# Patient Record
Sex: MALE | Race: BLACK OR AFRICAN AMERICAN | NOT HISPANIC OR LATINO | Employment: OTHER | URBAN - METROPOLITAN AREA
[De-identification: names, ages, dates, MRNs, and addresses within clinical notes are randomized per-mention and may not be internally consistent; named-entity substitution may affect disease eponyms.]

---

## 2017-01-02 DIAGNOSIS — I10 ESSENTIAL HYPERTENSION: ICD-10-CM

## 2017-01-02 RX ORDER — HYDRALAZINE HYDROCHLORIDE 50 MG/1
TABLET, FILM COATED ORAL
Qty: 180 TABLET | Refills: 0 | Status: SHIPPED | OUTPATIENT
Start: 2017-01-02 | End: 2017-02-16 | Stop reason: SDUPTHER

## 2017-01-16 RX ORDER — CALCITRIOL 0.5 UG/1
CAPSULE ORAL
Qty: 90 CAPSULE | Refills: 4 | Status: ON HOLD | OUTPATIENT
Start: 2017-01-16 | End: 2017-04-05 | Stop reason: HOSPADM

## 2017-01-22 ENCOUNTER — HOSPITAL ENCOUNTER (OUTPATIENT)
Facility: HOSPITAL | Age: 82
Discharge: HOME | End: 2017-01-23
Attending: EMERGENCY MEDICINE | Admitting: INTERNAL MEDICINE
Payer: MEDICARE

## 2017-01-22 DIAGNOSIS — R10.30 LOWER ABDOMINAL PAIN: ICD-10-CM

## 2017-01-22 DIAGNOSIS — R53.1 WEAKNESS FOLLOWING CEREBROVASCULAR ACCIDENT (CVA): Chronic | ICD-10-CM

## 2017-01-22 DIAGNOSIS — N18.4 ANEMIA OF CHRONIC RENAL FAILURE, STAGE 4 (SEVERE): Chronic | ICD-10-CM

## 2017-01-22 DIAGNOSIS — E10.65 TYPE 1 DIABETES MELLITUS WITH HYPERGLYCEMIA: Primary | ICD-10-CM

## 2017-01-22 DIAGNOSIS — N18.4 CKD (CHRONIC KIDNEY DISEASE) STAGE 4, GFR 15-29 ML/MIN: Chronic | ICD-10-CM

## 2017-01-22 DIAGNOSIS — I15.0 RENOVASCULAR HYPERTENSION: Chronic | ICD-10-CM

## 2017-01-22 DIAGNOSIS — G40.909 SECONDARY SEIZURE DISORDER: Chronic | ICD-10-CM

## 2017-01-22 DIAGNOSIS — G31.84 MCI (MILD COGNITIVE IMPAIRMENT): Chronic | ICD-10-CM

## 2017-01-22 DIAGNOSIS — E10.65 HYPERGLYCEMIA DUE TO TYPE 1 DIABETES MELLITUS: ICD-10-CM

## 2017-01-22 DIAGNOSIS — D63.1 ANEMIA OF CHRONIC RENAL FAILURE, STAGE 4 (SEVERE): Chronic | ICD-10-CM

## 2017-01-22 DIAGNOSIS — I69.398 WEAKNESS FOLLOWING CEREBROVASCULAR ACCIDENT (CVA): Chronic | ICD-10-CM

## 2017-01-22 LAB
B-OH-BUTYR BLD STRIP-SCNC: 1 MMOL/L
BACTERIA #/AREA URNS AUTO: NORMAL /HPF
BASOPHILS # BLD AUTO: 0.02 K/UL
BASOPHILS NFR BLD: 0.4 %
BILIRUB UR QL STRIP: NEGATIVE
CLARITY UR REFRACT.AUTO: CLEAR
COLOR UR AUTO: ABNORMAL
DIFFERENTIAL METHOD: ABNORMAL
EOSINOPHIL # BLD AUTO: 0.2 K/UL
EOSINOPHIL NFR BLD: 2.8 %
ERYTHROCYTE [DISTWIDTH] IN BLOOD BY AUTOMATED COUNT: 12.4 %
GLUCOSE UR QL STRIP: ABNORMAL
HCT VFR BLD AUTO: 34.9 %
HGB BLD-MCNC: 11.8 G/DL
HGB UR QL STRIP: ABNORMAL
KETONES UR QL STRIP: NEGATIVE
LEUKOCYTE ESTERASE UR QL STRIP: NEGATIVE
LYMPHOCYTES # BLD AUTO: 1.3 K/UL
LYMPHOCYTES NFR BLD: 23.8 %
MCH RBC QN AUTO: 29.3 PG
MCHC RBC AUTO-ENTMCNC: 33.8 %
MCV RBC AUTO: 87 FL
MICROSCOPIC COMMENT: NORMAL
MONOCYTES # BLD AUTO: 0.6 K/UL
MONOCYTES NFR BLD: 11.7 %
NEUTROPHILS # BLD AUTO: 3.2 K/UL
NEUTROPHILS NFR BLD: 61.3 %
NITRITE UR QL STRIP: NEGATIVE
PH UR STRIP: 7 [PH] (ref 5–8)
PLATELET # BLD AUTO: 241 K/UL
PMV BLD AUTO: 11.9 FL
PROT UR QL STRIP: ABNORMAL
RBC # BLD AUTO: 4.03 M/UL
RBC #/AREA URNS AUTO: 1 /HPF (ref 0–4)
SP GR UR STRIP: 1.01 (ref 1–1.03)
URN SPEC COLLECT METH UR: ABNORMAL
UROBILINOGEN UR STRIP-ACNC: NEGATIVE EU/DL
WBC # BLD AUTO: 5.29 K/UL
WBC #/AREA URNS AUTO: 1 /HPF (ref 0–5)
YEAST UR QL AUTO: NORMAL

## 2017-01-22 PROCEDURE — 85025 COMPLETE CBC W/AUTO DIFF WBC: CPT

## 2017-01-22 PROCEDURE — 99285 EMERGENCY DEPT VISIT HI MDM: CPT

## 2017-01-22 PROCEDURE — 82010 KETONE BODYS QUAN: CPT

## 2017-01-22 PROCEDURE — 81001 URINALYSIS AUTO W/SCOPE: CPT

## 2017-01-22 PROCEDURE — 93010 ELECTROCARDIOGRAM REPORT: CPT | Mod: ,,, | Performed by: INTERNAL MEDICINE

## 2017-01-22 PROCEDURE — 93005 ELECTROCARDIOGRAM TRACING: CPT

## 2017-01-22 PROCEDURE — 83036 HEMOGLOBIN GLYCOSYLATED A1C: CPT

## 2017-01-22 PROCEDURE — 99285 EMERGENCY DEPT VISIT HI MDM: CPT | Mod: ,,, | Performed by: EMERGENCY MEDICINE

## 2017-01-22 PROCEDURE — 96374 THER/PROPH/DIAG INJ IV PUSH: CPT

## 2017-01-22 PROCEDURE — 80053 COMPREHEN METABOLIC PANEL: CPT

## 2017-01-22 PROCEDURE — 25000003 PHARM REV CODE 250: Performed by: ANESTHESIOLOGY

## 2017-01-22 PROCEDURE — 96361 HYDRATE IV INFUSION ADD-ON: CPT

## 2017-01-22 PROCEDURE — 82962 GLUCOSE BLOOD TEST: CPT

## 2017-01-22 RX ADMIN — SODIUM CHLORIDE 1000 ML: 0.9 INJECTION, SOLUTION INTRAVENOUS at 09:01

## 2017-01-22 NOTE — IP AVS SNAPSHOT
[facility]  [address]  [facility] [address]  Phone: [phone]           Patient Discharge Instructions     Our goal is to set you up for success. This packet includes information on your condition, medications, and your home care. It will help you to care for yourself so you don't get sicker and need to go back to the hospital.     Please ask your nurse if you have any questions.        There are many details to remember when preparing to leave the hospital. Here is what you will need to do:    1. Take your medicine. If you are prescribed medications, review your Medication List in the following pages. You may have new medications to  at the pharmacy and others that you'll need to stop taking. Review the instructions for how and when to take your medications. Talk with your doctor or nurses if you are unsure of what to do.     2. Go to your follow-up appointments. Specific follow-up information is listed in the following pages. Your may be contacted by a transition nurse or clinical provider about future appointments. Be sure we have all of the phone numbers to reach you, if needed. Please contact your provider's office if you are unable to make an appointment.     3. Watch for warning signs. Your doctor or nurse will give you detailed warning signs to watch for and when to call for assistance. These instructions may also include educational information about your condition. If you experience any of warning signs to your health, call your doctor.               [facility]  Unless otherwise directed by your provider, please contact [facility], our nurse care line that is available for 24/7 assistance.     [phone] (toll-free)    Registered nurses in the [facility] provide clinical advisement, health education, appointment booking, and other advisory services.                    ** Verify the list of medication(s) below is accurate and up  "to date. Carry this with you in case of emergency. If your medications have changed, please notify your healthcare provider.             Medication List      CHANGE how you take these medications        Additional Info                      amlodipine 10 MG tablet   Commonly known as:  NORVASC   Quantity:  90 tablet   Refills:  4   What changed:  Another medication with the same name was removed. Continue taking this medication, and follow the directions you see here.    Last time this was given:  10 mg on 1/23/2017  8:40 AM   Instructions:  TAKE 1 TABLET BY MOUTH EVERY DAY     Begin Date    AM    Noon    PM    Bedtime       pen needle, diabetic 31 gauge x 5/16" Ndle   Commonly known as:  BD INSULIN PEN NEEDLE UF SHORT   Quantity:  450 each   Refills:  3   What changed:  additional instructions    Instructions:  Use to inject insulin 5 times daily     Begin Date    AM    Noon    PM    Bedtime         CONTINUE taking these medications        Additional Info                      aspirin 81 MG EC tablet   Commonly known as:  ECOTRIN   Refills:  0   Dose:  81 mg    Last time this was given:  81 mg on 1/23/2017  8:40 AM   Instructions:  Take 81 mg by mouth once daily.     Begin Date    AM    Noon    PM    Bedtime       blood-glucose meter kit   Commonly known as:  CONTOUR METER   Quantity:  1 each   Refills:  0    Instructions:  Use as instructed.  Please substitute appropriate meter to match his strips.     Begin Date    AM    Noon    PM    Bedtime       calcitRIOL 0.5 MCG Cap   Commonly known as:  ROCALTROL   Quantity:  90 capsule   Refills:  4    Last time this was given:  0.5 mcg on 1/23/2017  8:40 AM   Instructions:  TAKE 1 CAPSULE BY MOUTH ONCE DAILY     Begin Date    AM    Noon    PM    Bedtime       carvedilol 3.125 MG tablet   Commonly known as:  COREG   Quantity:  180 tablet   Refills:  3   Dose:  3.125 mg    Last time this was given:  3.125 mg on 1/23/2017  8:40 AM   Instructions:  Take 1 tablet (3.125 mg " total) by mouth 2 (two) times daily.     Begin Date    AM    Noon    PM    Bedtime       CONTOUR TEST STRIPS Strp   Quantity:  300 strip   Refills:  12   Comments:  **Patient requests 90 days supply**   Generic drug:  blood sugar diagnostic    Instructions:  USE AS DIRECTED THREE TIMES DAILY     Begin Date    AM    Noon    PM    Bedtime       furosemide 20 MG tablet   Commonly known as:  LASIX   Quantity:  90 tablet   Refills:  3    Last time this was given:  20 mg on 1/23/2017  8:40 AM   Instructions:  TAKE 1 TABLET BY MOUTH EVERY DAY     Begin Date    AM    Noon    PM    Bedtime       * hydrALAZINE 50 MG tablet   Commonly known as:  APRESOLINE   Quantity:  180 tablet   Refills:  4   Dose:  50 mg    Last time this was given:  50 mg on 1/23/2017  8:40 AM   Instructions:  Take 1 tablet (50 mg total) by mouth every 12 (twelve) hours.     Begin Date    AM    Noon    PM    Bedtime       * hydrALAZINE 50 MG tablet   Commonly known as:  APRESOLINE   Quantity:  180 tablet   Refills:  0    Last time this was given:  50 mg on 1/23/2017  8:40 AM   Instructions:  TAKE 1 TABLET BY MOUTH EVERY 12 HOURS     Begin Date    AM    Noon    PM    Bedtime       insulin aspart 100 unit/mL Inpn pen   Commonly known as:  NOVOLOG FLEXPEN   Quantity:  1 Box   Refills:  6   Dose:  5 Units    Last time this was given:  7 Units on 1/23/2017  8:38 AM   Instructions:  Inject 5 Units into the skin 3 (three) times daily with meals. Plus correction scale.     Begin Date    AM    Noon    PM    Bedtime       insulin detemir 100 unit/mL (3 mL) Inpn pen   Commonly known as:  LEVEMIR FLEXPEN   Quantity:  1 Box   Refills:  6   Dose:  15 Units    Last time this was given:  12 Units on 1/23/2017  8:39 AM   Instructions:  Inject 15 Units into the skin once daily.     Begin Date    AM    Noon    PM    Bedtime       lancets Misc   Quantity:  100 each   Refills:  3    Instructions:  Use three times daily for finger stick glucose monitoring.     Begin Date    AM     Noon    PM    Bedtime       levetiracetam 1000 MG tablet   Commonly known as:  KEPPRA   Quantity:  180 tablet   Refills:  6    Last time this was given:  1,000 mg on 1/23/2017  8:40 AM   Instructions:  TAKE 1 TABLET BY MOUTH TWICE DAILY     Begin Date    AM    Noon    PM    Bedtime       nystatin cream   Commonly known as:  MYCOSTATIN   Quantity:  60 g   Refills:  3    Instructions:  Apply topically 2 (two) times daily.     Begin Date    AM    Noon    PM    Bedtime       paroxetine 10 MG tablet   Commonly known as:  PAXIL   Quantity:  90 tablet   Refills:  1   Dose:  10 mg    Last time this was given:  10 mg on 1/23/2017  6:53 AM   Instructions:  Take 1 tablet (10 mg total) by mouth every morning.     Begin Date    AM    Noon    PM    Bedtime       * Notice:  This list has 2 medication(s) that are the same as other medications prescribed for you. Read the directions carefully, and ask your doctor or other care provider to review them with you.      ASK your doctor about these medications        Additional Info                      lisinopril 10 MG tablet   Quantity:  90 tablet   Refills:  4   You were prescribed two or more home medications with a similar name. You should completely stop taking this medication.    Instructions:  TAKE 1 TABLET BY MOUTH EVERY DAY     Begin Date    AM    Noon    PM    Bedtime                  Please bring to all follow up appointments:    1. A copy of your discharge instructions.  2. All medicines you are currently taking in their original bottles.  3. Identification and insurance card.    Please arrive 15 minutes ahead of scheduled appointment time.    Please call 24 hours in advance if you must reschedule your appointment and/or time.        Your Scheduled Appointments     Jan 26, 2017 12:00 PM CST   Established Patient Visit with MD Mathieu Hollis - Internal Medicine ([facility])    [address]  [facility] [address]    [phone]            Feb 09, 2017 11:00 AM CST   Non-Fasting Lab with LAB, APPOINTMENT [facility] ([facility])    [address]  [facility] [address]   [phone]            Feb 16, 2017  3:00 PM CST   Established Patient with Clara Coreas DNP, NP   [facility] - Endocrinology ([facility] )    [address] [id number] [phone]              Follow-up Information     Follow up with Mady Carson MD On 1/26/2017.    Specialty:  Internal Medicine    Why:  appointment 12:00pm    Contact information:    [address]  [place] [id number] [phone]          Schedule an appointment as soon as possible for a visit with ISABEL Guevara,ANP-C.    Specialty:  Endocrinology    Contact information:    [address] [id number] [phone]        Referrals     Future Orders    Ambulatory consult to Diabetic Education     Questions:    Diagnosis:      Reason For Referral (Please Check Appropriate Boxes):  Suboptimal blood glucose levels    Diabetes Self-Management Education Program:  Individual DSME    Reason for Indiviual DSME :      Ambulatory Referral to Endocrinology         Discharge Instructions     Future Orders    Activity as tolerated     Diet general     Questions:    Total calories:      Fat restriction, if any:      Protein restriction, if any:      Na restriction, if any:      Fluid restriction:      Additional restrictions:  Diabetic 1800        Primary Diagnosis     Your primary diagnosis was:  Type 1 Diabetes      Admission Information     Date & Time Provider Department CSN    1/22/2017  8:00 PM Jose Henderson MD [facility] [id number]      Care Providers     Provider Role Specialty Primary office phone    Jose Henderson MD Attending Provider Hospitalist [phone]    Jose Hendersno MD Team Attending  Hospitalist [phone]      Your Vitals Were  "    BP Pulse Temp Resp Height Weight    168/88 69 98.4 °F (36.9 °C) (Oral) 18 5' 7" (1.702 m) 79.8 kg (176 lb)    SpO2 BMI             100% 27.57 kg/m2         Recent Lab Values        4/14/2015 10/22/2015 1/19/2016 4/19/2016 6/27/2016 10/20/2016 12/8/2016 1/22/2017      5:37 PM 12:44 PM  2:07 PM  2:30 PM  9:47 PM 10:04 AM 12:23 PM  9:11 PM    A1C 11.2 (H) 8.9 (H) 8.3 (H) 8.5 (H) 8.9 (H) 7.9 (H) 9.1 (H) 9.5 (H)    Comment for A1C at 10:04 AM on 10/20/2016:  According to ADA guidelines, hemoglobin A1C <7.0% represents  optimal control in non-pregnant diabetic patients.  Different  metrics may apply to specific populations.   Standards of Medical Care in Diabetes - 2016.  For the purpose of screening for the presence of diabetes:  <5.7%     Consistent with the absence of diabetes  5.7-6.4%  Consistent with increasing risk for diabetes   (prediabetes)  >or=6.5%  Consistent with diabetes  Currently no consensus exists for use of hemoglobin A1C  for diagnosis of diabetes for children.      Comment for A1C at 12:23 PM on 12/8/2016:  According to ADA guidelines, hemoglobin A1C <7.0% represents  optimal control in non-pregnant diabetic patients.  Different  metrics may apply to specific populations.   Standards of Medical Care in Diabetes - 2016.  For the purpose of screening for the presence of diabetes:  <5.7%     Consistent with the absence of diabetes  5.7-6.4%  Consistent with increasing risk for diabetes   (prediabetes)  >or=6.5%  Consistent with diabetes  Currently no consensus exists for use of hemoglobin A1C  for diagnosis of diabetes for children.      Comment for A1C at  9:11 PM on 1/22/2017:  According to ADA guidelines, hemoglobin A1C <7.0% represents  optimal control in non-pregnant diabetic patients.  Different  metrics may apply to specific populations.   Standards of Medical Care in Diabetes - 2016.  For the purpose of screening for the presence of diabetes:  <5.7%     Consistent with the absence of " diabetes  5.7-6.4%  Consistent with increasing risk for diabetes   (prediabetes)  >or=6.5%  Consistent with diabetes  Currently no consensus exists for use of hemoglobin A1C  for diagnosis of diabetes for children.        Allergies as of 1/23/2017     No Known Allergies      Advance Directives     An advance directive is a document which, in the event you are no longer able to make decisions for yourself, tells your healthcare team what kind of treatment you do or do not want to receive, or who you would like to make those decisions for you.  If you do not currently have an advance directive, [facility] encourages you to create one.  For more information call:  [phone]), [phone] ([phone]),  or log on to [email or web address].        Smoking Cessation     If you would like to quit smoking:   You may be eligible for free services if you are a [place] resident and started smoking cigarettes before September 1, 1988.  Call the Smoking Cessation Trust ([facility]) toll free at (189) [phone] or [phone].   Call 3-730-QUIT-NOW if you do not meet the above criteria.            Language Assistance Services     ATTENTION: Language assistance services are available, free of charge. Please call [phone].      ATENCIÓN: Si habla español, tiene a miller disposición servicios gratuitos de asistencia lingüística. Llame al [phone].     CHÚ Ý: N?u b?n nói Ti?ng Vi?t, có các d?ch v? h? tr? ngôn ng? mi?n phí dành cho b?n. G?i s? [phone].        Chronic Kindey Disease Education             Diabetes Discharge Instructions                                    [facility] complies with applicable Federal civil rights laws and does not discriminate on the basis of race, color, national origin, age, disability, or sex.

## 2017-01-23 VITALS
RESPIRATION RATE: 18 BRPM | HEIGHT: 67 IN | HEART RATE: 69 BPM | BODY MASS INDEX: 27.62 KG/M2 | WEIGHT: 176 LBS | SYSTOLIC BLOOD PRESSURE: 168 MMHG | TEMPERATURE: 98 F | DIASTOLIC BLOOD PRESSURE: 88 MMHG | OXYGEN SATURATION: 100 %

## 2017-01-23 PROBLEM — R10.30 LOWER ABDOMINAL PAIN: Status: ACTIVE | Noted: 2017-01-23

## 2017-01-23 PROBLEM — E10.65 HYPERGLYCEMIA DUE TO TYPE 1 DIABETES MELLITUS: Status: ACTIVE | Noted: 2017-01-23

## 2017-01-23 LAB
ALBUMIN SERPL BCP-MCNC: 3 G/DL
ALP SERPL-CCNC: 100 U/L
ALT SERPL W/O P-5'-P-CCNC: 30 U/L
ANION GAP SERPL CALC-SCNC: 8 MMOL/L
ANION GAP SERPL CALC-SCNC: 9 MMOL/L
AST SERPL-CCNC: 22 U/L
BASOPHILS # BLD AUTO: 0.02 K/UL
BASOPHILS NFR BLD: 0.4 %
BILIRUB SERPL-MCNC: 0.4 MG/DL
BUN SERPL-MCNC: 21 MG/DL
BUN SERPL-MCNC: 23 MG/DL (ref 6–30)
BUN SERPL-MCNC: 25 MG/DL
CALCIUM SERPL-MCNC: 8.7 MG/DL
CALCIUM SERPL-MCNC: 9.9 MG/DL
CHLORIDE SERPL-SCNC: 103 MMOL/L (ref 95–110)
CHLORIDE SERPL-SCNC: 104 MMOL/L
CHLORIDE SERPL-SCNC: 106 MMOL/L
CO2 SERPL-SCNC: 25 MMOL/L
CO2 SERPL-SCNC: 28 MMOL/L
CREAT SERPL-MCNC: 1.9 MG/DL (ref 0.5–1.4)
CREAT SERPL-MCNC: 2 MG/DL
CREAT SERPL-MCNC: 2.2 MG/DL
DIFFERENTIAL METHOD: ABNORMAL
EOSINOPHIL # BLD AUTO: 0.2 K/UL
EOSINOPHIL NFR BLD: 3.3 %
ERYTHROCYTE [DISTWIDTH] IN BLOOD BY AUTOMATED COUNT: 12.5 %
EST. GFR  (AFRICAN AMERICAN): 30.7 ML/MIN/1.73 M^2
EST. GFR  (AFRICAN AMERICAN): 34.4 ML/MIN/1.73 M^2
EST. GFR  (NON AFRICAN AMERICAN): 26.5 ML/MIN/1.73 M^2
EST. GFR  (NON AFRICAN AMERICAN): 29.8 ML/MIN/1.73 M^2
ESTIMATED AVG GLUCOSE: 226 MG/DL
GLUCOSE SERPL-MCNC: 227 MG/DL
GLUCOSE SERPL-MCNC: 547 MG/DL (ref 70–110)
GLUCOSE SERPL-MCNC: 592 MG/DL
HBA1C MFR BLD HPLC: 9.5 %
HCT VFR BLD AUTO: 34.5 %
HCT VFR BLD CALC: 32 %PCV (ref 36–54)
HGB BLD-MCNC: 11.8 G/DL
LYMPHOCYTES # BLD AUTO: 1.9 K/UL
LYMPHOCYTES NFR BLD: 32.9 %
MCH RBC QN AUTO: 29.1 PG
MCHC RBC AUTO-ENTMCNC: 34.2 %
MCV RBC AUTO: 85 FL
MONOCYTES # BLD AUTO: 0.5 K/UL
MONOCYTES NFR BLD: 8.9 %
NEUTROPHILS # BLD AUTO: 3.1 K/UL
NEUTROPHILS NFR BLD: 54.3 %
PLATELET # BLD AUTO: 230 K/UL
PMV BLD AUTO: 10.6 FL
POC IONIZED CALCIUM: 1.2 MMOL/L (ref 1.06–1.42)
POC TCO2 (MEASURED): 27 MMOL/L (ref 23–29)
POCT GLUCOSE: 213 MG/DL (ref 70–110)
POCT GLUCOSE: 218 MG/DL (ref 70–110)
POCT GLUCOSE: 222 MG/DL (ref 70–110)
POCT GLUCOSE: 279 MG/DL (ref 70–110)
POCT GLUCOSE: 461 MG/DL (ref 70–110)
POCT GLUCOSE: >500 MG/DL (ref 70–110)
POCT GLUCOSE: >500 MG/DL (ref 70–110)
POTASSIUM BLD-SCNC: 3.4 MMOL/L (ref 3.5–5.1)
POTASSIUM SERPL-SCNC: 3.3 MMOL/L
POTASSIUM SERPL-SCNC: 3.3 MMOL/L
PROT SERPL-MCNC: 6.4 G/DL
RBC # BLD AUTO: 4.06 M/UL
SAMPLE: ABNORMAL
SODIUM BLD-SCNC: 138 MMOL/L (ref 136–145)
SODIUM SERPL-SCNC: 139 MMOL/L
SODIUM SERPL-SCNC: 141 MMOL/L
WBC # BLD AUTO: 5.71 K/UL

## 2017-01-23 PROCEDURE — 25000003 PHARM REV CODE 250: Performed by: PHYSICIAN ASSISTANT

## 2017-01-23 PROCEDURE — G0378 HOSPITAL OBSERVATION PER HR: HCPCS

## 2017-01-23 PROCEDURE — 63600175 PHARM REV CODE 636 W HCPCS: Performed by: ANESTHESIOLOGY

## 2017-01-23 PROCEDURE — 85025 COMPLETE CBC W/AUTO DIFF WBC: CPT

## 2017-01-23 PROCEDURE — 36415 COLL VENOUS BLD VENIPUNCTURE: CPT

## 2017-01-23 PROCEDURE — 80048 BASIC METABOLIC PNL TOTAL CA: CPT

## 2017-01-23 PROCEDURE — 25000003 PHARM REV CODE 250: Performed by: ANESTHESIOLOGY

## 2017-01-23 PROCEDURE — 99217 PR OBSERVATION CARE DISCHARGE: CPT | Mod: ,,, | Performed by: PHYSICIAN ASSISTANT

## 2017-01-23 PROCEDURE — 99220 PR INITIAL OBSERVATION CARE,LEVL III: CPT | Mod: ,,, | Performed by: PHYSICIAN ASSISTANT

## 2017-01-23 PROCEDURE — 63600175 PHARM REV CODE 636 W HCPCS: Performed by: PHYSICIAN ASSISTANT

## 2017-01-23 RX ORDER — PROMETHAZINE HYDROCHLORIDE 25 MG/1
25 TABLET ORAL EVERY 6 HOURS PRN
Status: DISCONTINUED | OUTPATIENT
Start: 2017-01-23 | End: 2017-01-23 | Stop reason: HOSPADM

## 2017-01-23 RX ORDER — HYDRALAZINE HYDROCHLORIDE 25 MG/1
50 TABLET, FILM COATED ORAL EVERY 12 HOURS
Status: DISCONTINUED | OUTPATIENT
Start: 2017-01-23 | End: 2017-01-23

## 2017-01-23 RX ORDER — GLUCAGON 1 MG
1 KIT INJECTION
Status: DISCONTINUED | OUTPATIENT
Start: 2017-01-23 | End: 2017-01-23 | Stop reason: HOSPADM

## 2017-01-23 RX ORDER — AMLODIPINE BESYLATE 10 MG/1
10 TABLET ORAL DAILY
Status: DISCONTINUED | OUTPATIENT
Start: 2017-01-23 | End: 2017-01-23 | Stop reason: HOSPADM

## 2017-01-23 RX ORDER — IBUPROFEN 200 MG
24 TABLET ORAL
Status: DISCONTINUED | OUTPATIENT
Start: 2017-01-23 | End: 2017-01-23 | Stop reason: HOSPADM

## 2017-01-23 RX ORDER — POTASSIUM CHLORIDE 20 MEQ/1
40 TABLET, EXTENDED RELEASE ORAL ONCE
Status: COMPLETED | OUTPATIENT
Start: 2017-01-23 | End: 2017-01-23

## 2017-01-23 RX ORDER — CARVEDILOL 3.12 MG/1
3.12 TABLET ORAL 2 TIMES DAILY
Status: DISCONTINUED | OUTPATIENT
Start: 2017-01-23 | End: 2017-01-23 | Stop reason: HOSPADM

## 2017-01-23 RX ORDER — LEVETIRACETAM 500 MG/1
1000 TABLET ORAL 2 TIMES DAILY
Status: DISCONTINUED | OUTPATIENT
Start: 2017-01-23 | End: 2017-01-23 | Stop reason: HOSPADM

## 2017-01-23 RX ORDER — POLYETHYLENE GLYCOL 3350 17 G/17G
17 POWDER, FOR SOLUTION ORAL 3 TIMES DAILY PRN
Status: DISCONTINUED | OUTPATIENT
Start: 2017-01-23 | End: 2017-01-23 | Stop reason: HOSPADM

## 2017-01-23 RX ORDER — RAMELTEON 8 MG/1
8 TABLET ORAL NIGHTLY PRN
Status: DISCONTINUED | OUTPATIENT
Start: 2017-01-23 | End: 2017-01-23 | Stop reason: HOSPADM

## 2017-01-23 RX ORDER — CALCITRIOL 0.25 UG/1
0.5 CAPSULE ORAL DAILY
Status: DISCONTINUED | OUTPATIENT
Start: 2017-01-23 | End: 2017-01-23 | Stop reason: HOSPADM

## 2017-01-23 RX ORDER — IPRATROPIUM BROMIDE AND ALBUTEROL SULFATE 2.5; .5 MG/3ML; MG/3ML
3 SOLUTION RESPIRATORY (INHALATION) EVERY 4 HOURS PRN
Status: DISCONTINUED | OUTPATIENT
Start: 2017-01-23 | End: 2017-01-23 | Stop reason: HOSPADM

## 2017-01-23 RX ORDER — IBUPROFEN 200 MG
16 TABLET ORAL
Status: DISCONTINUED | OUTPATIENT
Start: 2017-01-23 | End: 2017-01-23 | Stop reason: HOSPADM

## 2017-01-23 RX ORDER — ASPIRIN 81 MG/1
81 TABLET ORAL DAILY
Status: DISCONTINUED | OUTPATIENT
Start: 2017-01-23 | End: 2017-01-23 | Stop reason: HOSPADM

## 2017-01-23 RX ORDER — ONDANSETRON 8 MG/1
8 TABLET, ORALLY DISINTEGRATING ORAL EVERY 8 HOURS PRN
Status: DISCONTINUED | OUTPATIENT
Start: 2017-01-23 | End: 2017-01-23 | Stop reason: HOSPADM

## 2017-01-23 RX ORDER — LOPERAMIDE HYDROCHLORIDE 2 MG/1
2 CAPSULE ORAL
Status: DISCONTINUED | OUTPATIENT
Start: 2017-01-23 | End: 2017-01-23 | Stop reason: HOSPADM

## 2017-01-23 RX ORDER — HYDRALAZINE HYDROCHLORIDE 50 MG/1
50 TABLET, FILM COATED ORAL EVERY 12 HOURS
Status: DISCONTINUED | OUTPATIENT
Start: 2017-01-23 | End: 2017-01-23 | Stop reason: HOSPADM

## 2017-01-23 RX ORDER — INSULIN ASPART 100 [IU]/ML
5 INJECTION, SOLUTION INTRAVENOUS; SUBCUTANEOUS
Status: DISCONTINUED | OUTPATIENT
Start: 2017-01-23 | End: 2017-01-23 | Stop reason: HOSPADM

## 2017-01-23 RX ORDER — ACETAMINOPHEN 325 MG/1
650 TABLET ORAL EVERY 4 HOURS PRN
Status: DISCONTINUED | OUTPATIENT
Start: 2017-01-23 | End: 2017-01-23 | Stop reason: HOSPADM

## 2017-01-23 RX ORDER — PAROXETINE 10 MG/1
10 TABLET, FILM COATED ORAL EVERY MORNING
Status: DISCONTINUED | OUTPATIENT
Start: 2017-01-23 | End: 2017-01-23 | Stop reason: HOSPADM

## 2017-01-23 RX ORDER — INSULIN ASPART 100 [IU]/ML
0-5 INJECTION, SOLUTION INTRAVENOUS; SUBCUTANEOUS
Status: DISCONTINUED | OUTPATIENT
Start: 2017-01-23 | End: 2017-01-23 | Stop reason: HOSPADM

## 2017-01-23 RX ORDER — FUROSEMIDE 20 MG/1
20 TABLET ORAL DAILY
Status: DISCONTINUED | OUTPATIENT
Start: 2017-01-23 | End: 2017-01-23 | Stop reason: HOSPADM

## 2017-01-23 RX ADMIN — AMLODIPINE BESYLATE 10 MG: 10 TABLET ORAL at 08:01

## 2017-01-23 RX ADMIN — INSULIN ASPART 5 UNITS: 100 INJECTION, SOLUTION INTRAVENOUS; SUBCUTANEOUS at 12:01

## 2017-01-23 RX ADMIN — INSULIN ASPART 2 UNITS: 100 INJECTION, SOLUTION INTRAVENOUS; SUBCUTANEOUS at 12:01

## 2017-01-23 RX ADMIN — CARVEDILOL 3.12 MG: 3.12 TABLET, FILM COATED ORAL at 08:01

## 2017-01-23 RX ADMIN — POTASSIUM CHLORIDE 40 MEQ: 1500 TABLET, EXTENDED RELEASE ORAL at 11:01

## 2017-01-23 RX ADMIN — FUROSEMIDE 20 MG: 20 TABLET ORAL at 08:01

## 2017-01-23 RX ADMIN — INSULIN DETEMIR 12 UNITS: 100 INJECTION, SOLUTION SUBCUTANEOUS at 08:01

## 2017-01-23 RX ADMIN — LEVETIRACETAM 1000 MG: 500 TABLET, FILM COATED ORAL at 08:01

## 2017-01-23 RX ADMIN — INSULIN ASPART 2 UNITS: 100 INJECTION, SOLUTION INTRAVENOUS; SUBCUTANEOUS at 08:01

## 2017-01-23 RX ADMIN — HYDRALAZINE HYDROCHLORIDE 50 MG: 50 TABLET ORAL at 08:01

## 2017-01-23 RX ADMIN — INSULIN ASPART 5 UNITS: 100 INJECTION, SOLUTION INTRAVENOUS; SUBCUTANEOUS at 08:01

## 2017-01-23 RX ADMIN — CALCITRIOL 0.5 MCG: 0.25 CAPSULE, LIQUID FILLED ORAL at 08:01

## 2017-01-23 RX ADMIN — SODIUM CHLORIDE 1000 ML: 0.9 INJECTION, SOLUTION INTRAVENOUS at 12:01

## 2017-01-23 RX ADMIN — ASPIRIN 81 MG: 81 TABLET, COATED ORAL at 08:01

## 2017-01-23 RX ADMIN — PAROXETINE HYDROCHLORIDE 10 MG: 10 TABLET, FILM COATED ORAL at 06:01

## 2017-01-23 RX ADMIN — INSULIN HUMAN 8 UNITS: 100 INJECTION, SOLUTION PARENTERAL at 12:01

## 2017-01-23 NOTE — DISCHARGE SUMMARY
[facility] Hospital Medicine  Discharge Summary      Patient Name: Julius Cardenas  MRN: [id number]  Admission Date: 1/22/2017  Hospital Length of Stay: 0 days  Discharge Date and Time: 1/23/2017 10:26 AM  Attending Physician: Jose Henderson MD   Discharging Provider: Yris Gamez PA-C  Primary Care Provider: Mady Carson MD  [facility] Medicine Team: [facility] [place] [facility] Yris Gamez PA-C    HPI:   Patient is an 84 year old gentleman with a h/o CKD 4, HTN, poorly controlled DM I, parasagittal meningioma with secondary seizure disorder, TIA, and mild cognitive impairment.  He presents with abdominal pain and hyperglycemia.  No family at bedside.  Abdominal pain began earlier today.  He has had two BMs today and is passing gas (one BM in ER).  He denies N/V/D, chest pain, SOB, dizziness, palpitations, fever/chills.  Glucose on arrival was 592.  He was given 1L NS bolus and 8 units regular insulin with improvement to 461.  He reports improvement with abdominal pain.    * No surgery found *      Indwelling Lines/Drains at time of discharge:   Lines/Drains/Airways          No matching active lines, drains, or airways        Hospital Course:   Pt admitted to observation for hyperglycemia (ED ).  Anion gap of 8, Beta-Hydroxybutyrate 1.0.  Pt not in DKA.  Glucose improved with IVF and administration of prescribed insulin (noncompliance an issue).  At time of discharge, glucose 227.  Pt to be discharged on Levemir 15U qhs and Novolog 5U tid with meals plus low dose correction scale.  Will refer to diabetes education outpatient.  Potassium of 3.3 replaced po prior to discharge.     Consults:   Consults         Status Ordering Provider     Case Management/  Once     Provider:  (Not yet assigned)    Acknowledged PEGGY HICKEY     Inpatient consult to Diabetes educator  Once     Provider:  (Not yet assigned)    Acknowledged PEGGY HICKEY.          Significant Diagnostic  Studies: Labs:   BMP:   Recent Labs  Lab 01/22/17  2349 01/23/17  0821   * 227*    141   K 3.3* 3.3*    104   CO2 25 28   BUN 25* 21   CREATININE 2.2* 2.0*   CALCIUM 8.7 9.9       Pending Diagnostic Studies:     None        Final Active Diagnoses:    Diagnosis Date Noted POA    PRINCIPAL PROBLEM:  Hyperglycemia due to type 1 diabetes mellitus [E10.65] 01/23/2017 Yes    Weakness following cerebrovascular accident (CVA) [I69.398, R53.1] 01/30/2016 Not Applicable     Chronic    Anemia of chronic renal failure, stage 4 (severe) [N18.4, D63.1] 04/08/2014 Yes     Chronic    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  Service: Hospital Medicine      Discharged Condition: good    Disposition: Home or Self Care    Follow Up:  Follow-up Information     Follow up with Mady Carson MD On 1/26/2017.    Specialty:  Internal Medicine    Why:  appointment 12:00pm    Contact information:    [address]  [place] [address]  [phone]          Schedule an appointment as soon as possible for a visit with ISABEL Guevara,ANP-C.    Specialty:  Endocrinology    Contact information:    [address] [facility] [id number] [phone]          Patient Instructions:     Ambulatory Referral to Endocrinology   Referral Priority: Routine Referral Type: Consultation   Requested Specialty: Endocrinology    Number of Visits Requested: 1      Ambulatory consult to Diabetic Education   Referral Priority: Routine Referral Type: Consultation   Referral  Reason: Specialty Services Required    Requested Specialty: Diabetes    Number of Visits Requested: 1      Diet general   Order Specific Question Answer Comments   Additional restrictions: Diabetic 1800      Activity as tolerated       Medications:  Reconciled Home Medications:   Current Discharge Medication List      CONTINUE these medications which have NOT CHANGED    Details   amlodipine (NORVASC) 10 MG tablet TAKE 1 TABLET BY MOUTH EVERY DAY  Qty: 90 tablet, Refills: 4      aspirin (ECOTRIN) 81 MG EC tablet Take 81 mg by mouth once daily.      blood-glucose meter (CONTOUR METER) kit Use as instructed.  Please substitute appropriate meter to match his strips.  Qty: 1 each, Refills: 0    Associated Diagnoses: DM2 (diabetes mellitus, type 2); Type II or unspecified type diabetes mellitus with renal manifestations, uncontrolled      calcitRIOL (ROCALTROL) 0.5 MCG Cap TAKE 1 CAPSULE BY MOUTH ONCE DAILY  Qty: 90 capsule, Refills: 4      carvedilol (COREG) 3.125 MG tablet Take 1 tablet (3.125 mg total) by mouth 2 (two) times daily.  Qty: 180 tablet, Refills: 3    Associated Diagnoses: Essential hypertension      CONTOUR TEST STRIPS Strp USE AS DIRECTED THREE TIMES DAILY  Qty: 300 strip, Refills: 12    Comments: **Patient requests 90 days supply**  Associated Diagnoses: Diabetes mellitus due to abnormal insulin      furosemide (LASIX) 20 MG tablet TAKE 1 TABLET BY MOUTH EVERY DAY  Qty: 90 tablet, Refills: 3      !! hydrALAZINE (APRESOLINE) 50 MG tablet Take 1 tablet (50 mg total) by mouth every 12 (twelve) hours.  Qty: 180 tablet, Refills: 4    Associated Diagnoses: Renovascular hypertension      !! hydrALAZINE (APRESOLINE) 50 MG tablet TAKE 1 TABLET BY MOUTH EVERY 12 HOURS  Qty: 180 tablet, Refills: 0    Associated Diagnoses: Essential hypertension      insulin aspart (NOVOLOG FLEXPEN) 100 unit/mL InPn pen Inject 5 Units into the skin 3 (three) times daily with meals. Plus correction scale.  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  Qty: 100 each, Refills: 3      levetiracetam (KEPPRA) 1000 MG tablet TAKE 1 TABLET BY MOUTH TWICE DAILY  Qty: 180 tablet, Refills: 6    Associated Diagnoses: Intractable epilepsy without status epilepticus      nystatin (MYCOSTATIN) cream Apply topically 2 (two) times daily.  Qty: 60 g, Refills: 3      paroxetine (PAXIL) 10 MG tablet Take 1 tablet (10 mg total) by mouth every morning.  Qty: 90 tablet, Refills: 1      pen needle, diabetic (BD INSULIN PEN NEEDLE UF SHORT) 31 gauge x 5/16" Ndle Use to inject insulin 5 times daily  Qty: 450 each, Refills: 3    Associated Diagnoses: 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       !! - Potential duplicate medications found. Please discuss with provider.      STOP taking these medications       lisinopril 10 MG tablet Comments:   Reason for Stopping:         lisinopril 10 MG tablet Comments:   Reason for Stopping:             Time spent on the discharge of patient: >30 minutes    Yris Gamez PA-C  Department of Hospital Medicine  [facility]  "

## 2017-01-23 NOTE — PROGRESS NOTES
Pt free of falls. Pain at LLQ but said manageable and no med needs for pain at this time. Cbg is within stable dtczt=206. Vs within pt baseline. No acute distress noted. Will continue to monitor pt.

## 2017-01-23 NOTE — SUBJECTIVE & OBJECTIVE
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     TIA (transient ischemic attack) 2016    Type 1 diabetes     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 3/25/2013    Type 1 diabetes mellitus with hyperglycemia 4/8/2014    Type 1 diabetes mellitus with hypoglycemia and without coma 4/8/2014    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      Poor control due to cognitive impairment, with history of hypoglycemia and DKA     Vitamin D deficiency disease 7/31/2013       Past Surgical History   Procedure Laterality Date    Cataract extraction w/  intraocular lens implant  8/26/2009, 10/14/2009    Craniotomy  1995    Craniotomy  12/21/2010    Eye surgery         Review of patient's allergies indicates:  No Known Allergies    No current facility-administered medications on file prior to encounter.      Current Outpatient Prescriptions on File Prior to Encounter   Medication Sig    amlodipine (NORVASC) 10 MG tablet TAKE 1 TABLET BY MOUTH EVERY DAY    aspirin (ECOTRIN) 81 MG EC tablet Take 81 mg by mouth once daily.    blood-glucose meter (CONTOUR METER) kit Use as instructed.  Please substitute appropriate meter to match his strips.    calcitRIOL (ROCALTROL) 0.5 MCG Cap TAKE 1 CAPSULE BY MOUTH ONCE DAILY    carvedilol (COREG) 3.125 MG tablet Take 1 tablet (3.125 mg total) by mouth 2 (two) times daily.     "CONTOUR TEST STRIPS Strp USE AS DIRECTED THREE TIMES DAILY    furosemide (LASIX) 20 MG tablet TAKE 1 TABLET BY MOUTH EVERY DAY    hydrALAZINE (APRESOLINE) 50 MG tablet Take 1 tablet (50 mg total) by mouth every 12 (twelve) hours.    hydrALAZINE (APRESOLINE) 50 MG tablet TAKE 1 TABLET BY MOUTH EVERY 12 HOURS    insulin aspart (NOVOLOG FLEXPEN) 100 unit/mL InPn pen Inject 5 Units into the skin 3 (three) times daily with meals. Plus correction scale.    insulin detemir (LEVEMIR FLEXPEN) 100 unit/mL (3 mL) SubQ InPn pen Inject 15 Units into the skin once daily.    lancets Misc Use three times daily for finger stick glucose monitoring.    levetiracetam (KEPPRA) 1000 MG tablet TAKE 1 TABLET BY MOUTH TWICE DAILY    nystatin (MYCOSTATIN) cream Apply topically 2 (two) times daily.    paroxetine (PAXIL) 10 MG tablet Take 1 tablet (10 mg total) by mouth every morning.    pen needle, diabetic (BD INSULIN PEN NEEDLE UF SHORT) 31 gauge x 5/16" Ndle Use to inject insulin 5 times daily (Patient taking differently: Use to inject insulin 4 times daily)    [DISCONTINUED] amlodipine (NORVASC) 10 MG tablet Take 1 tablet (10 mg total) by mouth once daily.    [DISCONTINUED] lisinopril 10 MG tablet Take 1 tablet (10 mg total) by mouth once daily.    [DISCONTINUED] lisinopril 10 MG tablet TAKE 1 TABLET BY MOUTH EVERY DAY     Family History     Problem Relation (Age of Onset)    Cataracts Mother    Diabetes Mother, Sister, Sister, Brother, Sister, Brother    No Known Problems Father, Son, Daughter, Maternal Grandmother, Maternal Grandfather, Paternal Grandmother, Paternal Grandfather, Maternal Aunt, Maternal Uncle, Paternal Aunt, Paternal Uncle        Social History Main Topics    Smoking status: Former Smoker     Packs/day: 1.00     Types: Cigarettes     Quit date: 12/31/1994    Smokeless tobacco: Former User     Quit date: 4/3/2010    Alcohol use No    Drug use: No    Sexual activity: Not on file     Review of Systems "   Constitutional: Negative for activity change, appetite change, chills, diaphoresis, fatigue, fever and unexpected weight change.   HENT: Negative for congestion, rhinorrhea, sore throat, trouble swallowing and voice change.    Eyes: Negative for visual disturbance.   Respiratory: Negative for cough, choking, chest tightness, shortness of breath and wheezing.    Cardiovascular: Negative for chest pain, palpitations and leg swelling.   Gastrointestinal: Positive for abdominal pain. Negative for abdominal distention, anal bleeding, blood in stool, constipation, diarrhea, nausea and vomiting.   Endocrine: Negative for cold intolerance, heat intolerance, polydipsia and polyuria.   Genitourinary: Negative for dysuria, flank pain, frequency, hematuria and urgency.   Musculoskeletal: Negative for arthralgias, back pain, joint swelling and myalgias.   Skin: Negative for color change and rash.   Neurological: Negative for dizziness, seizures, syncope, facial asymmetry, speech difficulty, weakness, light-headedness, numbness and headaches.   Hematological: Negative for adenopathy. Does not bruise/bleed easily.   Psychiatric/Behavioral: Negative for agitation, confusion, hallucinations and suicidal ideas.     Objective:     Vital Signs (Most Recent):  Temp: 97.7 °F (36.5 °C) (01/22/17 1620)  Pulse: 73 (01/23/17 0241)  Resp: 18 (01/23/17 0242)  BP: (!) 158/77 (01/23/17 0241)  SpO2: 100 % (01/23/17 0241) Vital Signs (24h Range):  Temp:  [97.7 °F (36.5 °C)] 97.7 °F (36.5 °C)  Pulse:  [72-79] 73  Resp:  [16-26] 18  SpO2:  [97 %-100 %] 100 %  BP: (158-195)/(72-90) 158/77     Weight: 77.1 kg (170 lb)  Body mass index is 26.63 kg/(m^2).    Physical Exam   Constitutional: He is oriented to person, place, and time. He appears well-developed and well-nourished. No distress.   HENT:   Head: Normocephalic and atraumatic.   Eyes: Pupils are equal, round, and reactive to light.   Neck: Neck supple. Carotid bruit is not present. No  thyromegaly present.   Cardiovascular: Normal rate and regular rhythm.  Exam reveals no gallop.    No murmur heard.  Pulmonary/Chest: Effort normal and breath sounds normal. No respiratory distress. He has no wheezes.   Abdominal: Bowel sounds are normal. He exhibits no distension. There is no splenomegaly or hepatomegaly. There is no tenderness.   Musculoskeletal: Normal range of motion. He exhibits no edema.   Neurological: He is alert and oriented to person, place, and time.   Skin: Skin is warm and dry. No rash noted.   Psychiatric: He has a normal mood and affect. His behavior is normal.        Significant Labs: All pertinent labs within the past 24 hours have been reviewed.    Significant Imaging: I have reviewed all pertinent imaging results/findings within the past 24 hours.

## 2017-01-23 NOTE — NURSING
Potassium replacement administered. Family contacted about need for transportation since pt d/c. Family stated they could not provide the ride and pt would need taxi like last time. Contacted DAVID Gonzales about ride. IV access removed. Catheter intact. Discharge instructions given to pt.     1145: Spoke with MJ Rizzo about ride. Ride set up for  at 1300.       1315: pt picked up. Personal belongings sent with pt. Pt stable upon leaving floor.

## 2017-01-23 NOTE — ED NOTES
Pt identifiers Julius Cardenas were checked and are correct  LOC: The patient is awake, alert, aware of environment with an appropriate affect. Oriented to person, place, and time, but answers questions inappropriately.  APPEARANCE: Pt resting comfortably, in no acute distress, pt is clean but does not appear well groomed. SKIN: Skin warm, dry and intact, loose skin turgor, dry mucous membranes.  RESPIRATORY: Airway is open and patent, respirations are spontaneous, even and unlabored, normal effort and rate. Lung sounds diminished but clear bilaterally  CARDIAC: Normal rate and rhythm, 3+ BLE edema noted, pedal pulses palpable.  ABDOMEN: Soft, tender to palpation, nondistended. Bowel sounds present x4 quads. Pt incontinent of urine and stool.    NEUROLOGIC: PERRL, facial expression is symmetrical, patient moving all extremities spontaneously.  Follows all commands appropriately  MUSCULOSKELETAL: No obvious deformities.

## 2017-01-23 NOTE — ED NOTES
Pt. Brought to bathroom in ED stretcher and was changed into hospital gown. Pt. Had many episodes of urine incontinence and home brief was saturated along with bed sheets. Perianal care provided. Pt. Changed into hospital gown and all clothing placed in labeled belonging bags (2x). Bed linens were changed and fresh sheets/blankets and hospital non-skid socks provided to patient.

## 2017-01-23 NOTE — ASSESSMENT & PLAN NOTE
- Continue amlodipine 10mg daily, Coreg 3.125mg daily, hydralazine 50mg BID, lisinopril 10mg daily.

## 2017-01-23 NOTE — ASSESSMENT & PLAN NOTE
- Glucose now improved to 279.  - Will start patient on 12 units Levemir daily and 5 units Novolog TID WM.  - Anion gap of 8, Beta-Hydroxybutyrate 1.0.  Not in DKA.  - Will consult diabetes educator.  Patient has multiple recent admission for issues pertaining to his diabetes.  I suspect his mild cognitive impairment is contributing.  12/8/2016 HgbA1c 9.1.

## 2017-01-23 NOTE — PLAN OF CARE
DAVID spoke to MJ Rizzo, and confirmed that pt needs a wc van to his house.  MJ was in pt's room and confirmed pt's address and p/u time.    DAVID called [facility] ([phone]) and spoke to Jeannine.  DAVID set up and  confirmed a 1pm  from 1160b, to his home.    DAVID informed pt's RN of p/u time.    Macrina Quevedo LCSW     [phone]  Critical Care ([facility])

## 2017-01-23 NOTE — UM SECONDARY REVIEW
Physician Advisor External - case referred to EHR via email from SAMI Lofton RN    Level of Care Issue

## 2017-01-23 NOTE — ED PROVIDER NOTES
Encounter Date: 1/22/2017    SCRIBE #1 NOTE: I, Fabienne Romo, am scribing for, and in the presence of,  Dr. Sauceda. I have scribed the following portions of the note - the Resident attestation.       History     Chief Complaint   Patient presents with    Abdominal Pain     +c/o generalized weakness; CBG >600 per EMS     Review of patient's allergies indicates:  No Known Allergies    HPI  The history is provided by the patient.   84 y M w PMHx of DM1 poorly controlled, CKD4, HTN, parasagittal meningioma with secondary seizure disorder, TIA and mild cognitive impairment presenting with abdominal pain and hyperglycemia. Patient reports abdominal pain that started earlier today. He could not give it a number. He say it does not radiate and is not associated with N/V, CP, SOB, fever, chills. He reports he had a BM today and is passing gas. He denies blood in his stools, and nursing confirmed as he had a large BM upon being roomed.       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     TIA (transient ischemic attack) 2016    Type 1 diabetes     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 3/25/2013    Type 1 diabetes mellitus with hyperglycemia 4/8/2014    Type 1 diabetes mellitus with hypoglycemia and without coma 4/8/2014    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      Poor control due to cognitive  impairment, with history of hypoglycemia and DKA     Vitamin D deficiency disease 7/31/2013     Past Medical History Pertinent Negatives   Diagnosis Date Noted    Amblyopia 2/1/2016    Arthritis 2/1/2016    Cataract 2/1/2016    Diabetic retinopathy 2/1/2016    Glaucoma 2/1/2016    Macular degeneration 2/1/2016    Retinal detachment 2/1/2016    Sickle cell anemia 2/1/2016    Sickle cell trait 2/1/2016    Strabismus 2/1/2016    Stroke 9/9/2016    Uveitis 2/1/2016     Past Surgical History   Procedure Laterality Date    Cataract extraction w/  intraocular lens implant  8/26/2009, 10/14/2009    Craniotomy  1995    Craniotomy  12/21/2010    Eye surgery       Family History   Problem Relation Age of Onset    Diabetes Mother     Cataracts Mother     No Known Problems Father     Diabetes Sister     No Known Problems Son     No Known Problems Daughter     No Known Problems Maternal Grandmother     No Known Problems Maternal Grandfather     No Known Problems Paternal Grandmother     No Known Problems Paternal Grandfather     Diabetes Sister     Diabetes Brother     No Known Problems Maternal Aunt     No Known Problems Maternal Uncle     No Known Problems Paternal Aunt     No Known Problems Paternal Uncle     Diabetes Sister     Diabetes Brother     Amblyopia Neg Hx     Blindness Neg Hx     Cancer Neg Hx     Glaucoma Neg Hx     Hypertension Neg Hx     Macular degeneration Neg Hx     Retinal detachment Neg Hx     Strabismus Neg Hx     Stroke Neg Hx     Thyroid disease Neg Hx      Social History   Substance Use Topics    Smoking status: Former Smoker     Packs/day: 1.00     Types: Cigarettes     Quit date: 12/31/1994    Smokeless tobacco: Former User     Quit date: 4/3/2010    Alcohol use No     Review of Systems   Constitutional: Negative for chills and fever.   HENT: Negative for congestion and sore throat.    Respiratory: Negative for chest tightness, shortness of breath and  wheezing.    Cardiovascular: Negative for chest pain and leg swelling.   Gastrointestinal: Positive for abdominal pain. Negative for blood in stool, diarrhea, nausea and vomiting.   Genitourinary: Negative for dysuria, frequency and urgency.   Musculoskeletal: Negative for back pain.   Skin: Negative for rash.   Neurological: Negative for dizziness, facial asymmetry, weakness and headaches.   Hematological: Does not bruise/bleed easily.   Psychiatric/Behavioral: Positive for decreased concentration. Negative for agitation and behavioral problems.       Physical Exam   Initial Vitals   BP Pulse Resp Temp SpO2   01/22/17 1620 01/22/17 1620 01/22/17 1620 01/22/17 1620 01/22/17 1620   182/89 73 16 97.7 °F (36.5 °C) 97 %     Physical Exam    Nursing note and vitals reviewed.  Constitutional: He appears well-developed and well-nourished. He is not diaphoretic. No distress.   HENT:   Head: Normocephalic and atraumatic.   Eyes: Pupils are equal, round, and reactive to light. No scleral icterus.   Neck: Normal range of motion. Neck supple.   Cardiovascular: Normal rate, regular rhythm and normal heart sounds.   Pulmonary/Chest: Breath sounds normal. No respiratory distress. He has no wheezes.   Abdominal: Bowel sounds are normal. He exhibits no distension. There is tenderness. There is guarding. There is no rebound.   BL lower quadrants exquisitely tender   Musculoskeletal: He exhibits no edema or tenderness.   Neurological: He is alert and oriented to person, place, and time. No cranial nerve deficit.   Psychiatric: He has a normal mood and affect.         ED Course   Procedures  Labs Reviewed   CBC W/ AUTO DIFFERENTIAL - Abnormal; Notable for the following:        Result Value    RBC 4.03 (*)     Hemoglobin 11.8 (*)     Hematocrit 34.9 (*)     All other components within normal limits   BETA - HYDROXYBUTYRATE, SERUM - Abnormal; Notable for the following:     Beta-Hydroxybutyrate 1.0 (*)     All other components within  normal limits   URINALYSIS - Abnormal; Notable for the following:     Protein, UA Trace (*)     Glucose, UA 4+ (*)     Occult Blood UA Trace (*)     All other components within normal limits   COMPREHENSIVE METABOLIC PANEL - Abnormal; Notable for the following:     Potassium 3.3 (*)     Glucose 592 (*)     BUN, Bld 25 (*)     Creatinine 2.2 (*)     Albumin 3.0 (*)     eGFR if  30.7 (*)     eGFR if non  26.5 (*)     All other components within normal limits   ISTAT PROCEDURE - Abnormal; Notable for the following:     POC Glucose 547 (*)     POC Creatinine 1.9 (*)     POC Potassium 3.4 (*)     POC Hematocrit 32 (*)     All other components within normal limits   POCT GLUCOSE - Abnormal; Notable for the following:     POCT Glucose 461 (*)     All other components within normal limits   POCT GLUCOSE - Abnormal; Notable for the following:     POCT Glucose 279 (*)     All other components within normal limits   URINALYSIS MICROSCOPIC   HEMOGLOBIN A1C     EKG Readings: (Independently Interpreted)   Rhythm: Normal Sinus Rhythm. Ectopy: No Ectopy. Conduction: Normal. ST Segments: Normal ST Segments. T Waves: Normal. Clinical Impression: Normal Sinus Rhythm       Differential diagnosis includes but not limited to DKA, constipation, obstruction. POCT Glucose >500. DKA protocol initiated. NS 1L bolus as he has normal EF. Beta-hydroxybutyrate, CBC, CMP, UA, CXR, EKG ordered.  Amaya Mcguire MD, PGY-3  02/01/2017  8:41 PM    Increased Beta-hydroxybutyrate. Ketones in urine. Chronic anemia. CXR wnl.  Amaya Mcguire MD, PGY-3  02/01/2017  10:43 PM    NS bolus 1L as well as 8 units of insulin given. No anion gap. Glucose 592. POCT glucose ordered.  Amaya Mcguire MD, PGY-3  02/01/2017  12:24 AM           Medical Decision Making:   History:   Old Medical Records: I decided to obtain old medical records.  Clinical Tests:   Lab Tests: Ordered and Reviewed  Radiological Study:  Ordered and Reviewed  Medical Tests: Ordered and Reviewed            Scribe Attestation:   Scribe #1: I performed the above scribed service and the documentation accurately describes the services I performed. I attest to the accuracy of the note.    Attending Attestation:   Physician Attestation Statement for Resident:  As the supervising MD   Physician Attestation Statement: I have personally seen and examined this patient.   I agree with the above history. -: This is a 84 year old male with history of DM type I with poorly controlled insulin presents with abdominal pain since earlier today. Pt denies CP, SOB, nausea, vomiting. Pt produced BM today. No blood in stool and passing gas.    As the supervising MD I agree with the above PE.   -: Tender in lower quadrant with guarding. No acute abdomen.    As the supervising MD I agree with the above treatment, course, plan, and disposition.   -: Will get labs, CXR.           Physician Attestation for Scribe:  Physician Attestation Statement for Scribe #1: I, Dr. Sauceda, reviewed documentation, as scribed by Fabienne Romo in my presence, and it is both accurate and complete.         Attending ED Notes:   84-year-old male not been taking his medications for his  Diabetes now presenting with significant hyperglycemia beta hydroxy it are is slightly elevated patient treated aggressively with hydration  Regular insulin with slight improvement but patient require further treatment in the hospital patient is placed in observation to internal medicine          ED Course     Clinical Impression:   The primary encounter diagnosis was Type 1 diabetes mellitus with hyperglycemia. Diagnoses of Lower abdominal pain, Hyperglycemia due to type 1 diabetes mellitus, CKD (chronic kidney disease) stage 4, GFR 15-29 ml/min, Renovascular hypertension, Anemia of chronic renal failure, stage 4 (severe), MCI (mild cognitive impairment), Secondary seizure disorder, and Weakness following  cerebrovascular accident (CVA) were also pertinent to this visit.    Disposition:   Disposition: Placed in Observation  Condition: Serious       Amaya Mcguire MD  Resident  01/23/17 0809       Cristofer Sauceda MD  02/01/17 0912

## 2017-01-23 NOTE — H&P
[facility] Medicine  History & Physical    Patient Name: Julius Cardenas  MRN: [id number]  Admission Date: 1/22/2017  Attending Physician: Jose Henderson MD   Primary Care Provider: Mady Carsno MD    [facility] Medicine Team: [facility]OSP MED F Jolly Woodard PA-C     Patient information was obtained from patient, past medical records and ER records.     Subjective:     Principal Problem:Hyperglycemia due to type 1 diabetes mellitus    Chief Complaint:  Abdominal pain     HPI: Patient is an 84 year old gentleman with a h/o CKD 4, HTN, poorly controlled DM I, parasagittal meningioma with secondary seizure disorder, TIA, and mild cognitive impairment.  He presents with abdominal pain and hyperglycemia.  No family at bedside.  Abdominal pain began earlier today.  He has had two BMs today and is passing gas (one BM in ER).  He denies N/V/D, chest pain, SOB, dizziness, palpitations, fever/chills.  Glucose on arrival was 592.  He was given 1L NS bolus and 8 units regular insulin with improvement to 461.  He reports improvement with abdominal pain.    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     TIA (transient ischemic attack) 2016    Type 1 diabetes     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 3/25/2013    Type 1 diabetes mellitus with hyperglycemia 4/8/2014    Type 1 diabetes mellitus with  hypoglycemia and without coma 4/8/2014    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      Poor control due to cognitive impairment, with history of hypoglycemia and DKA     Vitamin D deficiency disease 7/31/2013       Past Surgical History   Procedure Laterality Date    Cataract extraction w/  intraocular lens implant  8/26/2009, 10/14/2009    Craniotomy  1995    Craniotomy  12/21/2010    Eye surgery         Review of patient's allergies indicates:  No Known Allergies    No current facility-administered medications on file prior to encounter.      Current Outpatient Prescriptions on File Prior to Encounter   Medication Sig    amlodipine (NORVASC) 10 MG tablet TAKE 1 TABLET BY MOUTH EVERY DAY    aspirin (ECOTRIN) 81 MG EC tablet Take 81 mg by mouth once daily.    blood-glucose meter (CONTOUR METER) kit Use as instructed.  Please substitute appropriate meter to match his strips.    calcitRIOL (ROCALTROL) 0.5 MCG Cap TAKE 1 CAPSULE BY MOUTH ONCE DAILY    carvedilol (COREG) 3.125 MG tablet Take 1 tablet (3.125 mg total) by mouth 2 (two) times daily.    CONTOUR TEST STRIPS Strp USE AS DIRECTED THREE TIMES DAILY    furosemide (LASIX) 20 MG tablet TAKE 1 TABLET BY MOUTH EVERY DAY    hydrALAZINE (APRESOLINE) 50 MG tablet Take 1 tablet (50 mg total) by mouth every 12 (twelve) hours.    hydrALAZINE (APRESOLINE) 50 MG tablet TAKE 1 TABLET BY MOUTH EVERY 12 HOURS    insulin aspart (NOVOLOG FLEXPEN) 100 unit/mL InPn pen Inject 5 Units into the skin 3 (three) times daily with meals. Plus correction scale.    insulin detemir (LEVEMIR FLEXPEN) 100 unit/mL (3 mL) SubQ InPn pen Inject 15 Units into the skin once daily.    lancets Misc Use three times daily for finger stick glucose monitoring.    levetiracetam (KEPPRA) 1000 MG tablet TAKE 1 TABLET BY MOUTH TWICE DAILY    nystatin (MYCOSTATIN) cream Apply topically 2 (two) times daily.    paroxetine (PAXIL) 10 MG tablet Take 1 tablet (10 mg total)  "by mouth every morning.    pen needle, diabetic (BD INSULIN PEN NEEDLE UF SHORT) 31 gauge x 5/16" Ndle Use to inject insulin 5 times daily (Patient taking differently: Use to inject insulin 4 times daily)    [DISCONTINUED] amlodipine (NORVASC) 10 MG tablet Take 1 tablet (10 mg total) by mouth once daily.    [DISCONTINUED] lisinopril 10 MG tablet Take 1 tablet (10 mg total) by mouth once daily.    [DISCONTINUED] lisinopril 10 MG tablet TAKE 1 TABLET BY MOUTH EVERY DAY     Family History     Problem Relation (Age of Onset)    Cataracts Mother    Diabetes Mother, Sister, Sister, Brother, Sister, Brother    No Known Problems Father, Son, Daughter, Maternal Grandmother, Maternal Grandfather, Paternal Grandmother, Paternal Grandfather, Maternal Aunt, Maternal Uncle, Paternal Aunt, Paternal Uncle        Social History Main Topics    Smoking status: Former Smoker     Packs/day: 1.00     Types: Cigarettes     Quit date: 12/31/1994    Smokeless tobacco: Former User     Quit date: 4/3/2010    Alcohol use No    Drug use: No    Sexual activity: Not on file     Review of Systems   Constitutional: Negative for activity change, appetite change, chills, diaphoresis, fatigue, fever and unexpected weight change.   HENT: Negative for congestion, rhinorrhea, sore throat, trouble swallowing and voice change.    Eyes: Negative for visual disturbance.   Respiratory: Negative for cough, choking, chest tightness, shortness of breath and wheezing.    Cardiovascular: Negative for chest pain, palpitations and leg swelling.   Gastrointestinal: Positive for abdominal pain. Negative for abdominal distention, anal bleeding, blood in stool, constipation, diarrhea, nausea and vomiting.   Endocrine: Negative for cold intolerance, heat intolerance, polydipsia and polyuria.   Genitourinary: Negative for dysuria, flank pain, frequency, hematuria and urgency.   Musculoskeletal: Negative for arthralgias, back pain, joint swelling and myalgias. "   Skin: Negative for color change and rash.   Neurological: Negative for dizziness, seizures, syncope, facial asymmetry, speech difficulty, weakness, light-headedness, numbness and headaches.   Hematological: Negative for adenopathy. Does not bruise/bleed easily.   Psychiatric/Behavioral: Negative for agitation, confusion, hallucinations and suicidal ideas.     Objective:     Vital Signs (Most Recent):  Temp: 97.7 °F (36.5 °C) (01/22/17 1620)  Pulse: 73 (01/23/17 0241)  Resp: 18 (01/23/17 0242)  BP: (!) 158/77 (01/23/17 0241)  SpO2: 100 % (01/23/17 0241) Vital Signs (24h Range):  Temp:  [97.7 °F (36.5 °C)] 97.7 °F (36.5 °C)  Pulse:  [72-79] 73  Resp:  [16-26] 18  SpO2:  [97 %-100 %] 100 %  BP: (158-195)/(72-90) 158/77     Weight: 77.1 kg (170 lb)  Body mass index is 26.63 kg/(m^2).    Physical Exam   Constitutional: He is oriented to person, place, and time. He appears well-developed and well-nourished. No distress.   HENT:   Head: Normocephalic and atraumatic.   Eyes: Pupils are equal, round, and reactive to light.   Neck: Neck supple. Carotid bruit is not present. No thyromegaly present.   Cardiovascular: Normal rate and regular rhythm.  Exam reveals no gallop.    No murmur heard.  Pulmonary/Chest: Effort normal and breath sounds normal. No respiratory distress. He has no wheezes.   Abdominal: Bowel sounds are normal. He exhibits no distension. There is no splenomegaly or hepatomegaly. There is no tenderness.   Musculoskeletal: Normal range of motion. He exhibits no edema.   Neurological: He is alert and oriented to person, place, and time.   Skin: Skin is warm and dry. No rash noted.   Psychiatric: He has a normal mood and affect. His behavior is normal.        Significant Labs: All pertinent labs within the past 24 hours have been reviewed.    Significant Imaging: I have reviewed all pertinent imaging results/findings within the past 24 hours.    Assessment/Plan:     * Hyperglycemia due to type 1 diabetes  mellitus  - Glucose now improved to 279.  - Will start patient on 12 units Levemir daily and 5 units Novolog TID WM.  - Anion gap of 8, Beta-Hydroxybutyrate 1.0.  Not in DKA.  - Will consult diabetes educator.  Patient has multiple recent admission for issues pertaining to his diabetes.  I suspect his mild cognitive impairment is contributing.  12/8/2016 HgbA1c 9.1.      CKD (chronic kidney disease) stage 4, GFR 15-29 ml/min  - Creatinine, GFR stable at 2.2 and 30.7.  Continue to monitor.      Secondary seizure disorder  - Continue Keppra 1000mg BID.      MCI (mild cognitive impairment)  - Stable.  Patient lives with brother and sister-in-law per recent admission note.      Renovascular hypertension  - Continue amlodipine 10mg daily, Coreg 3.125mg daily, hydralazine 50mg BID, lisinopril 10mg daily.      Anemia of chronic renal failure, stage 4 (severe)  - Stable at 11.8/34.9.      Weakness following cerebrovascular accident (CVA)  - Stable.  Continue secondary prevention.      VTE Risk Mitigation         Ordered     Medium Risk of VTE  Once      01/23/17 0301     Place sequential compression device  Until discontinued      01/23/17 0301     Place SRINIVAS hose  Until discontinued      01/23/17 0301        Jolly Woodard PA-C  Department of Hospital Medicine   [facility]

## 2017-01-25 ENCOUNTER — TELEPHONE (OUTPATIENT)
Dept: DIABETES | Facility: CLINIC | Age: 82
End: 2017-01-25

## 2017-01-25 NOTE — TELEPHONE ENCOUNTER
Patient previously followed in Diabetes Empowerment, last seen 2017.     Pt determined to have T1DM and needs chronic endocrine f/u.    Patient needs 1:1 diabetes education with a family member to review insulin regimen, injection technique, timing of medications to assure patient is giving medications as prescribed. Suspect non adherence due to hospitalizations in past 3 months r/t hyperglycemia    Appt already scheduled for chronic endocrine f/u with KEITH Coreas in Feb, needs diabetes education appt rescheduled (has cancelled and no showed in the past) - also needs family member present at visit (family member that gives injections)    Thanks

## 2017-01-25 NOTE — TELEPHONE ENCOUNTER
Received order for Diabetes education.  Pt is participating in the Diabetes Empowerment Program with Caitlyn Pitts, JUDITH and see's a Diabetes educator in the program.

## 2017-02-11 DIAGNOSIS — I10 ESSENTIAL HYPERTENSION: ICD-10-CM

## 2017-02-11 RX ORDER — CARVEDILOL 3.12 MG/1
TABLET ORAL
Qty: 180 TABLET | Refills: 4 | Status: ON HOLD | OUTPATIENT
Start: 2017-02-11 | End: 2017-02-19 | Stop reason: HOSPADM

## 2017-02-16 ENCOUNTER — HOSPITAL ENCOUNTER (INPATIENT)
Facility: OTHER | Age: 82
LOS: 3 days | Discharge: HOME HEALTH | DRG: 638 | End: 2017-02-19
Attending: EMERGENCY MEDICINE | Admitting: HOSPITALIST
Payer: MEDICARE

## 2017-02-16 DIAGNOSIS — R53.1 WEAKNESS: ICD-10-CM

## 2017-02-16 DIAGNOSIS — E11.00 HYPEROSMOLAR NON-KETOTIC STATE IN PATIENT WITH TYPE 2 DIABETES MELLITUS: Primary | ICD-10-CM

## 2017-02-16 DIAGNOSIS — N17.9 ACUTE KIDNEY INJURY: ICD-10-CM

## 2017-02-16 DIAGNOSIS — R73.9 HYPERGLYCEMIA: ICD-10-CM

## 2017-02-16 DIAGNOSIS — E11.01 HYPERGLYCEMIC HYPEROSMOLAR NONKETOTIC COMA: ICD-10-CM

## 2017-02-16 DIAGNOSIS — I15.0 RENOVASCULAR HYPERTENSION: ICD-10-CM

## 2017-02-16 PROBLEM — E11.21 DIABETIC NEPHROPATHY ASSOCIATED WITH TYPE 2 DIABETES MELLITUS: Status: ACTIVE | Noted: 2017-02-16

## 2017-02-16 PROBLEM — E86.9 VOLUME DEPLETION: Status: ACTIVE | Noted: 2017-02-16

## 2017-02-16 PROBLEM — E10.21 DIABETIC NEPHROPATHY ASSOCIATED WITH TYPE 1 DIABETES MELLITUS: Status: ACTIVE | Noted: 2017-02-16

## 2017-02-16 PROBLEM — E11.42 DIABETIC POLYNEUROPATHY ASSOCIATED WITH TYPE 2 DIABETES MELLITUS: Status: ACTIVE | Noted: 2017-02-16

## 2017-02-16 PROBLEM — E44.0 MALNUTRITION OF MODERATE DEGREE: Status: ACTIVE | Noted: 2017-02-16

## 2017-02-16 LAB
25(OH)D3+25(OH)D2 SERPL-MCNC: 14 NG/ML
ANION GAP SERPL CALC-SCNC: 11 MMOL/L
ANION GAP SERPL CALC-SCNC: 13 MMOL/L
B-OH-BUTYR BLD STRIP-SCNC: 0.2 MMOL/L
BACTERIA #/AREA URNS HPF: NORMAL /HPF
BASOPHILS # BLD AUTO: 0.01 K/UL
BASOPHILS NFR BLD: 0.2 %
BILIRUB UR QL STRIP: NEGATIVE
BUN SERPL-MCNC: 22 MG/DL
BUN SERPL-MCNC: 27 MG/DL
CALCIUM SERPL-MCNC: 9.1 MG/DL
CALCIUM SERPL-MCNC: 9.5 MG/DL
CHLORIDE SERPL-SCNC: 114 MMOL/L
CHLORIDE SERPL-SCNC: 99 MMOL/L
CLARITY UR: CLEAR
CO2 SERPL-SCNC: 20 MMOL/L
CO2 SERPL-SCNC: 24 MMOL/L
COLOR UR: YELLOW
CREAT SERPL-MCNC: 1.9 MG/DL
CREAT SERPL-MCNC: 2.7 MG/DL
DIFFERENTIAL METHOD: ABNORMAL
EOSINOPHIL # BLD AUTO: 0.1 K/UL
EOSINOPHIL NFR BLD: 1.7 %
ERYTHROCYTE [DISTWIDTH] IN BLOOD BY AUTOMATED COUNT: 12.5 %
EST. GFR  (AFRICAN AMERICAN): 24 ML/MIN/1.73 M^2
EST. GFR  (AFRICAN AMERICAN): 37 ML/MIN/1.73 M^2
EST. GFR  (NON AFRICAN AMERICAN): 21 ML/MIN/1.73 M^2
EST. GFR  (NON AFRICAN AMERICAN): 32 ML/MIN/1.73 M^2
FERRITIN SERPL-MCNC: 112 NG/ML
GLUCOSE SERPL-MCNC: 55 MG/DL
GLUCOSE SERPL-MCNC: 770 MG/DL
GLUCOSE UR QL STRIP: ABNORMAL
HCT VFR BLD AUTO: 34.9 %
HGB BLD-MCNC: 11.6 G/DL
HGB UR QL STRIP: ABNORMAL
IRON SERPL-MCNC: 65 UG/DL
KETONES UR QL STRIP: NEGATIVE
LEUKOCYTE ESTERASE UR QL STRIP: NEGATIVE
LYMPHOCYTES # BLD AUTO: 1.7 K/UL
LYMPHOCYTES NFR BLD: 25 %
MAGNESIUM SERPL-MCNC: 1.7 MG/DL
MCH RBC QN AUTO: 28.4 PG
MCHC RBC AUTO-ENTMCNC: 33.2 %
MCV RBC AUTO: 86 FL
MICROSCOPIC COMMENT: NORMAL
MONOCYTES # BLD AUTO: 0.4 K/UL
MONOCYTES NFR BLD: 6.3 %
NEUTROPHILS # BLD AUTO: 4.4 K/UL
NEUTROPHILS NFR BLD: 66.6 %
NITRITE UR QL STRIP: NEGATIVE
PH UR STRIP: 6 [PH] (ref 5–8)
PHOSPHATE SERPL-MCNC: 2.1 MG/DL
PLATELET # BLD AUTO: 239 K/UL
PMV BLD AUTO: 10.7 FL
POCT GLUCOSE: 176 MG/DL (ref 70–110)
POCT GLUCOSE: 249 MG/DL (ref 70–110)
POCT GLUCOSE: 285 MG/DL (ref 70–110)
POCT GLUCOSE: 404 MG/DL (ref 70–110)
POCT GLUCOSE: 54 MG/DL (ref 70–110)
POCT GLUCOSE: 69 MG/DL (ref 70–110)
POCT GLUCOSE: 88 MG/DL (ref 70–110)
POTASSIUM SERPL-SCNC: 3.3 MMOL/L
POTASSIUM SERPL-SCNC: 3.4 MMOL/L
PROT UR QL STRIP: ABNORMAL
RBC # BLD AUTO: 4.08 M/UL
RBC #/AREA URNS HPF: 1 /HPF (ref 0–4)
SATURATED IRON: 20 %
SODIUM SERPL-SCNC: 136 MMOL/L
SODIUM SERPL-SCNC: 145 MMOL/L
SP GR UR STRIP: <=1.005 (ref 1–1.03)
TOTAL IRON BINDING CAPACITY: 333 UG/DL
TRANSFERRIN SERPL-MCNC: 225 MG/DL
TROPONIN I SERPL DL<=0.01 NG/ML-MCNC: 0.03 NG/ML
URN SPEC COLLECT METH UR: ABNORMAL
UROBILINOGEN UR STRIP-ACNC: NEGATIVE EU/DL
WBC # BLD AUTO: 6.63 K/UL
YEAST URNS QL MICRO: NORMAL

## 2017-02-16 PROCEDURE — 84100 ASSAY OF PHOSPHORUS: CPT

## 2017-02-16 PROCEDURE — 82306 VITAMIN D 25 HYDROXY: CPT

## 2017-02-16 PROCEDURE — 82728 ASSAY OF FERRITIN: CPT

## 2017-02-16 PROCEDURE — 96361 HYDRATE IV INFUSION ADD-ON: CPT

## 2017-02-16 PROCEDURE — 96365 THER/PROPH/DIAG IV INF INIT: CPT

## 2017-02-16 PROCEDURE — 93010 ELECTROCARDIOGRAM REPORT: CPT | Mod: ,,, | Performed by: INTERNAL MEDICINE

## 2017-02-16 PROCEDURE — 63600175 PHARM REV CODE 636 W HCPCS: Performed by: HOSPITALIST

## 2017-02-16 PROCEDURE — 83735 ASSAY OF MAGNESIUM: CPT

## 2017-02-16 PROCEDURE — 80048 BASIC METABOLIC PNL TOTAL CA: CPT | Mod: 91

## 2017-02-16 PROCEDURE — 80048 BASIC METABOLIC PNL TOTAL CA: CPT

## 2017-02-16 PROCEDURE — 81000 URINALYSIS NONAUTO W/SCOPE: CPT

## 2017-02-16 PROCEDURE — 20000000 HC ICU ROOM

## 2017-02-16 PROCEDURE — 85025 COMPLETE CBC W/AUTO DIFF WBC: CPT

## 2017-02-16 PROCEDURE — 83540 ASSAY OF IRON: CPT

## 2017-02-16 PROCEDURE — 93005 ELECTROCARDIOGRAM TRACING: CPT

## 2017-02-16 PROCEDURE — 25000003 PHARM REV CODE 250: Performed by: HOSPITALIST

## 2017-02-16 PROCEDURE — 36415 COLL VENOUS BLD VENIPUNCTURE: CPT

## 2017-02-16 PROCEDURE — 99223 1ST HOSP IP/OBS HIGH 75: CPT | Mod: AI,,, | Performed by: INTERNAL MEDICINE

## 2017-02-16 PROCEDURE — 99285 EMERGENCY DEPT VISIT HI MDM: CPT | Mod: 25

## 2017-02-16 PROCEDURE — 25000003 PHARM REV CODE 250: Performed by: INTERNAL MEDICINE

## 2017-02-16 PROCEDURE — 25000003 PHARM REV CODE 250: Performed by: EMERGENCY MEDICINE

## 2017-02-16 PROCEDURE — 82010 KETONE BODYS QUAN: CPT

## 2017-02-16 PROCEDURE — 84484 ASSAY OF TROPONIN QUANT: CPT

## 2017-02-16 RX ORDER — ASPIRIN 81 MG/1
81 TABLET ORAL DAILY
Status: DISCONTINUED | OUTPATIENT
Start: 2017-02-16 | End: 2017-02-19 | Stop reason: HOSPADM

## 2017-02-16 RX ORDER — CARVEDILOL 3.12 MG/1
3.12 TABLET ORAL 2 TIMES DAILY
Status: DISCONTINUED | OUTPATIENT
Start: 2017-02-16 | End: 2017-02-18

## 2017-02-16 RX ORDER — LEVETIRACETAM 500 MG/1
1000 TABLET ORAL 2 TIMES DAILY
Status: DISCONTINUED | OUTPATIENT
Start: 2017-02-16 | End: 2017-02-19 | Stop reason: HOSPADM

## 2017-02-16 RX ORDER — DEXTROSE MONOHYDRATE AND SODIUM CHLORIDE 5; .9 G/100ML; G/100ML
INJECTION, SOLUTION INTRAVENOUS CONTINUOUS
Status: DISCONTINUED | OUTPATIENT
Start: 2017-02-16 | End: 2017-02-17

## 2017-02-16 RX ORDER — ONDANSETRON 2 MG/ML
4 INJECTION INTRAMUSCULAR; INTRAVENOUS EVERY 12 HOURS PRN
Status: DISCONTINUED | OUTPATIENT
Start: 2017-02-16 | End: 2017-02-19 | Stop reason: HOSPADM

## 2017-02-16 RX ORDER — POTASSIUM CHLORIDE 20 MEQ/1
40 TABLET, EXTENDED RELEASE ORAL ONCE
Status: COMPLETED | OUTPATIENT
Start: 2017-02-16 | End: 2017-02-16

## 2017-02-16 RX ORDER — SODIUM CHLORIDE 9 MG/ML
INJECTION, SOLUTION INTRAVENOUS CONTINUOUS
Status: DISCONTINUED | OUTPATIENT
Start: 2017-02-16 | End: 2017-02-16

## 2017-02-16 RX ORDER — CALCITRIOL 0.25 UG/1
0.5 CAPSULE ORAL DAILY
Status: DISCONTINUED | OUTPATIENT
Start: 2017-02-16 | End: 2017-02-19 | Stop reason: HOSPADM

## 2017-02-16 RX ORDER — HEPARIN SODIUM 5000 [USP'U]/ML
5000 INJECTION, SOLUTION INTRAVENOUS; SUBCUTANEOUS EVERY 8 HOURS
Status: DISCONTINUED | OUTPATIENT
Start: 2017-02-16 | End: 2017-02-19 | Stop reason: HOSPADM

## 2017-02-16 RX ORDER — PAROXETINE 10 MG/1
10 TABLET, FILM COATED ORAL EVERY MORNING
Status: DISCONTINUED | OUTPATIENT
Start: 2017-02-16 | End: 2017-02-19 | Stop reason: HOSPADM

## 2017-02-16 RX ORDER — AMLODIPINE BESYLATE 5 MG/1
10 TABLET ORAL DAILY
Status: DISCONTINUED | OUTPATIENT
Start: 2017-02-16 | End: 2017-02-17

## 2017-02-16 RX ORDER — SODIUM CHLORIDE 9 MG/ML
1000 INJECTION, SOLUTION INTRAVENOUS
Status: COMPLETED | OUTPATIENT
Start: 2017-02-16 | End: 2017-02-16

## 2017-02-16 RX ORDER — HYDRALAZINE HYDROCHLORIDE 25 MG/1
50 TABLET, FILM COATED ORAL EVERY 8 HOURS
Status: DISCONTINUED | OUTPATIENT
Start: 2017-02-16 | End: 2017-02-17

## 2017-02-16 RX ADMIN — HYDRALAZINE HYDROCHLORIDE 50 MG: 25 TABLET, FILM COATED ORAL at 09:02

## 2017-02-16 RX ADMIN — SODIUM CHLORIDE 1000 ML: 0.9 INJECTION, SOLUTION INTRAVENOUS at 03:02

## 2017-02-16 RX ADMIN — SODIUM CHLORIDE 8 UNITS/HR: 9 INJECTION, SOLUTION INTRAVENOUS at 04:02

## 2017-02-16 RX ADMIN — SODIUM CHLORIDE: 0.9 INJECTION, SOLUTION INTRAVENOUS at 03:02

## 2017-02-16 RX ADMIN — CARVEDILOL 3.12 MG: 3.12 TABLET, FILM COATED ORAL at 09:02

## 2017-02-16 RX ADMIN — HEPARIN SODIUM 5000 UNITS: 5000 INJECTION, SOLUTION INTRAVENOUS; SUBCUTANEOUS at 09:02

## 2017-02-16 RX ADMIN — LEVETIRACETAM 1000 MG: 500 TABLET, FILM COATED ORAL at 09:02

## 2017-02-16 RX ADMIN — SODIUM CHLORIDE 1000 ML: 0.9 INJECTION, SOLUTION INTRAVENOUS at 01:02

## 2017-02-16 RX ADMIN — DEXTROSE AND SODIUM CHLORIDE: 5; .9 INJECTION, SOLUTION INTRAVENOUS at 10:02

## 2017-02-16 RX ADMIN — AMLODIPINE BESYLATE 10 MG: 5 TABLET ORAL at 03:02

## 2017-02-16 RX ADMIN — HYDRALAZINE HYDROCHLORIDE 50 MG: 25 TABLET, FILM COATED ORAL at 03:02

## 2017-02-16 RX ADMIN — DEXTROSE MONOHYDRATE 25 G: 25 INJECTION, SOLUTION INTRAVENOUS at 09:02

## 2017-02-16 RX ADMIN — CALCITRIOL 0.5 MCG: 0.25 CAPSULE, LIQUID FILLED ORAL at 03:02

## 2017-02-16 RX ADMIN — POTASSIUM CHLORIDE 40 MEQ: 1500 TABLET, EXTENDED RELEASE ORAL at 03:02

## 2017-02-16 RX ADMIN — CARVEDILOL 3.12 MG: 3.12 TABLET, FILM COATED ORAL at 03:02

## 2017-02-16 RX ADMIN — ASPIRIN 81 MG: 81 TABLET, COATED ORAL at 03:02

## 2017-02-16 NOTE — IP AVS SNAPSHOT
[facility] Location ([place])  [address] [place] [address]  Phone: [phone]           Patient Discharge Instructions     Our goal is to set you up for success. This packet includes information on your condition, medications, and your home care. It will help you to care for yourself so you don't get sicker and need to go back to the hospital.     Please ask your nurse if you have any questions.        There are many details to remember when preparing to leave the hospital. Here is what you will need to do:    1. Take your medicine. If you are prescribed medications, review your Medication List in the following pages. You may have new medications to  at the pharmacy and others that you'll need to stop taking. Review the instructions for how and when to take your medications. Talk with your doctor or nurses if you are unsure of what to do.     2. Go to your follow-up appointments. Specific follow-up information is listed in the following pages. Your may be contacted by a transition nurse or clinical provider about future appointments. Be sure we have all of the phone numbers to reach you, if needed. Please contact your provider's office if you are unable to make an appointment.     3. Watch for warning signs. Your doctor or nurse will give you detailed warning signs to watch for and when to call for assistance. These instructions may also include educational information about your condition. If you experience any of warning signs to your health, call your doctor.               ** Verify the list of medication(s) below is accurate and up to date. Carry this with you in case of emergency. If your medications have changed, please notify your healthcare provider.             Medication List      START taking these medications        Additional Info                      nifedipine 90 MG (OSM) Tr24   Commonly known as:  PROCARDIA-XL   Quantity:  30 tablet   Refills:  0   Dose:  90 mg    Last  "time this was given:  90 mg on 2/19/2017  8:23 AM   Instructions:  Take 1 tablet (90 mg total) by mouth once daily.     Begin Date    AM    Noon    PM    Bedtime         CHANGE how you take these medications        Additional Info                      carvedilol 6.25 MG tablet   Commonly known as:  COREG   Quantity:  60 tablet   Refills:  0   Dose:  6.25 mg   What changed:  See the new instructions.    Last time this was given:  6.25 mg on 2/19/2017  8:23 AM   Instructions:  Take 1 tablet (6.25 mg total) by mouth 2 (two) times daily.     Begin Date    AM    Noon    PM    Bedtime       hydrALAZINE 50 MG tablet   Commonly known as:  APRESOLINE   Quantity:  180 tablet   Refills:  0   Dose:  50 mg   What changed:  when to take this    Last time this was given:  50 mg on 2/19/2017  6:03 AM   Instructions:  Take 1 tablet (50 mg total) by mouth every 8 (eight) hours.     Begin Date    AM    Noon    PM    Bedtime       pen needle, diabetic 31 gauge x 5/16" Ndle   Commonly known as:  BD INSULIN PEN NEEDLE UF SHORT   Quantity:  450 each   Refills:  3   What changed:  additional instructions    Instructions:  Use to inject insulin 5 times daily     Begin Date    AM    Noon    PM    Bedtime         CONTINUE taking these medications        Additional Info                      aspirin 81 MG EC tablet   Commonly known as:  ECOTRIN   Refills:  0   Dose:  81 mg    Last time this was given:  81 mg on 2/19/2017  8:23 AM   Instructions:  Take 81 mg by mouth once daily.     Begin Date    AM    Noon    PM    Bedtime       blood-glucose meter kit   Commonly known as:  CONTOUR METER   Quantity:  1 each   Refills:  0    Instructions:  Use as instructed.  Please substitute appropriate meter to match his strips.     Begin Date    AM    Noon    PM    Bedtime       calcitRIOL 0.5 MCG Cap   Commonly known as:  ROCALTROL   Quantity:  90 capsule   Refills:  4    Last time this was given:  0.5 mcg on 2/19/2017  8:22 AM   Instructions:  TAKE 1 " CAPSULE BY MOUTH ONCE DAILY     Begin Date    AM    Noon    PM    Bedtime       CONTOUR TEST STRIPS Strp   Quantity:  300 strip   Refills:  12   Comments:  **Patient requests 90 days supply**   Generic drug:  blood sugar diagnostic    Instructions:  USE AS DIRECTED THREE TIMES DAILY     Begin Date    AM    Noon    PM    Bedtime       insulin aspart 100 unit/mL Inpn pen   Commonly known as:  NOVOLOG FLEXPEN   Quantity:  1 Box   Refills:  6   Dose:  5 Units    Last time this was given:  5 Units on 2/19/2017 12:46 PM   Instructions:  Inject 5 Units into the skin 3 (three) times daily with meals. Plus correction scale.     Begin Date    AM    Noon    PM    Bedtime       insulin detemir 100 unit/mL (3 mL) Inpn pen   Commonly known as:  LEVEMIR FLEXPEN   Quantity:  1 Box   Refills:  6   Dose:  15 Units    Last time this was given:  15 Units on 2/19/2017  8:23 AM   Instructions:  Inject 15 Units into the skin once daily.     Begin Date    AM    Noon    PM    Bedtime       lancets Misc   Quantity:  100 each   Refills:  3    Instructions:  Use three times daily for finger stick glucose monitoring.     Begin Date    AM    Noon    PM    Bedtime       levetiracetam 1000 MG tablet   Commonly known as:  KEPPRA   Quantity:  180 tablet   Refills:  6    Last time this was given:  1,000 mg on 2/19/2017  8:22 AM   Instructions:  TAKE 1 TABLET BY MOUTH TWICE DAILY     Begin Date    AM    Noon    PM    Bedtime       nystatin cream   Commonly known as:  MYCOSTATIN   Quantity:  60 g   Refills:  3    Instructions:  Apply topically 2 (two) times daily.     Begin Date    AM    Noon    PM    Bedtime       paroxetine 10 MG tablet   Commonly known as:  PAXIL   Quantity:  90 tablet   Refills:  1   Dose:  10 mg    Last time this was given:  10 mg on 2/19/2017  6:03 AM   Instructions:  Take 1 tablet (10 mg total) by mouth every morning.     Begin Date    AM    Noon    PM    Bedtime         STOP taking these medications     amlodipine 10 MG tablet    Commonly known as:  NORVASC       furosemide 20 MG tablet   Commonly known as:  LASIX            Where to Get Your Medications      These medications were sent to [facility] [address] [place] [address] AT [address] [address]    Hours:  24-hours Phone:  [phone]     carvedilol 6.25 MG tablet    hydrALAZINE 50 MG tablet    nifedipine 90 MG (OSM) Tr24                  Please bring to all follow up appointments:    1. A copy of your discharge instructions.  2. All medicines you are currently taking in their original bottles.  3. Identification and insurance card.    Please arrive 15 minutes ahead of scheduled appointment time.    Please call 24 hours in advance if you must reschedule your appointment and/or time.        Your Scheduled Appointments     Apr 20, 2017  1:00 PM CDT   Diabetes Education with CALI Delcid - Diabetes Management ([facility] )    [address]  [place] [address] [phone]            Apr 20, 2017  2:30 PM CDT   Non-Fasting Lab with LAB, APPOINTMENT [place] [facility] ([facility])    [address] [facility] [address] [phone]            Apr 20, 2017  3:30 PM CDT   Established Patient with JUDITH William - Endo/Diab/Metab ([facility] )    [address]  [place] [address] [phone]              Follow-up Information     Follow up with Mady Carson MD. Schedule an appointment as soon as possible for a visit in 1 week.    Specialty:  Internal Medicine    Contact information:    [address]  [place] [address] [phone]          Discharge Instructions     Future Orders    Activity as tolerated     Call MD for:  difficulty breathing or increased cough     Call MD for:  increased confusion or weakness     Call MD for:  persistent dizziness,  "light-headedness, or visual disturbances     Call MD for:  persistent nausea and vomiting or diarrhea     Call MD for:  severe persistent headache     Call MD for:  severe uncontrolled pain     Call MD for:  temperature >100.4     Call MD for:  worsening rash     Diet Diabetic 1800 Calories       Discharge References/Attachments     DIABETIC KETOACIDOSIS (ENGLISH)        Primary Diagnosis     Your primary diagnosis was:  Hyperosmolar Non-Ketotic State In Patient With Type 2 Diabetes Mellitus      Admission Information     Date & Time Provider Department CSN    2/16/2017 11:36 AM John Hill MD [facility] [id number]      Care Providers     Provider Role Specialty Primary office phone    John Hill MD Attending Provider Hospitalist [phone]    Diogo Colvin MD Consulting Physician  Nephrology  [phone]      Your Vitals Were     BP Pulse Temp Resp Height Weight    137/75 (BP Location: Left arm, Patient Position: Lying, BP Method: Automatic) 70 97.5 °F (36.4 °C) (Oral) 18 5' 8" (1.727 m) 66.5 kg (146 lb 9.7 oz)    SpO2 BMI             100% 22.29 kg/m2         Recent Lab Values        4/14/2015 10/22/2015 1/19/2016 4/19/2016 6/27/2016 10/20/2016 12/8/2016 1/22/2017      5:37 PM 12:44 PM  2:07 PM  2:30 PM  9:47 PM 10:04 AM 12:23 PM  9:11 PM    A1C 11.2 (H) 8.9 (H) 8.3 (H) 8.5 (H) 8.9 (H) 7.9 (H) 9.1 (H) 9.5 (H)    Comment for A1C at 10:04 AM on 10/20/2016:  According to ADA guidelines, hemoglobin A1C <7.0% represents  optimal control in non-pregnant diabetic patients.  Different  metrics may apply to specific populations.   Standards of Medical Care in Diabetes - 2016.  For the purpose of screening for the presence of diabetes:  <5.7%     Consistent with the absence of diabetes  5.7-6.4%  Consistent with increasing risk for diabetes   (prediabetes)  >or=6.5%  Consistent with diabetes  Currently no consensus exists for use of hemoglobin A1C  for diagnosis of diabetes for " children.      Comment for A1C at 12:23 PM on 12/8/2016:  According to ADA guidelines, hemoglobin A1C <7.0% represents  optimal control in non-pregnant diabetic patients.  Different  metrics may apply to specific populations.   Standards of Medical Care in Diabetes - 2016.  For the purpose of screening for the presence of diabetes:  <5.7%     Consistent with the absence of diabetes  5.7-6.4%  Consistent with increasing risk for diabetes   (prediabetes)  >or=6.5%  Consistent with diabetes  Currently no consensus exists for use of hemoglobin A1C  for diagnosis of diabetes for children.      Comment for A1C at  9:11 PM on 1/22/2017:  According to ADA guidelines, hemoglobin A1C <7.0% represents  optimal control in non-pregnant diabetic patients.  Different  metrics may apply to specific populations.   Standards of Medical Care in Diabetes - 2016.  For the purpose of screening for the presence of diabetes:  <5.7%     Consistent with the absence of diabetes  5.7-6.4%  Consistent with increasing risk for diabetes   (prediabetes)  >or=6.5%  Consistent with diabetes  Currently no consensus exists for use of hemoglobin A1C  for diagnosis of diabetes for children.        Allergies as of 2/19/2017     No Known Allergies      [facility] On Call     [facility] On Call Nurse Care Line - 24/7 Assistance  Unless otherwise directed by your provider, please contact [facility], our nurse care line that is available for 24/7 assistance.     Registered nurses in the [facility] provide clinical advisement, health education, appointment booking, and other advisory services.  Call for this free service at [phone].        Advance Directives     An advance directive is a document which, in the event you are no longer able to make decisions for yourself, tells your healthcare team what kind of treatment you do or do not want to receive, or who you would like to make those decisions for you.  If you do not currently have an  advance directive, [facility] encourages you to create one.  For more information call:  [phone]), [phone] ([phone]),  or log on to [email or web address].        Smoking Cessation     If you would like to quit smoking:   You may be eligible for free services if you are a [place] resident and started smoking cigarettes before September 1, 1988.  Call the Smoking Cessation Trust (SCT) toll free at (885) 109-4228 or (195) 586-7010.   Call 7-139-QUIT-NOW if you do not meet the above criteria.            Language Assistance Services     ATTENTION: Language assistance services are available, free of charge. Please call [phone].      ATENCIÓN: Si habla español, tiene a miller disposición servicios gratuitos de asistencia lingüística. Llame al [phone].     CHÚ Ý: N?u b?n nói Ti?ng Vi?t, có các d?ch v? h? tr? ngôn ng? mi?n phí dành cho b?n. G?i s? [phone].        Chronic Kindey Disease Education             Diabetes Discharge Instructions                                    [facility] complies with applicable Federal civil rights laws and does not discriminate on the basis of race, color, national origin, age, disability, or sex.

## 2017-02-16 NOTE — H&P
History & Physical  Hospital Medicine      SUBJECTIVE:     Chief Complaint/Reason for Admission: hyperglycemia    History of Present Illness:  Mr. Julius Cardenas is a 84 y.o. who has a history of prior CVA with residual left sided weakness. PT reports  He goes to adult  daily. Apparently they noted he had more weakness on the side of his prior stroke and had difficulty getting him out of the tub because of this. He normally ambulates with a cane. They called EMS who recorded a BG >500 so he was brought to the ED where BG was 770. He is a poor historian. Unclear if he took his insulin today but admits he is not consistently compliant and rarely checks his BG. Denies any CP/ SOB.      (Not in a hospital admission)     Review of patient's allergies indicates:  No Known Allergies    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     TIA (transient ischemic attack) 2016    Type 1 diabetes     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 3/25/2013    Type 1 diabetes mellitus with hyperglycemia 4/8/2014    Type 1 diabetes mellitus with hypoglycemia and without coma 4/8/2014    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      Poor control due to cognitive impairment, with history of hypoglycemia and DKA     Vitamin D deficiency disease 7/31/2013       Past Surgical History   Procedure Laterality Date     Cataract extraction w/  intraocular lens implant  8/26/2009, 10/14/2009    Craniotomy  1995    Craniotomy  12/21/2010    Eye surgery         Family History   Problem Relation Age of Onset    Diabetes Mother     Cataracts Mother     No Known Problems Father     Diabetes Sister     No Known Problems Son     No Known Problems Daughter     No Known Problems Maternal Grandmother     No Known Problems Maternal Grandfather     No Known Problems Paternal Grandmother     No Known Problems Paternal Grandfather     Diabetes Sister     Diabetes Brother     No Known Problems Maternal Aunt     No Known Problems Maternal Uncle     No Known Problems Paternal Aunt     No Known Problems Paternal Uncle     Diabetes Sister     Diabetes Brother     Amblyopia Neg Hx     Blindness Neg Hx     Cancer Neg Hx     Glaucoma Neg Hx     Hypertension Neg Hx     Macular degeneration Neg Hx     Retinal detachment Neg Hx     Strabismus Neg Hx     Stroke Neg Hx     Thyroid disease Neg Hx         Social History     Social History    Marital status: Single     Spouse name: N/A    Number of children: N/A    Years of education: N/A     Social History Main Topics    Smoking status: Former Smoker     Packs/day: 1.00     Types: Cigarettes     Quit date: 12/31/1994    Smokeless tobacco: Former User     Quit date: 4/3/2010    Alcohol use No    Drug use: No    Sexual activity: Not Asked     Other Topics Concern    None     Social History Narrative    Single with adult aged children. Lives with Brother and Sister-in-Law. Quit alcohol and tobacco intake ~2007. Retired, but previously worked as installing Insulation.       Review of Systems:  Constitutional: No fever or chills, no weight changes.  Eyes: No visual changes or photophobia  HEENT: No nasal congestion or sore throat  Respiratory: No cough or shorness of breath  Cardiovascular: No chest pain or palpitations  Gastrointestinal: No nausea or vomiting, no diarrhea or  change in bowel habits  Genitourinary: No hematuria or dysuria  Skin: No rash or pruritis  Hematologic/lymphatic: No easy bruising, bleeding or lymphadenopathy  Musculoskeletal: No arthralgias or myalgias  Neurological: No seizures or tremors  Endocrine: No heat/cold intolerance.  No polyuria/polydipsia  Psychiatric:  No depression or anxiety.      OBJECTIVE:     Temp:  [98.6 °F (37 °C)] 98.6 °F (37 °C)  Pulse:  [74-86] 82  Resp:  [18-19] 19  SpO2:  [94 %-98 %] 98 %  BP: (206-209)/(86-98) 209/86  Body mass index is 27.37 kg/(m^2).     Physical Exam:  General appearance: Well developed, well nourished  Head: Normocephalic, atraumatic  Eyes:  Conjunctivae nl. Sclera anicteric. PERRL.  HEENT: Lips, mucosa, and tongue normal; teeth and gums normal and oropharynx clear.  Neck: Supple, trachea midline, no thyromegaly.  Lungs: Clear to auscultation bilaterally and normal respiratory effort  Heart: Regular rate and rhythm, S1, S2 normal, no murmur, click, rub or gallop  Abdomen: Soft, non-tender, non-distended; bowel sounds normal; no masses, no organomegaly  Extremities: No cyanosis or clubbing. No edema  Pulses: 2+ and symmetric  Skin: Skin color, texture, turgor normal. No rashes or lesions  Lymph nodes: Cervical, supraclavicular, and axillary nodes normal.  Neurologic: left sided weakness  Psychiatric:  Alert and oriented times 3.  Affect appropriate.      Laboratory: Reviewed and noted  where applicable- Please see chart for full lab data.    BMP:   Recent Labs  Lab 02/16/17  1254   *      K 3.4*   CL 99   CO2 24   BUN 27*   CREATININE 2.7*   CALCIUM 9.5     CMP:   Recent Labs  Lab 02/16/17  1254   *   CALCIUM 9.5      K 3.4*   CO2 24   CL 99   BUN 27*   CREATININE 2.7*     Diagnostic Tests:  CT head:In this patient with history of previous resection of a parasagittal anterior meningioma, there are findings compatible with enlargement of this, likely partially resected mass.  If deemed  clinically indicated, further evaluation with contrast-enhanced MRI can be performed.  This mass is not causing mass effect on today's exam.  For the clinical history of weakness, this mass is of doubtful clinical correlation.    ASSESSMENT/PLAN:     Active Hospital Problems    Diagnosis  POA    *Hyperosmolar non-ketotic state in patient with type 2 diabetes mellitus [E11.01]  Yes    NIALL (acute kidney injury) [N17.9]  Yes    Volume depletion [E86.9]  Yes    Malnutrition of moderate degree [E44.0]  Yes    DM (diabetes mellitus), type 2, uncontrolled [E11.65]  Yes    Diabetic polyneuropathy associated with type 2 diabetes mellitus [E11.42]  Yes    Diabetic nephropathy associated with type 2 diabetes mellitus [E11.21]  Yes    Hypokalemia [E87.6]  Yes    Anemia of chronic renal failure, stage 4 (severe) [N18.4, D63.1]  Yes     Chronic    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     Hyperosmolar syndrome associated with type 2 DM  -suspect this is  from noncompliance, his cognitive  impairment is likley a significant factor in this  -no clear precipitating source such as infection or ischemia on EKG  -will hydrate, place on insulin gtt    Uncontrolled type 2 DM associated with diabetic nephropathy causing CKD 4 and diabetic neuropathy  -once hyperosmolar syndrome resolved will restart basal / prandial insulin  -check A1c    NIALL from volume depletion related to osmotic diuresis from hyperglycemia  -IVF  -monitor UOP  -daily Cr    H/o of CVA with residual left sided weakness with acute worsening of sx  -nothing acute on head ct  -suspect this worsening of prior neurologic deficits is related to his current severe metabolic disturbance.  -will consider MRI if does not resolve with correction of hyperglycemia,  dehydration    H/o of seizure d/o  -continue keppra    Hypokalemia  -replace, needs to be monitored closely since on insulin drip    Malnutrition of Moderate Degree  -monitor caloric intake  -supplements PRN    Anemia of CKD  -monitor H&H  VTE prophylaxis  -Heparin SQ

## 2017-02-16 NOTE — ED PROVIDER NOTES
"Encounter Date: 2/16/2017    SCRIBE #1 NOTE: I, Rocío Mayo, am scribing for, and in the presence of, Dr. Munguia.       History     Chief Complaint   Patient presents with    Hyperglycemia     EMS reports weakness and "HI" CBG.      Review of patient's allergies indicates:  No Known Allergies  HPI Comments: Time seen by provider: 12:34 PM    This is a 84 y.o. male, with dementia, who presents with complaint of gradually worsening left leg weakness that began last month. He states he did not speak to adult  staff about the weakness previously, but this morning he could not get out of his bath because he could not move his leg. The staff had to help him out of the tub, and they called EMS. The staff called EMS, who found his CBG to be "high." He denies headache and shortness of breath. The patient's medical record states he has residual left-sided weakness from a previous CVA.     The history is provided by the patient, the EMS personnel and medical records. The history is limited by the condition of the patient.     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     TIA (transient ischemic attack) 2016    Type 1 diabetes     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 3/25/2013    Type 1 diabetes mellitus with hyperglycemia 4/8/2014    Type 1 diabetes mellitus with hypoglycemia and without coma 4/8/2014    Type 1 " diabetes mellitus with stage 4 chronic kidney disease     Poor control due to cognitive impairment, with history of hypoglycemia and DKA     Vitamin D deficiency disease 7/31/2013     Past Medical History Pertinent Negatives:   Diagnosis Date Noted    Amblyopia 02/01/2016    Arthritis 02/01/2016    Cataract 02/01/2016    Diabetic retinopathy 02/01/2016    Glaucoma 02/01/2016    Macular degeneration 02/01/2016    Retinal detachment 02/01/2016    Sickle cell anemia 02/01/2016    Sickle cell trait 02/01/2016    Strabismus 02/01/2016    Stroke 09/09/2016    Uveitis 02/01/2016     Past Surgical History:   Procedure Laterality Date    CATARACT EXTRACTION W/  INTRAOCULAR LENS IMPLANT  8/26/2009, 10/14/2009    CRANIOTOMY  1995    CRANIOTOMY  12/21/2010    EYE SURGERY       Family History   Problem Relation Age of Onset    Diabetes Mother     Cataracts Mother     No Known Problems Father     Diabetes Sister     No Known Problems Son     No Known Problems Daughter     No Known Problems Maternal Grandmother     No Known Problems Maternal Grandfather     No Known Problems Paternal Grandmother     No Known Problems Paternal Grandfather     Diabetes Sister     Diabetes Brother     No Known Problems Maternal Aunt     No Known Problems Maternal Uncle     No Known Problems Paternal Aunt     No Known Problems Paternal Uncle     Diabetes Sister     Diabetes Brother     Amblyopia Neg Hx     Blindness Neg Hx     Cancer Neg Hx     Glaucoma Neg Hx     Hypertension Neg Hx     Macular degeneration Neg Hx     Retinal detachment Neg Hx     Strabismus Neg Hx     Stroke Neg Hx     Thyroid disease Neg Hx      Social History   Substance Use Topics    Smoking status: Former Smoker     Packs/day: 1.00     Types: Cigarettes     Quit date: 12/31/1994    Smokeless tobacco: Former User     Quit date: 4/3/2010    Alcohol use No     Review of Systems   Constitutional: Negative for chills and fever.   HENT:  Negative for congestion and facial swelling.    Respiratory: Negative for shortness of breath.    Cardiovascular: Negative for chest pain.   Gastrointestinal: Negative for abdominal pain, nausea and vomiting.   Endocrine: Negative for polyuria.   Genitourinary: Negative for dysuria.   Musculoskeletal: Negative for myalgias.   Skin: Negative for rash.   Neurological: Positive for weakness (left leg). Negative for headaches.       Physical Exam   Initial Vitals   BP Pulse Resp Temp SpO2   -- 02/16/17 1141 02/16/17 1141 02/16/17 1141 02/16/17 1141    74 18 98.6 °F (37 °C) 94 %     Physical Exam    Nursing note and vitals reviewed.  Constitutional: He appears well-developed and well-nourished. He is not diaphoretic. No distress.   HENT:   Head: Normocephalic and atraumatic.   Right Ear: External ear normal.   Left Ear: External ear normal.   Eyes: EOM are normal. Right eye exhibits no discharge. Left eye exhibits no discharge.   Neck: Normal range of motion.   Cardiovascular: Normal rate, regular rhythm and normal heart sounds. Exam reveals no gallop and no friction rub.    No murmur heard.  Pulmonary/Chest: Breath sounds normal. No respiratory distress. He has no wheezes. He has no rhonchi. He has no rales.   Abdominal: Soft. There is no tenderness. There is no rebound and no guarding.   Musculoskeletal: Normal range of motion. He exhibits no edema or tenderness.   Neurological: He is alert.   Oriented to person and place.   Left hemiparesis. Left facial droop.   Skin: Skin is warm and dry. No rash and no abscess noted. No erythema. No pallor.   Psychiatric: He has a normal mood and affect. His behavior is normal. Judgment and thought content normal.         ED Course   Critical Care  Date/Time: 3/2/2017 7:05 AM  Performed by: ABBY WATERMAN.  Authorized by: ABBY WATERMAN.   Direct patient critical care time: 15 minutes  Additional history critical care time: 5 minutes  Ordering / reviewing critical care  time: 5 minutes  Documentation critical care time: 5 minutes  Consulting other physicians critical care time: 5 minutes  Total critical care time (exclusive of procedural time) : 35 minutes        Labs Reviewed   CBC W/ AUTO DIFFERENTIAL - Abnormal; Notable for the following:        Result Value    RBC 4.08 (*)     Hemoglobin 11.6 (*)     Hematocrit 34.9 (*)     All other components within normal limits   BASIC METABOLIC PANEL - Abnormal; Notable for the following:     Potassium 3.4 (*)     Glucose 770 (*)     BUN, Bld 27 (*)     Creatinine 2.7 (*)     eGFR if  24 (*)     eGFR if non  21 (*)     All other components within normal limits    Narrative:     GLU critical result(s) called and verbal readback obtained from Yonatan Smith RN., 02/16/2017 13:26   URINALYSIS - Abnormal; Notable for the following:     Specific Gravity, UA <=1.005 (*)     Protein, UA Trace (*)     Glucose, UA 3+ (*)     Occult Blood UA Trace (*)     All other components within normal limits   BETA - HYDROXYBUTYRATE, SERUM   URINALYSIS MICROSCOPIC   IRON AND TIBC   FERRITIN   VITAMIN D   PTH, INTACT        Imaging Results         CT Head Without Contrast (Final result) Result time:  02/16/17 13:34:24    Final result by Adali Garay MD (02/16/17 13:34:24)    Impression:     In this patient with history of previous resection of a parasagittal anterior meningioma, there are findings compatible with enlargement of this, likely partially resected mass.  If deemed clinically indicated, further evaluation with contrast-enhanced MRI can be performed.  This mass is not causing mass effect on today's exam.  For the clinical history of weakness, this mass is of doubtful clinical correlation.      Electronically signed by: ADALI GARAY MD  Date:     02/16/17  Time:    13:34     Narrative:    Comparison: 10/30/16.    Technique: Contiguous 5 mm axial images were obtained from the vertex to the skull base without  demonstration of contrast.  Sagittal and coronal reformats were also submitted.    Findings: Post surgical changes associated with right frontoparietal craniotomy which extends into the left frontal space and associated subjacent encephalomalacia change in the bifrontal lobes is present.  On today's exam, there is a hyperdense, extra axial mass seen in all 3 projections would best in the coronal view which measures approximately 2.8 cm x 2.5 cm.  This mass abuts the anterior [place] and superior sagittal sinus.  It is not well-seen on prior exams.  No intra-axial or extra-axial hemorrhage.  No intra-axial mass.  Global cerebral volume loss is present.  A mild degree of periventricular white matter hyperintense foci are identified.  There is no evidence for acute ischemia or infarction.    The intraorbital contents are normal.  Numerous has aspects of the skull and skull base as well as the soft tissue structures are within normal limits.             EKG Readings: (Independently Interpreted)   Initial Reading: No STEMI.   Normal sinus rhythm at rate of 81. Normal intervals. Normal QRS. No acute ST or T wave abnormalities. Overall impression: Normal.  Compared to EKG on 01/22/2017 no change.           Medical Decision Making:   Clinical Tests:   Lab Tests: Ordered and Reviewed  Radiological Study: Ordered and Reviewed  Medical Tests: Ordered and Reviewed  ED Management:  84-year-old gentleman brought in for weakness and hyperglycemia.  The weakness is generalized.  He states he has no new deficits.  The patient does have a history of a previous stroke which is consistent with his left hemiparesis.  Patient's examination is otherwise unremarkable.  Blood work including an EKG CT the brain will be done and reevaluate.    The patient's glucose is 770.  Creatinine is 2.7 above his baseline of 2.1.  Based on this due to the patient's be admitted for further evaluation and management.  Patient otherwise has no acute  complaints.    1:52 PM: Discussed the case with Dr. Hill, hospitalist on call, who will admit the patient.     Additional MDM:   EKG: I have independently interpreted EKG(s) - see notes.          Scribe Attestation:   Scribe #1: I performed the above scribed service and the documentation accurately describes the services I performed. I attest to the accuracy of the note.    Attending Attestation:           Physician Attestation for Scribe:  Physician Attestation Statement for Scribe #1: I, Dr. Munguia, reviewed documentation, as scribed by Rocío Mayo in my presence, and it is both accurate and complete.                 ED Course     Clinical Impression:     1. Hyperosmolar non-ketotic state in patient with type 2 diabetes mellitus    2. Weakness    3. Hyperglycemia    4. Acute kidney injury    5. Hyperglycemic hyperosmolar nonketotic coma    6. Renovascular hypertension             Gianfranco Munguia, DO  02/16/17 1519       Gianfranco Munguia, DO  03/02/17 0705

## 2017-02-16 NOTE — ED NOTES
Patients sister, Christine Cardenas, wants to be called if patient is admitted or discharged at [phone]

## 2017-02-16 NOTE — CONSULTS
Consult Note  Nephrology    Consult Requested By: Gianfranco Munguia DO  Reason for Consult: NIALL on CKD Stage IV    SUBJECTIVE:     History of Present Illness:  Patient is a 84 y.o. male with CKD Stage IV, likely from DM, BPH, HTN, parasagittal meningioma with secondary seizure disorder, TIA, and mild cognitive impairment, CVA with resultant hemiparesis, admitted for HONK.  He reports adherence to insulin, both at home and at adult .  He states he only eats one meal a day--breakfast.  He has been seen by Dr. Salgado for nephrology evaluation in October 2016.  Baseline Cr ~ 2.3-2.4.  He has been hospitalized at [facility] [place] twice in the last two months for hyperglycemic-related issues.    In the ED, labs remarkable for blood sugar >700, Cr 2.7, K 3.4.  He is receiving IVFs.    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     TIA (transient ischemic attack) 2016    Type 1 diabetes     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 3/25/2013    Type 1 diabetes mellitus with hyperglycemia 4/8/2014    Type 1 diabetes mellitus with hypoglycemia and without coma 4/8/2014    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      Poor control due to cognitive impairment, with history of hypoglycemia and DKA     Vitamin D deficiency disease 7/31/2013     Past Surgical History   Procedure Laterality Date    Cataract extraction  w/  intraocular lens implant  8/26/2009, 10/14/2009    Craniotomy  1995    Craniotomy  12/21/2010    Eye surgery       Family History   Problem Relation Age of Onset    Diabetes Mother     Cataracts Mother     No Known Problems Father     Diabetes Sister     No Known Problems Son     No Known Problems Daughter     No Known Problems Maternal Grandmother     No Known Problems Maternal Grandfather     No Known Problems Paternal Grandmother     No Known Problems Paternal Grandfather     Diabetes Sister     Diabetes Brother     No Known Problems Maternal Aunt     No Known Problems Maternal Uncle     No Known Problems Paternal Aunt     No Known Problems Paternal Uncle     Diabetes Sister     Diabetes Brother     Amblyopia Neg Hx     Blindness Neg Hx     Cancer Neg Hx     Glaucoma Neg Hx     Hypertension Neg Hx     Macular degeneration Neg Hx     Retinal detachment Neg Hx     Strabismus Neg Hx     Stroke Neg Hx     Thyroid disease Neg Hx      Social History   Substance Use Topics    Smoking status: Former Smoker     Packs/day: 1.00     Types: Cigarettes     Quit date: 12/31/1994    Smokeless tobacco: Former User     Quit date: 4/3/2010    Alcohol use No       Review of patient's allergies indicates:  No Known Allergies     Review of Systems:  Constitutional: no fever or chills  Eyes: no visual changes  ENT: no nasal congestion or sore throat  Respiratory: no cough or shortness of breath  Cardiovascular: no chest pain or palpitations  Gastrointestinal: no nausea or vomiting, no abdominal pain or change in bowel habits  Musculoskeletal: no arthralgias or myalgias  Neurological: no seizures or tremors  Behavioral/Psych: no auditory or visual hallucinations  Endocrine: no heat or cold intolerance     OBJECTIVE:     Vital Signs (Most Recent)  Temp: 98.6 °F (37 °C) (02/16/17 1141)  Pulse: 84 (02/16/17 1557)  Resp: 20 (02/16/17 1557)  BP: (!) 207/97 (02/16/17 1557)  SpO2: 96 % (02/16/17  1557)    Vital Signs Range (Last 24H):  Temp:  [98.6 °F (37 °C)]   Pulse:  [74-86]   Resp:  [18-20]   BP: (206-218)/(86-98)   SpO2:  [94 %-98 %]     Physical Exam:  Gen: Awake, answers questions appropriately, NAD  HEENT: mmm, sclera anicteric, poor dentition  CV: RRR, no m/r  Resp: CTAB, rales at bases  GI: soft, distended, NTTP, +BS  Extr: no edema  Skin: thickened toe nails     amlodipine  10 mg Oral Daily    aspirin  81 mg Oral Daily    calcitRIOL  0.5 mcg Oral Daily    carvedilol  3.125 mg Oral BID    heparin (porcine)  5,000 Units Subcutaneous Q8H    hydrALAZINE  50 mg Oral Q8H    levetiracetam  1,000 mg Oral BID    paroxetine  10 mg Oral QAM       Laboratory:    Recent Labs  Lab 02/16/17  1254      K 3.4*   CL 99   CO2 24   BUN 27*   CREATININE 2.7*   CALCIUM 9.5       Recent Labs  Lab 02/16/17  1254   WBC 6.63   HGB 11.6*   HCT 34.9*             Diagnostic Results:  Labs: Reviewed  X-Ray: Reviewed    ASSESSMENT/PLAN:     NIALL on CKD CKD Stage IV due to HONK with T1DM (N17.9, N18.4, E11.01)  - Agree with IVFs.  - Check BMPs q4hr  - Baseline Cr ~ 2.3-2.4.  - Strict I/Os.  - Defer to Dr. Salgado to discuss RRT options at outpatient f/u appt.    Hypokalemia (E87.6)  - Expect potassium to move intracellularly with continued IVFs and insulin administration.  - Replace prn.    T1DM (E 10.22)  - IVFs and insulin per primary team.  - HgbA1C 9.5% last month.  - Needs strict glycemic control as an outpatient.    Secondary HPTH (N25.81)  - On Calctriol daily.  - Check iPTH and Vitamin D levels.    Anemia of CKD (D63.1)  - Check iron studies.  - Hgb>10.    HTN (I 12.9)  - Resume home BP meds.  - Can add Labetalol prn.    D/W Dr. Beck.  Thank you for the consult.  We will continue to follow along with you.    Diogo Colvin MD  Nephrology

## 2017-02-17 PROBLEM — R73.9 HYPERGLYCEMIA: Status: ACTIVE | Noted: 2017-02-17

## 2017-02-17 LAB
ANION GAP SERPL CALC-SCNC: 10 MMOL/L
ANION GAP SERPL CALC-SCNC: 9 MMOL/L
ANION GAP SERPL CALC-SCNC: 9 MMOL/L
BUN SERPL-MCNC: 17 MG/DL
BUN SERPL-MCNC: 18 MG/DL
BUN SERPL-MCNC: 20 MG/DL
CALCIUM SERPL-MCNC: 8.4 MG/DL
CALCIUM SERPL-MCNC: 8.5 MG/DL
CALCIUM SERPL-MCNC: 8.7 MG/DL
CHLORIDE SERPL-SCNC: 115 MMOL/L
CO2 SERPL-SCNC: 21 MMOL/L
CO2 SERPL-SCNC: 22 MMOL/L
CO2 SERPL-SCNC: 22 MMOL/L
CREAT SERPL-MCNC: 1.8 MG/DL
CREAT SERPL-MCNC: 1.9 MG/DL
CREAT SERPL-MCNC: 1.9 MG/DL
EST. GFR  (AFRICAN AMERICAN): 37 ML/MIN/1.73 M^2
EST. GFR  (AFRICAN AMERICAN): 37 ML/MIN/1.73 M^2
EST. GFR  (AFRICAN AMERICAN): 39 ML/MIN/1.73 M^2
EST. GFR  (NON AFRICAN AMERICAN): 32 ML/MIN/1.73 M^2
EST. GFR  (NON AFRICAN AMERICAN): 32 ML/MIN/1.73 M^2
EST. GFR  (NON AFRICAN AMERICAN): 34 ML/MIN/1.73 M^2
GLUCOSE SERPL-MCNC: 111 MG/DL
GLUCOSE SERPL-MCNC: 137 MG/DL
GLUCOSE SERPL-MCNC: 81 MG/DL
MAGNESIUM SERPL-MCNC: 1.6 MG/DL
MAGNESIUM SERPL-MCNC: 1.6 MG/DL
PHOSPHATE SERPL-MCNC: 2.3 MG/DL
PHOSPHATE SERPL-MCNC: 2.4 MG/DL
POCT GLUCOSE: 115 MG/DL (ref 70–110)
POCT GLUCOSE: 128 MG/DL (ref 70–110)
POCT GLUCOSE: 133 MG/DL (ref 70–110)
POCT GLUCOSE: 161 MG/DL (ref 70–110)
POCT GLUCOSE: 173 MG/DL (ref 70–110)
POCT GLUCOSE: 176 MG/DL (ref 70–110)
POCT GLUCOSE: 180 MG/DL (ref 70–110)
POCT GLUCOSE: 203 MG/DL (ref 70–110)
POCT GLUCOSE: 466 MG/DL (ref 70–110)
POCT GLUCOSE: 85 MG/DL (ref 70–110)
POCT GLUCOSE: 91 MG/DL (ref 70–110)
POTASSIUM SERPL-SCNC: 2.9 MMOL/L
POTASSIUM SERPL-SCNC: 2.9 MMOL/L
POTASSIUM SERPL-SCNC: 3.2 MMOL/L
SODIUM SERPL-SCNC: 145 MMOL/L
SODIUM SERPL-SCNC: 146 MMOL/L
SODIUM SERPL-SCNC: 147 MMOL/L
TROPONIN I SERPL DL<=0.01 NG/ML-MCNC: 0.02 NG/ML
TROPONIN I SERPL DL<=0.01 NG/ML-MCNC: 0.02 NG/ML

## 2017-02-17 PROCEDURE — 97162 PT EVAL MOD COMPLEX 30 MIN: CPT

## 2017-02-17 PROCEDURE — 25000003 PHARM REV CODE 250: Performed by: HOSPITALIST

## 2017-02-17 PROCEDURE — 97530 THERAPEUTIC ACTIVITIES: CPT

## 2017-02-17 PROCEDURE — 97116 GAIT TRAINING THERAPY: CPT

## 2017-02-17 PROCEDURE — 80048 BASIC METABOLIC PNL TOTAL CA: CPT

## 2017-02-17 PROCEDURE — 25000003 PHARM REV CODE 250: Performed by: NURSE PRACTITIONER

## 2017-02-17 PROCEDURE — 25000003 PHARM REV CODE 250: Performed by: INTERNAL MEDICINE

## 2017-02-17 PROCEDURE — 97535 SELF CARE MNGMENT TRAINING: CPT

## 2017-02-17 PROCEDURE — 36415 COLL VENOUS BLD VENIPUNCTURE: CPT

## 2017-02-17 PROCEDURE — 84484 ASSAY OF TROPONIN QUANT: CPT

## 2017-02-17 PROCEDURE — 83735 ASSAY OF MAGNESIUM: CPT | Mod: 91

## 2017-02-17 PROCEDURE — 63600175 PHARM REV CODE 636 W HCPCS: Performed by: INTERNAL MEDICINE

## 2017-02-17 PROCEDURE — 80048 BASIC METABOLIC PNL TOTAL CA: CPT | Mod: 91

## 2017-02-17 PROCEDURE — 20000000 HC ICU ROOM

## 2017-02-17 PROCEDURE — 99233 SBSQ HOSP IP/OBS HIGH 50: CPT | Mod: ,,, | Performed by: INTERNAL MEDICINE

## 2017-02-17 PROCEDURE — 97166 OT EVAL MOD COMPLEX 45 MIN: CPT

## 2017-02-17 PROCEDURE — 84100 ASSAY OF PHOSPHORUS: CPT | Mod: 91

## 2017-02-17 RX ORDER — NIFEDIPINE 30 MG/1
90 TABLET, EXTENDED RELEASE ORAL DAILY
Status: DISCONTINUED | OUTPATIENT
Start: 2017-02-17 | End: 2017-02-19 | Stop reason: HOSPADM

## 2017-02-17 RX ORDER — POTASSIUM CHLORIDE 20 MEQ/15ML
40 SOLUTION ORAL ONCE
Status: COMPLETED | OUTPATIENT
Start: 2017-02-17 | End: 2017-02-17

## 2017-02-17 RX ORDER — HYDRALAZINE HYDROCHLORIDE 25 MG/1
50 TABLET, FILM COATED ORAL EVERY 12 HOURS
Status: DISCONTINUED | OUTPATIENT
Start: 2017-02-17 | End: 2017-02-19 | Stop reason: HOSPADM

## 2017-02-17 RX ORDER — IBUPROFEN 200 MG
16 TABLET ORAL
Status: DISCONTINUED | OUTPATIENT
Start: 2017-02-17 | End: 2017-02-19 | Stop reason: HOSPADM

## 2017-02-17 RX ORDER — IBUPROFEN 200 MG
24 TABLET ORAL
Status: DISCONTINUED | OUTPATIENT
Start: 2017-02-17 | End: 2017-02-19 | Stop reason: HOSPADM

## 2017-02-17 RX ORDER — INSULIN ASPART 100 [IU]/ML
0-5 INJECTION, SOLUTION INTRAVENOUS; SUBCUTANEOUS
Status: DISCONTINUED | OUTPATIENT
Start: 2017-02-17 | End: 2017-02-19 | Stop reason: HOSPADM

## 2017-02-17 RX ORDER — CHOLECALCIFEROL (VITAMIN D3) 25 MCG
1000 TABLET ORAL DAILY
Status: DISCONTINUED | OUTPATIENT
Start: 2017-02-17 | End: 2017-02-19 | Stop reason: HOSPADM

## 2017-02-17 RX ORDER — GLUCAGON 1 MG
1 KIT INJECTION
Status: DISCONTINUED | OUTPATIENT
Start: 2017-02-17 | End: 2017-02-19 | Stop reason: HOSPADM

## 2017-02-17 RX ORDER — DEXTROSE MONOHYDRATE AND SODIUM CHLORIDE 5; .45 G/100ML; G/100ML
INJECTION, SOLUTION INTRAVENOUS CONTINUOUS
Status: DISCONTINUED | OUTPATIENT
Start: 2017-02-17 | End: 2017-02-17

## 2017-02-17 RX ORDER — INSULIN ASPART 100 [IU]/ML
5 INJECTION, SOLUTION INTRAVENOUS; SUBCUTANEOUS
Status: DISCONTINUED | OUTPATIENT
Start: 2017-02-18 | End: 2017-02-19 | Stop reason: HOSPADM

## 2017-02-17 RX ORDER — HYDRALAZINE HYDROCHLORIDE 25 MG/1
100 TABLET, FILM COATED ORAL EVERY 12 HOURS
Status: DISCONTINUED | OUTPATIENT
Start: 2017-02-17 | End: 2017-02-17

## 2017-02-17 RX ADMIN — DEXTROSE AND SODIUM CHLORIDE: 5; .45 INJECTION, SOLUTION INTRAVENOUS at 07:02

## 2017-02-17 RX ADMIN — DEXTROSE AND SODIUM CHLORIDE: 5; .9 INJECTION, SOLUTION INTRAVENOUS at 05:02

## 2017-02-17 RX ADMIN — CALCITRIOL 0.5 MCG: 0.25 CAPSULE, LIQUID FILLED ORAL at 08:02

## 2017-02-17 RX ADMIN — HYDRALAZINE HYDROCHLORIDE 50 MG: 25 TABLET, FILM COATED ORAL at 06:02

## 2017-02-17 RX ADMIN — INSULIN HUMAN 10 UNITS: 100 INJECTION, SOLUTION PARENTERAL at 09:02

## 2017-02-17 RX ADMIN — PAROXETINE HYDROCHLORIDE 10 MG: 10 TABLET, FILM COATED ORAL at 06:02

## 2017-02-17 RX ADMIN — CARVEDILOL 3.12 MG: 3.12 TABLET, FILM COATED ORAL at 09:02

## 2017-02-17 RX ADMIN — CARVEDILOL 3.12 MG: 3.12 TABLET, FILM COATED ORAL at 08:02

## 2017-02-17 RX ADMIN — HEPARIN SODIUM 5000 UNITS: 5000 INJECTION, SOLUTION INTRAVENOUS; SUBCUTANEOUS at 06:02

## 2017-02-17 RX ADMIN — NIFEDIPINE 90 MG: 30 TABLET, FILM COATED, EXTENDED RELEASE ORAL at 08:02

## 2017-02-17 RX ADMIN — HEPARIN SODIUM 5000 UNITS: 5000 INJECTION, SOLUTION INTRAVENOUS; SUBCUTANEOUS at 09:02

## 2017-02-17 RX ADMIN — LEVETIRACETAM 1000 MG: 500 TABLET, FILM COATED ORAL at 09:02

## 2017-02-17 RX ADMIN — INSULIN DETEMIR 15 UNITS: 100 INJECTION, SOLUTION SUBCUTANEOUS at 06:02

## 2017-02-17 RX ADMIN — HYDRALAZINE HYDROCHLORIDE 50 MG: 25 TABLET, FILM COATED ORAL at 09:02

## 2017-02-17 RX ADMIN — INSULIN ASPART 5 UNITS: 100 INJECTION, SOLUTION INTRAVENOUS; SUBCUTANEOUS at 06:02

## 2017-02-17 RX ADMIN — HEPARIN SODIUM 5000 UNITS: 5000 INJECTION, SOLUTION INTRAVENOUS; SUBCUTANEOUS at 02:02

## 2017-02-17 RX ADMIN — POTASSIUM CHLORIDE 40 MEQ: 40 SOLUTION ORAL at 08:02

## 2017-02-17 RX ADMIN — LEVETIRACETAM 1000 MG: 500 TABLET, FILM COATED ORAL at 08:02

## 2017-02-17 RX ADMIN — VITAMIN D, TAB 1000IU (100/BT) 1000 UNITS: 25 TAB at 08:02

## 2017-02-17 RX ADMIN — ASPIRIN 81 MG: 81 TABLET, COATED ORAL at 08:02

## 2017-02-17 NOTE — PROGRESS NOTES
Progress Note  Hospital Medicine    Admit Date: 2/16/2017   LOS: 1 day     SUBJECTIVE:     Follow-up For:  Hyperosmolar non-ketotic state in patient with type 2 diabetes mellitus    Interval History:      Off insulin drip since last night  No recurrence of hyperglycemia    Review of Systems:  Constitutional: No fever or chills  Respiratory: No cough or shorness of breath  Cardiovascular: No chest pain or palpitations  Gastrointestinal: No nausea or vomiting  Neurological: No confusion or weakness    OBJECTIVE:     Vital Signs Range (Last 24H):  Vitals:    02/17/17 0700   BP: (!) 186/84   Pulse: 60   Resp: 12   Temp: 97.9 °F (36.6 °C)       Temp:  [97.8 °F (36.6 °C)-98.2 °F (36.8 °C)]   Pulse:  [60-86]   Resp:  [12-33]   BP: (139-218)/()   SpO2:  [96 %-100 %]     I & O (Last 24H):  Intake/Output Summary (Last 24 hours) at 02/17/17 1354  Last data filed at 02/17/17 0600   Gross per 24 hour   Intake          1007.52 ml   Output              950 ml   Net            57.52 ml       Physical Exam:  General appearance: Well developed, well nourished  Eyes:  Conjunctivae/corneas clear. PERRL.  Lungs: Normal respiratory effort,   clear to auscultation bilaterally   Heart: Regular rate and rhythm, S1, S2 normal, no murmur, rub or sherron.  Abdomen: Soft, non-tender non-distended; bowel sounds normal; no masses,  no organomegaly  Extremities: No cyanosis or clubbing. No edema.    Skin: Skin color, texture, turgor normal. No rashes or lesions  Neurologic: left side weakness    Laboratory Data:  Reviewed and noted  where applicable- Please see chart for full lab data.    Medications:  Medication list was reviewed and changes noted under Assessment/Plan.    Diagnostic Results:        ASSESSMENT/PLAN:     Active Hospital Problems    Diagnosis  POA    *Hyperosmolar non-ketotic state in patient with type 2 diabetes mellitus [E11.01]  Yes    NIALL (acute kidney injury) [N17.9]  Yes    Volume depletion [E86.9]  Yes     Malnutrition of moderate degree [E44.0]  Yes    DM (diabetes mellitus), type 2, uncontrolled [E11.65]  Yes    Diabetic polyneuropathy associated with type 2 diabetes mellitus [E11.42]  Yes    Diabetic nephropathy associated with type 2 diabetes mellitus [E11.21]  Yes    Hypokalemia [E87.6]  Yes    Anemia of chronic renal failure, stage 4 (severe) [N18.4, D63.1]  Yes     Chronic    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       Hyperosmolar syndrome associated with type 2 DM  -no clear precipitating source such as infection or cardiac ischemia  -off insulin drip without recurrence of hyperglycemia     Uncontrolled type 2 DM associated with diabetic nephropathy causing CKD 4 and diabetic neuropathy  - observe on SSI for now    Recent Labs  Lab 02/17/17  0433 02/17/17  0538 02/17/17  0636 02/17/17  0743 02/17/17  0831 02/17/17  0932   POCTGLUCOSE 115* 85 91 128* 173* 203*        NIALL from volume depletion related to osmotic diuresis from hyperglycemia  -resolving  -monitor UOP  -daily Cr     H/o of CVA with residual left sided weakness with acute worsening of sx  -nothing acute on head ct  -suspect this worsening of prior neurologic deficits is related to his current severe metabolic disturbance.  -PT/OT  -eval need for MRI after PT eval    H/o of seizure d/o  -continue keppra     Hypokalemia  -replace     Malnutrition of Moderate Degree  -monitor caloric intake  -supplements PRN     Anemia of CKD  -monitor H&H    VTE prophylaxis  -Heparin SQ        Yonatan Beck MD  c- [phone]  p- [phone]

## 2017-02-17 NOTE — PT/OT/SLP EVAL
Occupational Therapy  Evaluation and Treatment    Julius Cardenas   MRN: [id number]   Admitting Diagnosis: Hyperosmolar non-ketotic state in patient with type 2 diabetes mellitus    OT Date of Treatment: 02/17/17   OT Start Time: 1050  OT Stop Time: 1113  OT Total Time (min): 23 min    Billable Minutes:  Evaluation 7  Self Care/Home Management 8  Therapeutic Activity 8    Diagnosis: Hyperosmolar non-ketotic state in patient with type 2 diabetes mellitus       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     TIA (transient ischemic attack) 2016    Type 1 diabetes     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 3/25/2013    Type 1 diabetes mellitus with hyperglycemia 4/8/2014    Type 1 diabetes mellitus with hypoglycemia and without coma 4/8/2014    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      Poor control due to cognitive impairment, with history of hypoglycemia and DKA     Vitamin D deficiency disease 7/31/2013      Past Surgical History   Procedure Laterality Date    Cataract extraction w/  intraocular lens implant  8/26/2009, 10/14/2009    Craniotomy  1995    Craniotomy  12/21/2010    Eye surgery         Referring physician: ELISABETH Hill  Date referred to OT: 2/17/17    General Precautions: Standard, aspiration, fall  Orthopedic Precautions: N/A  Braces: N/A    Do you have any cultural, spiritual,  "Hindu conflicts, given your current situation?: none specified     Patient History:  Living Environment  Living Environment Comment: Per chart and pt confirmed: Pt lives with his brother and sister in law in a 1 story house with 5 MONTY with bilateral hand rails. He ambulates with a quad cane. He has an aide that come daily to assist with bathing while seated on a shower chair in his tub/shower combe. Aide also assists with dressing and wears briefs due to incontenince.   Equipment Currently Used at Home: cane, quad    Prior level of function:            Dominant hand: right    Subjective:  Communicated with nursing prior to session.  "Sometimes you just need to drink some water!"  Chief Complaint: none at this time  Patient/Family stated goals: to go back home.     Pain Ratin/10              Pain Rating Post-Intervention: 0/10    Objective:       Cognitive Exam:  Oriented to: Person and Place  Follows Commands/attention: Easily distracted and Follows one-step commands  Communication: clear/fluent  Memory:  Poor immediate recall pt poor historian/inconsistent  Safety awareness/insight to disability: impaired  Coping skills/emotional control: Appropriate to situation    Visual/perceptual:  Pt inconsistent with testing appropriately.     Physical Exam:  Postural examination/scapula alignment: Head forward and Kyphosis  Skin integrity: Dry scar LE's  Edema: None noted BUE's    Sensation:   Intact    Upper Extremity Range of Motion:  Right Upper Extremity: WFL  Left Upper Extremity: WFL    Upper Extremity Strength:  Right Upper Extremity: WFL except 3+/5  Left Upper Extremity: WFL except 3+/5   Strength: 4-/5    Fine motor coordination:   NT    Functional Mobility:    Transfers:  Sit <> Stand Assistance: Stand By Assistance  Sit <> Stand Assistive Device: Rolling Walker  Bed <> Chair Technique:  (pt ambulated around bed with rolling walker and assistance to manage lines. Prior to sitting pt required verbal " "prompting for safe transfer. )  Bed <> Chair Transfer Assistance: Stand By Assistance    Functional Ambulation: with rolling walker SBA and management of lines and verbal cuing for safe transfer to bedside chair    Activities of Daily Living:    LE Dressing Level of Assistance: Total assistance (Pt initiated doffing L sock )      Balance:   Static Sit: GOOD-: Takes MODERATE challenges from all directions but inconsistently  Dynamic Sit: FAIR+: Maintains balance through MINIMAL excursions of active trunk motion  Static Stand: FAIR+: Takes MINIMAL challenges from all directions  Dynamic stand: FAIR+: Needs CLOSE SUPERVISION during gait and is able to right self with minor LOB    Therapeutic Activities and Exercises:  Functional transfers and mobility, role of OT and POC. Walker safety.     AM-PAC 6 CLICK ADL  How much help from another person does this patient currently need?  1 = Unable, Total/Dependent Assistance  2 = A lot, Maximum/Moderate Assistance  3 = A little, Minimum/Contact Guard/Supervision  4 = None, Modified [place]/Independent    Putting on and taking off regular lower body clothing? : 1  Bathing (including washing, rinsing, drying)?: 1  Toileting, which includes using toilet, bedpan, or urinal? : 2  Putting on and taking off regular upper body clothing?: 2  Taking care of personal grooming such as brushing teeth?: 2  Eating meals?: 3  Total Score: 11    AM-PAC Raw Score CMS "G-Code Modifier Level of Impairment Assistance   6 % Total / Unable   7 - 9 CM 80 - 100% Maximal Assist   10 - 14 CL 60 - 80% Moderate Assist   15 - 19 CK 40 - 60% Moderate Assist   20 - 22 CJ 20 - 40% Minimal Assist   23 CI 1-20% SBA / CGA   24 CH 0% Independent/ Mod I       Patient left up in chair with all lines intact, call button in reach and nursing notified    Assessment:  Julius Cardenas is a 84 y.o. male with a medical diagnosis of Hyperosmolar non-ketotic state in patient with type 2 diabetes mellitus OT " evaluation completed and treatment initiated. Pt would benefit from skilled occupational therapy intervention for increased activity tolerance and independence in ADL's.    Rehab identified problem list/impairments: Rehab identified problem list/impairments: weakness, impaired self care skills, decreased upper extremity function, decreased lower extremity function, decreased safety awareness, impaired cognition, impaired endurance    Rehab potential is good.    Activity tolerance: Good    Discharge recommendations: Discharge Facility/Level Of Care Needs: home health PT, home health OT     Barriers to discharge: Barriers to Discharge: None    Equipment recommendations: walker, rolling     GOALS:   Occupational Therapy Goals        Problem: Occupational Therapy Goal    Goal Priority Disciplines Outcome Interventions   Occupational Therapy Goal     OT, PT/OT Ongoing (interventions implemented as appropriate)    Description:  Goals to be met by: 3/10/2017     Patient will increase functional independence with ADLs by performing:    Supine <> sit with MOD I   Assess feeding.   Grooming while seated with Set-up Assistance.  Toilet transfer to toilet with Supervision.                  PLAN:  Patient to be seen 5 x/week to address the above listed problems via self-care/home management, therapeutic exercises, neuromuscular re-education  Plan of Care expires: 03/19/17  Plan of Care reviewed with: patient         Janetteyessenia WOOD Haritha, OT  02/17/2017

## 2017-02-17 NOTE — PT/OT/SLP EVAL
Physical Therapy  Evaluation and Treatment    Julius Cardenas   MRN: [id number]   Admitting Diagnosis: Hyperosmolar non-ketotic state in patient with type 2 diabetes mellitus    PT Received On: 02/17/17  PT Start Time: 1021     PT Stop Time: 1051    PT Total Time (min): 30 min       Billable Minutes:  Evaluation 12, Gait Training 10 and Therapeutic Activity 8    Diagnosis: Hyperosmolar non-ketotic state in patient with type 2 diabetes mellitus      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     TIA (transient ischemic attack) 2016    Type 1 diabetes     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 3/25/2013    Type 1 diabetes mellitus with hyperglycemia 4/8/2014    Type 1 diabetes mellitus with hypoglycemia and without coma 4/8/2014    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      Poor control due to cognitive impairment, with history of hypoglycemia and DKA     Vitamin D deficiency disease 7/31/2013      Past Surgical History   Procedure Laterality Date    Cataract extraction w/  intraocular lens implant  8/26/2009, 10/14/2009    Craniotomy  1995    Craniotomy  12/21/2010    Eye surgery         Referring physician: Kiran  Date referred to PT: 2/16/17    General Precautions: Standard, aspiration, fall  Orthopedic Precautions: N/A   Braces: N/A       Do you have any cultural, spiritual, Orthodoxy  "conflicts, given your current situation?: none specified    Patient History:  Living Environment Comment: Per chart and confirmed by patient: Pt lives with brother and sister in law in 1 story house with 5 steps to enter with bilateral hand rails. He ambualtes with a quad cane. He has an aide that comes daily to assist with bathing while seated on a shower chair in his tub./shower. Aide also assists with dressing. He reports he wears briefs due to incontinence.   Equipment Currently Used at Home: cane, quad, shower chair  DME owned (not currently used): nonw    Previous Level of Function:  Ambulation Skills: needs device   Self-care: needs assist and device    Subjective:  Communicated with nurse prior to session.  Pt stated "I'll be fine without the brief. I don't have to go right now." Then later with incontinence during gait pt stated "you were taking too long. That's why I hate all this talking."    Chief Complaint: urinary incontinence  Patient goals: none stated    Pain Ratin/10   Pain Rating Post-Intervention: 0/10    Objective:    Pt found supine in bed with HOB elevated, nurse present.     Cognitive Exam:  Oriented to: Person, Place, Time and Situation    Follows Commands/attention: Follows one-step commands  Communication: clear/fluent  Safety awareness/insight to disability: impaired    Physical Exam:  Postural examination/scapula alignment: Rounded shoulder    Skin integrity: Visible skin intact (redness at distal LEs)  Edema: mild bilateral feet    Sensation:   Diminished to light touch bilateral distal LEs    Lower Extremity Range of Motion:  Right Lower Extremity: WFL  Left Lower Extremity: WFL    Lower Extremity Strength:  Right Lower Extremity: WFL  Left Lower Extremity: Deficits: 3+/5 ankle dorsiflexion     No tone or coordination impairments identified.     Functional Mobility:  Bed Mobility:  Supine to Sit: Minimum Assistance (HOB elevated)    Transfers:  Sit <> Stand Assistance: Stand By " Assistance (x 3 trials)  Sit <> Stand Assistive Device: No Assistive Device    Gait:   Gait Distance: x 165 ft on level tile, urinary incontience during gait trial  Assistance 1: Contact Guard Assistance  Gait Assistive Device: Rolling walker  Gait Pattern: reciprocal  Verbal cues for safety throughout mobility, including pacing and navigation of obstacles.     Balance:   Static Sit: FAIR+: Able to take MINIMAL challenges from all directions  Dynamic Sit: FAIR+: Maintains balance through MINIMAL excursions of active trunk motion  Static Stand: FAIR: Maintains without assist but unable to take challenges x 2 trials while assisting with cleaning/hygiene following urinary incontinence.   Dynamic stand: FAIR: Needs CONTACT GUARD during gait with bilateral UE support; pt also attempted pericare following incontinence and required min A at trunk without UE support.     Therapeutic Activities and Exercises:  Max A for changing out soiled gown, total A for changing out soiled socks.     AM-PAC 6 CLICK MOBILITY  How much help from another person does this patient currently need?   1 = Unable, Total/Dependent Assistance  2 = A lot, Maximum/Moderate Assistance  3 = A little, Minimum/Contact Guard/Supervision  4 = None, Modified [place]/Independent    Turning over in bed (including adjusting bedclothes, sheets and blankets)?: 3  Sitting down on and standing up from a chair with arms (e.g., wheelchair, bedside commode, etc.): 3  Moving from lying on back to sitting on the side of the bed?: 3  Moving to and from a bed to a chair (including a wheelchair)?: 3  Need to walk in hospital room?: 3  Climbing 3-5 steps with a railing?: 3  Total Score: 18     AM-PAC Raw Score CMS G-Code Modifier Level of Impairment Assistance   6 % Total / Unable   7 - 9 CM 80 - 100% Maximal Assist   10 - 14 CL 60 - 80% Moderate Assist   15 - 19 CK 40 - 60% Moderate Assist   20 - 22 CJ 20 - 40% Minimal Assist   23 CI 1-20% SBA / CGA   24 CH  0% Independent/ Mod I     Patient left sitting up at edge of bed with all lines intact, call button in reach, nurse notified and OT present.    Assessment:   Julius Cardenas is a 84 y.o. male with a medical diagnosis of Hyperosmolar non-ketotic state in patient with type 2 diabetes mellitus   PT evaluation completed. Good activity tolerance including ambulation in halls with rolling walker > 150 ft although urinary incontinence occurred [facility] through gait trial. Will need briefs for OOB mobility. Pt has decreased independence with functional mobility presenting with below impairments. Pt would benefit from continued skilled PT to maximize independence and safety with functional mobility.      Rehab identified problem list/impairments: Rehab identified problem list/impairments: weakness, impaired self care skills, impaired functional mobilty, impaired balance    Rehab potential is good.    Activity tolerance: Good    Discharge recommendations: Discharge Facility/Level Of Care Needs: home health PT, home health OT     Barriers to discharge: Barriers to Discharge: None    Equipment recommendations: Equipment Needed After Discharge: walker, rolling     GOALS:   Physical Therapy Goals        Problem: Physical Therapy Goal    Goal Priority Disciplines Outcome Goal Variances Interventions   Physical Therapy Goal     PT/OT, PT Ongoing (interventions implemented as appropriate)     Description:  Goals to be met by: 17     Patient will increase functional independence with mobility by performin. Supine to sit with modified independence.   2. Sit to supine with modified independence.   3. Sit<>stand transfer with modified independence using quad cane or rolling walker.   4. Gait x 150 feet with modified independence using quad cane or rolling walker.   5. Ascend/descend 5 stairs with bilateral handrails with CGA with or without AD.                PLAN:    Patient to be seen 6 x/week to address the above listed  problems via gait training, therapeutic activities, therapeutic exercises, neuromuscular re-education  Plan of Care expires: 03/19/17  Plan of Care reviewed with: patient          Katemasha Jerry, PT  02/17/2017

## 2017-02-17 NOTE — PHYSICIAN QUERY
"PT Name: Julius Cardenas  MR #: [id number]     Physician Query Form - Documentation Clarification    Reviewer  Suni Hernandez RN, CCDS  ext [phone])     This form is a permanent document in the medical record.     Query Date: February 17, 2017  By submitting this query, we are merely seeking further clarification of documentation to reflect the severity of illness of your patient. Please utilize your independent clinical judgment when addressing the question(s) below.    (The Medical record reflects the following:)      Supporting Clinical Findings Location in Medical Record     "Hyperosmolar syndrome associated with type 2 DM"    "PMH Type 1 diabetes"    "T1DM (E 10.22)  - IVFs and insulin per primary team.  - HgbA1C 9.5% last month.  - Needs strict glycemic control as an outpatient."       H&P 2/16/17      H&P 2/16/17    Nephrology Consult 2/16/17                                                                                  Doctor, Please specify diagnosis or diagnoses associated with above clinical findings.  Please clarify type of Diabetes Mellitus:    Physician Use Only        ____Type 1 Diabetes mellitus     XX  ____Type 2  Diabetes mellitus      ____other,___________                                                                                                             [  ] Unable to determine            "

## 2017-02-17 NOTE — PLAN OF CARE
Problem: Occupational Therapy Goal  Goal: Occupational Therapy Goal  Goals to be met by: 3/10/2017     Patient will increase functional independence with ADLs by performing:    Supine <> sit with MOD I   Assess feeding.   Grooming while seated with Set-up Assistance.  Toilet transfer to toilet with Supervision.  Outcome: Ongoing (interventions implemented as appropriate)  OT evaluation completed and treatment initiated.  Pt would benefit from skilled occupational therapy intervention for increased activity tolerance and independence in ADL's.

## 2017-02-17 NOTE — PROGRESS NOTES
"Nephrology  Progress Note    Admit Date: 2/16/2017   LOS: 1 day     SUBJECTIVE:     Follow-up For:  Hyperosmolar non-ketotic state in patient with type 2 diabetes mellitus/NIALL    Interval History:    Off insulin drip.  Renal fxn improved and UOP adequate.  Denies CP/SOB.  Discussed with RN at bedside.      Review of Systems:  Constitutional: No fever or chills  Respiratory: No cough or shortness of breath  Cardiovascular: No chest pain or palpitations  Gastrointestinal: No nausea or vomiting  Neurological: No confusion or weakness    OBJECTIVE:     Vital Signs Range (Last 24H):  Visit Vitals    BP (!) 173/77    Pulse 60    Temp 98.2 °F (36.8 °C) (Oral)    Resp 12    Ht 5' 8" (1.727 m)    Wt 81.6 kg (180 lb)    SpO2 98%    BMI 27.37 kg/m2       Temp:  [97.8 °F (36.6 °C)-98.6 °F (37 °C)]   Pulse:  [60-86]   Resp:  [12-33]   BP: (139-218)/()   SpO2:  [94 %-100 %]     I & O (Last 24H):  Intake/Output Summary (Last 24 hours) at 02/17/17 0722  Last data filed at 02/17/17 0600   Gross per 24 hour   Intake          1007.52 ml   Output              950 ml   Net            57.52 ml       Physical Exam:  General appearance: Well developed, well nourished  Eyes:  Conjunctivae/corneas clear. PERRL.  Poor dentition  Lungs: Normal respiratory effort,   clear to auscultation bilaterally   Heart: Regular rate and rhythm, S1, S2 normal, no murmur, rub or sherron.  Abdomen: Soft, non-tender non-distended; bowel sounds normal; no masses,  no organomegaly  Extremities: No cyanosis or clubbing. No edema.    Skin: Skin color, texture, turgor normal. No rashes or lesions  Neurologic: Normal strength and tone. No focal numbness or weakness     Laboratory Data:    Recent Labs  Lab 02/16/17  1254   WBC 6.63   RBC 4.08*   HGB 11.6*   HCT 34.9*      MCV 86   MCH 28.4   MCHC 33.2       BMP:   Recent Labs  Lab 02/17/17  0616   GLU 81   *   K 3.2*   *   CO2 22*   BUN 18   CREATININE 1.9*   CALCIUM 8.7   MG 1.6 "     Lab Results   Component Value Date    CALCIUM 8.7 02/17/2017    PHOS 2.4 (L) 02/17/2017       POCT Glucose   Date Value Ref Range Status   02/17/2017 91 70 - 110 mg/dL Final   02/17/2017 85 70 - 110 mg/dL Final   02/17/2017 115 (H) 70 - 110 mg/dL Final   02/17/2017 133 (H) 70 - 110 mg/dL Final   02/17/2017 161 (H) 70 - 110 mg/dL Final   02/17/2017 180 (H) 70 - 110 mg/dL Final   02/16/2017 176 (H) 70 - 110 mg/dL Final   02/16/2017 176 (H) 70 - 110 mg/dL Final   02/16/2017 54 (L) 70 - 110 mg/dL Final   02/16/2017 69 (L) 70 - 110 mg/dL Final   02/16/2017 88 70 - 110 mg/dL Final   02/16/2017 249 (H) 70 - 110 mg/dL Final   02/16/2017 285 (H) 70 - 110 mg/dL Final   02/16/2017 404 (H) 70 - 110 mg/dL Final         Medications:  Medication list was reviewed and changes noted under Assessment/Plan.    Diagnostic Results:        ASSESSMENT/PLAN:       Resolved NIALL on CKD CKD Stage IV due to HONK with T1DM (N17.9, N18.4, E11.01)  - adjusted IVF's  - Baseline Cr ~ 2.3-2.4.  - Strict I/Os.  - Defer to Dr. Salgado to discuss RRT options at outpatient f/u appt.  -Renally dose meds, avoid nephrotoxins, and monitor I/O's closely.       Hypokalemia (E87.6)  -Replace orally this am.  -mag wnl    Hypernatremia/Hyperchloridemia secondary to fluid resuscitation/dehydration from hyperglycemia (E87.0):  -IVF's adjusted.    -encourage water intake today.        T1DM (E 10.22)  - IVFs and insulin per primary team.  - HgbA1C 9.5% last month.  - Needs strict glycemic control as an outpatient.       Secondary HPTH (N25.81, E55.9)  - On Calctriol daily.  - Add D3 1000/day       Anemia of CKD (D63.1)  - at goal for CKD       HTN (I 12.9)  - adjusted meds.  -change norvasc to procardia.  -defer ACE/ARB/[place] to Dr. Salgado as outpt.         Stable for transfer to floor.   See above orders/recs.

## 2017-02-17 NOTE — PLAN OF CARE
Concetta Saucedo [phone] ( NIECE IN LAW )    Ryan [phone] ( SISTER IN LAW)--- LIVES WITH HER AND HIS BROTHER PAST TEN YEARS   ----------------------------------------   ATTN : TEAM   DC PLANNING    PT LIVES WITH BROTHER AND SISTER ON LAW RYAN  X 10  YEARS     WENT TO LIVE WITH BROTHER'S  DAUGHTER  CONCETTA PAST WEEK    DME OWNS : CANE   '  HX: [facility] 5 X WK   ( CONTINUE CARE )     DC ADDRESS : BROTHER ADDRESS [id number] [id number]     RECOMMENDATIONS:  AWAIS , BSC ,  CHAIR     Magali Hicks RN  Case management 2/17/201710:57 AM  # [phone] (FAX) [phone]     02/17/17 1057   Discharge Assessment   Assessment Type Discharge Planning Assessment   Assessment information obtained from? Caregiver   Communicated expected length of stay with patient/caregiver yes   Prior to hospitilization cognitive status: Alert/Oriented   Prior to hospitalization functional status: Assistive Equipment   Current cognitive status: Alert/Oriented   Current Functional Status: Assistive Equipment   Arrived From admitted as an inpatient   Lives With sibling(s)   Able to Return to Prior Arrangements yes   Is patient able to care for self after discharge? Yes   Patient's perception of discharge disposition admitted as an inpatient   Patient currently being followed by outpatient case management? No   Patient currently receives home health services? No   Does the patient currently use HME? No   Patient currently receives private duty nursing? No   Patient currently receives any other outside agency services? ([facility])   Equipment Currently Used at Home cane, straight   Do you have any problems affording any of your prescribed medications? TBD   Is the patient taking medications as prescribed? yes   Do you have any financial concerns preventing you from receiving the healthcare you need? No   Does the patient have transportation to healthcare appointments? No   On Dialysis? No   Does  the patient receive services at the [facility]? No   Are there any open cases? No   Discharge Plan A Home with family   Discharge Plan B Home with family   Patient/Family In Agreement With Plan yes

## 2017-02-17 NOTE — PLAN OF CARE
Problem: Physical Therapy Goal  Goal: Physical Therapy Goal  Goals to be met by: 17     Patient will increase functional independence with mobility by performin. Supine to sit with modified independence.   2. Sit to supine with modified independence.   3. Sit<>stand transfer with modified independence using quad cane or rolling walker.   4. Gait x 150 feet with modified independence using quad cane or rolling walker.   5. Ascend/descend 5 stairs with bilateral handrails with CGA with or without AD.  Outcome: Ongoing (interventions implemented as appropriate)  PT evaluation completed. Good activity tolerance including ambulation in halls with rolling walker > 150 ft although urinary incontinence occurred assisted through gait trial. Will need briefs for OOB mobility. Will continue to follow and progress as tolerated. Please see progress note for detailed plan of care and recommendations.

## 2017-02-18 LAB
ANION GAP SERPL CALC-SCNC: 8 MMOL/L
BUN SERPL-MCNC: 23 MG/DL
CALCIUM SERPL-MCNC: 9 MG/DL
CHLORIDE SERPL-SCNC: 113 MMOL/L
CO2 SERPL-SCNC: 23 MMOL/L
CREAT SERPL-MCNC: 2.2 MG/DL
EST. GFR  (AFRICAN AMERICAN): 31 ML/MIN/1.73 M^2
EST. GFR  (NON AFRICAN AMERICAN): 27 ML/MIN/1.73 M^2
GLUCOSE SERPL-MCNC: 64 MG/DL
MAGNESIUM SERPL-MCNC: 1.6 MG/DL
PHOSPHATE SERPL-MCNC: 2.8 MG/DL
POCT GLUCOSE: 112 MG/DL (ref 70–110)
POCT GLUCOSE: 153 MG/DL (ref 70–110)
POCT GLUCOSE: 337 MG/DL (ref 70–110)
POCT GLUCOSE: 431 MG/DL (ref 70–110)
POCT GLUCOSE: 73 MG/DL (ref 70–110)
POCT GLUCOSE: 75 MG/DL (ref 70–110)
POTASSIUM SERPL-SCNC: 3.2 MMOL/L
SODIUM SERPL-SCNC: 144 MMOL/L

## 2017-02-18 PROCEDURE — 63600175 PHARM REV CODE 636 W HCPCS: Performed by: INTERNAL MEDICINE

## 2017-02-18 PROCEDURE — 99233 SBSQ HOSP IP/OBS HIGH 50: CPT | Mod: ,,, | Performed by: HOSPITALIST

## 2017-02-18 PROCEDURE — 25000003 PHARM REV CODE 250: Performed by: NURSE PRACTITIONER

## 2017-02-18 PROCEDURE — 84100 ASSAY OF PHOSPHORUS: CPT

## 2017-02-18 PROCEDURE — 25000003 PHARM REV CODE 250: Performed by: INTERNAL MEDICINE

## 2017-02-18 PROCEDURE — 83735 ASSAY OF MAGNESIUM: CPT

## 2017-02-18 PROCEDURE — 11000001 HC ACUTE MED/SURG PRIVATE ROOM

## 2017-02-18 PROCEDURE — 25000003 PHARM REV CODE 250: Performed by: HOSPITALIST

## 2017-02-18 PROCEDURE — 36415 COLL VENOUS BLD VENIPUNCTURE: CPT

## 2017-02-18 PROCEDURE — 80048 BASIC METABOLIC PNL TOTAL CA: CPT

## 2017-02-18 RX ORDER — POTASSIUM CHLORIDE 20 MEQ/1
40 TABLET, EXTENDED RELEASE ORAL ONCE
Status: DISCONTINUED | OUTPATIENT
Start: 2017-02-18 | End: 2017-02-18

## 2017-02-18 RX ORDER — POTASSIUM CHLORIDE 20 MEQ/1
40 TABLET, EXTENDED RELEASE ORAL 2 TIMES DAILY
Status: COMPLETED | OUTPATIENT
Start: 2017-02-18 | End: 2017-02-18

## 2017-02-18 RX ORDER — CARVEDILOL 6.25 MG/1
6.25 TABLET ORAL 2 TIMES DAILY
Status: DISCONTINUED | OUTPATIENT
Start: 2017-02-18 | End: 2017-02-19 | Stop reason: HOSPADM

## 2017-02-18 RX ORDER — CARVEDILOL 3.12 MG/1
3.12 TABLET ORAL ONCE
Status: COMPLETED | OUTPATIENT
Start: 2017-02-18 | End: 2017-02-18

## 2017-02-18 RX ADMIN — ASPIRIN 81 MG: 81 TABLET, COATED ORAL at 08:02

## 2017-02-18 RX ADMIN — INSULIN ASPART 5 UNITS: 100 INJECTION, SOLUTION INTRAVENOUS; SUBCUTANEOUS at 05:02

## 2017-02-18 RX ADMIN — HEPARIN SODIUM 5000 UNITS: 5000 INJECTION, SOLUTION INTRAVENOUS; SUBCUTANEOUS at 06:02

## 2017-02-18 RX ADMIN — LEVETIRACETAM 1000 MG: 500 TABLET, FILM COATED ORAL at 08:02

## 2017-02-18 RX ADMIN — INSULIN DETEMIR 15 UNITS: 100 INJECTION, SOLUTION SUBCUTANEOUS at 08:02

## 2017-02-18 RX ADMIN — VITAMIN D, TAB 1000IU (100/BT) 1000 UNITS: 25 TAB at 08:02

## 2017-02-18 RX ADMIN — CARVEDILOL 3.12 MG: 3.12 TABLET, FILM COATED ORAL at 08:02

## 2017-02-18 RX ADMIN — INSULIN ASPART 5 UNITS: 100 INJECTION, SOLUTION INTRAVENOUS; SUBCUTANEOUS at 12:02

## 2017-02-18 RX ADMIN — HYDRALAZINE HYDROCHLORIDE 50 MG: 25 TABLET, FILM COATED ORAL at 08:02

## 2017-02-18 RX ADMIN — CARVEDILOL 3.12 MG: 3.12 TABLET, FILM COATED ORAL at 01:02

## 2017-02-18 RX ADMIN — CALCITRIOL 0.5 MCG: 0.25 CAPSULE, LIQUID FILLED ORAL at 08:02

## 2017-02-18 RX ADMIN — INSULIN ASPART 2 UNITS: 100 INJECTION, SOLUTION INTRAVENOUS; SUBCUTANEOUS at 12:02

## 2017-02-18 RX ADMIN — POTASSIUM CHLORIDE 40 MEQ: 1500 TABLET, EXTENDED RELEASE ORAL at 08:02

## 2017-02-18 RX ADMIN — HEPARIN SODIUM 5000 UNITS: 5000 INJECTION, SOLUTION INTRAVENOUS; SUBCUTANEOUS at 09:02

## 2017-02-18 RX ADMIN — POTASSIUM CHLORIDE 40 MEQ: 1500 TABLET, EXTENDED RELEASE ORAL at 01:02

## 2017-02-18 RX ADMIN — NIFEDIPINE 90 MG: 30 TABLET, FILM COATED, EXTENDED RELEASE ORAL at 08:02

## 2017-02-18 RX ADMIN — CARVEDILOL 6.25 MG: 6.25 TABLET, FILM COATED ORAL at 08:02

## 2017-02-18 RX ADMIN — PAROXETINE HYDROCHLORIDE 10 MG: 10 TABLET, FILM COATED ORAL at 07:02

## 2017-02-18 RX ADMIN — HEPARIN SODIUM 5000 UNITS: 5000 INJECTION, SOLUTION INTRAVENOUS; SUBCUTANEOUS at 01:02

## 2017-02-18 NOTE — PROGRESS NOTES
Progress Note  Hospital Medicine      Admit Date: 2/16/2017 (LOS: 2)    SUBJECTIVE:     Follow-up For:  Hyperosmolar non-ketotic state in patient with type 2 diabetes mellitus    HPI/Interval history: Patient has attempted to get up without assistance but I was able to redirect patient to his hospital bed.  Patient reports feeling hungry this morning but otherwise without complaints.    Review of Systems: Patient denies chest pain, shortness of breath.  No fevers, chills.  No nausea, vomiting, or diarrhea.    Medications: Reviewed and documented in the MAR.    OBJECTIVE:     Vital Signs (Most Recent)  Temp: 98.8 °F (37.1 °C) (02/18/17 1115)  Pulse: 66 (02/18/17 1115)  Resp: 16 (02/18/17 1115)  BP: (!) 140/66 (02/18/17 1115)  SpO2: 99 % (02/17/17 1505)    Vital Signs Range (Last 24H):  Temp:  [97.7 °F (36.5 °C)-99.6 °F (37.6 °C)]   Pulse:  [66-82]   Resp:  [16-25]   BP: (136-190)/(66-86)   SpO2:  [99 %]     I & O (Last 24H):  Intake/Output Summary (Last 24 hours) at 02/18/17 1208  Last data filed at 02/18/17 0000   Gross per 24 hour   Intake                0 ml   Output              325 ml   Net             -325 ml       BMI: Body mass index is 27.37 kg/(m^2).    Physical Exam:  General: Patient is awake, alert, and oriented, and in no acute distress.  Eyes: Pupils equal, round, and reactive to light, anicteric sclera.  Mouth: No lesions, moist mucous membranes.  Respiratory: Lungs clear to ausculation, no crackles or wheezing.  Cardiovascular: Regular rate and rythmn, no murmurs appreciated.  Abdomen: Soft, non-tender, non-distended, normal bowel sounds.  Skin: No rashes or breakdown.  Extremities: No edema, cyanosis, or clubbing.  Neuro: No focal weakness.    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 with type 2 diabetes mellitus [E11.01]  Yes    Hyperglycemia [R73.9]  Yes    NIALL (acute kidney injury) [N17.9]  Yes    Volume depletion [E86.9]  Yes    Malnutrition of moderate degree [E44.0]  Yes    DM (diabetes mellitus), type 2, uncontrolled [E11.65]  Yes    Diabetic polyneuropathy associated with type 2 diabetes mellitus [E11.42]  Yes    Diabetic nephropathy associated with type 2 diabetes mellitus [E11.21]  Yes    Hypokalemia [E87.6]  Yes    Anemia of chronic renal failure, stage 4 (severe) [N18.4, D63.1]  Yes     Chronic    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     Hyperosmolar non-ketotic state secondary to poorly controlled diabetes mellitus type II with most recent hemoglobin A1c 9.5 percent on 1/22/2017.  Resolved.  Continue with insulin detemir 15 units daily along with insulin aspart 5 units before meals.      Acute kidney injury secondary to volume depletion due to osmotic diuresis from hyperglycemia with chronic kidney disease stage IV.  Stable.  Nephrology following.    Hypertension.  Will increase carvedilol for better blood pressure control.  Further beta-blockade may also help calm the patient who has become agitated with nursing this morning.  Patient calm per my exam.     History of cerebrovascular accident with residual left-sided weakness and likely vascular dementia.  Patient did well with therapy who is recommending additional  therapy with home health.    History of seizure disorder.  Continue with levetiracetam.    Malnutrition of Moderate Degree.  Continue with caloric intake monitoring.      Anemia of chronic kidney disease.  Stable.    DVT prophylaxis.  Subcutaneous heparin.    Stable for transfer to the floor.

## 2017-02-18 NOTE — PROGRESS NOTES
Pt attempted to get oob by himself without pressing call bell. Bed alarm went off and pt had an incontinent episode of urine and stool. Pt instructed not to get oob, pt very beligerent with nurses, hollering and acting out. Placed pt back to bed and cleaned pt and changed sheets. Instructed to stay in bed and call for assistance, SR up x3 and bed alarm on. Blood sugar taken and >500, 10 units regular insulin given.

## 2017-02-18 NOTE — PLAN OF CARE
Problem: Patient Care Overview  Goal: Plan of Care Review  Outcome: Ongoing (interventions implemented as appropriate)  Pt alert and oriented but confused at times, instructed pt several times to stay in bed and call for assist. Pt gets oob by himself, has several incontinent episodes. Vitals are stable, SR up x3 and bed alarm on. Denies pain or SOB, blood sugars ranging fm 200-500.

## 2017-02-18 NOTE — PROGRESS NOTES
Renal ICU Progress Note    Admit Date: 2/16/2017   LOS: 2 days     SUBJECTIVE:   Patient very belligerant per nursing staff and diffucult to manage.  No acute issues.  Eating well per RN.  Has baseline dementia.      Scheduled Meds:   aspirin  81 mg Oral Daily    calcitRIOL  0.5 mcg Oral Daily    carvedilol  3.125 mg Oral BID    heparin (porcine)  5,000 Units Subcutaneous Q8H    hydrALAZINE  50 mg Oral Q12H    insulin aspart  5 Units Subcutaneous TID WM    insulin detemir  15 Units Subcutaneous Daily    levetiracetam  1,000 mg Oral BID    nifedipine 30 MG ORAL TR24  90 mg Oral Daily    paroxetine  10 mg Oral QAM    vitamin D  1,000 Units Oral Daily     Continuous Infusions:     Review of Systems  See subjective    OBJECTIVE:     Vital Signs Range (Last 24H):  Temp:  [97.7 °F (36.5 °C)-99.6 °F (37.6 °C)]   Pulse:  [66-82]   Resp:  [16-25]   BP: (136-190)/(66-86)   SpO2:  [99 %]     I & O (Last 24H):  Intake/Output Summary (Last 24 hours) at 02/18/17 1122  Last data filed at 02/18/17 0000   Gross per 24 hour   Intake                0 ml   Output              325 ml   Net             -325 ml       Physical Exam:  General appearance: Well developed, well nourished  Eyes:  Conjunctivae/corneas clear. PERRL.  Poor dentition  Lungs: Normal respiratory effort,   clear to auscultation bilaterally   Heart: Regular rate and rhythm, S1, S2 normal, no murmur, rub or sherron.  Abdomen: Soft, non-tender non-distended; bowel sounds normal; no masses,  no organomegaly  Extremities: No cyanosis or clubbing. No edema.    Skin: Skin color, texture, turgor normal. No rashes or lesions  Neurologic: Normal strength and tone. No focal numbness or weakness        Laboratory:  CBC:   Recent Labs  Lab 02/16/17  1254   WBC 6.63   RBC 4.08*   HGB 11.6*   HCT 34.9*      MCV 86   MCH 28.4   MCHC 33.2     BMP:   Recent Labs  Lab 02/18/17  0411   GLU 64*      K 3.2*   *   CO2 23   BUN 23   CREATININE 2.2*   CALCIUM 9.0    MG 1.6         ASSESSMENT/PLAN:     Resolved NIALL on CKD CKD Stage IV due to HONK with T1DM (N17.9, N18.4, E11.01)  - adjusted IVF's  - Baseline Cr ~ 2.3-2.4, so still within range despite ^ creatinine - possibly due to fluid replacement?  -Ins and Outs not accurate because patient is incontinent.  -IVF's have been held  - Defer to Dr. Salgado to discuss RRT options at outpatient f/u appt.  -Renally dose meds, avoid nephrotoxins, and monitor I/O's closely.         Hypokalemia (E87.6)  -Replace again today.  -mag wnl     Hypernatremia/Hyperchloridemia secondary to fluid resuscitation/dehydration from hyperglycemia (E87.0):  -Mostly resolved  -Continue to encourage water intake today.          T1DM (E 10.22)  - IVFs and insulin per primary team.  - HgbA1C 9.5% last month.  - Needs strict glycemic control as an outpatient.         Secondary HPTH (N25.81, E55.9)  - On Calctriol daily.  - Add D3 1000/day         Anemia of CKD (D63.1)  - at goal for CKD         HTN (I 12.9)  -As now.  -defer ACE/ARB/[place] to Dr. Salgado as outpt.        Stable for transfer to floor.   See above orders/recs.

## 2017-02-18 NOTE — NURSING
Pt transferred form ICU awake and alert by strecther; assessment unchanged since ICU transfer. Room orientation given. Pt free of skin breakdown. Call bell in reach.

## 2017-02-18 NOTE — PROGRESS NOTES
Bed alarm went off and pt was trying to get oob again, pt screaming that he has to have a bowel movement. Informed pt that he has a diaper on and that he cant get oob by himself. Pt continues to scream at nurses and get very beligerent. Assisted pt to commode with walker, pt had a bm in diaper, on floor and in toilet. Pt cleaned and assisted back to bed. SR up x3 and call bell within reach, bed alarm on. Will cont to monitor status, pt states he thought he was at home, didn't realize he was in a hospital. Reoriented to environment.

## 2017-02-19 VITALS
OXYGEN SATURATION: 99 % | RESPIRATION RATE: 16 BRPM | BODY MASS INDEX: 22.22 KG/M2 | DIASTOLIC BLOOD PRESSURE: 71 MMHG | WEIGHT: 146.63 LBS | HEIGHT: 68 IN | SYSTOLIC BLOOD PRESSURE: 133 MMHG | TEMPERATURE: 98 F | HEART RATE: 71 BPM

## 2017-02-19 LAB
ANION GAP SERPL CALC-SCNC: 8 MMOL/L
BUN SERPL-MCNC: 26 MG/DL
CALCIUM SERPL-MCNC: 9.1 MG/DL
CHLORIDE SERPL-SCNC: 110 MMOL/L
CO2 SERPL-SCNC: 21 MMOL/L
CREAT SERPL-MCNC: 2.1 MG/DL
EST. GFR  (AFRICAN AMERICAN): 32 ML/MIN/1.73 M^2
EST. GFR  (NON AFRICAN AMERICAN): 28 ML/MIN/1.73 M^2
GLUCOSE SERPL-MCNC: 208 MG/DL
MAGNESIUM SERPL-MCNC: 1.7 MG/DL
PHOSPHATE SERPL-MCNC: 2.8 MG/DL
POCT GLUCOSE: 241 MG/DL (ref 70–110)
POTASSIUM SERPL-SCNC: 4.1 MMOL/L
SODIUM SERPL-SCNC: 139 MMOL/L

## 2017-02-19 PROCEDURE — 80048 BASIC METABOLIC PNL TOTAL CA: CPT

## 2017-02-19 PROCEDURE — 25000003 PHARM REV CODE 250: Performed by: HOSPITALIST

## 2017-02-19 PROCEDURE — 99239 HOSP IP/OBS DSCHRG MGMT >30: CPT | Mod: ,,, | Performed by: HOSPITALIST

## 2017-02-19 PROCEDURE — 36415 COLL VENOUS BLD VENIPUNCTURE: CPT

## 2017-02-19 PROCEDURE — 25000003 PHARM REV CODE 250: Performed by: NURSE PRACTITIONER

## 2017-02-19 PROCEDURE — 84100 ASSAY OF PHOSPHORUS: CPT

## 2017-02-19 PROCEDURE — 83735 ASSAY OF MAGNESIUM: CPT

## 2017-02-19 PROCEDURE — 97116 GAIT TRAINING THERAPY: CPT

## 2017-02-19 RX ORDER — NIFEDIPINE 90 MG/1
90 TABLET, EXTENDED RELEASE ORAL DAILY
Qty: 30 TABLET | Refills: 0 | Status: ON HOLD | OUTPATIENT
Start: 2017-02-19 | End: 2017-03-27 | Stop reason: SDUPTHER

## 2017-02-19 RX ORDER — CARVEDILOL 6.25 MG/1
6.25 TABLET ORAL 2 TIMES DAILY
Qty: 60 TABLET | Refills: 0 | Status: SHIPPED | OUTPATIENT
Start: 2017-02-19 | End: 2017-07-21 | Stop reason: SDUPTHER

## 2017-02-19 RX ORDER — HYDRALAZINE HYDROCHLORIDE 50 MG/1
50 TABLET, FILM COATED ORAL EVERY 8 HOURS
Qty: 180 TABLET | Refills: 0 | Status: ON HOLD | OUTPATIENT
Start: 2017-02-19 | End: 2017-04-05 | Stop reason: HOSPADM

## 2017-02-19 RX ADMIN — CARVEDILOL 6.25 MG: 6.25 TABLET, FILM COATED ORAL at 08:02

## 2017-02-19 RX ADMIN — PAROXETINE HYDROCHLORIDE 10 MG: 10 TABLET, FILM COATED ORAL at 06:02

## 2017-02-19 RX ADMIN — LEVETIRACETAM 1000 MG: 500 TABLET, FILM COATED ORAL at 08:02

## 2017-02-19 RX ADMIN — NIFEDIPINE 90 MG: 30 TABLET, FILM COATED, EXTENDED RELEASE ORAL at 08:02

## 2017-02-19 RX ADMIN — HEPARIN SODIUM 5000 UNITS: 5000 INJECTION, SOLUTION INTRAVENOUS; SUBCUTANEOUS at 05:02

## 2017-02-19 RX ADMIN — CALCITRIOL 0.5 MCG: 0.25 CAPSULE, LIQUID FILLED ORAL at 08:02

## 2017-02-19 RX ADMIN — ASPIRIN 81 MG: 81 TABLET, COATED ORAL at 08:02

## 2017-02-19 RX ADMIN — INSULIN DETEMIR 15 UNITS: 100 INJECTION, SOLUTION SUBCUTANEOUS at 08:02

## 2017-02-19 RX ADMIN — INSULIN ASPART 5 UNITS: 100 INJECTION, SOLUTION INTRAVENOUS; SUBCUTANEOUS at 08:02

## 2017-02-19 RX ADMIN — HYDRALAZINE HYDROCHLORIDE 50 MG: 25 TABLET, FILM COATED ORAL at 06:02

## 2017-02-19 RX ADMIN — INSULIN ASPART 5 UNITS: 100 INJECTION, SOLUTION INTRAVENOUS; SUBCUTANEOUS at 12:02

## 2017-02-19 RX ADMIN — VITAMIN D, TAB 1000IU (100/BT) 1000 UNITS: 25 TAB at 08:02

## 2017-02-19 RX ADMIN — HEPARIN SODIUM 5000 UNITS: 5000 INJECTION, SOLUTION INTRAVENOUS; SUBCUTANEOUS at 01:02

## 2017-02-19 NOTE — PLAN OF CARE
Problem: Physical Therapy Goal  Goal: Physical Therapy Goal  Goals to be met by: 17     Patient will increase functional independence with mobility by performin. Supine to sit with modified independence.   2. Sit to supine with modified independence.   3. Sit<>stand transfer with modified independence using quad cane or rolling walker.   4. Gait x 150 feet with modified independence using quad cane or rolling walker.   5. Ascend/descend 5 stairs with bilateral handrails with CGA with or without AD.   Outcome: Ongoing (interventions implemented as appropriate)  Pt progressing well with therapy. Please see progress note for details, POC, and recommendations.

## 2017-02-19 NOTE — DISCHARGE SUMMARY
[facility]  Discharge Summary      Admit Date: 2/16/2017    Discharge Date and Time:  02/19/2017 3:23 PM    Attending Physician: John Hill MD     Reason for Admission: Hyperosmolar non-ketotic state in patient with type 2 diabetes mellitus and acute kidney injury    Hospital Course:  Patient is 84 year-old with diabetes mellitus type II, hypertension, prior cerebrovascular accident with residual left-sided weakness and evidence of vascular dementia who per family has been having difficulty with his medications who presented with worsening confusion.  Patient admitted to the intensive care unit and treated for hyperosmolar non-ketotic state and acute kidney injury with intravenous fluids and insulin infusion.  Patient's mental status returned to his baseline and his kidney function stablized.  As baseline, patient he be bad-tempered and combative particularly when he does not understand what is going on but can be redirected. Patient transited to subcutaneous insulin regimen and step-down to the floor.  Patient calm, cooperative, and pleasant on the floor.  He maintained reasonable capillary blood glucose measurements.  I spoke with his family who will arrange for patient to have 24 hours of care at home and will ensure that he is compliant with his medication regimen.  I have advised the family to ensure that patient follow-up with his primary care provider to continue to monitor his comorbidities and adjust his medication regimen further as needed to maintain good blood pressure control and glycemic control.    Consults: Nephrology    Final Diagnoses:    Principal Problem: Hyperosmolar non-ketotic state in patient with type 2 diabetes mellitus   Secondary Diagnoses:   Active Hospital Problems    Diagnosis  POA    *Hyperosmolar non-ketotic state in patient with type 2 diabetes mellitus [E11.01]  Yes    Hyperglycemia [R73.9]  Yes    NIALL (acute kidney injury) [N17.9]  Yes    Volume  depletion [E86.9]  Yes    Malnutrition of moderate degree [E44.0]  Yes    DM (diabetes mellitus), type 2, uncontrolled [E11.65]  Yes    Diabetic polyneuropathy associated with type 2 diabetes mellitus [E11.42]  Yes    Diabetic nephropathy associated with type 2 diabetes mellitus [E11.21]  Yes    Hypokalemia [E87.6]  Yes    Anemia of chronic renal failure, stage 4 (severe) [N18.4, D63.1]  Yes     Chronic    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       Discharged Condition: Stable    Disposition[place] [facility] [place]    Follow Up/Patient Instructions:     Medications:  Reconciled Home Medications:   Current Discharge Medication List      START taking these medications    Details   nifedipine 30 MG ORAL TR24 (PROCARDIA-XL) 90 MG (OSM) TR24 Take 1 tablet (90 mg total) by mouth once daily.  Qty: 30 tablet, Refills: 0         CONTINUE these medications which have CHANGED    Details   carvedilol (COREG) 6.25 MG tablet Take 1 tablet (6.25 mg total) by mouth 2 (two) times daily.  Qty: 60 tablet, Refills: 0      hydrALAZINE (APRESOLINE) 50 MG tablet Take 1 tablet (50 mg total) by mouth every 8 (eight) hours.  Qty: 180 tablet, Refills: 0    Associated Diagnoses: Renovascular hypertension         CONTINUE these medications which have NOT CHANGED    Details   aspirin (ECOTRIN) 81 MG EC tablet Take 81 mg by mouth once daily.      blood-glucose meter (CONTOUR METER) kit Use as instructed.  Please substitute appropriate meter to match his strips.  Qty: 1 each, Refills: 0    Associated Diagnoses: DM2 (diabetes mellitus, type 2); Type II or unspecified type diabetes mellitus with renal manifestations, uncontrolled      calcitRIOL (ROCALTROL) 0.5 MCG Cap TAKE 1 CAPSULE BY MOUTH ONCE DAILY  Qty: 90 capsule, Refills: 4      CONTOUR TEST  "STRIPS Strp USE AS DIRECTED THREE TIMES DAILY  Qty: 300 strip, Refills: 12    Comments: **Patient requests 90 days supply**  Associated Diagnoses: Diabetes mellitus due to abnormal insulin      insulin aspart (NOVOLOG FLEXPEN) 100 unit/mL InPn pen Inject 5 Units into the skin 3 (three) times daily with meals. Plus correction scale.  Qty: 1 Box, Refills: 6    Associated Diagnoses: 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; 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; Type 1 diabetes mellitus with hyperglycemia      insulin detemir (LEVEMIR FLEXPEN) 100 unit/mL (3 mL) SubQ InPn pen Inject 15 Units into the skin once daily.  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  Qty: 100 each, Refills: 3      levetiracetam (KEPPRA) 1000 MG tablet TAKE 1 TABLET BY MOUTH TWICE DAILY  Qty: 180 tablet, Refills: 6    Associated Diagnoses: Intractable epilepsy without status epilepticus      nystatin (MYCOSTATIN) cream Apply topically 2 (two) times daily.  Qty: 60 g, Refills: 3      paroxetine (PAXIL) 10 MG tablet Take 1 tablet (10 mg total) by mouth every morning.  Qty: 90 tablet, Refills: 1      pen needle, diabetic (BD INSULIN PEN NEEDLE UF SHORT) 31 gauge x 5/16" Ndle Use to inject insulin 5 times daily  Qty: 450 each, Refills: 3    Associated Diagnoses: 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         STOP taking these medications       amlodipine (NORVASC) 10 MG tablet Comments:   Reason for Stopping:         furosemide (LASIX) 20 MG tablet Comments:   Reason for Stopping:               Discharge Procedure Orders  Diet Diabetic 1800 Calories     Activity as tolerated     Call MD for:  temperature >100.4     Call MD for:  persistent nausea and " vomiting or diarrhea     Call MD for:  severe uncontrolled pain     Call MD for:  difficulty breathing or increased cough     Call MD for:  severe persistent headache     Call MD for:  worsening rash     Call MD for:  persistent dizziness, light-headedness, or visual disturbances     Call MD for:  increased confusion or weakness       Follow-up Information     Follow up with Mady Carson MD. Schedule an appointment as soon as possible for a visit in 1 week.    Specialty:  Internal Medicine    Contact information:    [facility] [address]  [facility] [id number]  [phone]          Time: 40 minutes spent coordinating and completing discharge.

## 2017-02-19 NOTE — NURSING
No significant events overnight. Remains free from fall, injury, and skin breakdown. Voiding via urinal located at the bedside. Remained bedfast throughout shift. HTN observed, otherwise VSS on RA throughout the night. Denies pain. Blood sugar monitored per order. SCDs in place. Plan of care reviewed with patient and all questions answered. Bed low, locked w/ bed alarm on. Call light within reach. Purposeful rounding performed. Resting comfortably in bed, no other complaints at this time.

## 2017-02-19 NOTE — PLAN OF CARE
Discharge planner spoke with Dr Hill to update patient has a safe discharge plan. Dr. Hill advised that patient attends adult day care 5 days a week and does not qualify for home health care, as he is not home bound. Dr Hill asked that discharge planner confirms patient attemds adult day care with niece.  Discharge planner spoke w/ niece who arrive to  patient, confirmed patient does attend adult day care 5 days a week, verbalized understanding that patient does not qualify for home health. Emerson, SSC

## 2017-02-19 NOTE — PROGRESS NOTES
Renal Progress Note    Admit Date: 2/16/2017   LOS: 3 days     SUBJECTIVE:     Patient more calm now that he has been transferred.  Pleasant and non-violent per nursing staff.  Has sitter at bedside.  Eating well per nursing staff.    Scheduled Meds:   aspirin  81 mg Oral Daily    calcitRIOL  0.5 mcg Oral Daily    carvedilol  6.25 mg Oral BID    heparin (porcine)  5,000 Units Subcutaneous Q8H    hydrALAZINE  50 mg Oral Q12H    insulin aspart  5 Units Subcutaneous TID WM    insulin detemir  15 Units Subcutaneous Daily    levetiracetam  1,000 mg Oral BID    nifedipine 30 MG ORAL TR24  90 mg Oral Daily    paroxetine  10 mg Oral QAM    vitamin D  1,000 Units Oral Daily       OBJECTIVE:     Vital Signs Range (Last 24H):  Temp:  [97.6 °F (36.4 °C)-98.8 °F (37.1 °C)]   Pulse:  [56-70]   Resp:  [15-18]   BP: (140-182)/(66-86)   SpO2:  [100 %]     I & O (Last 24H):  Intake/Output Summary (Last 24 hours) at 02/19/17 1112  Last data filed at 02/19/17 0600   Gross per 24 hour   Intake              540 ml   Output             1200 ml   Net             -660 ml       Physical Exam:  General appearance: Well developed, well nourished  Eyes:  Conjunctivae/corneas clear. PERRL.  Poor dentition  Lungs: Normal respiratory effort,   clear to auscultation bilaterally   Heart: Regular rate and rhythm, S1, S2 normal, no murmur, rub or sherron.  Abdomen: Soft, non-tender non-distended; bowel sounds normal; no masses,  no organomegaly  Extremities: No cyanosis or clubbing. No edema.    Skin: Skin color, texture, turgor normal. No rashes or lesions  Neurologic: Normal strength and tone. No focal numbness or weakness     Laboratory:  CBC:   Recent Labs  Lab 02/16/17  1254   WBC 6.63   RBC 4.08*   HGB 11.6*   HCT 34.9*      MCV 86   MCH 28.4   MCHC 33.2     BMP:   Recent Labs  Lab 02/19/17  0507   *      K 4.1      CO2 21*   BUN 26*   CREATININE 2.1*   CALCIUM 9.1   MG 1.7       ASSESSMENT/PLAN:   Resolved  NIALL on CKD CKD Stage IV due to HONK with T1DM (N17.9, N18.4, E11.01)  - Baseline Cr ~ 2.3-2.4  -Ins and Outs not accurate because patient is incontinent.  - Defer to Dr. Salgado to discuss RRT options at outpatient f/u appt.     Hypokalemia (E87.6)  -Resolved.  -mag wnl     Hypernatremia/Hyperchloridemia secondary to fluid resuscitation/dehydration from hyperglycemia (E87.0):  -Resolved.      T1DM (E 10.22)  - Insulin per primary  - HgbA1C 9.5% last month.  - Needs strict glycemic control as an outpatient.      Secondary HPTH (N25.81, E55.9)  - On Calctriol daily.  - Add D3 1000/day     Anemia of CKD (D63.1)  - at goal for CKD      HTN (I 12.9)  -As now.  -defer ACE/ARB/[place] to Dr. Salgado as outpt.

## 2017-02-19 NOTE — PLAN OF CARE
Received call from Dr. Hill concern about safe discharge plan in place for patient's discharge on today and advised to contact patient's family. ABA Norman    1310- Discharge planner called Kamini at [phone], verified patient will be discharging to his niece resident and will continue to have adult day care during the week. Patient Concetta lundberg will pick patient up today at discharge. Updated patient, nurse, Alia. ABA Norman     1310-  Dr. Hill paged to update, awaiting call back. Emerson SSC

## 2017-02-19 NOTE — PT/OT/SLP PROGRESS
Physical Therapy  Treatment    Julius Cardenas   MRN: [id number]   Admitting Diagnosis: Hyperosmolar non-ketotic state in patient with type 2 diabetes mellitus    PT Received On: 17  PT Start Time: 1416     PT Stop Time: 1432    PT Total Time (min): 16 min       Billable Minutes:  Gait [id number]    Treatment Type: Treatment  PT/PTA: PT           General Precautions: Standard, fall  Orthopedic Precautions: N/A     Do you have any cultural, spiritual, Yazdanism conflicts, given your current situation?: none specified    Subjective:  Communicated with RN prior to session.    Pain Ratin/10  Pain Rating Post-Intervention: 0/10    Objective:   Patient found with: peripheral IV    Functional Mobility:  Bed Mobility:   Supine to Sit: Stand by Assistance (HOB slightly elevated; pt required verbal cues for safety and technique )  Sit to Supine: Stand by Assistance (with HOB slightly elevated; pt required verbal cues for safety )    Transfers:  Sit <> Stand Assistance: Stand By Assistance (x 2 from EOB; pt required verbal controlled descent into seated position)  Sit <> Stand Assistive Device: Quad Cane    Gait:   Gait Distance: x 180'  Assistance 1: Contact Guard Assistance  Gait Assistive Device: Small base quad cane  Gait Pattern: 2-point gait  Gait Deviation(s): decreased connie, decreased velocity of limb motion, decreased step length, decreased stride length, decreased toe-to-floor clearance   Pt required verbal cues safety and technique     Balance:   Static Sit: GOOD: Takes MODERATE challenges from all directions  Dynamic Sit: GOOD: Maintains balance through MODERATE excursions of active trunk movement  Static Stand: GOOD-: Takes MODERATE challenges from all directions inconsistently  Dynamic stand: GOOD-: Needs SUPERVISION only during gait and able to self right with moderate      AM-PAC 6 CLICK MOBILITY  How much help from another person does this patient currently need?   1 = Unable, Total/Dependent  Assistance  2 = A lot, Maximum/Moderate Assistance  3 = A little, Minimum/Contact Guard/Supervision  4 = None, Modified [place]/Independent    Turning over in bed (including adjusting bedclothes, sheets and blankets)?: 4  Sitting down on and standing up from a chair with arms (e.g., wheelchair, bedside commode, etc.): 3  Moving from lying on back to sitting on the side of the bed?: 3  Moving to and from a bed to a chair (including a wheelchair)?: 3  Need to walk in hospital room?: 3  Climbing 3-5 steps with a railing?: 3  Total Score: 19    AM-PAC Raw Score CMS G-Code Modifier Level of Impairment Assistance   6 % Total / Unable   7 - 9 CM 80 - 100% Maximal Assist   10 - 14 CL 60 - 80% Moderate Assist   15 - 19 CK 40 - 60% Moderate Assist   20 - 22 CJ 20 - 40% Minimal Assist   23 CI 1-20% SBA / CGA   24 CH 0% Independent/ Mod I     Patient left supine with all lines intact, call button in reach, bed alarm on, RN notified and sitter present.    Assessment:  Pt progressing well with gait. Pt would benefit from continued skilled PT to maximize independence and safety with functional mobility.    Rehab identified problem list/impairments: Rehab identified problem list/impairments: weakness, gait instability, decreased lower extremity function, impaired balance, decreased safety awareness, impaired self care skills, impaired functional mobilty    Rehab potential is good.    Activity tolerance: Good    Discharge recommendations: Discharge Facility/Level Of Care Needs: home with home health, home health PT, home health OT     Barriers to discharge: Barriers to Discharge: None    GOALS:   Physical Therapy Goals        Problem: Physical Therapy Goal    Goal Priority Disciplines Outcome Goal Variances Interventions   Physical Therapy Goal     PT/OT, PT Ongoing (interventions implemented as appropriate)     Description:  Goals to be met by: 2/27/17     Patient will increase functional independence with mobility by  performin. Supine to sit with modified independence.   2. Sit to supine with modified independence.   3. Sit<>stand transfer with modified independence using quad cane or rolling walker.   4. Gait x 150 feet with modified independence using quad cane or rolling walker.   5. Ascend/descend 5 stairs with bilateral handrails with CGA with or without AD.           PLAN:    Patient to be seen 6 x/week  to address the above listed problems via gait training, therapeutic activities, therapeutic exercises, neuromuscular re-education  Plan of Care expires: 17  Plan of Care reviewed with: patient    Sridevi M Riley, PT  2017

## 2017-02-19 NOTE — PLAN OF CARE
[facility]    HOME HEALTH ORDERS  FACE TO FACE ENCOUNTER    Patient Name: Julius Cardenas  YOB: 1932    PCP: Mady Carson MD   [facility] [address] [facility] [address]  PCP Phone Number: [phone]  PCP Fax: [phone]    Encounter Date: 02/19/2017    Admit to Home Health    Diagnoses:  Active Hospital Problems    Diagnosis  POA    *Hyperosmolar non-ketotic state in patient with type 2 diabetes mellitus [E11.01]  Yes    Hyperglycemia [R73.9]  Yes    NIALL (acute kidney injury) [N17.9]  Yes    Volume depletion [E86.9]  Yes    Malnutrition of moderate degree [E44.0]  Yes    DM (diabetes mellitus), type 2, uncontrolled [E11.65]  Yes    Diabetic polyneuropathy associated with type 2 diabetes mellitus [E11.42]  Yes    Diabetic nephropathy associated with type 2 diabetes mellitus [E11.21]  Yes    Hypokalemia [E87.6]  Yes    Anemia of chronic renal failure, stage 4 (severe) [N18.4, D63.1]  Yes     Chronic    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       Future Appointments  Date Time Provider Department Center   4/20/2017 1:00 PM Margaux Figueroa RD [facility] [place]   4/20/2017 2:30 PM LAB, APPOINTMENT [facility] LAB [facility]   4/20/2017 3:30 PM Amina Garza NP [facility] ENDOCRN [facility]     Follow-up Information     Follow up with Mady Carson MD. Schedule an appointment as soon as possible for a visit in 1 week.    Specialty:  Internal Medicine    Contact information:    [address]  [place] [id number] [phone]              I have seen and examined this patient face to face today. My clinical findings that support the need for the home health skilled services and home bound status are the  following:  Weakness/numbness causing balance and gait disturbance due to Weakness/Debility making it taxing to leave home.    Allergies:Review of patient's allergies indicates:  No Known Allergies    Diet: diabetic diet: 1800 calorie    Activities: activity as tolerated    Nursing:   SN to complete comprehensive assessment including routine vital signs. Instruct on disease process and s/s of complications to report to MD. Review/verify medication list sent home with the patient at time of discharge  and instruct patient/caregiver as needed. Frequency may be adjusted depending on start of care date.    Notify MD if SBP > 160 or < 90; DBP > 90 or < 50; HR > 120 or < 50; Temp > 101;      CONSULTS:    Physical Therapy to evaluate and treat. Evaluate for home safety and equipment needs; Establish/upgrade home exercise program. Perform / instruct on therapeutic exercises, gait training, transfer training, and Range of Motion.  Occupational Therapy to evaluate and treat. Evaluate home environment for safety and equipment needs. Perform/Instruct on transfers, ADL training, ROM, and therapeutic exercises.   to evaluate for community resources/long-range planning.  Aide to provide assistance with personal care, ADLs, and vital signs.    MISCELLANEOUS CARE:  Diabetic Care:   SN to perform and educate Diabetic management with blood glucose monitoring: and Fingerstick blood sugar AC and HS    Medications: Review discharge medications with patient and family and provide education.      Current Discharge Medication List      START taking these medications    Details   nifedipine 30 MG ORAL TR24 (PROCARDIA-XL) 90 MG (OSM) TR24 Take 1 tablet (90 mg total) by mouth once daily.  Qty: 30 tablet, Refills: 0         CONTINUE these medications which have CHANGED    Details   carvedilol (COREG) 6.25 MG tablet Take 1 tablet (6.25 mg total) by mouth 2 (two) times daily.  Qty: 60 tablet, Refills: 0      hydrALAZINE (APRESOLINE)  50 MG tablet Take 1 tablet (50 mg total) by mouth every 8 (eight) hours.  Qty: 180 tablet, Refills: 0    Associated Diagnoses: Renovascular hypertension         CONTINUE these medications which have NOT CHANGED    Details   aspirin (ECOTRIN) 81 MG EC tablet Take 81 mg by mouth once daily.      blood-glucose meter (CONTOUR METER) kit Use as instructed.  Please substitute appropriate meter to match his strips.  Qty: 1 each, Refills: 0    Associated Diagnoses: DM2 (diabetes mellitus, type 2); Type II or unspecified type diabetes mellitus with renal manifestations, uncontrolled      calcitRIOL (ROCALTROL) 0.5 MCG Cap TAKE 1 CAPSULE BY MOUTH ONCE DAILY  Qty: 90 capsule, Refills: 4      CONTOUR TEST STRIPS Strp USE AS DIRECTED THREE TIMES DAILY  Qty: 300 strip, Refills: 12    Comments: **Patient requests 90 days supply**  Associated Diagnoses: Diabetes mellitus due to abnormal insulin      insulin aspart (NOVOLOG FLEXPEN) 100 unit/mL InPn pen Inject 5 Units into the skin 3 (three) times daily with meals. Plus correction scale.  Qty: 1 Box, Refills: 6    Associated Diagnoses: 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; 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; Type 1 diabetes mellitus with hyperglycemia      insulin detemir (LEVEMIR FLEXPEN) 100 unit/mL (3 mL) SubQ InPn pen Inject 15 Units into the skin once daily.  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  Qty: 100 each, Refills: 3      levetiracetam (KEPPRA) 1000 MG tablet TAKE 1 TABLET BY MOUTH TWICE DAILY  Qty: 180 tablet, Refills: 6    Associated Diagnoses: Intractable epilepsy without status epilepticus      nystatin (MYCOSTATIN) cream Apply topically 2 (two) times daily.  Qty: 60 g, Refills: 3  "     paroxetine (PAXIL) 10 MG tablet Take 1 tablet (10 mg total) by mouth every morning.  Qty: 90 tablet, Refills: 1      pen needle, diabetic (BD INSULIN PEN NEEDLE UF SHORT) 31 gauge x 5/16" Ndle Use to inject insulin 5 times daily  Qty: 450 each, Refills: 3    Associated Diagnoses: 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         STOP taking these medications       amlodipine (NORVASC) 10 MG tablet Comments:   Reason for Stopping:         furosemide (LASIX) 20 MG tablet Comments:   Reason for Stopping:               I certify that this patient is confined to his home and needs intermittent skilled nursing care, physical therapy and occupational therapy.        "

## 2017-02-19 NOTE — NURSING
"VSS; pt free of falls and injury; pt voided, participated with PT and tolerated meals. Needs assessed hourly. Pt denied pain. Extremity pulses intact. Blood sugars maintained less than 300 mg/dl. Bronchial hygiene measures encouraged frequently. Po fluids encouraged and tolerated well. Pt and niece given verbal and written discharge instructions. Pt verbalized understanding to all discharge instructions including the need to attend follow up appointment.  Pt sent home with family. Pt niece able to verbalize food and portions to control pt blood pressure.   Prior to discharge pt niece made multiple call inquiring about when pt was going to be discharged. Pt family informed that case management and MD are actively coordinating pt discharge; [place]  Informed MD that pt family had questions; family stated "the  Called and give discharge instructions by phone regarding medications."   "

## 2017-02-20 NOTE — PT/OT/SLP DISCHARGE
Occupational Therapy Discharge Summary    Julius Cardenas  MRN: [id number]   Hyperosmolar non-ketotic state in patient with type 2 diabetes mellitus   Patient Discharged from acute Occupational Therapy on 2/19/2017.  Please refer to prior OT note dated on 2/17/17 for functional status.     Assessment:   Patient appropriate for care in another setting.  GOALS:   Occupational Therapy Goals        Problem: Occupational Therapy Goal    Goal Priority Disciplines Outcome Interventions   Occupational Therapy Goal     OT, PT/OT Ongoing (interventions implemented as appropriate)    Description:  Goals to be met by: 3/10/2017     Patient will increase functional independence with ADLs by performing:    Supine <> sit with MOD I   Assess feeding.   Grooming while seated with Set-up Assistance.  Toilet transfer to toilet with Supervision.                Reasons for Discontinuation of Therapy Services  Transfer to alternate level of care.      Plan:  Patient Discharged to: Home health OT recommended, per CM note, pt is not home bound and attends adult day care 5 x week. Home with family support.

## 2017-02-20 NOTE — PROGRESS NOTES
Physical Therapy Discharge Summary    Julius Cardenas  MRN: [id number]   Hyperosmolar non-ketotic state in patient with type 2 diabetes mellitus   Patient Discharged from acute Physical Therapy on 17.  Please refer to prior PT noted date on 17 for functional status.     Assessment:   Patient has not met goals.  GOALS:   Physical Therapy Goals        Problem: Physical Therapy Goal    Goal Priority Disciplines Outcome Goal Variances Interventions   Physical Therapy Goal     PT/OT, PT Ongoing (interventions implemented as appropriate)     Description:  Goals to be met by: 17     Patient will increase functional independence with mobility by performin. Supine to sit with modified independence.   2. Sit to supine with modified independence.   3. Sit<>stand transfer with modified independence using quad cane or rolling walker.   4. Gait x 150 feet with modified independence using quad cane or rolling walker.   5. Ascend/descend 5 stairs with bilateral handrails with CGA with or without AD.              Reasons for Discontinuation of Therapy Services  Transfer to alternate level of care.      Plan:  Patient Discharged to: Home health PT recommended, per CM note, pt is not home bound and attends adult day care 5 x week. Home with family support.

## 2017-03-10 LAB
POCT GLUCOSE: >500 MG/DL (ref 70–110)

## 2017-03-24 ENCOUNTER — HOSPITAL ENCOUNTER (OUTPATIENT)
Facility: OTHER | Age: 82
Discharge: HOME | End: 2017-03-26
Attending: EMERGENCY MEDICINE | Admitting: HOSPITALIST
Payer: MEDICARE

## 2017-03-24 ENCOUNTER — TELEPHONE (OUTPATIENT)
Dept: INTERNAL MEDICINE | Facility: CLINIC | Age: 82
End: 2017-03-24

## 2017-03-24 DIAGNOSIS — R73.9 HYPERGLYCEMIA: ICD-10-CM

## 2017-03-24 DIAGNOSIS — E10.22 TYPE 1 DIABETES MELLITUS WITH STAGE 4 CHRONIC KIDNEY DISEASE: Chronic | ICD-10-CM

## 2017-03-24 DIAGNOSIS — E11.21 DIABETIC NEPHROPATHY ASSOCIATED WITH TYPE 2 DIABETES MELLITUS: ICD-10-CM

## 2017-03-24 DIAGNOSIS — E10.29 MICROALBUMINURIA DUE TO TYPE 1 DIABETES MELLITUS: Chronic | ICD-10-CM

## 2017-03-24 DIAGNOSIS — E86.9 VOLUME DEPLETION: ICD-10-CM

## 2017-03-24 DIAGNOSIS — E10.65 TYPE 1 DIABETES MELLITUS WITH HYPERGLYCEMIA: Chronic | ICD-10-CM

## 2017-03-24 DIAGNOSIS — E11.00 HYPEROSMOLAR NON-KETOTIC STATE IN PATIENT WITH TYPE 2 DIABETES MELLITUS: ICD-10-CM

## 2017-03-24 DIAGNOSIS — E87.5 HYPERKALEMIA: ICD-10-CM

## 2017-03-24 DIAGNOSIS — G40.909 SECONDARY SEIZURE DISORDER: Chronic | ICD-10-CM

## 2017-03-24 DIAGNOSIS — E16.2 HYPOGLYCEMIA: Primary | ICD-10-CM

## 2017-03-24 DIAGNOSIS — E10.42 TYPE 1 DIABETES MELLITUS WITH DIABETIC POLYNEUROPATHY: Chronic | ICD-10-CM

## 2017-03-24 DIAGNOSIS — R26.9 ABNORMALITY OF GAIT: ICD-10-CM

## 2017-03-24 DIAGNOSIS — G31.84 MCI (MILD COGNITIVE IMPAIRMENT): Chronic | ICD-10-CM

## 2017-03-24 DIAGNOSIS — E11.42 DIABETIC POLYNEUROPATHY ASSOCIATED WITH TYPE 2 DIABETES MELLITUS: ICD-10-CM

## 2017-03-24 DIAGNOSIS — I69.398 WEAKNESS FOLLOWING CEREBROVASCULAR ACCIDENT (CVA): Chronic | ICD-10-CM

## 2017-03-24 DIAGNOSIS — E44.0 MALNUTRITION OF MODERATE DEGREE: ICD-10-CM

## 2017-03-24 DIAGNOSIS — R80.9 MICROALBUMINURIA DUE TO TYPE 1 DIABETES MELLITUS: Chronic | ICD-10-CM

## 2017-03-24 DIAGNOSIS — N17.9 AKI (ACUTE KIDNEY INJURY): ICD-10-CM

## 2017-03-24 DIAGNOSIS — R53.1 WEAKNESS FOLLOWING CEREBROVASCULAR ACCIDENT (CVA): Chronic | ICD-10-CM

## 2017-03-24 DIAGNOSIS — I15.0 RENOVASCULAR HYPERTENSION: Chronic | ICD-10-CM

## 2017-03-24 DIAGNOSIS — N18.4 TYPE 1 DIABETES MELLITUS WITH STAGE 4 CHRONIC KIDNEY DISEASE: Chronic | ICD-10-CM

## 2017-03-24 DIAGNOSIS — E10.65 HYPERGLYCEMIA DUE TO TYPE 1 DIABETES MELLITUS: ICD-10-CM

## 2017-03-24 DIAGNOSIS — N18.4 ANEMIA OF CHRONIC RENAL FAILURE, STAGE 4 (SEVERE): Chronic | ICD-10-CM

## 2017-03-24 DIAGNOSIS — T14.90XA TRAUMA: ICD-10-CM

## 2017-03-24 DIAGNOSIS — E55.9 VITAMIN D DEFICIENCY DISEASE: Chronic | ICD-10-CM

## 2017-03-24 DIAGNOSIS — I15.2 HYPERTENSION DUE TO ENDOCRINE DISORDER: ICD-10-CM

## 2017-03-24 DIAGNOSIS — E87.6 HYPOKALEMIA: ICD-10-CM

## 2017-03-24 DIAGNOSIS — N18.4 CKD (CHRONIC KIDNEY DISEASE) STAGE 4, GFR 15-29 ML/MIN: Chronic | ICD-10-CM

## 2017-03-24 DIAGNOSIS — D63.1 ANEMIA OF CHRONIC RENAL FAILURE, STAGE 4 (SEVERE): Chronic | ICD-10-CM

## 2017-03-24 LAB
ALBUMIN SERPL BCP-MCNC: 3.6 G/DL
ALP SERPL-CCNC: 78 U/L
ALT SERPL W/O P-5'-P-CCNC: 42 U/L
ANION GAP SERPL CALC-SCNC: 14 MMOL/L
AST SERPL-CCNC: 33 U/L
B-OH-BUTYR BLD STRIP-SCNC: 0.1 MMOL/L
BASOPHILS # BLD AUTO: 0.02 K/UL
BASOPHILS NFR BLD: 0.2 %
BILIRUB SERPL-MCNC: 0.3 MG/DL
BNP SERPL-MCNC: 30 PG/ML
BUN SERPL-MCNC: 39 MG/DL
CALCIUM SERPL-MCNC: 9.4 MG/DL
CHLORIDE SERPL-SCNC: 114 MMOL/L
CO2 SERPL-SCNC: 16 MMOL/L
CREAT SERPL-MCNC: 3.2 MG/DL
DIFFERENTIAL METHOD: ABNORMAL
EOSINOPHIL # BLD AUTO: 0.2 K/UL
EOSINOPHIL NFR BLD: 2.6 %
ERYTHROCYTE [DISTWIDTH] IN BLOOD BY AUTOMATED COUNT: 14 %
EST. GFR  (AFRICAN AMERICAN): 19 ML/MIN/1.73 M^2
EST. GFR  (NON AFRICAN AMERICAN): 17 ML/MIN/1.73 M^2
GLUCOSE SERPL-MCNC: 30 MG/DL
HCT VFR BLD AUTO: 35.4 %
HGB BLD-MCNC: 11.8 G/DL
LYMPHOCYTES # BLD AUTO: 3.9 K/UL
LYMPHOCYTES NFR BLD: 43.6 %
MCH RBC QN AUTO: 29.3 PG
MCHC RBC AUTO-ENTMCNC: 33.3 %
MCV RBC AUTO: 88 FL
MONOCYTES # BLD AUTO: 0.7 K/UL
MONOCYTES NFR BLD: 8.2 %
NEUTROPHILS # BLD AUTO: 4.1 K/UL
NEUTROPHILS NFR BLD: 45.2 %
PLATELET # BLD AUTO: 196 K/UL
PMV BLD AUTO: 11.2 FL
POCT GLUCOSE: 136 MG/DL (ref 70–110)
POCT GLUCOSE: 25 MG/DL (ref 70–110)
POCT GLUCOSE: 26 MG/DL (ref 70–110)
POCT GLUCOSE: 83 MG/DL (ref 70–110)
POTASSIUM SERPL-SCNC: 3.2 MMOL/L
PROT SERPL-MCNC: 7.6 G/DL
RBC # BLD AUTO: 4.03 M/UL
SODIUM SERPL-SCNC: 144 MMOL/L
TROPONIN I SERPL DL<=0.01 NG/ML-MCNC: 0.01 NG/ML
WBC # BLD AUTO: 9.01 K/UL

## 2017-03-24 PROCEDURE — 80053 COMPREHEN METABOLIC PANEL: CPT

## 2017-03-24 PROCEDURE — 93005 ELECTROCARDIOGRAM TRACING: CPT

## 2017-03-24 PROCEDURE — 96376 TX/PRO/DX INJ SAME DRUG ADON: CPT

## 2017-03-24 PROCEDURE — 96374 THER/PROPH/DIAG INJ IV PUSH: CPT

## 2017-03-24 PROCEDURE — 63600175 PHARM REV CODE 636 W HCPCS: Performed by: EMERGENCY MEDICINE

## 2017-03-24 PROCEDURE — 99285 EMERGENCY DEPT VISIT HI MDM: CPT | Mod: 25

## 2017-03-24 PROCEDURE — G0378 HOSPITAL OBSERVATION PER HR: HCPCS

## 2017-03-24 PROCEDURE — 84484 ASSAY OF TROPONIN QUANT: CPT

## 2017-03-24 PROCEDURE — 83880 ASSAY OF NATRIURETIC PEPTIDE: CPT

## 2017-03-24 PROCEDURE — 85025 COMPLETE CBC W/AUTO DIFF WBC: CPT

## 2017-03-24 PROCEDURE — 82962 GLUCOSE BLOOD TEST: CPT

## 2017-03-24 PROCEDURE — 96375 TX/PRO/DX INJ NEW DRUG ADDON: CPT

## 2017-03-24 PROCEDURE — 93010 ELECTROCARDIOGRAM REPORT: CPT | Mod: ,,, | Performed by: INTERNAL MEDICINE

## 2017-03-24 PROCEDURE — 82010 KETONE BODYS QUAN: CPT

## 2017-03-24 PROCEDURE — 25000003 PHARM REV CODE 250: Performed by: EMERGENCY MEDICINE

## 2017-03-24 RX ORDER — ONDANSETRON 2 MG/ML
4 INJECTION INTRAMUSCULAR; INTRAVENOUS
Status: COMPLETED | OUTPATIENT
Start: 2017-03-24 | End: 2017-03-24

## 2017-03-24 RX ADMIN — DEXTROSE MONOHYDRATE 25 G: 25 INJECTION, SOLUTION INTRAVENOUS at 07:03

## 2017-03-24 RX ADMIN — ONDANSETRON 4 MG: 2 INJECTION, SOLUTION INTRAMUSCULAR; INTRAVENOUS at 08:03

## 2017-03-24 RX ADMIN — DEXTROSE MONOHYDRATE 25 G: 25 INJECTION, SOLUTION INTRAVENOUS at 09:03

## 2017-03-24 NOTE — TELEPHONE ENCOUNTER
----- Message from Elen Rodriguez MA sent at 3/24/2017  7:33 AM CDT -----  Contact: LAB APPT   An appointment for an annual physical has been scheduled for 3/28/17    The patient is requesting prior labs.    A lab appointment is scheduled for 3/25/17 Please link labs to the scheduled appointment.    If the lab appointment is not appropriate, please cancel appointment and notify the patient.    Thank you!

## 2017-03-24 NOTE — IP AVS SNAPSHOT
[facility] Location ([facility])  [address] [place], [place] [address]  Phone: [phone]           Patient Discharge Instructions     Our goal is to set you up for success. This packet includes information on your condition, medications, and your home care. It will help you to care for yourself so you don't get sicker and need to go back to the hospital.     Please ask your nurse if you have any questions.        There are many details to remember when preparing to leave the hospital. Here is what you will need to do:    1. Take your medicine. If you are prescribed medications, review your Medication List in the following pages. You may have new medications to  at the pharmacy and others that you'll need to stop taking. Review the instructions for how and when to take your medications. Talk with your doctor or nurses if you are unsure of what to do.     2. Go to your follow-up appointments. Specific follow-up information is listed in the following pages. Your may be contacted by a transition nurse or clinical provider about future appointments. Be sure we have all of the phone numbers to reach you, if needed. Please contact your provider's office if you are unable to make an appointment.     3. Watch for warning signs. Your doctor or nurse will give you detailed warning signs to watch for and when to call for assistance. These instructions may also include educational information about your condition. If you experience any of warning signs to your health, call your doctor.               [facility]  Unless otherwise directed by your provider, please contact [facility], our nurse care line that is available for 24/7 assistance.     [phone] (toll-free)    Registered nurses in the [facility] provide clinical advisement, health education, appointment booking, and other advisory services.                    ** Verify the list of medication(s) below is accurate and up to  "date. Carry this with you in case of emergency. If your medications have changed, please notify your healthcare provider.             Medication List      START taking these medications        Additional Info                      nifedipine 90 MG (OSM) Tr24   Commonly known as:  PROCARDIA-XL   Quantity:  30 tablet   Refills:  0   Dose:  90 mg    Last time this was given:  90 mg on 3/27/2017  9:32 AM   Instructions:  Take 1 tablet (90 mg total) by mouth once daily.     Begin Date    AM    Noon    PM    Bedtime         CHANGE how you take these medications        Additional Info                      BD INSULIN PEN NEEDLE UF SHORT 31 gauge x 5/16" Ndle   Quantity:  100 each   Refills:  0   What changed:  See the new instructions.   Generic drug:  pen needle, diabetic    Instructions:  USE AS DIRECTED     Begin Date    AM    Noon    PM    Bedtime       insulin detemir 100 unit/mL (3 mL) Inpn pen   Commonly known as:  LEVEMIR FLEXPEN   Quantity:  1 Box   Refills:  6   Dose:  18 Units   What changed:  how much to take    Last time this was given:  18 Units on 3/27/2017  9:33 AM   Instructions:  Inject 18 Units into the skin once daily.     Begin Date    AM    Noon    PM    Bedtime         CONTINUE taking these medications        Additional Info                      aspirin 81 MG EC tablet   Commonly known as:  ECOTRIN   Refills:  0   Dose:  81 mg    Last time this was given:  81 mg on 3/27/2017  9:32 AM   Instructions:  Take 81 mg by mouth once daily.     Begin Date    AM    Noon    PM    Bedtime       blood-glucose meter kit   Commonly known as:  CONTOUR METER   Quantity:  1 each   Refills:  0    Instructions:  Use as instructed.  Please substitute appropriate meter to match his strips.     Begin Date    AM    Noon    PM    Bedtime       calcitRIOL 0.5 MCG Cap   Commonly known as:  ROCALTROL   Quantity:  90 capsule   Refills:  4    Last time this was given:  0.5 mcg on 3/27/2017  9:32 AM   Instructions:  TAKE 1 CAPSULE " BY MOUTH ONCE DAILY     Begin Date    AM    Noon    PM    Bedtime       carvedilol 6.25 MG tablet   Commonly known as:  COREG   Quantity:  60 tablet   Refills:  0   Dose:  6.25 mg    Last time this was given:  6.25 mg on 3/27/2017  9:32 AM   Instructions:  Take 1 tablet (6.25 mg total) by mouth 2 (two) times daily.     Begin Date    AM    Noon    PM    Bedtime       CONTOUR TEST STRIPS Strp   Quantity:  300 strip   Refills:  12   Comments:  **Patient requests 90 days supply**   Generic drug:  blood sugar diagnostic    Instructions:  USE AS DIRECTED THREE TIMES DAILY     Begin Date    AM    Noon    PM    Bedtime       hydrALAZINE 50 MG tablet   Commonly known as:  APRESOLINE   Quantity:  180 tablet   Refills:  0   Dose:  50 mg    Last time this was given:  50 mg on 3/27/2017  2:16 PM   Instructions:  Take 1 tablet (50 mg total) by mouth every 8 (eight) hours.     Begin Date    AM    Noon    PM    Bedtime       insulin aspart 100 unit/mL Inpn pen   Commonly known as:  NOVOLOG FLEXPEN   Quantity:  1 Box   Refills:  6   Dose:  5 Units    Last time this was given:  3 Units on 3/27/2017 12:49 PM   Instructions:  Inject 5 Units into the skin 3 (three) times daily with meals. Plus correction scale.     Begin Date    AM    Noon    PM    Bedtime       lancets Misc   Quantity:  100 each   Refills:  3    Instructions:  Use three times daily for finger stick glucose monitoring.     Begin Date    AM    Noon    PM    Bedtime       levetiracetam 1000 MG tablet   Commonly known as:  KEPPRA   Quantity:  180 tablet   Refills:  6    Last time this was given:  1,000 mg on 3/27/2017  9:31 AM   Instructions:  TAKE 1 TABLET BY MOUTH TWICE DAILY     Begin Date    AM    Noon    PM    Bedtime       nystatin cream   Commonly known as:  MYCOSTATIN   Quantity:  60 g   Refills:  3    Instructions:  Apply topically 2 (two) times daily.     Begin Date    AM    Noon    PM    Bedtime       paroxetine 10 MG tablet   Commonly known as:  PAXIL  "  Quantity:  90 tablet   Refills:  1   Dose:  10 mg    Last time this was given:  10 mg on 3/27/2017  6:16 AM   Instructions:  Take 1 tablet (10 mg total) by mouth every morning.     Begin Date    AM    Noon    PM    Bedtime            Where to Get Your Medications      [facility] [address] [facility] [address] [facility] [address] [place]  [address] [facility] [address]    Hours:  24-hours Phone:  [phone]     BD INSULIN PEN NEEDLE UF SHORT 31 gauge x 5/16" Ndle         These medications were sent to [facility] [employer] [address] [place], [place] - [address] AT [facility] [address]  [address] [facility] [address]    Hours:  24-hours Phone:  [phone]     insulin detemir 100 unit/mL (3 mL) Inpn pen    nifedipine 90 MG (OSM) Tr24                  Please bring to all follow up appointments:    1. A copy of your discharge instructions.  2. All medicines you are currently taking in their original bottles.  3. Identification and insurance card.    Please arrive 15 minutes ahead of scheduled appointment time.    Please call 24 hours in advance if you must reschedule your appointment and/or time.        Your Scheduled Appointments     Mar 28, 2017  2:30 PM CDT   Physical with MD Mathieu Hollis - Internal Medicine ([facility])    [address]  [place] [address] [phone]            Apr 20, 2017  1:00 PM CDT   Diabetes Education with CALI Delcid - Diabetes Management ([facility] )    [address]  [place] [address] [phone]            Apr 20, 2017  2:30 PM CDT   Non-Fasting Lab with LAB, APPOINTMENT [place] [facility] ([facility])    [address] [facility] [address] [phone]            Apr 20, 2017  3:30 PM CDT   Established Patient with Amina " SOCRATES Garza NP   [facility] Colon - Endo/Diab/Metab ([address] )    [address]  [facility] [address] [phone]              Follow-up Information     Follow up with Mady Carson MD. Schedule an appointment as soon as possible for a visit in 3 days.    Specialty:  Internal Medicine    Contact information:    [address]  [facility] [id number] [phone]          Discharge Instructions     Future Orders    Call MD for:  difficulty breathing or increased cough     Call MD for:  increased confusion or weakness     Call MD for:  persistent dizziness, light-headedness, or visual disturbances     Call MD for:  persistent nausea and vomiting or diarrhea     Call MD for:  severe persistent headache     Call MD for:  temperature >100.4     Diet general     Questions:    Total calories:      Fat restriction, if any:      Protein restriction, if any:      Na restriction, if any:      Fluid restriction:      Additional restrictions:          Discharge Instructions         Hypoglycemia (Low Blood Sugar)     Fast-acting sugar includes a cup of nonfat milk.     Too little sugar (glucose) in your blood is called hypoglycemia or low blood sugar. Low blood sugar usually means anything lower than 70 mg/dL. Talk with your healthcare provider about your target range and what level is too low for you. Diabetes itself doesnt cause low blood sugar. But some of the treatments for diabetes, such as pills or insulin, may raise your risk for it. Low blood sugar may cause you to pass out or have a seizure. So always treat low blood sugar right away, but don't overeat.  Special note: Always carry a source of fast-acting sugar and a snack in case of hypoglycemia.   What you may notice  If you have low blood sugar, you may have one or more of these symptoms:  · Shakiness or dizziness  · Cold, clammy skin or sweating  · Feelings of hunger  · Headache  · Nervousness  · A hard, fast  heartbeat  · Weakness  · Confusion or irritability  · Blurred vision  · Having nightmares or waking up confused or sweating  · Numbness or tingling in the lips or tongue  What you should do  Here are tips to follow if you have hypoglycemia:   · First check your blood sugar. If it is too low (out of your target range), eat or drink 15 to 20 grams of fast-acting sugar. This may be 3 to 4 glucose tablets, 4 ounces (half a cup) of fruit juice or regular (nondiet) soda, 8 ounces (1 cup) of fat-free milk, or 1 tablespoon of honey. Dont take more than this, or your blood sugar may go too high.  · Wait 15 minutes. Then recheck your blood sugar if you can.  · If your blood sugar is still too low, repeat the steps above and check your blood sugar again. If your blood sugar still has not returned to your target range, contact your healthcare provider or seek emergency care.  · Once your blood sugar returns to target range, eat a snack or meal.  Preventing low blood sugar  Things you can do include the following:   · If your condition needs a strict treatment plan, eat your meals and snacks at the same times each day. Dont skip meals!  · If your treatment plan lets you change when you eat and what you eat, learn how to change the time and dose of your rapid-acting insulin to match this.   · Ask your healthcare provider if it is safe for you to drink alcohol. Never drink on an empty stomach.  · Take your medicine at the prescribed times.  · Always carry a source of fast-acting sugar and a snack when youre away from home.  Other things to do  Additional tips include the following:  · Carry a medical ID card, a compact USB drive, or wear a medical alert bracelet or necklace. It should say that you have diabetes. It should also say what to do if you pass out or have a seizure.  · Make sure your family, friends, and coworkers know the signs of low blood sugar. Tell them what to do if your blood sugar falls very low and you cant  "treat yourself.  · Keep a glucagon emergency kit handy. Be sure your family, friends, and coworkers know how and when to use it. Check it regularly and replace the glucagon before it expires.  · Talk with your health care team about other things you can do to prevent low blood sugar.     If you have unexplained hypoglycemia or hypoglycemia several times, call your healthcare provider.   Date Last Reviewed: 5/1/2016  © 7178-0881 [employer]. [address] [place] [address], [place] [address]. All rights reserved. This information is not intended as a substitute for professional medical care. Always follow your healthcare professional's instructions.            Primary Diagnosis     Your primary diagnosis was:  Low Blood Sugar      Admission Information     Date & Time Provider Department CSN    3/24/2017  7:17 PM Blair Daniels MD [facility] [id number]      Care Providers     Provider Role Specialty Primary office phone    Blair Daniels MD Attending Provider Hospitalist [phone]      Your Vitals Were     BP Pulse Temp Resp Height Weight    142/68 (BP Location: Right arm, Patient Position: Lying, BP Method: Automatic) 78 98.3 °F (36.8 °C) (Oral) 18 5' 7" (1.702 m) 68 kg (150 lb)    SpO2 BMI             100% 23.49 kg/m2         Recent Lab Values        10/22/2015 1/19/2016 4/19/2016 6/27/2016 10/20/2016 12/8/2016 1/22/2017 3/25/2017     12:44 PM  2:07 PM  2:30 PM  9:47 PM 10:04 AM 12:23 PM  9:11 PM  4:59 AM    A1C 8.9 (H) 8.3 (H) 8.5 (H) 8.9 (H) 7.9 (H) 9.1 (H) 9.5 (H) 8.7 (H)    Comment for A1C at 10:04 AM on 10/20/2016:  According to ADA guidelines, hemoglobin A1C <7.0% represents  optimal control in non-pregnant diabetic patients.  Different  metrics may apply to specific populations.   Standards of Medical Care in Diabetes - 2016.  For the purpose of screening for the presence of diabetes:  <5.7%     Consistent with the absence of diabetes  5.7-6.4%  Consistent with increasing risk for " diabetes   (prediabetes)  >or=6.5%  Consistent with diabetes  Currently no consensus exists for use of hemoglobin A1C  for diagnosis of diabetes for children.      Comment for A1C at 12:23 PM on 12/8/2016:  According to ADA guidelines, hemoglobin A1C <7.0% represents  optimal control in non-pregnant diabetic patients.  Different  metrics may apply to specific populations.   Standards of Medical Care in Diabetes - 2016.  For the purpose of screening for the presence of diabetes:  <5.7%     Consistent with the absence of diabetes  5.7-6.4%  Consistent with increasing risk for diabetes   (prediabetes)  >or=6.5%  Consistent with diabetes  Currently no consensus exists for use of hemoglobin A1C  for diagnosis of diabetes for children.      Comment for A1C at  9:11 PM on 1/22/2017:  According to ADA guidelines, hemoglobin A1C <7.0% represents  optimal control in non-pregnant diabetic patients.  Different  metrics may apply to specific populations.   Standards of Medical Care in Diabetes - 2016.  For the purpose of screening for the presence of diabetes:  <5.7%     Consistent with the absence of diabetes  5.7-6.4%  Consistent with increasing risk for diabetes   (prediabetes)  >or=6.5%  Consistent with diabetes  Currently no consensus exists for use of hemoglobin A1C  for diagnosis of diabetes for children.      Comment for A1C at  4:59 AM on 3/25/2017:  According to ADA guidelines, hemoglobin A1C <7.0% represents  optimal control in non-pregnant diabetic patients.  Different  metrics may apply to specific populations.   Standards of Medical Care in Diabetes - 2016.  For the purpose of screening for the presence of diabetes:  <5.7%     Consistent with the absence of diabetes  5.7-6.4%  Consistent with increasing risk for diabetes   (prediabetes)  >or=6.5%  Consistent with diabetes  Currently no consensus exists for use of hemoglobin A1C  for diagnosis of diabetes for children.        Allergies as of 3/27/2017     No Known  Allergies      Advance Directives     An advance directive is a document which, in the event you are no longer able to make decisions for yourself, tells your healthcare team what kind of treatment you do or do not want to receive, or who you would like to make those decisions for you.  If you do not currently have an advance directive, [facility] encourages you to create one.  For more information call:  [phone]), [phone] ([phone]),  or log on to [email or web address].        Smoking Cessation     If you would like to quit smoking:   You may be eligible for free services if you are a [place] resident and started smoking cigarettes before September 1, 1988.  Call the Smoking Cessation Trust (SCT) toll free at (587) 689-0500 or (132) 606-0404.   Call 0-987-QUIT-NOW if you do not meet the above criteria.            Language Assistance Services     ATTENTION: Language assistance services are available, free of charge. Please call [phone].      ATENCIÓN: Si habla español, tiene a miller disposición servicios gratuitos de asistencia lingüística. Llame al [phone].     CHÚ Ý: N?u b?n nói Ti?ng Vi?t, có các d?ch v? h? tr? ngôn ng? mi?n phí dành cho b?n. G?i s? [phone].        Chronic Kindey Disease Education             Diabetes Discharge Instructions                                   [facility] Sign-Up     Activating your [facility] account is as easy as 1-2-3!     1) Visit [email or web address], select Sign Up Now, enter this activation code and your date of birth, then select Next.  Activation code not generated  Current Patient Portal Status: Account disabled      2) Create a username and password to use when you visit [facility] in the future and select a security question in case you lose your password and select Next.    3) Enter your e-mail address and click Sign Up!    Additional Information  If you have questions, please e-mail [email or web address] or call [phone] to  talk to our [facility] staff. Remember, [facility] is NOT to be used for urgent needs. For medical emergencies, dial 911.          [facility] complies with applicable Federal civil rights laws and does not discriminate on the basis of race, color, national origin, age, disability, or sex.

## 2017-03-25 PROBLEM — I15.2 HYPERTENSION DUE TO ENDOCRINE DISORDER: Status: ACTIVE | Noted: 2017-03-25

## 2017-03-25 PROBLEM — T14.90XA TRAUMA: Status: ACTIVE | Noted: 2017-03-25

## 2017-03-25 LAB
ALBUMIN SERPL BCP-MCNC: 3.2 G/DL
ALP SERPL-CCNC: 79 U/L
ALT SERPL W/O P-5'-P-CCNC: 35 U/L
ANION GAP SERPL CALC-SCNC: 10 MMOL/L
AST SERPL-CCNC: 26 U/L
BASOPHILS # BLD AUTO: 0.01 K/UL
BASOPHILS NFR BLD: 0.2 %
BILIRUB SERPL-MCNC: 0.3 MG/DL
BUN SERPL-MCNC: 38 MG/DL
CALCIUM SERPL-MCNC: 8.9 MG/DL
CHLORIDE SERPL-SCNC: 113 MMOL/L
CO2 SERPL-SCNC: 18 MMOL/L
CREAT SERPL-MCNC: 3 MG/DL
DIFFERENTIAL METHOD: ABNORMAL
EOSINOPHIL # BLD AUTO: 0.1 K/UL
EOSINOPHIL NFR BLD: 1.8 %
ERYTHROCYTE [DISTWIDTH] IN BLOOD BY AUTOMATED COUNT: 14.1 %
EST. GFR  (AFRICAN AMERICAN): 21 ML/MIN/1.73 M^2
EST. GFR  (NON AFRICAN AMERICAN): 18 ML/MIN/1.73 M^2
ESTIMATED AVG GLUCOSE: 203 MG/DL
GLUCOSE SERPL-MCNC: 206 MG/DL
HBA1C MFR BLD HPLC: 8.7 %
HCT VFR BLD AUTO: 32.5 %
HGB BLD-MCNC: 10.7 G/DL
LYMPHOCYTES # BLD AUTO: 2 K/UL
LYMPHOCYTES NFR BLD: 30.2 %
MAGNESIUM SERPL-MCNC: 2.1 MG/DL
MCH RBC QN AUTO: 29 PG
MCHC RBC AUTO-ENTMCNC: 32.9 %
MCV RBC AUTO: 88 FL
MONOCYTES # BLD AUTO: 0.5 K/UL
MONOCYTES NFR BLD: 7.6 %
NEUTROPHILS # BLD AUTO: 4 K/UL
NEUTROPHILS NFR BLD: 59.9 %
PHOSPHATE SERPL-MCNC: 5.9 MG/DL
PLATELET # BLD AUTO: 178 K/UL
PMV BLD AUTO: 11.1 FL
POCT GLUCOSE: 185 MG/DL (ref 70–110)
POCT GLUCOSE: 259 MG/DL (ref 70–110)
POCT GLUCOSE: 260 MG/DL (ref 70–110)
POCT GLUCOSE: 285 MG/DL (ref 70–110)
POCT GLUCOSE: 347 MG/DL (ref 70–110)
POCT GLUCOSE: 90 MG/DL (ref 70–110)
POTASSIUM SERPL-SCNC: 4.3 MMOL/L
PROT SERPL-MCNC: 6.8 G/DL
RBC # BLD AUTO: 3.69 M/UL
SODIUM SERPL-SCNC: 141 MMOL/L
WBC # BLD AUTO: 6.6 K/UL

## 2017-03-25 PROCEDURE — 83036 HEMOGLOBIN GLYCOSYLATED A1C: CPT

## 2017-03-25 PROCEDURE — 83735 ASSAY OF MAGNESIUM: CPT

## 2017-03-25 PROCEDURE — 85025 COMPLETE CBC W/AUTO DIFF WBC: CPT

## 2017-03-25 PROCEDURE — 63600175 PHARM REV CODE 636 W HCPCS: Performed by: FAMILY MEDICINE

## 2017-03-25 PROCEDURE — 80053 COMPREHEN METABOLIC PANEL: CPT

## 2017-03-25 PROCEDURE — 36415 COLL VENOUS BLD VENIPUNCTURE: CPT

## 2017-03-25 PROCEDURE — 25000003 PHARM REV CODE 250: Performed by: HOSPITALIST

## 2017-03-25 PROCEDURE — 99220 PR INITIAL OBSERVATION CARE,LEVL III: CPT | Mod: ,,, | Performed by: FAMILY MEDICINE

## 2017-03-25 PROCEDURE — 99220 PR INITIAL OBSERVATION CARE,LEVL III: CPT | Mod: ,,, | Performed by: HOSPITALIST

## 2017-03-25 PROCEDURE — G0378 HOSPITAL OBSERVATION PER HR: HCPCS

## 2017-03-25 PROCEDURE — 84100 ASSAY OF PHOSPHORUS: CPT

## 2017-03-25 PROCEDURE — 25000003 PHARM REV CODE 250: Performed by: FAMILY MEDICINE

## 2017-03-25 PROCEDURE — 63600175 PHARM REV CODE 636 W HCPCS: Performed by: HOSPITALIST

## 2017-03-25 RX ORDER — CARVEDILOL 6.25 MG/1
6.25 TABLET ORAL 2 TIMES DAILY
Status: DISCONTINUED | OUTPATIENT
Start: 2017-03-25 | End: 2017-03-28 | Stop reason: HOSPADM

## 2017-03-25 RX ORDER — PANTOPRAZOLE SODIUM 40 MG/1
40 TABLET, DELAYED RELEASE ORAL DAILY
Status: DISCONTINUED | OUTPATIENT
Start: 2017-03-25 | End: 2017-03-28 | Stop reason: HOSPADM

## 2017-03-25 RX ORDER — IBUPROFEN 200 MG
24 TABLET ORAL
Status: DISCONTINUED | OUTPATIENT
Start: 2017-03-25 | End: 2017-03-28 | Stop reason: HOSPADM

## 2017-03-25 RX ORDER — ASPIRIN 81 MG/1
81 TABLET ORAL DAILY
Status: DISCONTINUED | OUTPATIENT
Start: 2017-03-25 | End: 2017-03-28 | Stop reason: HOSPADM

## 2017-03-25 RX ORDER — HEPARIN SODIUM 5000 [USP'U]/ML
5000 INJECTION, SOLUTION INTRAVENOUS; SUBCUTANEOUS EVERY 8 HOURS
Status: DISCONTINUED | OUTPATIENT
Start: 2017-03-25 | End: 2017-03-28 | Stop reason: HOSPADM

## 2017-03-25 RX ORDER — HYDRALAZINE HYDROCHLORIDE 20 MG/ML
10 INJECTION INTRAMUSCULAR; INTRAVENOUS EVERY 8 HOURS PRN
Status: DISCONTINUED | OUTPATIENT
Start: 2017-03-25 | End: 2017-03-28 | Stop reason: HOSPADM

## 2017-03-25 RX ORDER — PAROXETINE 10 MG/1
10 TABLET, FILM COATED ORAL EVERY MORNING
Status: DISCONTINUED | OUTPATIENT
Start: 2017-03-25 | End: 2017-03-28 | Stop reason: HOSPADM

## 2017-03-25 RX ORDER — LEVETIRACETAM 500 MG/1
1000 TABLET ORAL 2 TIMES DAILY
Status: DISCONTINUED | OUTPATIENT
Start: 2017-03-25 | End: 2017-03-28 | Stop reason: HOSPADM

## 2017-03-25 RX ORDER — IBUPROFEN 200 MG
16 TABLET ORAL
Status: DISCONTINUED | OUTPATIENT
Start: 2017-03-25 | End: 2017-03-28 | Stop reason: HOSPADM

## 2017-03-25 RX ORDER — INSULIN ASPART 100 [IU]/ML
0-5 INJECTION, SOLUTION INTRAVENOUS; SUBCUTANEOUS
Status: DISCONTINUED | OUTPATIENT
Start: 2017-03-25 | End: 2017-03-26

## 2017-03-25 RX ORDER — SODIUM CHLORIDE 450 MG/100ML
INJECTION, SOLUTION INTRAVENOUS CONTINUOUS
Status: ACTIVE | OUTPATIENT
Start: 2017-03-25 | End: 2017-03-25

## 2017-03-25 RX ORDER — CALCITRIOL 0.25 UG/1
0.5 CAPSULE ORAL DAILY
Status: DISCONTINUED | OUTPATIENT
Start: 2017-03-25 | End: 2017-03-28 | Stop reason: HOSPADM

## 2017-03-25 RX ORDER — INSULIN ASPART 100 [IU]/ML
4 INJECTION, SOLUTION INTRAVENOUS; SUBCUTANEOUS
Status: DISCONTINUED | OUTPATIENT
Start: 2017-03-25 | End: 2017-03-26

## 2017-03-25 RX ORDER — HYDRALAZINE HYDROCHLORIDE 25 MG/1
50 TABLET, FILM COATED ORAL EVERY 8 HOURS
Status: DISCONTINUED | OUTPATIENT
Start: 2017-03-25 | End: 2017-03-28 | Stop reason: HOSPADM

## 2017-03-25 RX ORDER — GLUCAGON 1 MG
1 KIT INJECTION
Status: DISCONTINUED | OUTPATIENT
Start: 2017-03-25 | End: 2017-03-28 | Stop reason: HOSPADM

## 2017-03-25 RX ORDER — SODIUM CHLORIDE AND POTASSIUM CHLORIDE 150; 450 MG/100ML; MG/100ML
INJECTION, SOLUTION INTRAVENOUS CONTINUOUS
Status: DISPENSED | OUTPATIENT
Start: 2017-03-25 | End: 2017-03-25

## 2017-03-25 RX ORDER — NIFEDIPINE 30 MG/1
90 TABLET, EXTENDED RELEASE ORAL DAILY
Status: DISCONTINUED | OUTPATIENT
Start: 2017-03-25 | End: 2017-03-28 | Stop reason: HOSPADM

## 2017-03-25 RX ADMIN — INSULIN DETEMIR 15 UNITS: 100 INJECTION, SOLUTION SUBCUTANEOUS at 08:03

## 2017-03-25 RX ADMIN — HEPARIN SODIUM 5000 UNITS: 5000 INJECTION, SOLUTION INTRAVENOUS; SUBCUTANEOUS at 01:03

## 2017-03-25 RX ADMIN — HYDRALAZINE HYDROCHLORIDE 50 MG: 25 TABLET, FILM COATED ORAL at 01:03

## 2017-03-25 RX ADMIN — CARVEDILOL 6.25 MG: 6.25 TABLET, FILM COATED ORAL at 08:03

## 2017-03-25 RX ADMIN — SODIUM CHLORIDE AND POTASSIUM CHLORIDE: 4.5; 1.49 INJECTION, SOLUTION INTRAVENOUS at 03:03

## 2017-03-25 RX ADMIN — HYDRALAZINE HYDROCHLORIDE 50 MG: 25 TABLET, FILM COATED ORAL at 05:03

## 2017-03-25 RX ADMIN — LEVETIRACETAM 1000 MG: 500 TABLET, FILM COATED ORAL at 10:03

## 2017-03-25 RX ADMIN — INSULIN ASPART 4 UNITS: 100 INJECTION, SOLUTION INTRAVENOUS; SUBCUTANEOUS at 05:03

## 2017-03-25 RX ADMIN — INSULIN ASPART 3 UNITS: 100 INJECTION, SOLUTION INTRAVENOUS; SUBCUTANEOUS at 08:03

## 2017-03-25 RX ADMIN — CARVEDILOL 6.25 MG: 6.25 TABLET, FILM COATED ORAL at 10:03

## 2017-03-25 RX ADMIN — HYDRALAZINE HYDROCHLORIDE 50 MG: 25 TABLET, FILM COATED ORAL at 10:03

## 2017-03-25 RX ADMIN — SODIUM CHLORIDE: 0.45 INJECTION, SOLUTION INTRAVENOUS at 12:03

## 2017-03-25 RX ADMIN — INSULIN ASPART 3 UNITS: 100 INJECTION, SOLUTION INTRAVENOUS; SUBCUTANEOUS at 05:03

## 2017-03-25 RX ADMIN — ASPIRIN 81 MG: 81 TABLET, COATED ORAL at 08:03

## 2017-03-25 RX ADMIN — LEVETIRACETAM 1000 MG: 500 TABLET, FILM COATED ORAL at 08:03

## 2017-03-25 RX ADMIN — CALCITRIOL 0.5 MCG: 0.25 CAPSULE, LIQUID FILLED ORAL at 08:03

## 2017-03-25 RX ADMIN — HEPARIN SODIUM 5000 UNITS: 5000 INJECTION, SOLUTION INTRAVENOUS; SUBCUTANEOUS at 10:03

## 2017-03-25 RX ADMIN — NIFEDIPINE 90 MG: 30 TABLET, FILM COATED, EXTENDED RELEASE ORAL at 08:03

## 2017-03-25 RX ADMIN — SODIUM CHLORIDE: 0.45 INJECTION, SOLUTION INTRAVENOUS at 10:03

## 2017-03-25 RX ADMIN — PANTOPRAZOLE SODIUM 40 MG: 40 TABLET, DELAYED RELEASE ORAL at 08:03

## 2017-03-25 RX ADMIN — INSULIN ASPART 4 UNITS: 100 INJECTION, SOLUTION INTRAVENOUS; SUBCUTANEOUS at 12:03

## 2017-03-25 NOTE — PLAN OF CARE
Problem: Patient Care Overview  Goal: Plan of Care Review  Outcome: Ongoing (interventions implemented as appropriate)  Patient free of falls, injury, or skin breakdown during shift.  Denies pain.  IVF maintained as ordered.  Positions self independently in bed.  SCDs maintained; refuses teds, patient educated.  Bed low and locked, side rails x 2, bed alarm on, call bell in reach.  Patient resting comfortably in bed; no complaints at this time.  Will continue to monitor.

## 2017-03-25 NOTE — ED PROVIDER NOTES
Encounter Date: 3/24/2017    SCRIBE #1 NOTE: I, Bia Pappas, am scribing for, and in the presence of,  Dr. Davenport. I have scribed the entire note.       History     Chief Complaint   Patient presents with    Fall     frequent falls, and diarrhea x 1 week.     Chest Pain     x 1 week.     Review of patient's allergies indicates:  No Known Allergies  HPI Comments: Time seen by provider: 7:50 PM    This is a 84 y.o. male brought in by nurse who with complaint of fall. As per nurse onset of symptoms was about 1 week ago. She states the patient has been having frequent falls over the last week. Per nurse the patient had no head injury associated with the falls. The nurse states the patient has been having multiple episodes of diarrhea throughout the week. She also notes the patient's blood sugar has been fluctuating. As per nurse the patient has been compliant with his diabetic medication. He denies any associated nausea, vomiting, abdominal pain, urinary symptoms, fever or chills. In addition the patient complained of chest pain to his nurse. The patient has not described the pain to the nurse. As per nurse the patient did not complain of shortness of breath, palpitations, nausea, vomiting, cough or congestion.         The history is provided by a caregiver and the patient.     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     TIA  (transient ischemic attack) 2016    Type 1 diabetes     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 3/25/2013    Type 1 diabetes mellitus with hyperglycemia 4/8/2014    Type 1 diabetes mellitus with hypoglycemia and without coma 4/8/2014    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     Poor control due to cognitive impairment, with history of hypoglycemia and DKA     Vitamin D deficiency disease 7/31/2013     Past Surgical History:   Procedure Laterality Date    CATARACT EXTRACTION W/  INTRAOCULAR LENS IMPLANT  8/26/2009, 10/14/2009    CRANIOTOMY  1995    CRANIOTOMY  12/21/2010    EYE SURGERY       Family History   Problem Relation Age of Onset    Diabetes Mother     Cataracts Mother     No Known Problems Father     Diabetes Sister     No Known Problems Son     No Known Problems Daughter     No Known Problems Maternal Grandmother     No Known Problems Maternal Grandfather     No Known Problems Paternal Grandmother     No Known Problems Paternal Grandfather     Diabetes Sister     Diabetes Brother     No Known Problems Maternal Aunt     No Known Problems Maternal Uncle     No Known Problems Paternal Aunt     No Known Problems Paternal Uncle     Diabetes Sister     Diabetes Brother     Amblyopia Neg Hx     Blindness Neg Hx     Cancer Neg Hx     Glaucoma Neg Hx     Hypertension Neg Hx     Macular degeneration Neg Hx     Retinal detachment Neg Hx     Strabismus Neg Hx     Stroke Neg Hx     Thyroid disease Neg Hx      Social History   Substance Use Topics    Smoking status: Former Smoker     Packs/day: 1.00     Types: Cigarettes     Quit date: 12/31/1994    Smokeless tobacco: Former User     Quit date: 4/3/2010    Alcohol use No     Review of Systems   Constitutional: Negative for chills and fever.   HENT: Negative for congestion and sore throat.    Eyes: Negative for redness and visual disturbance.   Respiratory: Negative for cough and shortness of  breath.    Cardiovascular: Positive for chest pain. Negative for palpitations.   Gastrointestinal: Positive for diarrhea. Negative for abdominal pain, nausea and vomiting.   Genitourinary: Negative for dysuria.   Musculoskeletal: Negative for back pain.   Skin: Negative for rash.   Neurological: Negative for weakness and headaches.   Psychiatric/Behavioral: Negative for confusion.       Physical Exam   Initial Vitals   BP Pulse Resp Temp SpO2   03/24/17 1909 03/24/17 1909 03/24/17 1909 03/24/17 1909 03/24/17 1909   135/65 73 18 97.4 °F (36.3 °C) 100 %     Physical Exam    Nursing note and vitals reviewed.  Constitutional: He appears well-developed and well-nourished. He is not diaphoretic. No distress.   HENT:   Head: Normocephalic and atraumatic.   Right Ear: External ear normal.   Left Ear: External ear normal.   Eyes: Conjunctivae and EOM are normal.   Neck: Normal range of motion. Neck supple.   Cardiovascular: Normal rate and normal heart sounds. An irregularly irregular rhythm present. Exam reveals no gallop and no friction rub.    No murmur heard.  Pulmonary/Chest: He has no wheezes. He has no rhonchi. He has no rales.   Diminished breath sounds bilaterally more so on the left.    Musculoskeletal: Normal range of motion. He exhibits edema. He exhibits no tenderness.   Non-pitting 1+ edema to LLE to about mid-tibia   Lymphadenopathy:     He has no cervical adenopathy.   Neurological: He is alert and oriented to person, place, and time. He has normal strength.   Patient does not participate in neurologic exam   Skin: Skin is warm and dry. No rash noted.         ED Course   Critical Care  Date/Time: 3/24/2017 10:45 PM  Performed by: SANDRA SHEEHAN.  Authorized by: SANDRA SHEEHAN   Direct patient critical care time: 10 minutes  Additional history critical care time: 5 minutes  Ordering / reviewing critical care time: 10 minutes  Documentation critical care time: 5 minutes  Consulting other physicians critical  care time: 10 minutes  Consult with family critical care time: 5 minutes  Total critical care time (exclusive of procedural time) : 45 minutes  Critical care time was exclusive of separately billable procedures and treating other patients and teaching time.  Critical care was necessary to treat or prevent imminent or life-threatening deterioration of the following conditions: renal failure and metabolic crisis.  Critical care was time spent personally by me on the following activities: development of treatment plan with patient or surrogate, discussions with consultants, interpretation of cardiac output measurements, evaluation of patient's response to treatment, examination of patient, obtaining history from patient or surrogate, ordering and performing treatments and interventions, ordering and review of laboratory studies, ordering and review of radiographic studies, re-evaluation of patient's condition and review of old charts.        Labs Reviewed   POCT GLUCOSE - Abnormal; Notable for the following:        Result Value    POCT Glucose 26 (*)     All other components within normal limits   CBC W/ AUTO DIFFERENTIAL   COMPREHENSIVE METABOLIC PANEL   BETA - HYDROXYBUTYRATE, SERUM   TROPONIN I   B-TYPE NATRIURETIC PEPTIDE   POCT GLUCOSE MONITORING CONTINUOUS     EKG Readings: (Independently Interpreted)   EKG Reading (8:00 PM): Baseline artifact. Regular rhythm with occasional ectopic beats at rate of 72 bpm. No STEMI     Imaging Results         CT Head Without Contrast (Final result) Result time:  03/24/17 20:07:15    Final result by Gwyn Hermosillo MD (03/24/17 20:07:15)    Impression:       No acute intracranial process.    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        Electronically signed by: GWYN HERMOSILLO MD  Date:     03/24/17  Time:    20:07     Narrative:    Exam: [id number]  03/24/17  19:51:02 [id number] ([facility]) : CT HEAD WITHOUT  CONTRAST    Technique:    Axial CT scan of the head was obtained from the vertex to the skull base without intravenous contrast. Coronal and Sagittal reformats were obtained.     Comparison:    2/16/17.    Findings:      The subcutaneous tissues are within normal limits.  There are stable postoperative changes involving the calvarium.  The paranasal sinuses and mastoid air cells are clear.  There are postoperative changes in the orbits.    There are no extra-axial fluid collections.  There is no evidence of intracranial hemorrhage.  There is stable encephalomalacia in the right frontal lobe.  There is a stable appearance of right parasagittal meningioma. There are scattered hypodense foci within the periventricular and subcortical white matter, consistent with chronic small vessel ischemic disease.  Extensive vascular calcifications are present.  The ventricles and sulci are prominent, consistent with cerebral volume loss.  There is no evidence of midline shift.  There is no evidence of mass effect.            X-Ray Chest 1 View (Final result) Result time:  03/24/17 19:56:46    Final result by Annika Bynum MD (03/24/17 19:56:46)    Impression:        No radiographic acute intrathoracic process seen on this single view.      Electronically signed by: ANNIKA BYNUM MD, MD  Date:     03/24/17  Time:    19:56     Narrative:    COMPARISON: Chest radiograph 1/22/17    FINDINGS: AP portable upright view of the chest. Monitoring leads overlie the chest. Patient is slightly rotated. The lungs are symmetrically normally inflated without large consolidation or new focal opacity. Cardiomediastinal silhouette is within normal limits for age. No large pleural effusion or pneumothorax. No acute osseous process seen. PA and lateral views can be obtained.            X-Rays:   Independently Interpreted Readings:   Chest X-Ray: Portable Chest X-ray Reading (8:06 PM): Flattened diaphragm. Chronic changes. No effusion or opacities.       Medical Decision Making:   Independently Interpreted Test(s):   I have ordered and independently interpreted X-rays - see prior notes.  I have ordered and independently interpreted EKG Reading(s) - see prior notes  Clinical Tests:   Lab Tests: Ordered and Reviewed  Radiological Study: Ordered and Reviewed  Medical Tests: Reviewed and Ordered  ED Management:  Elderly patient with multiple falls presents complaining of chest pain and recalcitrant hypoglycemia.  He is repeatedly hypoglycemic here.  Recent admission for [facility].  I wonder if changing doses of insulin are to blame for his current hypoglycemia's.  Alternately this could relate to his worsening kidney function noted on exam today.  Admitted to the hospital service.      FRANKLIN Davenport M.D.  03/25/2017  4:25 AM    10:45 PM Case discussed with Laurent, will come to evaluate and admit the patient            Scribe Attestation:   Scribe #1: I performed the above scribed service and the documentation accurately describes the services I performed. I attest to the accuracy of the note.    Attending Attestation:           Physician Attestation for Scribe:  Physician Attestation Statement for Scribe #1: I, Dr. Davenport, reviewed documentation, as scribed by Bia Pappas in my presence, and it is both accurate and complete.                 ED Course     Clinical Impression:     1. Hypoglycemia    2. Trauma    3. NIALL (acute kidney injury)    4. Abnormality of gait    5. Hypokalemia    6. Uncontrolled type 2 diabetes mellitus with stage 4 chronic kidney disease, with long-term current use of insulin    7. Diabetic polyneuropathy associated with type 2 diabetes mellitus    8. Diabetic nephropathy associated with type 2 diabetes mellitus    9. CKD (chronic kidney disease) stage 4, GFR 15-29 ml/min    10. MCI (mild cognitive impairment)    11. Anemia of chronic renal failure, stage 4 (severe)              Franklin Davenport MD  03/25/17 1531

## 2017-03-25 NOTE — ED TRIAGE NOTES
Patients caretaker stated that patient has been c/o chest pain and has been having frequent falls for the past week.  Patient is normally ambulatory and independent with a cane but has been more dependent and weak lately.  Patients caretaker stated that patients CBG has been running low in the 15-30 range and they have been giving him candy and soda to get his BG level up.  Family stated that it only would stay up for a short period of time.  Patient is attached to cardiac monitor.  Fall precautions maintained.  Will continue to monitor.

## 2017-03-25 NOTE — PLAN OF CARE
Discharge planner met with patient at the bedside; Patient awake and alert, sitting up in bed eating lunch meal. Patient explained the MOON letter, verbalized understanding and signed (placed in blue chart). Patient is active with [facility], he attends the center M-F, and the center provides long term care at home. Patient is discharging  back to his home address at time of discharge. [place], [facility]      [id number]   Discharge Assessment   Assessment Type Discharge Planning Assessment   Confirmed/corrected address and phone number on facesheet? Yes   Assessment information obtained from? Patient    Communicated expected length of stay with patient/caregiver Yes    Type of Healthcare Directive Received None    If Healthcare Directive is received, is it scanned into Epic? no (comment)   Prior to hospitilization cognitive status: Alert/Oriented   Prior to hospitalization functional status: Assistive Equipment   Current cognitive status: AAOX2   Current Functional Status: Assistive Equipment   Arrived From home or self-care   Lives With Niece    Able to Return to Prior Arrangements yes   Is patient able to care for self after discharge? No   How many people do you have in your home that can help with your care after discharge? 1   Who are your caregiver(s) and their phone number(s)? [facility]    Readmission Within The Last 30 Days no previous admission in last 30 days   Patient currently being followed by outpatient case management? No   Patient currently receives home health services? Yes    Does the patient currently use HME? Yes    Patient currently receives private duty nursing? No   Patient currently receives any other outside agency services? No   Equipment Currently Used at Home Walker and cane    Do you have any problems affording any of your prescribed medications? No    Is the patient taking medications as prescribed? yes   Do you have any financial concerns preventing you from  receiving the healthcare you need? No   Does the patient have transportation to healthcare appointments? Yes   Transportation Available family o   On Dialysis? No   Are there any open cases? No   Discharge Plan A Home with family   Patient/Family In Agreement With Plan Yes

## 2017-03-25 NOTE — PLAN OF CARE
Problem: Patient Care Overview  Goal: Plan of Care Review  Outcome: Ongoing (interventions implemented as appropriate)  Plan of care reviewed with patient. Patient remained free of falls, injury and skin break down throughout shift. Positioned independently. Bed alarm on, bed lowered and locked, call bell and personal items within reach. SCD/Teds maintained. No needs at this time. Will continue to monitor.

## 2017-03-25 NOTE — H&P
[facility] Hospital Medicine  History & Physical    Patient Name: Julius Cardenas  MRN: [id number]  Admission Date: 3/24/2017  Attending Physician: Blair Daniels MD  Primary Care Provider: Mady Carson MD         Patient information was obtained from patient and ER records.     Subjective:     Principal Problem:Hypoglycemia    Chief Complaint:   Chief Complaint   Patient presents with    Fall     frequent falls, and diarrhea x 1 week.     Chest Pain     x 1 week.        HPI: 84 yr old pleasant black male with Type 1 diabetes mellitus, CKD stage 4, anemia of CKD, CVA w/deficits, MCI, polyneuropathy, presents to emergency after having multiple falls, weakness and hypoglycemia. It started a week ago and gradually worsening. He also endorsed loose bowel associated with mild chest discomfort. He reports that he is not eating like he is supposed to but taking his insulin. He reports multiple incidences of hypoglycemia. While his sugar drops, he experiences, palpitations, falls, sweating, confusion and chest discomfort. He feels better after eating anything. He denies any active chest pain/SOB/abdominal pain/diarrhea/nausea/vomiting currently. He has no external injuries as well. On presentation, patient found to be hypoglycemic which improved after D50. He is also found to have NIALL and hypokalemia/anemia. He received some fluids and will be admitted for observation.    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       Past Surgical History:   Procedure Laterality Date    CATARACT EXTRACTION W/  INTRAOCULAR LENS IMPLANT  8/26/2009, 10/14/2009    CRANIOTOMY  1995    CRANIOTOMY  12/21/2010    EYE SURGERY         Review of patient's allergies indicates:  No Known Allergies    No current facility-administered medications on file prior to encounter.      Current Outpatient Prescriptions on File Prior to Encounter   Medication Sig    aspirin (ECOTRIN) 81 MG EC tablet Take 81 mg by mouth once daily.    blood-glucose meter (CONTOUR METER) kit Use as instructed.  Please substitute appropriate meter to match his strips.    calcitRIOL (ROCALTROL) 0.5 MCG Cap TAKE 1 CAPSULE BY MOUTH ONCE DAILY    carvedilol (COREG) 6.25 MG tablet Take 1 tablet (6.25 mg total) by mouth 2 (two) times daily.    CONTOUR TEST STRIPS Strp USE AS DIRECTED THREE TIMES DAILY    hydrALAZINE (APRESOLINE) 50 MG tablet Take 1 tablet (50 mg total) by mouth every 8 (eight) hours.    insulin aspart (NOVOLOG FLEXPEN) 100 unit/mL InPn pen Inject 5 Units into the skin 3 (three) times daily with meals. Plus correction scale.    insulin detemir (LEVEMIR FLEXPEN) 100 unit/mL (3 mL) SubQ InPn pen Inject 15 Units into the skin once daily.    lancets Misc Use three times daily for finger stick glucose monitoring.    levetiracetam (KEPPRA) 1000 MG tablet TAKE 1 TABLET BY MOUTH TWICE DAILY    nifedipine 30 MG ORAL TR24  "(PROCARDIA-XL) 90 MG (OSM) TR24 Take 1 tablet (90 mg total) by mouth once daily.    nystatin (MYCOSTATIN) cream Apply topically 2 (two) times daily.    paroxetine (PAXIL) 10 MG tablet Take 1 tablet (10 mg total) by mouth every morning.    pen needle, diabetic (BD INSULIN PEN NEEDLE UF SHORT) 31 gauge x 5/16" Ndle Use to inject insulin 5 times daily (Patient taking differently: Use to inject insulin 4 times daily)     Family History     Problem Relation (Age of Onset)    Cataracts Mother    Diabetes Mother, Sister, Sister, Brother, Sister, Brother    No Known Problems Father, Son, Daughter, Maternal Grandmother, Maternal Grandfather, Paternal Grandmother, Paternal Grandfather, Maternal Aunt, Maternal Uncle, Paternal Aunt, Paternal Uncle        Social History Main Topics    Smoking status: Former Smoker     Packs/day: 1.00     Types: Cigarettes     Quit date: 12/31/1994    Smokeless tobacco: Former User     Quit date: 4/3/2010    Alcohol use No    Drug use: No    Sexual activity: Not on file     Review of Systems   Constitutional: Positive for fatigue.   HENT: Negative.  Negative for congestion, ear discharge, facial swelling, mouth sores, rhinorrhea, sneezing, tinnitus and voice change.    Eyes: Negative.  Negative for discharge, redness, itching and visual disturbance.   Respiratory: Negative.  Negative for apnea, choking, shortness of breath and wheezing.    Cardiovascular: Negative.  Negative for chest pain and palpitations.   Gastrointestinal: Negative.  Negative for abdominal distention, abdominal pain, anal bleeding, blood in stool, constipation, diarrhea, nausea and rectal pain.   Endocrine: Negative.  Negative for cold intolerance, polydipsia and polyuria.   Genitourinary: Negative.  Negative for decreased urine volume, difficulty urinating, discharge, flank pain, genital sores, penile swelling and testicular pain.   Musculoskeletal: Negative.  Negative for arthralgias, back pain, joint swelling and " neck pain.   Skin: Negative.  Negative for color change, pallor and wound.   Allergic/Immunologic: Negative.  Negative for environmental allergies and immunocompromised state.   Neurological: Positive for speech difficulty and weakness. Negative for dizziness, seizures, syncope and light-headedness.   Hematological: Negative.  Negative for adenopathy. Does not bruise/bleed easily.   Psychiatric/Behavioral: Negative.  Negative for agitation, confusion, dysphoric mood and self-injury. The patient is not hyperactive.      Objective:     Vital Signs (Most Recent):  Temp: 98 °F (36.7 °C) (03/25/17 0121)  Pulse: 62 (03/25/17 0121)  Resp: 16 (03/25/17 0121)  BP: (!) 188/86 (03/25/17 0121)  SpO2: 97 % (03/25/17 0121) Vital Signs (24h Range):  Temp:  [97.4 °F (36.3 °C)-98 °F (36.7 °C)] 98 °F (36.7 °C)  Pulse:  [54-73] 62  Resp:  [14-18] 16  SpO2:  [96 %-100 %] 97 %  BP: (106-188)/(54-86) 188/86     Weight: 68 kg (150 lb)  Body mass index is 23.49 kg/(m^2).    Physical Exam   Constitutional: He is oriented to person, place, and time. He appears well-developed and well-nourished. No distress.   HENT:   Head: Normocephalic and atraumatic.   Eyes: EOM are normal. Pupils are equal, round, and reactive to light.   Cardiovascular: Normal rate, normal heart sounds and intact distal pulses.  Exam reveals no gallop and no friction rub.    No murmur heard.  Skipped beats+   Pulmonary/Chest: Effort normal. No respiratory distress. He has no wheezes. He has rales (occasional rales and transmitted sounds B/L). He exhibits no tenderness.   Musculoskeletal: Normal range of motion. He exhibits no edema, tenderness or deformity.   Neurological: He is alert and oriented to person, place, and time. He has normal reflexes. He displays normal reflexes. No cranial nerve deficit. He exhibits normal muscle tone. Coordination normal.   Skin: He is not diaphoretic.       Significant Labs:   CBC:     Recent Labs  Lab 03/24/17 1941   WBC 9.01   HGB  11.8*   HCT 35.4*        CMP:     Recent Labs  Lab 03/24/17  1941      K 3.2*   *   CO2 16*   GLU 30*   BUN 39*   CREATININE 3.2*   CALCIUM 9.4   PROT 7.6   ALBUMIN 3.6   BILITOT 0.3   ALKPHOS 78   AST 33   ALT 42   ANIONGAP 14   EGFRNONAA 17*     Urine Studies: No results for input(s): COLORU, APPEARANCEUA, PHUR, SPECGRAV, PROTEINUA, GLUCUA, KETONESU, BILIRUBINUA, OCCULTUA, NITRITE, UROBILINOGEN, LEUKOCYTESUR, RBCUA, WBCUA, BACTERIA, SQUAMEPITHEL, HYALINECASTS in the last 48 hours.    Invalid input(s): WRIGHTSUR  All pertinent labs within the past 24 hours have been reviewed.    Significant Imaging:   CT Head Without Contrast 03/24/2017 falls          RESULTS:  Exam: [id number]  03/24/17 19:51:02 [id number] ([facility]) : CT HEAD WITHOUT CONTRAST     Technique: Axial CT scan of the head was obtained from the vertex to the skull base without intravenous contrast. Coronal and Sagittal reformats were obtained.      Comparison: 2/16/17.     Findings:   The subcutaneous tissues are within normal limits. There are stable postoperative changes involving the calvarium. The paranasal sinuses and mastoid air cells are clear. There are postoperative changes in the orbits.     There are no extra-axial fluid collections. There is no evidence of intracranial hemorrhage. There is stable encephalomalacia in the right frontal lobe. There is a stable appearance of right parasagittal meningioma. There are scattered hypodense foci within the periventricular and subcortical white matter, consistent with chronic small vessel ischemic disease. Extensive vascular calcifications are present. The ventricles and sulci are prominent, consistent with cerebral volume loss. There is no evidence of midline shift. There is no evidence of mass effect.    IMPRESSION:       No acute intracranial process.     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     X-Ray Chest 1 View  03/24/2017 None Specified          RESULTS:  COMPARISON: Chest radiograph 1/22/17     FINDINGS: AP portable upright view of the chest. Monitoring leads overlie the chest. Patient is slightly rotated. The lungs are symmetrically normally inflated without large consolidation or new focal opacity. Cardiomediastinal silhouette is within normal limits for age. No large pleural effusion or pneumothorax. No acute osseous process seen. PA and lateral views can be obtained.    IMPRESSION:         No radiographic acute intrathoracic process seen on this single view.       Assessment/Plan:     Active Diagnoses:    Diagnosis Date Noted POA    PRINCIPAL PROBLEM:  Hypoglycemia [E16.2] 03/24/2017 Yes    Trauma [T14.90] 03/25/2017 Yes    Hypertension due to endocrine disorder [I15.2] 03/25/2017 Yes    NIALL (acute kidney injury) [N17.9] 02/16/2017 Yes    Diabetic nephropathy associated with type 2 diabetes mellitus [E11.21] 02/16/2017 Yes    Diabetic polyneuropathy associated with type 2 diabetes mellitus [E11.42] 02/16/2017 Yes    DM (diabetes mellitus), type 2, uncontrolled [E11.65] 02/16/2017 Yes    Hypokalemia [E87.6] 12/09/2016 Yes    Abnormality of gait [R26.9] 06/30/2016 Yes    Anemia of chronic renal failure, stage 4 (severe) [N18.4, D63.1] 04/08/2014 Yes     Chronic    MCI (mild cognitive impairment) [G31.84] 02/01/2013 Yes     Chronic    CKD (chronic kidney disease) stage 4, GFR 15-29 ml/min [N18.4] 12/03/2012 Yes     Chronic      Problems Resolved During this Admission:    Diagnosis Date Noted Date Resolved POA    Hyperkalemia [E87.5] 04/06/2013 07/18/2013 Yes     VTE Risk Mitigation         Ordered     Place SRINIVAS hose  Until discontinued      03/25/17 0135     Medium Risk of VTE  Once      03/25/17 0104     Place sequential compression device  Until discontinued      03/25/17 0104          Admit to Hospital Medicine    1. HYpoglycemia  -likely from mismanagement of insulin and diabetes  -diab education and D50  when needed    2. DM II - uncontrolled Type 1  -A1C and adjust insulin    3. NIALL with hypokalemia  -conrrect w/fluids and electrolytes    4. Anemia of CKD IV  -baseline and monitor    5. CVA w/deficits  -stable    6. HTN  -uncontrolled - continue home meds  -hydralazine prn    6. Prophylaxis  -SRINIVAS, PPI    Spent adequate time in obtaining history and explaining differentials    50 minutes spent during this visit of which greater than 50% devoted to face-face counseling and coordination of care regarding diagnosis and management plan    Elie Brown MD  Department of Hospital Medicine   [facility]

## 2017-03-25 NOTE — ED NOTES
Patients BG was 25.  Provider not at station so gave patient some orange juice with sugar in it.  Patient tolerated juice well.

## 2017-03-25 NOTE — ED NOTES
Patients CBG was 26.  Notified Dr. Munguia due to patients provider not being at provider station.  Dr. Munguia will place order for IV dextrose.

## 2017-03-26 LAB
ANION GAP SERPL CALC-SCNC: 11 MMOL/L
BUN SERPL-MCNC: 38 MG/DL
CALCIUM SERPL-MCNC: 8.9 MG/DL
CHLORIDE SERPL-SCNC: 111 MMOL/L
CO2 SERPL-SCNC: 14 MMOL/L
CREAT SERPL-MCNC: 2.7 MG/DL
EST. GFR  (AFRICAN AMERICAN): 24 ML/MIN/1.73 M^2
EST. GFR  (NON AFRICAN AMERICAN): 21 ML/MIN/1.73 M^2
GLUCOSE SERPL-MCNC: 343 MG/DL
POCT GLUCOSE: 117 MG/DL (ref 70–110)
POCT GLUCOSE: 295 MG/DL (ref 70–110)
POCT GLUCOSE: 325 MG/DL (ref 70–110)
POCT GLUCOSE: 44 MG/DL (ref 70–110)
POCT GLUCOSE: 460 MG/DL (ref 70–110)
POTASSIUM SERPL-SCNC: 4.1 MMOL/L
SODIUM SERPL-SCNC: 136 MMOL/L

## 2017-03-26 PROCEDURE — 97535 SELF CARE MNGMENT TRAINING: CPT

## 2017-03-26 PROCEDURE — 25000003 PHARM REV CODE 250: Performed by: HOSPITALIST

## 2017-03-26 PROCEDURE — G8980 MOBILITY D/C STATUS: HCPCS | Mod: CK

## 2017-03-26 PROCEDURE — 80048 BASIC METABOLIC PNL TOTAL CA: CPT

## 2017-03-26 PROCEDURE — 36415 COLL VENOUS BLD VENIPUNCTURE: CPT

## 2017-03-26 PROCEDURE — 25000003 PHARM REV CODE 250: Performed by: FAMILY MEDICINE

## 2017-03-26 PROCEDURE — G8978 MOBILITY CURRENT STATUS: HCPCS | Mod: CK

## 2017-03-26 PROCEDURE — G0378 HOSPITAL OBSERVATION PER HR: HCPCS

## 2017-03-26 PROCEDURE — 97116 GAIT TRAINING THERAPY: CPT

## 2017-03-26 PROCEDURE — G8988 SELF CARE GOAL STATUS: HCPCS | Mod: CK

## 2017-03-26 PROCEDURE — G8979 MOBILITY GOAL STATUS: HCPCS | Mod: CI

## 2017-03-26 PROCEDURE — G8987 SELF CARE CURRENT STATUS: HCPCS | Mod: CL

## 2017-03-26 PROCEDURE — 97166 OT EVAL MOD COMPLEX 45 MIN: CPT

## 2017-03-26 PROCEDURE — 63600175 PHARM REV CODE 636 W HCPCS: Performed by: HOSPITALIST

## 2017-03-26 PROCEDURE — 97161 PT EVAL LOW COMPLEX 20 MIN: CPT

## 2017-03-26 RX ORDER — INSULIN ASPART 100 [IU]/ML
5 INJECTION, SOLUTION INTRAVENOUS; SUBCUTANEOUS
Status: DISCONTINUED | OUTPATIENT
Start: 2017-03-26 | End: 2017-03-28 | Stop reason: HOSPADM

## 2017-03-26 RX ORDER — INSULIN ASPART 100 [IU]/ML
1-10 INJECTION, SOLUTION INTRAVENOUS; SUBCUTANEOUS
Status: DISCONTINUED | OUTPATIENT
Start: 2017-03-26 | End: 2017-03-26

## 2017-03-26 RX ORDER — INSULIN ASPART 100 [IU]/ML
0-5 INJECTION, SOLUTION INTRAVENOUS; SUBCUTANEOUS
Status: DISCONTINUED | OUTPATIENT
Start: 2017-03-26 | End: 2017-03-28 | Stop reason: HOSPADM

## 2017-03-26 RX ADMIN — INSULIN ASPART 5 UNITS: 100 INJECTION, SOLUTION INTRAVENOUS; SUBCUTANEOUS at 05:03

## 2017-03-26 RX ADMIN — HEPARIN SODIUM 5000 UNITS: 5000 INJECTION, SOLUTION INTRAVENOUS; SUBCUTANEOUS at 06:03

## 2017-03-26 RX ADMIN — INSULIN ASPART 10 UNITS: 100 INJECTION, SOLUTION INTRAVENOUS; SUBCUTANEOUS at 12:03

## 2017-03-26 RX ADMIN — PANTOPRAZOLE SODIUM 40 MG: 40 TABLET, DELAYED RELEASE ORAL at 08:03

## 2017-03-26 RX ADMIN — HEPARIN SODIUM 5000 UNITS: 5000 INJECTION, SOLUTION INTRAVENOUS; SUBCUTANEOUS at 09:03

## 2017-03-26 RX ADMIN — CARVEDILOL 6.25 MG: 6.25 TABLET, FILM COATED ORAL at 09:03

## 2017-03-26 RX ADMIN — INSULIN ASPART 4 UNITS: 100 INJECTION, SOLUTION INTRAVENOUS; SUBCUTANEOUS at 08:03

## 2017-03-26 RX ADMIN — CARVEDILOL 6.25 MG: 6.25 TABLET, FILM COATED ORAL at 08:03

## 2017-03-26 RX ADMIN — SODIUM CHLORIDE 500 ML: 0.9 INJECTION, SOLUTION INTRAVENOUS at 01:03

## 2017-03-26 RX ADMIN — INSULIN ASPART 5 UNITS: 100 INJECTION, SOLUTION INTRAVENOUS; SUBCUTANEOUS at 12:03

## 2017-03-26 RX ADMIN — LEVETIRACETAM 1000 MG: 500 TABLET, FILM COATED ORAL at 08:03

## 2017-03-26 RX ADMIN — INSULIN DETEMIR 15 UNITS: 100 INJECTION, SOLUTION SUBCUTANEOUS at 08:03

## 2017-03-26 RX ADMIN — HEPARIN SODIUM 5000 UNITS: 5000 INJECTION, SOLUTION INTRAVENOUS; SUBCUTANEOUS at 01:03

## 2017-03-26 RX ADMIN — LEVETIRACETAM 1000 MG: 500 TABLET, FILM COATED ORAL at 09:03

## 2017-03-26 RX ADMIN — HYDRALAZINE HYDROCHLORIDE 50 MG: 25 TABLET, FILM COATED ORAL at 06:03

## 2017-03-26 RX ADMIN — HYDRALAZINE HYDROCHLORIDE 50 MG: 25 TABLET, FILM COATED ORAL at 01:03

## 2017-03-26 RX ADMIN — ASPIRIN 81 MG: 81 TABLET, COATED ORAL at 08:03

## 2017-03-26 RX ADMIN — CALCITRIOL 0.5 MCG: 0.25 CAPSULE, LIQUID FILLED ORAL at 08:03

## 2017-03-26 RX ADMIN — PAROXETINE HYDROCHLORIDE 10 MG: 10 TABLET, FILM COATED ORAL at 06:03

## 2017-03-26 RX ADMIN — INSULIN DETEMIR 3 UNITS: 100 INJECTION, SOLUTION SUBCUTANEOUS at 12:03

## 2017-03-26 RX ADMIN — NIFEDIPINE 90 MG: 30 TABLET, FILM COATED, EXTENDED RELEASE ORAL at 08:03

## 2017-03-26 RX ADMIN — HYDRALAZINE HYDROCHLORIDE 50 MG: 25 TABLET, FILM COATED ORAL at 09:03

## 2017-03-26 NOTE — PLAN OF CARE
Problem: Occupational Therapy Goal  Goal: Occupational Therapy Goal  Goals to be met by 4/25/17  1. Min A UBD  2. Min A LBD  3. SBA G/H standing at sink  4. Mod A toilet hygiene  Outcome: Ongoing (interventions implemented as appropriate)  OT evaluation completed with treatment initiated.  Recommend Home Health PT/OT/Aide at discharge.  DME: None.  ROSA MARIA Mohan 3/26/2017

## 2017-03-26 NOTE — ASSESSMENT & PLAN NOTE
- ? medication non-compliance ?  - patient has been eating poorly/less lately leading to hypoglycemia   - insulin sliding scale with basal insulin, accuchecks, diabetic diet  - mealtime insuline added today  - A1C     Hemoglobin A1C   Date Value Ref Range Status   03/25/2017 8.7 (H) 4.5 - 6.2 % Final     Comment:     According to ADA guidelines, hemoglobin A1C <7.0% represents  optimal control in non-pregnant diabetic patients.  Different  metrics may apply to specific populations.   Standards of Medical Care in Diabetes - 2016.  For the purpose of screening for the presence of diabetes:  <5.7%     Consistent with the absence of diabetes  5.7-6.4%  Consistent with increasing risk for diabetes   (prediabetes)  >or=6.5%  Consistent with diabetes  Currently no consensus exists for use of hemoglobin A1C  for diagnosis of diabetes for children.     01/22/2017 9.5 (H) 4.5 - 6.2 % Final     Comment:     According to ADA guidelines, hemoglobin A1C <7.0% represents  optimal control in non-pregnant diabetic patients.  Different  metrics may apply to specific populations.   Standards of Medical Care in Diabetes - 2016.  For the purpose of screening for the presence of diabetes:  <5.7%     Consistent with the absence of diabetes  5.7-6.4%  Consistent with increasing risk for diabetes   (prediabetes)  >or=6.5%  Consistent with diabetes  Currently no consensus exists for use of hemoglobin A1C  for diagnosis of diabetes for children.     12/08/2016 9.1 (H) 4.5 - 6.2 % Final     Comment:     According to ADA guidelines, hemoglobin A1C <7.0% represents  optimal control in non-pregnant diabetic patients.  Different  metrics may apply to specific populations.   Standards of Medical Care in Diabetes - 2016.  For the purpose of screening for the presence of diabetes:  <5.7%     Consistent with the absence of diabetes  5.7-6.4%  Consistent with increasing risk for diabetes   (prediabetes)  >or=6.5%  Consistent with diabetes  Currently no  consensus exists for use of hemoglobin A1C  for diagnosis of diabetes for children.

## 2017-03-26 NOTE — PT/OT/SLP EVAL
Physical Therapy  Evaluation and treatment    Julius Cardenas   MRN: [id number]   Admitting Diagnosis: Hypoglycemia    PT Received On: 03/26/17  PT Start Time: 1054     PT Stop Time: 1124    PT Total Time (min): 30 min       Billable Minutes:  Evaluation 20 and Gait [id number]    Diagnosis: Hypoglycemia      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      Past Surgical History:   Procedure Laterality Date    CATARACT EXTRACTION W/  INTRAOCULAR LENS IMPLANT  8/26/2009, 10/14/2009    CRANIOTOMY  1995    CRANIOTOMY  12/21/2010    EYE SURGERY         Referring physician: LALITHA Daniels  Date referred to PT: 3-25-17    General Precautions: Standard, fall  Orthopedic Precautions: N/A   Braces:              Patient History:  Lives With: sibling(s)  Living Arrangements: house  Home Accessibility: stairs to enter home  Number of Stairs to Enter Home: 5  Stair Railings at Home: outside, present on right  side  Transportation Available: family or friend will provide  Living Environment Comment: per chart pt unable to confirm: live with brother and sister in law in ss house with 5 dulce B handrails  Equipment Currently Used at Home: cane, quad (pt unable to say if he had a RW)  DME owned (not currently used): none    Previous Level of Function:  Ambulation Skills: needs device  Transfer Skills: needs device  ADL Skills: needs device and assist  Work/Leisure Activity: needs device and assist    Subjective:  Communicated with patient prior to session.    Chief Complaint: no c/o  Patient goals: unable to articulate    Pain Ratin/10               Pain Rating Post-Intervention: 0/10    Objective:   Patient found with: peripheral IV     Cognitive Exam:  Oriented to: Person    Follows Commands/attention: Follows one-step commands  Communication: clear/fluent  Safety awareness/insight to disability: impaired    Physical Exam:  Postural examination/scapula alignment: Rounded shoulder and Head forward    Skin integrity: Visible skin intact  Edema: None noted B LE    Sensation:   Intact      Lower Extremity Range of Motion:  Right Lower Extremity: WFL  Left Lower Extremity: WFL    Lower Extremity Strength:  Right Lower Extremity: WFL  Left Lower Extremity: WFL     No tone or coordination impairment observed    Functional Mobility:  Bed Mobility:  Supine to Sit: Supervision  Sit to Supine: Supervision    Transfers:  Sit <> Stand Assistance: Contact Guard Assistance  Sit <> Stand Assistive Device: Rolling Walker    Gait:   Gait Distance: 120  Assistance 1: Contact Guard Assistance  Gait Assistive Device: Rolling walker  Gait Deviation(s): decreased connie, increased time in double stance, increased stride width, decreased swing-to-stance ratio, decreased step length      Balance:   Static Sit: GOOD: Takes MODERATE challenges from all directions  Dynamic Sit: GOOD-: Maintains balance through MODERATE excursions of active trunk  movement,     Static Stand: FAIR+: Takes MINIMAL challenges from all directions  Dynamic stand: FAIR+: Needs CLOSE SUPERVISION during gait and is able to right self with minor LOB      AM-PAC 6 CLICK MOBILITY  How much help from another person does this patient currently need?   1 = Unable, Total/Dependent Assistance  2 = A lot, Maximum/Moderate Assistance  3 = A little, Minimum/Contact Guard/Supervision  4 = None, Modified [place]/Independent    Turning over in bed (including adjusting bedclothes, sheets and blankets)?: 3  Sitting down on and standing up from a chair with arms (e.g., wheelchair, bedside commode, etc.): 3  Moving from lying on back to sitting on the side of the bed?: 3  Moving to and from a bed to a chair (including a wheelchair)?: 3  Need to walk in hospital room?: 3  Climbing 3-5 steps with a railing?: 3  Total Score: 18     AM-PAC Raw Score CMS G-Code Modifier Level of Impairment Assistance   6 % Total / Unable   7 - 9 CM 80 - 100% Maximal Assist   10 - 14 CL 60 - 80% Moderate Assist   15 - 19 CK 40 - 60% Moderate Assist   20 - 22 CJ 20 - 40% Minimal Assist   23 CI 1-20% SBA / CGA   24 CH 0% Independent/ Mod I     Patient left supine with all lines intact, call button in reach, bed alarm on and nurse notified.    Assessment:   Julius Cardenas is a 84 y.o. male with a medical diagnosis of Hypoglycemia and presents with high level of confusion and impaired functional mobility. He is a high risk for falls and needs supervision at all times.  Pt will benefit from hospital PT and  PT to regain full independent functional mobility.    Rehab identified problem list/impairments: Rehab identified problem list/impairments: weakness, impaired endurance, impaired balance, gait instability    Rehab potential is good.    Activity tolerance: Good    Discharge recommendations: Discharge Facility/Level Of Care Needs: home, home health PT     Barriers to discharge: Barriers to Discharge:  None    Equipment recommendations: Equipment Needed After Discharge: walker, rolling     GOALS:   Physical Therapy Goals        Problem: Physical Therapy Goal    Goal Priority Disciplines Outcome Goal Variances Interventions   Physical Therapy Goal     PT/OT, PT      Description:  Goals to be met by 4-3-17.  1. Sup<>sit mod I  2. Sit<>stand with RW mod I  3. amb 150' with RW mod I  4. Up/down 3 steps B rail mod I              PLAN:    Patient to be seen 5 x/week to address the above listed problems via gait training, therapeutic activities, therapeutic exercises  Plan of Care expires: 04/26/17  Plan of Care reviewed with: patient    Functional Assessment Tool Used: AM-PAC  Functional Limitation: Mobility: Walking and moving around  Mobility: Walking and Moving Around Current Status (): CK  Mobility: Walking and Moving Around Goal Status (): CORBY Aragon, PT  03/26/2017

## 2017-03-26 NOTE — PROGRESS NOTES
[facility] Hospital Medicine  Progress Note    Patient Name: Julius Cardenas  MRN: [id number]  Patient Class: OP- Observation   Admission Date: 3/24/2017  Length of Stay: 0 days  Attending Physician: Blair Daniels MD  Primary Care Provider: Mady Carson MD        Subjective:     Principal Problem:Hypoglycemia    HPI:  Mr. Julius Cardenas is an 84 yr old male,  with PMH of Type 1 diabetes mellitus, CKD stage 4, anemia of CKD, CVA w/deficits, MCI, polyneuropathy, who presented to the ED after having multiple falls, weakness and hypoglycemia. It started a week ago and gradually worsening. He also endorsed loose bowel associated with mild chest discomfort. He reports that he is not eating like he is supposed to but still taking his insulin. He reports multiple incidences of hypoglycemia. While his sugar drops, he experiences, palpitations, falls, sweating, confusion and chest discomfort. He feels better after eating anything, and often keeps soft drinks nearby to prevent hypoglycemia. He denies any active chest pain/SOB/abdominal pain/diarrhea/nausea/vomiting currently.     Hospital Course:  The patient was seen in the ED. He had a negative CXR and Head CT without acute intracranial injury. Labs showed NIALL on CKD. The patient was also found to be repeatedly hypoglycemia, for which he was treated with D50. He recently had dose change of insulin after admission for HONK from which he was discharged on 2/20/2017. He was admitted for further workup and treatment.        Interval History: Patient new to me. Notes hypoglycemia causing multiple falls and episodes of weakness. Poor historian. No new concerns at this time.     Review of Systems   Constitutional: Positive for diaphoresis. Negative for activity change, appetite change, chills and fever.   Respiratory: Negative for cough, shortness of breath and wheezing.    Cardiovascular: Positive for leg swelling (left). Negative for chest pain.    Gastrointestinal: Positive for diarrhea. Negative for abdominal pain, constipation, nausea and vomiting.   Genitourinary: Negative for decreased urine volume, difficulty urinating and frequency.   Musculoskeletal: Negative for back pain, joint swelling, neck pain and neck stiffness.   Skin: Negative for wound.   Neurological: Positive for dizziness, weakness and headaches. Negative for syncope, light-headedness and numbness.   Hematological: Does not bruise/bleed easily.   Psychiatric/Behavioral: Negative for confusion.     Objective:     Vital Signs (Most Recent):  Temp: 97.8 °F (36.6 °C) (03/25/17 1927)  Pulse: 71 (03/25/17 1927)  Resp: 16 (03/25/17 1927)  BP: (!) 164/73 (03/25/17 1927)  SpO2: 100 % (03/25/17 1927) Vital Signs (24h Range):  Temp:  [97.1 °F (36.2 °C)-98 °F (36.7 °C)] 97.8 °F (36.6 °C)  Pulse:  [54-71] 71  Resp:  [14-17] 16  SpO2:  [96 %-100 %] 100 %  BP: (106-197)/(54-90) 164/73     Weight: 68 kg (150 lb)  Body mass index is 23.49 kg/(m^2).    Intake/Output Summary (Last 24 hours) at 03/25/17 1951  Last data filed at 03/25/17 1700   Gross per 24 hour   Intake              480 ml   Output             1100 ml   Net             -620 ml      Physical Exam   Constitutional: He is oriented to person, place, and time. Vital signs are normal. He appears well-developed and well-nourished.  Non-toxic appearance. He does not have a sickly appearance. He does not appear ill. No distress.   HENT:   Head: Normocephalic and atraumatic.   Eyes: Conjunctivae and EOM are normal. Pupils are equal, round, and reactive to light. No scleral icterus.   Neck: Normal range of motion. Neck supple. No JVD present. No tracheal deviation present.   Cardiovascular: Normal rate, normal heart sounds and intact distal pulses.  Exam reveals no gallop and no friction rub.    No murmur heard.  Irregularly irregular rhythm. Left lower extremity with 1+ pitting edema.    Pulmonary/Chest: Effort normal and breath sounds normal. No  stridor. No respiratory distress. He has no wheezes. He has no rales. He exhibits no tenderness.   Bilateral lung fields CTA.     Abdominal: Soft. Bowel sounds are normal. He exhibits no distension. There is no tenderness.   Musculoskeletal: Normal range of motion. He exhibits no edema or deformity.   No bony TTP of LLE.   Neurological: He is alert and oriented to person, place, and time. No cranial nerve deficit. He exhibits normal muscle tone.   Skin: Skin is warm and dry. He is not diaphoretic.   Psychiatric: He has a normal mood and affect. His behavior is normal. Judgment and thought content normal.   Nursing note and vitals reviewed.      Significant Labs:   BMP:   Recent Labs  Lab 03/25/17 0459   *      K 4.3   *   CO2 18*   BUN 38*   CREATININE 3.0*   CALCIUM 8.9   MG 2.1     CBC:   Recent Labs  Lab 03/24/17 1941 03/25/17 0459   WBC 9.01 6.60   HGB 11.8* 10.7*   HCT 35.4* 32.5*    178     CMP:   Recent Labs  Lab 03/24/17 1941 03/25/17  0459    141   K 3.2* 4.3   * 113*   CO2 16* 18*   GLU 30* 206*   BUN 39* 38*   CREATININE 3.2* 3.0*   CALCIUM 9.4 8.9   PROT 7.6 6.8   ALBUMIN 3.6 3.2*   BILITOT 0.3 0.3   ALKPHOS 78 79   AST 33 26   ALT 42 35   ANIONGAP 14 10   EGFRNONAA 17* 18*     All pertinent labs within the past 24 hours have been reviewed.    Significant Imaging: I have reviewed all pertinent imaging results/findings within the past 24 hours.     Imaging Results         CT Head Without Contrast (Final result) Result time:  03/24/17 20:07:15    Final result by Gwyn Hermosillo MD (03/24/17 20:07:15)    Impression:       No acute intracranial process.    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        Electronically signed by: GWYN HERMOSILLO MD  Date:     03/24/17  Time:    20:07     Narrative:    Exam: [id number]  03/24/17  19:51:02 [id number] ([facility]) : CT HEAD WITHOUT CONTRAST    Technique:    Axial CT scan  of the head was obtained from the vertex to the skull base without intravenous contrast. Coronal and Sagittal reformats were obtained.     Comparison:    2/16/17.    Findings:      The subcutaneous tissues are within normal limits.  There are stable postoperative changes involving the calvarium.  The paranasal sinuses and mastoid air cells are clear.  There are postoperative changes in the orbits.    There are no extra-axial fluid collections.  There is no evidence of intracranial hemorrhage.  There is stable encephalomalacia in the right frontal lobe.  There is a stable appearance of right parasagittal meningioma. There are scattered hypodense foci within the periventricular and subcortical white matter, consistent with chronic small vessel ischemic disease.  Extensive vascular calcifications are present.  The ventricles and sulci are prominent, consistent with cerebral volume loss.  There is no evidence of midline shift.  There is no evidence of mass effect.            X-Ray Chest 1 View (Final result) Result time:  03/24/17 19:56:46    Final result by Annika Bynum MD (03/24/17 19:56:46)    Impression:        No radiographic acute intrathoracic process seen on this single view.      Electronically signed by: ANNIKA BYNUM MD, MD  Date:     03/24/17  Time:    19:56     Narrative:    COMPARISON: Chest radiograph 1/22/17    FINDINGS: AP portable upright view of the chest. Monitoring leads overlie the chest. Patient is slightly rotated. The lungs are symmetrically normally inflated without large consolidation or new focal opacity. Cardiomediastinal silhouette is within normal limits for age. No large pleural effusion or pneumothorax. No acute osseous process seen. PA and lateral views can be obtained.               Assessment/Plan:      * Hypoglycemia  - ?2/2 insulin dosing  - sugars elevated without insulin at meals  - added Aspart 4 U with meals   - monitor CBG      MCI (mild cognitive impairment)  - appears to be  at baseline based upon previous discharge summaries  - no family at bedside to confirm       Anemia of chronic renal failure, stage 4 (severe)  - H&H mildly reduced from previous admission  - overall stable      Abnormality of gait  - 2/2 h/o CVA   -PT/OT eval      Hypokalemia  - replaced upon admission  - normal today      NIALL (acute kidney injury)  - continue 1/2 NS IV fluids  - monitor renal function  - avoid nephrotoxic meds  - renally dose all meds      DM (diabetes mellitus), type 2, uncontrolled  - ? medication non-compliance ?  - patient has been eating poorly/less lately leading to hypoglycemia   - insulin sliding scale with basal insulin, accuchecks, diabetic diet  - mealtime insuline added today  - A1C     Hemoglobin A1C   Date Value Ref Range Status   03/25/2017 8.7 (H) 4.5 - 6.2 % Final     Comment:     According to ADA guidelines, hemoglobin A1C <7.0% represents  optimal control in non-pregnant diabetic patients.  Different  metrics may apply to specific populations.   Standards of Medical Care in Diabetes - 2016.  For the purpose of screening for the presence of diabetes:  <5.7%     Consistent with the absence of diabetes  5.7-6.4%  Consistent with increasing risk for diabetes   (prediabetes)  >or=6.5%  Consistent with diabetes  Currently no consensus exists for use of hemoglobin A1C  for diagnosis of diabetes for children.     01/22/2017 9.5 (H) 4.5 - 6.2 % Final     Comment:     According to ADA guidelines, hemoglobin A1C <7.0% represents  optimal control in non-pregnant diabetic patients.  Different  metrics may apply to specific populations.   Standards of Medical Care in Diabetes - 2016.  For the purpose of screening for the presence of diabetes:  <5.7%     Consistent with the absence of diabetes  5.7-6.4%  Consistent with increasing risk for diabetes   (prediabetes)  >or=6.5%  Consistent with diabetes  Currently no consensus exists for use of hemoglobin A1C  for diagnosis of diabetes for  children.     12/08/2016 9.1 (H) 4.5 - 6.2 % Final     Comment:     According to ADA guidelines, hemoglobin A1C <7.0% represents  optimal control in non-pregnant diabetic patients.  Different  metrics may apply to specific populations.   Standards of Medical Care in Diabetes - 2016.  For the purpose of screening for the presence of diabetes:  <5.7%     Consistent with the absence of diabetes  5.7-6.4%  Consistent with increasing risk for diabetes   (prediabetes)  >or=6.5%  Consistent with diabetes  Currently no consensus exists for use of hemoglobin A1C  for diagnosis of diabetes for children.            VTE Risk Mitigation         Ordered     heparin (porcine) injection 5,000 Units  Every 8 hours     Route:  Subcutaneous        03/25/17 0735     Place SRINIVAS hose  Until discontinued      03/25/17 0135     Medium Risk of VTE  Once      03/25/17 0104     Place sequential compression device  Until discontinued      03/25/17 0104          Suni Lyons PA-C  Department of Hospital Medicine   [facility]

## 2017-03-26 NOTE — ASSESSMENT & PLAN NOTE
- ?2/2 insulin dosing  - sugars elevated without insulin at meals  - added Aspart 4 U with meals   - monitor CBG

## 2017-03-26 NOTE — ASSESSMENT & PLAN NOTE
- continue 1/2 NS IV fluids  - monitor renal function  - avoid nephrotoxic meds  - renally dose all meds

## 2017-03-26 NOTE — PROGRESS NOTES
Progress Note  Hospital Medicine    Admit Date: 3/24/2017   LOS: 0 days     SUBJECTIVE:     Follow-up For:  Hypoglycemia    Interval History:   Doing well.  No complaints.  Again states takes Lantus 18 U daily.   Blood glucose is now high.     Review of Systems:  Constitutional: No fever or chills  Respiratory: No cough or shorness of breath  Cardiovascular: No chest pain or palpitations  Gastrointestinal: No nausea or vomiting  Neurological: No confusion or weakness    OBJECTIVE:     Vital Signs Range (Last 24H):  Vitals:    03/26/17 1126   BP: 123/60   Pulse: 63   Resp: 16   Temp: 97.8 °F (36.6 °C)       Temp:  [97.5 °F (36.4 °C)-97.8 °F (36.6 °C)]   Pulse:  [61-75]   Resp:  [16]   BP: (123-172)/(60-78)   SpO2:  [97 %-100 %]     I & O (Last 24H):    Intake/Output Summary (Last 24 hours) at 03/26/17 1155  Last data filed at 03/25/17 1700   Gross per 24 hour   Intake                0 ml   Output              600 ml   Net             -600 ml       Physical Exam:  General appearance: Calm, NAD  Eyes:  Conjunctivae/corneas clear. PERRL.  Lungs: Normal respiratory effort,   clear to auscultation bilaterally   Heart: Regular rate and rhythm, S1, S2 normal,  Abdomen: Soft, non-tender non-distended; bowel sounds normal;   Extremities: No cyanosis or clubbing. No edema.    Skin: Skin color, texture, turgor normal. No rashes or lesions  Neurologic:  No focal numbness or weakness     Laboratory Data:  Reviewed and noted  where applicable- Please see chart for full lab data.    Medications:  Medication list was reviewed and changes noted under Assessment/Plan.    Diagnostic Results:  CT HEAD WO 3/24/17:  No acute intracranial process.  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]  Yes     NIALL (acute kidney injury) [N17.9]  Yes    Diabetic nephropathy associated with type 2 diabetes mellitus [E11.21]  Yes    Diabetic polyneuropathy associated with type 2 diabetes mellitus [E11.42]  Yes    Uncontrolled type 2 diabetes mellitus with stage 4 chronic kidney disease, with long-term current use of insulin [E11.22, E11.65, N18.4, Z79.4]  Yes    Hypokalemia [E87.6]  Yes    Abnormality of gait [R26.9]  Yes    Anemia of chronic renal failure, stage 4 (severe) [N18.4, D63.1]  Yes     Chronic    MCI (mild cognitive impairment) [G31.84]  Yes     Chronic    CKD (chronic kidney disease) stage 4, GFR 15-29 ml/min [N18.4]  Yes     Chronic      Resolved Hospital Problems    Diagnosis Date Resolved POA    Hyperkalemia [E87.5] 07/18/2013 Yes       * Hypoglycemia  - ?2/2 insulin dosing  - sugars elevated without insulin at meals  - added Aspart to 5 U with meals   - monitor BG  - Resolved.        MCI (mild cognitive impairment)  - appears to be at baseline based upon previous discharge summaries  - no family at bedside to confirm         Anemia of chronic renal failure, stage 4 (severe)  - H&H mildly reduced from previous admission  - overall stable        Abnormality of gait  - 2/2 h/o CVA   -PT/OT eval        Hypokalemia  - replaced upon admission  - normal today        NIALL (acute kidney injury)  - Gave 1/2 NS IV fluids  - monitor renal function  - avoid nephrotoxic meds  - renally dose all meds        DM (diabetes mellitus), type 2, uncontrolled  - ? medication non-compliance ?  - patient has been eating poorly/less lately leading to hypoglycemia   - insulin sliding scale with basal insulin, accuchecks, diabetic diet  - mealtime insulin added today  - A1C = 8.7%, improved from 1/2017 at 9.5  - Anticipate Lantus 18 Units q AM at discharge.      Heparin

## 2017-03-26 NOTE — NURSING
Patient free of falls, injury, or skin breakdown during shift. Denies pain. IVF maintained as ordered. Positions self independently in bed. SCDs maintained; refuses teds, patient educated. Bed low and locked, side rails x 2, bed alarm on, call bell in reach. Patient resting comfortably in bed; no complaints at this time. Will continue to monitor.

## 2017-03-26 NOTE — ASSESSMENT & PLAN NOTE
- appears to be at baseline based upon previous discharge summaries  - no family at bedside to confirm

## 2017-03-26 NOTE — SUBJECTIVE & OBJECTIVE
Interval History: Patient new to me. Notes hypoglycemia causing multiple falls and episodes of weakness. Poor historian. No new concerns at this time.     Review of Systems   Constitutional: Positive for diaphoresis. Negative for activity change, appetite change, chills and fever.   Respiratory: Negative for cough, shortness of breath and wheezing.    Cardiovascular: Positive for leg swelling (left). Negative for chest pain.   Gastrointestinal: Positive for diarrhea. Negative for abdominal pain, constipation, nausea and vomiting.   Genitourinary: Negative for decreased urine volume, difficulty urinating and frequency.   Musculoskeletal: Negative for back pain, joint swelling, neck pain and neck stiffness.   Skin: Negative for wound.   Neurological: Positive for dizziness, weakness and headaches. Negative for syncope, light-headedness and numbness.   Hematological: Does not bruise/bleed easily.   Psychiatric/Behavioral: Negative for confusion.     Objective:     Vital Signs (Most Recent):  Temp: 97.8 °F (36.6 °C) (03/25/17 1927)  Pulse: 71 (03/25/17 1927)  Resp: 16 (03/25/17 1927)  BP: (!) 164/73 (03/25/17 1927)  SpO2: 100 % (03/25/17 1927) Vital Signs (24h Range):  Temp:  [97.1 °F (36.2 °C)-98 °F (36.7 °C)] 97.8 °F (36.6 °C)  Pulse:  [54-71] 71  Resp:  [14-17] 16  SpO2:  [96 %-100 %] 100 %  BP: (106-197)/(54-90) 164/73     Weight: 68 kg (150 lb)  Body mass index is 23.49 kg/(m^2).    Intake/Output Summary (Last 24 hours) at 03/25/17 1951  Last data filed at 03/25/17 1700   Gross per 24 hour   Intake              480 ml   Output             1100 ml   Net             -620 ml      Physical Exam   Constitutional: He is oriented to person, place, and time. Vital signs are normal. He appears well-developed and well-nourished.  Non-toxic appearance. He does not have a sickly appearance. He does not appear ill. No distress.   HENT:   Head: Normocephalic and atraumatic.   Eyes: Conjunctivae and EOM are normal. Pupils are  equal, round, and reactive to light. No scleral icterus.   Neck: Normal range of motion. Neck supple. No JVD present. No tracheal deviation present.   Cardiovascular: Normal rate, normal heart sounds and intact distal pulses.  Exam reveals no gallop and no friction rub.    No murmur heard.  Irregularly irregular rhythm. Left lower extremity with 1+ pitting edema.    Pulmonary/Chest: Effort normal and breath sounds normal. No stridor. No respiratory distress. He has no wheezes. He has no rales. He exhibits no tenderness.   Bilateral lung fields CTA.     Abdominal: Soft. Bowel sounds are normal. He exhibits no distension. There is no tenderness.   Musculoskeletal: Normal range of motion. He exhibits no edema or deformity.   No bony TTP of LLE.   Neurological: He is alert and oriented to person, place, and time. No cranial nerve deficit. He exhibits normal muscle tone.   Skin: Skin is warm and dry. He is not diaphoretic.   Psychiatric: He has a normal mood and affect. His behavior is normal. Judgment and thought content normal.   Nursing note and vitals reviewed.      Significant Labs:   BMP:   Recent Labs  Lab 03/25/17 0459   *      K 4.3   *   CO2 18*   BUN 38*   CREATININE 3.0*   CALCIUM 8.9   MG 2.1     CBC:   Recent Labs  Lab 03/24/17 1941 03/25/17 0459   WBC 9.01 6.60   HGB 11.8* 10.7*   HCT 35.4* 32.5*    178     CMP:   Recent Labs  Lab 03/24/17 1941 03/25/17  0459    141   K 3.2* 4.3   * 113*   CO2 16* 18*   GLU 30* 206*   BUN 39* 38*   CREATININE 3.2* 3.0*   CALCIUM 9.4 8.9   PROT 7.6 6.8   ALBUMIN 3.6 3.2*   BILITOT 0.3 0.3   ALKPHOS 78 79   AST 33 26   ALT 42 35   ANIONGAP 14 10   EGFRNONAA 17* 18*     All pertinent labs within the past 24 hours have been reviewed.    Significant Imaging: I have reviewed all pertinent imaging results/findings within the past 24 hours.     Imaging Results         CT Head Without Contrast (Final result) Result time:  03/24/17 20:07:15     Final result by Gwyn Hermosillo MD (03/24/17 20:07:15)    Impression:       No acute intracranial process.    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        Electronically signed by: GWYN HERMOSILLO MD  Date:     03/24/17  Time:    20:07     Narrative:    Exam: [id number]  03/24/17  19:51:02 [id number] ([facility]) : CT HEAD WITHOUT CONTRAST    Technique:    Axial CT scan of the head was obtained from the vertex to the skull base without intravenous contrast. Coronal and Sagittal reformats were obtained.     Comparison:    2/16/17.    Findings:      The subcutaneous tissues are within normal limits.  There are stable postoperative changes involving the calvarium.  The paranasal sinuses and mastoid air cells are clear.  There are postoperative changes in the orbits.    There are no extra-axial fluid collections.  There is no evidence of intracranial hemorrhage.  There is stable encephalomalacia in the right frontal lobe.  There is a stable appearance of right parasagittal meningioma. There are scattered hypodense foci within the periventricular and subcortical white matter, consistent with chronic small vessel ischemic disease.  Extensive vascular calcifications are present.  The ventricles and sulci are prominent, consistent with cerebral volume loss.  There is no evidence of midline shift.  There is no evidence of mass effect.            X-Ray Chest 1 View (Final result) Result time:  03/24/17 19:56:46    Final result by Annika Bynum MD (03/24/17 19:56:46)    Impression:        No radiographic acute intrathoracic process seen on this single view.      Electronically signed by: ANNIKA BYNUM MD, MD  Date:     03/24/17  Time:    19:56     Narrative:    COMPARISON: Chest radiograph 1/22/17    FINDINGS: AP portable upright view of the chest. Monitoring leads overlie the chest. Patient is slightly rotated. The lungs are symmetrically normally inflated without  large consolidation or new focal opacity. Cardiomediastinal silhouette is within normal limits for age. No large pleural effusion or pneumothorax. No acute osseous process seen. PA and lateral views can be obtained.

## 2017-03-26 NOTE — PLAN OF CARE
Problem: Physical Therapy Goal  Goal: Physical Therapy Goal  Goals to be met by 4-3-17.  1. Sup<>sit mod I  2. Sit<>stand with RW mod I  3. amb 150 with RW mod I  4. Up/down 3 steps B rail mod I   -    Comments:   Physical therapy eval completed.  Recommend home with HH PT.  Will need a RW for home use.

## 2017-03-26 NOTE — NURSING
Dr. Daniels notified of blood glucose 460.  Pt asymptomatic.  New orders noted, and Dr. Daniels ordered to give new scheduled and sliding scale insulin with lunch.  Called dietary who states tray will be up in 30 minutes.  Will continue to monitor.

## 2017-03-26 NOTE — PLAN OF CARE
Problem: Patient Care Overview  Goal: Plan of Care Review  Outcome: Ongoing (interventions implemented as appropriate)  Patient free of falls, injury, or skin breakdown during shift. Denies pain. Positions self independently in bed. Patient occasionally incontinent of bowel and bladder this shift; incontinence care provided prn.  SCDs maintained; refuses teds, patient educated. Bed low and locked, side rails x 2, bed alarm on, call bell in reach. Patient resting comfortably in bed; no complaints at this time. Will continue to monitor.

## 2017-03-26 NOTE — PT/OT/SLP PROGRESS
Occupational Therapy  Not Seen Note    Julius Cardenas  MRN: [id number]    Patient not seen today secondary to Unavailable (dialysis, nurse informed of patient's concern that he needs to be unhookd from the paraneal dialysis  Machine. Roseline, his nurse reported that she did not know how to work the paraneal dialysis machine and was seeking someone to assist with the patient's need.). Will follow-up Monday, 3/27/17.    ROSA MARIA Mohan  3/26/2017

## 2017-03-26 NOTE — NURSING
Pt's blood glucose 44, pt asymptomatic and pt is currently eating dinner.  Dr. Daniels notified, who ordered to give the 5 units of scheduled insulin aspart now.  Will continue to monitor.

## 2017-03-27 DIAGNOSIS — E10.22 TYPE 1 DIABETES MELLITUS WITH STAGE 4 CHRONIC KIDNEY DISEASE: Chronic | ICD-10-CM

## 2017-03-27 DIAGNOSIS — N18.4 TYPE 1 DIABETES MELLITUS WITH STAGE 4 CHRONIC KIDNEY DISEASE: Chronic | ICD-10-CM

## 2017-03-27 LAB
ANION GAP SERPL CALC-SCNC: 9 MMOL/L
BUN SERPL-MCNC: 38 MG/DL
CALCIUM SERPL-MCNC: 8.5 MG/DL
CHLORIDE SERPL-SCNC: 115 MMOL/L
CO2 SERPL-SCNC: 16 MMOL/L
CREAT SERPL-MCNC: 2.6 MG/DL
EST. GFR  (AFRICAN AMERICAN): 25 ML/MIN/1.73 M^2
EST. GFR  (NON AFRICAN AMERICAN): 22 ML/MIN/1.73 M^2
GLUCOSE SERPL-MCNC: 174 MG/DL
POCT GLUCOSE: 100 MG/DL (ref 70–110)
POCT GLUCOSE: 139 MG/DL (ref 70–110)
POCT GLUCOSE: 188 MG/DL (ref 70–110)
POCT GLUCOSE: 192 MG/DL (ref 70–110)
POCT GLUCOSE: 205 MG/DL (ref 70–110)
POCT GLUCOSE: 274 MG/DL (ref 70–110)
POTASSIUM SERPL-SCNC: 3.8 MMOL/L
SODIUM SERPL-SCNC: 140 MMOL/L

## 2017-03-27 PROCEDURE — 63600175 PHARM REV CODE 636 W HCPCS: Performed by: HOSPITALIST

## 2017-03-27 PROCEDURE — 25000003 PHARM REV CODE 250: Performed by: HOSPITALIST

## 2017-03-27 PROCEDURE — 97530 THERAPEUTIC ACTIVITIES: CPT

## 2017-03-27 PROCEDURE — 97535 SELF CARE MNGMENT TRAINING: CPT

## 2017-03-27 PROCEDURE — 80048 BASIC METABOLIC PNL TOTAL CA: CPT

## 2017-03-27 PROCEDURE — G0378 HOSPITAL OBSERVATION PER HR: HCPCS

## 2017-03-27 PROCEDURE — 36415 COLL VENOUS BLD VENIPUNCTURE: CPT

## 2017-03-27 PROCEDURE — 99217 PR OBSERVATION CARE DISCHARGE: CPT | Mod: ,,, | Performed by: HOSPITALIST

## 2017-03-27 PROCEDURE — 25000003 PHARM REV CODE 250: Performed by: FAMILY MEDICINE

## 2017-03-27 RX ORDER — NIFEDIPINE 90 MG/1
90 TABLET, EXTENDED RELEASE ORAL DAILY
Qty: 30 TABLET | Refills: 0 | Status: SHIPPED | OUTPATIENT
Start: 2017-03-27 | End: 2017-07-21 | Stop reason: SDUPTHER

## 2017-03-27 RX ORDER — PEN NEEDLE, DIABETIC 31 GX5/16"
NEEDLE, DISPOSABLE MISCELLANEOUS
Qty: 100 EACH | Refills: 0 | Status: SHIPPED | OUTPATIENT
Start: 2017-03-27 | End: 2017-07-21 | Stop reason: SDUPTHER

## 2017-03-27 RX ORDER — NIFEDIPINE 90 MG/1
90 TABLET, EXTENDED RELEASE ORAL DAILY
Qty: 30 TABLET | Refills: 0 | Status: CANCELLED | OUTPATIENT
Start: 2017-03-27 | End: 2018-03-27

## 2017-03-27 RX ORDER — NIFEDIPINE 90 MG/1
90 TABLET, EXTENDED RELEASE ORAL DAILY
Qty: 30 TABLET | Refills: 11 | Status: SHIPPED | OUTPATIENT
Start: 2017-03-27 | End: 2017-03-27 | Stop reason: HOSPADM

## 2017-03-27 RX ADMIN — LEVETIRACETAM 1000 MG: 500 TABLET, FILM COATED ORAL at 10:03

## 2017-03-27 RX ADMIN — INSULIN ASPART 5 UNITS: 100 INJECTION, SOLUTION INTRAVENOUS; SUBCUTANEOUS at 05:03

## 2017-03-27 RX ADMIN — ASPIRIN 81 MG: 81 TABLET, COATED ORAL at 09:03

## 2017-03-27 RX ADMIN — PANTOPRAZOLE SODIUM 40 MG: 40 TABLET, DELAYED RELEASE ORAL at 09:03

## 2017-03-27 RX ADMIN — HYDRALAZINE HYDROCHLORIDE 50 MG: 25 TABLET, FILM COATED ORAL at 10:03

## 2017-03-27 RX ADMIN — CALCITRIOL 0.5 MCG: 0.25 CAPSULE, LIQUID FILLED ORAL at 09:03

## 2017-03-27 RX ADMIN — HYDRALAZINE HYDROCHLORIDE 50 MG: 25 TABLET, FILM COATED ORAL at 02:03

## 2017-03-27 RX ADMIN — INSULIN ASPART 5 UNITS: 100 INJECTION, SOLUTION INTRAVENOUS; SUBCUTANEOUS at 12:03

## 2017-03-27 RX ADMIN — INSULIN ASPART 3 UNITS: 100 INJECTION, SOLUTION INTRAVENOUS; SUBCUTANEOUS at 12:03

## 2017-03-27 RX ADMIN — HEPARIN SODIUM 5000 UNITS: 5000 INJECTION, SOLUTION INTRAVENOUS; SUBCUTANEOUS at 02:03

## 2017-03-27 RX ADMIN — LEVETIRACETAM 1000 MG: 500 TABLET, FILM COATED ORAL at 09:03

## 2017-03-27 RX ADMIN — INSULIN ASPART 5 UNITS: 100 INJECTION, SOLUTION INTRAVENOUS; SUBCUTANEOUS at 09:03

## 2017-03-27 RX ADMIN — NIFEDIPINE 90 MG: 30 TABLET, FILM COATED, EXTENDED RELEASE ORAL at 09:03

## 2017-03-27 RX ADMIN — CARVEDILOL 6.25 MG: 6.25 TABLET, FILM COATED ORAL at 08:03

## 2017-03-27 RX ADMIN — PAROXETINE HYDROCHLORIDE 10 MG: 10 TABLET, FILM COATED ORAL at 06:03

## 2017-03-27 RX ADMIN — HEPARIN SODIUM 5000 UNITS: 5000 INJECTION, SOLUTION INTRAVENOUS; SUBCUTANEOUS at 10:03

## 2017-03-27 RX ADMIN — HEPARIN SODIUM 5000 UNITS: 5000 INJECTION, SOLUTION INTRAVENOUS; SUBCUTANEOUS at 06:03

## 2017-03-27 RX ADMIN — CARVEDILOL 6.25 MG: 6.25 TABLET, FILM COATED ORAL at 09:03

## 2017-03-27 RX ADMIN — HYDRALAZINE HYDROCHLORIDE 50 MG: 25 TABLET, FILM COATED ORAL at 06:03

## 2017-03-27 NOTE — SUBJECTIVE & OBJECTIVE
Interval History: No new concerns at this time. Blood glucose has adequate control as of today. Will plan for discharge to home to follow up with PCP ASAP.     Review of Systems   Constitutional: Negative for activity change, appetite change, chills, diaphoresis and fever.   Respiratory: Negative for cough, shortness of breath and wheezing.    Cardiovascular: Negative for chest pain and leg swelling.   Gastrointestinal: Negative for abdominal pain, constipation, diarrhea, nausea and vomiting.   Genitourinary: Negative for decreased urine volume, difficulty urinating and frequency.   Musculoskeletal: Negative for back pain, joint swelling, neck pain and neck stiffness.   Skin: Negative for wound.   Neurological: Negative for dizziness, syncope, weakness, light-headedness, numbness and headaches.   Hematological: Does not bruise/bleed easily.   Psychiatric/Behavioral: Negative for confusion.     Objective:     Vital Signs (Most Recent):  Temp: 97.9 °F (36.6 °C) (03/27/17 0730)  Pulse: 74 (03/27/17 0730)  Resp: 18 (03/27/17 0730)  BP: (!) 170/79 (03/27/17 0730)  SpO2: 99 % (03/27/17 0730) Vital Signs (24h Range):  Temp:  [97.5 °F (36.4 °C)-98.3 °F (36.8 °C)] 97.9 °F (36.6 °C)  Pulse:  [61-74] 74  Resp:  [16-18] 18  SpO2:  [96 %-100 %] 99 %  BP: (131-170)/(63-79) 170/79     Weight: 68 kg (150 lb)  Body mass index is 23.49 kg/(m^2).    Intake/Output Summary (Last 24 hours) at 03/27/17 1327  Last data filed at 03/27/17 0730   Gross per 24 hour   Intake                0 ml   Output              100 ml   Net             -100 ml      Physical Exam   Constitutional: He is oriented to person, place, and time. Vital signs are normal. He appears well-developed and well-nourished.  Non-toxic appearance. He does not have a sickly appearance. He does not appear ill. No distress.   HENT:   Head: Normocephalic and atraumatic.   Eyes: Conjunctivae and EOM are normal. Pupils are equal, round, and reactive to light. No scleral icterus.    Neck: Normal range of motion. Neck supple. No JVD present. No tracheal deviation present.   Cardiovascular: Normal rate, regular rhythm, normal heart sounds and intact distal pulses.  Exam reveals no gallop and no friction rub.    No murmur heard.  Pulmonary/Chest: Effort normal and breath sounds normal. No stridor. No respiratory distress. He has no wheezes. He has no rales. He exhibits no tenderness.   Bilateral lung fields CTA.     Abdominal: Soft. Bowel sounds are normal. He exhibits no distension. There is no tenderness.   Musculoskeletal: Normal range of motion. He exhibits no edema or deformity.   Neurological: He is alert and oriented to person, place, and time. No cranial nerve deficit. He exhibits normal muscle tone.   Skin: Skin is warm and dry. He is not diaphoretic.   Psychiatric: He has a normal mood and affect. His behavior is normal. Judgment and thought content normal.   Nursing note and vitals reviewed.      Significant Labs:   BMP:   Recent Labs  Lab 03/27/17  0525   *      K 3.8   *   CO2 16*   BUN 38*   CREATININE 2.6*   CALCIUM 8.5*     CBC: No results for input(s): WBC, HGB, HCT, PLT in the last 48 hours.  CMP:   Recent Labs  Lab 03/26/17  1131 03/27/17  0525    140   K 4.1 3.8   * 115*   CO2 14* 16*   * 174*   BUN 38* 38*   CREATININE 2.7* 2.6*   CALCIUM 8.9 8.5*   ANIONGAP 11 9   EGFRNONAA 21* 22*     All pertinent labs within the past 24 hours have been reviewed.    Significant Imaging: I have reviewed all pertinent imaging results/findings within the past 24 hours.     Imaging Results         CT Head Without Contrast (Final result) Result time:  03/24/17 20:07:15    Final result by Gwyn Hermosillo MD (03/24/17 20:07:15)    Impression:       No acute intracranial process.    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        Electronically signed by: GWYN HERMOSILLO MD  Date:      03/24/17  Time:    20:07     Narrative:    Exam: [id number]  03/24/17  19:51:02 [id number] ([facility]) : CT HEAD WITHOUT CONTRAST    Technique:    Axial CT scan of the head was obtained from the vertex to the skull base without intravenous contrast. Coronal and Sagittal reformats were obtained.     Comparison:    2/16/17.    Findings:      The subcutaneous tissues are within normal limits.  There are stable postoperative changes involving the calvarium.  The paranasal sinuses and mastoid air cells are clear.  There are postoperative changes in the orbits.    There are no extra-axial fluid collections.  There is no evidence of intracranial hemorrhage.  There is stable encephalomalacia in the right frontal lobe.  There is a stable appearance of right parasagittal meningioma. There are scattered hypodense foci within the periventricular and subcortical white matter, consistent with chronic small vessel ischemic disease.  Extensive vascular calcifications are present.  The ventricles and sulci are prominent, consistent with cerebral volume loss.  There is no evidence of midline shift.  There is no evidence of mass effect.            X-Ray Chest 1 View (Final result) Result time:  03/24/17 19:56:46    Final result by Annika Bynum MD (03/24/17 19:56:46)    Impression:        No radiographic acute intrathoracic process seen on this single view.      Electronically signed by: ANNIKA BYNUM MD, MD  Date:     03/24/17  Time:    19:56     Narrative:    COMPARISON: Chest radiograph 1/22/17    FINDINGS: AP portable upright view of the chest. Monitoring leads overlie the chest. Patient is slightly rotated. The lungs are symmetrically normally inflated without large consolidation or new focal opacity. Cardiomediastinal silhouette is within normal limits for age. No large pleural effusion or pneumothorax. No acute osseous process seen. PA and lateral views can be obtained.

## 2017-03-27 NOTE — PLAN OF CARE
CM spoke with pt's sister in law, Christine Cardenas, [phone]. Christine stated pt did not live with her now, that he stayed by a niece, Concetta Saucedo, but did not have a phone number.  CM then spoke with pt again, and he could not provide a number. CM then spoke with Christine again, as well as her daughter and asked them to see if they could find a number.  CM awaiting call back. CM to follow.

## 2017-03-27 NOTE — ASSESSMENT & PLAN NOTE
- ?2/2 insulin dosing  - sugars elevated without insulin at meals  - Continue Aspart to 5 U with meals & Levemir 18 units upon discharge  - BG improved   - Resolved.

## 2017-03-27 NOTE — PROGRESS NOTES
[facility] Hospital Medicine  Progress Note    Patient Name: Julius Cardenas  MRN: [id number]  Patient Class: OP- Observation   Admission Date: 3/24/2017  Length of Stay: 0 days  Attending Physician: Blair Daniels MD  Primary Care Provider: Mady Carson MD        Subjective:     Principal Problem:Hypoglycemia    HPI:  Mr. Julius Cardenas is an 84 yr old male,  with PMH of Type 1 diabetes mellitus, CKD stage 4, anemia of CKD, CVA w/deficits, MCI, polyneuropathy, who presented to the ED after having multiple falls, weakness and hypoglycemia. It started a week ago and gradually worsening. He also endorsed loose bowel associated with mild chest discomfort. He reports that he is not eating like he is supposed to but still taking his insulin. He reports multiple incidences of hypoglycemia. While his sugar drops, he experiences, palpitations, falls, sweating, confusion and chest discomfort. He feels better after eating anything, and often keeps soft drinks nearby to prevent hypoglycemia. He denies any active chest pain/SOB/abdominal pain/diarrhea/nausea/vomiting currently.     Hospital Course:  The patient was seen in the ED. He had a negative CXR and Head CT without acute intracranial injury. Labs showed NIALL on CKD. The patient was also found to be repeatedly hypoglycemia, for which he was treated with D50. He recently had dose change of insulin after admission for [facility]ONK from which he was discharged on 2/20/2017. He was admitted for further workup and treatment. Initially insulin was held, and blood sugars were elevated. His insulin was re-started at home dose, and he was observed, and appears to have adequate control. He will be discharged on his previous home doses of 18U long acting, and 5 units short active with meals. He is to follow up with his PCP for continued monitoring and treatment.     Interval History: No new concerns at this time. Blood glucose has adequate control as of today. Will  plan for discharge to home to follow up with PCP ASAP.     Review of Systems   Constitutional: Negative for activity change, appetite change, chills, diaphoresis and fever.   Respiratory: Negative for cough, shortness of breath and wheezing.    Cardiovascular: Negative for chest pain and leg swelling.   Gastrointestinal: Negative for abdominal pain, constipation, diarrhea, nausea and vomiting.   Genitourinary: Negative for decreased urine volume, difficulty urinating and frequency.   Musculoskeletal: Negative for back pain, joint swelling, neck pain and neck stiffness.   Skin: Negative for wound.   Neurological: Negative for dizziness, syncope, weakness, light-headedness, numbness and headaches.   Hematological: Does not bruise/bleed easily.   Psychiatric/Behavioral: Negative for confusion.     Objective:     Vital Signs (Most Recent):  Temp: 97.9 °F (36.6 °C) (03/27/17 0730)  Pulse: 74 (03/27/17 0730)  Resp: 18 (03/27/17 0730)  BP: (!) 170/79 (03/27/17 0730)  SpO2: 99 % (03/27/17 0730) Vital Signs (24h Range):  Temp:  [97.5 °F (36.4 °C)-98.3 °F (36.8 °C)] 97.9 °F (36.6 °C)  Pulse:  [61-74] 74  Resp:  [16-18] 18  SpO2:  [96 %-100 %] 99 %  BP: (131-170)/(63-79) 170/79     Weight: 68 kg (150 lb)  Body mass index is 23.49 kg/(m^2).    Intake/Output Summary (Last 24 hours) at 03/27/17 1327  Last data filed at 03/27/17 0730   Gross per 24 hour   Intake                0 ml   Output              100 ml   Net             -100 ml      Physical Exam   Constitutional: He is oriented to person, place, and time. Vital signs are normal. He appears well-developed and well-nourished.  Non-toxic appearance. He does not have a sickly appearance. He does not appear ill. No distress.   HENT:   Head: Normocephalic and atraumatic.   Eyes: Conjunctivae and EOM are normal. Pupils are equal, round, and reactive to light. No scleral icterus.   Neck: Normal range of motion. Neck supple. No JVD present. No tracheal deviation present.    Cardiovascular: Normal rate, regular rhythm, normal heart sounds and intact distal pulses.  Exam reveals no gallop and no friction rub.    No murmur heard.  Pulmonary/Chest: Effort normal and breath sounds normal. No stridor. No respiratory distress. He has no wheezes. He has no rales. He exhibits no tenderness.   Bilateral lung fields CTA.     Abdominal: Soft. Bowel sounds are normal. He exhibits no distension. There is no tenderness.   Musculoskeletal: Normal range of motion. He exhibits no edema or deformity.   Neurological: He is alert and oriented to person, place, and time. No cranial nerve deficit. He exhibits normal muscle tone.   Skin: Skin is warm and dry. He is not diaphoretic.   Psychiatric: He has a normal mood and affect. His behavior is normal. Judgment and thought content normal.   Nursing note and vitals reviewed.      Significant Labs:   BMP:   Recent Labs  Lab 03/27/17  0525   *      K 3.8   *   CO2 16*   BUN 38*   CREATININE 2.6*   CALCIUM 8.5*     CBC: No results for input(s): WBC, HGB, HCT, PLT in the last 48 hours.  CMP:   Recent Labs  Lab 03/26/17  1131 03/27/17  0525    140   K 4.1 3.8   * 115*   CO2 14* 16*   * 174*   BUN 38* 38*   CREATININE 2.7* 2.6*   CALCIUM 8.9 8.5*   ANIONGAP 11 9   EGFRNONAA 21* 22*     All pertinent labs within the past 24 hours have been reviewed.    Significant Imaging: I have reviewed all pertinent imaging results/findings within the past 24 hours.     Imaging Results         CT Head Without Contrast (Final result) Result time:  03/24/17 20:07:15    Final result by Gwyn Hermosillo MD (03/24/17 20:07:15)    Impression:       No acute intracranial process.    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        Electronically signed by: GWYN HERMOSILLO MD  Date:     03/24/17  Time:    20:07     Narrative:    Exam: [id number]  03/24/17  19:51:02 [id number] ([facility]) : CT HEAD  WITHOUT CONTRAST    Technique:    Axial CT scan of the head was obtained from the vertex to the skull base without intravenous contrast. Coronal and Sagittal reformats were obtained.     Comparison:    2/16/17.    Findings:      The subcutaneous tissues are within normal limits.  There are stable postoperative changes involving the calvarium.  The paranasal sinuses and mastoid air cells are clear.  There are postoperative changes in the orbits.    There are no extra-axial fluid collections.  There is no evidence of intracranial hemorrhage.  There is stable encephalomalacia in the right frontal lobe.  There is a stable appearance of right parasagittal meningioma. There are scattered hypodense foci within the periventricular and subcortical white matter, consistent with chronic small vessel ischemic disease.  Extensive vascular calcifications are present.  The ventricles and sulci are prominent, consistent with cerebral volume loss.  There is no evidence of midline shift.  There is no evidence of mass effect.            X-Ray Chest 1 View (Final result) Result time:  03/24/17 19:56:46    Final result by Annika Bynum MD (03/24/17 19:56:46)    Impression:        No radiographic acute intrathoracic process seen on this single view.      Electronically signed by: ANNIKA BYNUM MD, MD  Date:     03/24/17  Time:    19:56     Narrative:    COMPARISON: Chest radiograph 1/22/17    FINDINGS: AP portable upright view of the chest. Monitoring leads overlie the chest. Patient is slightly rotated. The lungs are symmetrically normally inflated without large consolidation or new focal opacity. Cardiomediastinal silhouette is within normal limits for age. No large pleural effusion or pneumothorax. No acute osseous process seen. PA and lateral views can be obtained.               Assessment/Plan:      * Hypoglycemia  - ?2/2 insulin dosing  - sugars elevated without insulin at meals  - Continue Aspart to 5 U with meals & Levemir 18  units upon discharge  - BG improved   - Resolved.      CKD (chronic kidney disease) stage 4, GFR 15-29 ml/min  - GFR mildly improved from admission  - appears stable        MCI (mild cognitive impairment)  - appears to be at baseline based upon previous discharge summaries  - no family at bedside to confirm       Anemia of chronic renal failure, stage 4 (severe)  - H&H mildly reduced from previous admission  - overall stable      Abnormality of gait  - 2/2 h/o CVA   -PT/OT eval: Patient to be discharged with home health and aide, with PT/OT 5x week.       Hypokalemia  - replaced upon admission  - normal today      NIALL (acute kidney injury)  - renal function has been stable  - avoid nephrotoxic meds  - renally dose all meds      Uncontrolled type 2 diabetes mellitus with stage 4 chronic kidney disease, with long-term current use of insulin  - ? medication non-compliance ?  - patient has been eating poorly/less lately leading to hypoglycemia   - Continue home medications upon discharge at 18 units long-acting, and 5 units of short acting insulin with meals   - mealtime insuline added today  - A1C      Hemoglobin A1C   Date Value Ref Range Status   03/25/2017 8.7 (H) 4.5 - 6.2 % Final     Comment:     According to ADA guidelines, hemoglobin A1C <7.0% represents  optimal control in non-pregnant diabetic patients.  Different  metrics may apply to specific populations.   Standards of Medical Care in Diabetes - 2016.  For the purpose of screening for the presence of diabetes:  <5.7%     Consistent with the absence of diabetes  5.7-6.4%  Consistent with increasing risk for diabetes   (prediabetes)  >or=6.5%  Consistent with diabetes  Currently no consensus exists for use of hemoglobin A1C  for diagnosis of diabetes for children.     01/22/2017 9.5 (H) 4.5 - 6.2 % Final     Comment:     According to ADA guidelines, hemoglobin A1C <7.0% represents  optimal control in non-pregnant diabetic patients.  Different  metrics may  apply to specific populations.   Standards of Medical Care in Diabetes - 2016.  For the purpose of screening for the presence of diabetes:  <5.7%     Consistent with the absence of diabetes  5.7-6.4%  Consistent with increasing risk for diabetes   (prediabetes)  >or=6.5%  Consistent with diabetes  Currently no consensus exists for use of hemoglobin A1C  for diagnosis of diabetes for children.     12/08/2016 9.1 (H) 4.5 - 6.2 % Final     Comment:     According to ADA guidelines, hemoglobin A1C <7.0% represents  optimal control in non-pregnant diabetic patients.  Different  metrics may apply to specific populations.   Standards of Medical Care in Diabetes - 2016.  For the purpose of screening for the presence of diabetes:  <5.7%     Consistent with the absence of diabetes  5.7-6.4%  Consistent with increasing risk for diabetes   (prediabetes)  >or=6.5%  Consistent with diabetes  Currently no consensus exists for use of hemoglobin A1C  for diagnosis of diabetes for children.            Hypertension due to endocrine disorder  - BP appears to be fairly well controlled  - Continue home meds upon discharge:    - Carvedilol 6.25 mg BID    - Hydralazine 50 mg Q8h   - Nifedipine 90 QD       VTE Risk Mitigation         Ordered     heparin (porcine) injection 5,000 Units  Every 8 hours     Route:  Subcutaneous        03/25/17 0735     Place SRINIVAS hose  Until discontinued      03/25/17 0135     Medium Risk of VTE  Once      03/25/17 0104     Place sequential compression device  Until discontinued      03/25/17 0104          Suni Lyons PA-C  Department of Hospital Medicine   [facility]

## 2017-03-27 NOTE — PT/OT/SLP PROGRESS
"Occupational Therapy  Treatment    Julius Cardenas   MRN: [id number]   Admitting Diagnosis: Hypoglycemia    OT Date of Treatment: 17   OT Start Time: 921  OT Stop Time: 959  OT Total Time (min): 38 min    Billable Minutes:  Self Care/Home Management 22 and Therapeutic Activity 16    General Precautions: Standard, fall, diabetic  Orthopedic Precautions: N/A  Braces: N/A    Do you have any cultural, spiritual, Orthodox conflicts, given your current situation?: none    Subjective:  Communicated with nursing prior to session.  "The man came in by himself, yall need two women."    Pain Ratin/10              Pain Rating Post-Intervention: 0/10    Objective:  Patient found with: telemetry, peripheral IV     Functional Mobility:  Bed Mobility:  Rolling/Turning to Left: Supervision  Scooting/Bridging: Supervision  Supine to Sit: Supervision  Sit to Supine: Supervision    Transfers:   Sit <> Stand Assistance: Stand By Assistance  Sit <> Stand Assistive Device: Rolling Walker    Functional Ambulation: with rolling walker SBA with no LOB around hospital room.     Activities of Daily Living:  Feeding Level of Assistance:  (Pt required assistance for opening small packages/sugar and creamer)    LE Dressing Level of Assistance: Minimum assistance (with verbal prompting for technique to kenisha and doff shoes)  Grooming Position: Standing at sink  Grooming Level of Assistance: Stand by assistance         Balance:   Static Sit: GOOD: Takes MODERATE challenges from all directions  Dynamic Sit: GOOD-: Maintains balance through MODERATE excursions of active trunk movement,     Static Stand: GOOD-: Takes MODERATE challenges from all directions inconsistently  Dynamic stand: FAIR+: Needs CLOSE SUPERVISION during gait and is able to right self with minor LOB    Therapeutic Activities and Exercises:  Functional mobility, standing balance and tolerance with ADL's, bed mobility and walker management.     AM-PAC 6 CLICK ADL   How much " help from another person does this patient currently need?   1 = Unable, Total/Dependent Assistance  2 = A lot, Maximum/Moderate Assistance  3 = A little, Minimum/Contact Guard/Supervision  4 = None, Modified [place]/Independent    Putting on and taking off regular lower body clothing? : 3  Bathing (including washing, rinsing, drying)?: 2  Toileting, which includes using toilet, bedpan, or urinal? : 1  Putting on and taking off regular upper body clothing?: 2  Taking care of personal grooming such as brushing teeth?: 3  Eating meals?: 4  Total Score: 15     AM-PAC Raw Score CMS G-Code Modifier Level of Impairment Assistance   6 % Total / Unable   7 - 9 CM 80 - 100% Maximal Assist   10 - 14 CL 60 - 80% Moderate Assist   15 - 19 CK 40 - 60% Moderate Assist   20 - 22 CJ 20 - 40% Minimal Assist   23 CI 1-20% SBA / CGA   24 CH 0% Independent/ Mod I     Patient left HOB elevated with all lines intact, call button in reach and nursing notified    ASSESSMENT:  Julius Cardenas is a 84 y.o. male with a medical diagnosis of Hypoglycemia Pt demonstrated decreased assistance with LBD dressing and functional mobility. Pt continues to require some redirection for cooperation and increased independence. Pt requires decreased balance and coordination and manipulation on L side from previous CVA. Pt would benefit from skilled occupational therapy intervention for increased activity tolerance and independence in ADL's.    Rehab identified problem list/impairments: Rehab identified problem list/impairments: weakness, gait instability, decreased upper extremity function, decreased safety awareness, impaired cognition, impaired self care skills, impaired functional mobilty, impaired balance    Rehab potential is good.    Activity tolerance: Good    Discharge recommendations: Discharge Facility/Level Of Care Needs: home with home health, home health PT, home health OT     Barriers to discharge: Barriers to Discharge:  Inaccessible home environment    Equipment recommendations: walker, rolling     GOALS:   Occupational Therapy Goals        Problem: Occupational Therapy Goal    Goal Priority Disciplines Outcome Interventions   Occupational Therapy Goal     OT, PT/OT Ongoing (interventions implemented as appropriate)    Description:  Goals to be met by 4/25/17  1. Min A UBD  2. Min A LBD  3. SBA G/H standing at sink  4. Mod A toilet hygiene              Plan:  Patient to be seen 5 x/week to address the above listed problems via self-care/home management, therapeutic activities, therapeutic exercises  Plan of Care expires: 04/25/17  Plan of Care reviewed with: patient         Michael WOOD Haritha, OT  03/27/2017

## 2017-03-27 NOTE — ASSESSMENT & PLAN NOTE
- BP appears to be fairly well controlled  - Continue home meds upon discharge:    - Carvedilol 6.25 mg BID    - Hydralazine 50 mg Q8h   - Nifedipine 90 QD

## 2017-03-27 NOTE — PLAN OF CARE
"DC Planning:    Writer called patient's niece Kiki [phone] to discuss plan of care and inform her of DC orders. Patient's niece said "M'aam he is not my uncle, he is my brother in law". Writer replied "Mr. Cardenas asked to call 'nicoty Suárez'. Are you Kamini?" she confirmed she is Kamini, patient's family contact but said "M'aam I cannot speak with you right now, I'm taking care of business, how long is this going to take". Writer replied "I just need about 5 min of your time", Kamini said out loud "Theyre calling from the hospital!" and handed the phone to a gentleman who asked for call back number. Writer provided family with Leonor Trinh CM telephone number.   "

## 2017-03-27 NOTE — ASSESSMENT & PLAN NOTE
- ? medication non-compliance ?  - patient has been eating poorly/less lately leading to hypoglycemia   - Continue home medications upon discharge at 18 units long-acting, and 5 units of short acting insulin with meals   - mealtime insuline added today  - A1C      Hemoglobin A1C   Date Value Ref Range Status   03/25/2017 8.7 (H) 4.5 - 6.2 % Final     Comment:     According to ADA guidelines, hemoglobin A1C <7.0% represents  optimal control in non-pregnant diabetic patients.  Different  metrics may apply to specific populations.   Standards of Medical Care in Diabetes - 2016.  For the purpose of screening for the presence of diabetes:  <5.7%     Consistent with the absence of diabetes  5.7-6.4%  Consistent with increasing risk for diabetes   (prediabetes)  >or=6.5%  Consistent with diabetes  Currently no consensus exists for use of hemoglobin A1C  for diagnosis of diabetes for children.     01/22/2017 9.5 (H) 4.5 - 6.2 % Final     Comment:     According to ADA guidelines, hemoglobin A1C <7.0% represents  optimal control in non-pregnant diabetic patients.  Different  metrics may apply to specific populations.   Standards of Medical Care in Diabetes - 2016.  For the purpose of screening for the presence of diabetes:  <5.7%     Consistent with the absence of diabetes  5.7-6.4%  Consistent with increasing risk for diabetes   (prediabetes)  >or=6.5%  Consistent with diabetes  Currently no consensus exists for use of hemoglobin A1C  for diagnosis of diabetes for children.     12/08/2016 9.1 (H) 4.5 - 6.2 % Final     Comment:     According to ADA guidelines, hemoglobin A1C <7.0% represents  optimal control in non-pregnant diabetic patients.  Different  metrics may apply to specific populations.   Standards of Medical Care in Diabetes - 2016.  For the purpose of screening for the presence of diabetes:  <5.7%     Consistent with the absence of diabetes  5.7-6.4%  Consistent with increasing risk for diabetes    (prediabetes)  >or=6.5%  Consistent with diabetes  Currently no consensus exists for use of hemoglobin A1C  for diagnosis of diabetes for children.

## 2017-03-27 NOTE — DISCHARGE INSTRUCTIONS
Hypoglycemia (Low Blood Sugar)     Fast-acting sugar includes a cup of nonfat milk.     Too little sugar (glucose) in your blood is called hypoglycemia or low blood sugar. Low blood sugar usually means anything lower than 70 mg/dL. Talk with your healthcare provider about your target range and what level is too low for you. Diabetes itself doesnt cause low blood sugar. But some of the treatments for diabetes, such as pills or insulin, may raise your risk for it. Low blood sugar may cause you to pass out or have a seizure. So always treat low blood sugar right away, but don't overeat.  Special note: Always carry a source of fast-acting sugar and a snack in case of hypoglycemia.   What you may notice  If you have low blood sugar, you may have one or more of these symptoms:  · Shakiness or dizziness  · Cold, clammy skin or sweating  · Feelings of hunger  · Headache  · Nervousness  · A hard, fast heartbeat  · Weakness  · Confusion or irritability  · Blurred vision  · Having nightmares or waking up confused or sweating  · Numbness or tingling in the lips or tongue  What you should do  Here are tips to follow if you have hypoglycemia:   · First check your blood sugar. If it is too low (out of your target range), eat or drink 15 to 20 grams of fast-acting sugar. This may be 3 to 4 glucose tablets, 4 ounces (half a cup) of fruit juice or regular (nondiet) soda, 8 ounces (1 cup) of fat-free milk, or 1 tablespoon of honey. Dont take more than this, or your blood sugar may go too high.  · Wait 15 minutes. Then recheck your blood sugar if you can.  · If your blood sugar is still too low, repeat the steps above and check your blood sugar again. If your blood sugar still has not returned to your target range, contact your healthcare provider or seek emergency care.  · Once your blood sugar returns to target range, eat a snack or meal.  Preventing low blood sugar  Things you can do include the following:   · If your condition  needs a strict treatment plan, eat your meals and snacks at the same times each day. Dont skip meals!  · If your treatment plan lets you change when you eat and what you eat, learn how to change the time and dose of your rapid-acting insulin to match this.   · Ask your healthcare provider if it is safe for you to drink alcohol. Never drink on an empty stomach.  · Take your medicine at the prescribed times.  · Always carry a source of fast-acting sugar and a snack when youre away from home.  Other things to do  Additional tips include the following:  · Carry a medical ID card, a compact USB drive, or wear a medical alert bracelet or necklace. It should say that you have diabetes. It should also say what to do if you pass out or have a seizure.  · Make sure your family, friends, and coworkers know the signs of low blood sugar. Tell them what to do if your blood sugar falls very low and you cant treat yourself.  · Keep a glucagon emergency kit handy. Be sure your family, friends, and coworkers know how and when to use it. Check it regularly and replace the glucagon before it expires.  · Talk with your health care team about other things you can do to prevent low blood sugar.     If you have unexplained hypoglycemia or hypoglycemia several times, call your healthcare provider.   Date Last Reviewed: 5/1/2016  © 9560-5812 [employer]. [address] [place] [address], [place] [address]. All rights reserved. This information is not intended as a substitute for professional medical care. Always follow your healthcare professional's instructions.

## 2017-03-27 NOTE — PLAN OF CARE
Problem: Occupational Therapy Goal  Goal: Occupational Therapy Goal  Goals to be met by 4/25/17  1. Min A UBD  2. Min A LBD  3. SBA G/H standing at sink  4. Mod A toilet hygiene   Outcome: Ongoing (interventions implemented as appropriate)  Pt demonstrated decreased assistance with LBD dressing and functional mobility. Pt continues to require some redirection for cooperation and increased independence. Pt requires decreased balance and coordination and manipulation on L side from previous CVA.  Pt would benefit from skilled occupational therapy intervention for increased activity tolerance and independence in ADL's.

## 2017-03-27 NOTE — PLAN OF CARE
Problem: Patient Care Overview  Goal: Plan of Care Review  Outcome: Ongoing (interventions implemented as appropriate)  Patient free of falls, injury, or skin breakdown during shift. Denies pain. Positions self independently in bed. Patient occasionally incontinent of bladder this shift; incontinence care provided prn. SCDs maintained; refuses teds, patient educated. Bed low and locked, side rails x 2, bed alarm on, call bell and personal items within reach. No needs at this time. Will continue to monitor.

## 2017-03-27 NOTE — DISCHARGE SUMMARY
[facility] Hospital Medicine  Discharge Summary      Patient Name: Julius Cardenas  MRN: [id number]  Admission Date: 3/24/2017  Hospital Length of Stay: 0 days  Discharge Date and Time:  03/27/2017 2:14 PM  Attending Physician: Blair Daniels MD   Discharging Provider: Suni Lyons PA-C  Primary Care Provider: Mady Carson MD      HPI:   Mr. Julius Cardenas is an 84 yr old male,  with PMH of Type 1 diabetes mellitus, CKD stage 4, anemia of CKD, CVA w/deficits, MCI, polyneuropathy, who presented to the ED after having multiple falls, weakness and hypoglycemia. It started a week ago and gradually worsening. He also endorsed loose bowel associated with mild chest discomfort. He reports that he is not eating like he is supposed to but still taking his insulin. He reports multiple incidences of hypoglycemia. While his sugar drops, he experiences, palpitations, falls, sweating, confusion and chest discomfort. He feels better after eating anything, and often keeps soft drinks nearby to prevent hypoglycemia. He denies any active chest pain/SOB/abdominal pain/diarrhea/nausea/vomiting currently.     * No surgery found *      Indwelling Lines/Drains at time of discharge:   Lines/Drains/Airways          No matching active lines, drains, or airways        Hospital Course:   The patient was seen in the ED. He had a negative CXR and Head CT without acute intracranial injury. Labs showed NIALL on CKD. The patient was also found to be repeatedly hypoglycemia, for which he was treated with D50. He recently had dose change of insulin [facility]. He was admitted for further workup and treatment. Initially insulin was held, and blood sugars were elevated. His insulin was re-started at home dose, and he was observed, and appears to have adequate control. He will be discharged on his previous home doses of 18 U Levemir, and 5 units Aspart with meals. He is  to follow up with his PCP for continued monitoring and treatment.      Consults:     Significant Diagnostic Studies: Labs:   BMP:   Recent Labs  Lab 03/26/17  1131 03/27/17  0525   * 174*    140   K 4.1 3.8   * 115*   CO2 14* 16*   BUN 38* 38*   CREATININE 2.7* 2.6*   CALCIUM 8.9 8.5*   , CMP   Recent Labs  Lab 03/26/17  1131 03/27/17  0525    140   K 4.1 3.8   * 115*   CO2 14* 16*   * 174*   BUN 38* 38*   CREATININE 2.7* 2.6*   CALCIUM 8.9 8.5*   ANIONGAP 11 9   ESTGFRAFRICA 24* 25*   EGFRNONAA 21* 22*   , CBC No results for input(s): WBC, HGB, HCT, PLT in the last 48 hours., A1C:   Recent Labs  Lab 12/08/16  1223 01/22/17  2111 03/25/17  0459   HGBA1C 9.1* 9.5* 8.7*    and All labs within the past 24 hours have been reviewed    Pending Diagnostic Studies:     None        Final Active Diagnoses:    Diagnosis Date Noted POA    PRINCIPAL PROBLEM:  Hypoglycemia [E16.2] 03/24/2017 Yes    Trauma [T14.90] 03/25/2017 Yes    Hypertension due to endocrine disorder [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] 03/25/2017 Yes    NIALL (acute kidney injury) [N17.9] 02/16/2017 Yes    Diabetic polyneuropathy associated with type 2 diabetes mellitus [E11.42] 02/16/2017 Yes    Uncontrolled type 2 diabetes mellitus with stage 4 chronic kidney disease, with long-term current use of insulin [E11.22, E11.65, N18.4, Z79.4] 02/16/2017 Yes    Hypokalemia [E87.6] 12/09/2016 Yes    Abnormality of gait [R26.9] 06/30/2016 Yes    Anemia of chronic renal failure, stage 4 (severe) [N18.4, D63.1] 04/08/2014 Yes     Chronic    MCI (mild cognitive impairment) [G31.84] 02/01/2013 Yes     Chronic    CKD (chronic kidney disease) stage 4, GFR 15-29 ml/min [N18.4] 12/03/2012 Yes     Chronic      Problems Resolved During this Admission:    Diagnosis Date Noted Date Resolved POA    Hyperkalemia [E87.5] 04/06/2013 07/18/2013 Yes      * Hypoglycemia  - ?2/2 insulin dosing  - sugars elevated without insulin at meals  - Continue Aspart to 5 U with  meals & Levemir 18 units upon discharge  - BG improved   - Resolved.      CKD (chronic kidney disease) stage 4, GFR 15-29 ml/min  - GFR mildly improved from admission  - appears stable        MCI (mild cognitive impairment)  - appears to be at baseline based upon previous discharge summaries  - no family at bedside to confirm       Anemia of chronic renal failure, stage 4 (severe)  - H&H mildly reduced from previous admission  - overall stable      Abnormality of gait  - 2/2 h/o CVA   -PT/OT eval: Patient to be discharged with home health and aide, with PT/OT 5x week.       Hypokalemia  - replaced upon admission  - normal today      NIALL (acute kidney injury)  - renal function has been stable  - avoid nephrotoxic meds  - renally dose all meds      Uncontrolled type 2 diabetes mellitus with stage 4 chronic kidney disease, with long-term current use of insulin  - ? medication non-compliance ?  - patient has been eating poorly/less lately leading to hypoglycemia   - insulin sliding scale with basal insulin, accuchecks, diabetic diet  - mealtime insulin added yesterday   - A1C = 8.7%, improved from 1/2017 at 9.5  - Continue home medications upon discharge at 18 units long-acting QAM, and 5 units of short acting insulin with meals        Diabetic polyneuropathy associated with type 2 diabetes mellitus      Hypertension due to endocrine disorder  - BP appears to be fairly well controlled  - Continue home meds upon discharge:    - Carvedilol 6.25 mg BID    - Hydralazine 50 mg Q8h   - Nifedipine 90 QD       Discharged Condition: stable    Disposition: Home or Self Care    Follow Up:  Follow-up Information     Follow up with Mady Carson MD. Schedule an appointment as soon as possible for a visit in 3 days.    Specialty:  Internal Medicine    Contact information:    [address] [facility] [address] [phone]          Patient Instructions:     Diet general     Call MD for:  temperature >100.4     Call MD for:   "difficulty breathing or increased cough     Call MD for:  severe persistent headache     Call MD for:  persistent dizziness, light-headedness, or visual disturbances     Call MD for:  increased confusion or weakness     Call MD for:  persistent nausea and vomiting or diarrhea       Medications:  Reconciled Home Medications:   Current Discharge Medication List      CONTINUE these medications which have CHANGED    Details   insulin detemir (LEVEMIR FLEXPEN) 100 unit/mL (3 mL) SubQ InPn pen Inject 18 Units into the skin once daily.  Qty: 1 Box, Refills: 6    Associated Diagnoses: 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; Type 1 diabetes mellitus with hyperglycemia; 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      nifedipine (PROCARDIA-XL) 90 MG (OSM) TR24 Take 1 tablet (90 mg total) by mouth once daily.  Qty: 30 tablet, Refills: 0         CONTINUE these medications which have NOT CHANGED    Details   aspirin (ECOTRIN) 81 MG EC tablet Take 81 mg by mouth once daily.      BD INSULIN PEN NEEDLE UF SHORT 31 gauge x 5/16" Ndle USE AS DIRECTED  Qty: 100 each, Refills: 0    Associated Diagnoses: 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      blood-glucose meter (CONTOUR METER) kit Use as instructed.  Please substitute appropriate meter to match his strips.  Qty: 1 each, Refills: 0    Associated Diagnoses: DM2 (diabetes mellitus, type 2); Type II or unspecified type diabetes mellitus with renal manifestations, uncontrolled      calcitRIOL (ROCALTROL) 0.5 MCG Cap TAKE 1 CAPSULE BY MOUTH ONCE DAILY  Qty: 90 capsule, Refills: 4      carvedilol (COREG) 6.25 MG tablet Take 1 tablet (6.25 mg total) by mouth 2 (two) times daily.  Qty: 60 tablet, Refills: 0      CONTOUR TEST STRIPS Strp USE AS DIRECTED THREE TIMES DAILY  Qty: 300 strip, Refills: 12    Comments: **Patient requests 90 days supply**  Associated Diagnoses: Diabetes mellitus due to abnormal insulin      hydrALAZINE " (APRESOLINE) 50 MG tablet Take 1 tablet (50 mg total) by mouth every 8 (eight) hours.  Qty: 180 tablet, Refills: 0    Associated Diagnoses: Renovascular hypertension      insulin aspart (NOVOLOG FLEXPEN) 100 unit/mL InPn pen Inject 5 Units into the skin 3 (three) times daily with meals. Plus correction scale.  Qty: 1 Box, Refills: 6    Associated Diagnoses: 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; 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; Type 1 diabetes mellitus with hyperglycemia      lancets Misc Use three times daily for finger stick glucose monitoring.  Qty: 100 each, Refills: 3      levetiracetam (KEPPRA) 1000 MG tablet TAKE 1 TABLET BY MOUTH TWICE DAILY  Qty: 180 tablet, Refills: 6    Associated Diagnoses: Intractable epilepsy without status epilepticus      nystatin (MYCOSTATIN) cream Apply topically 2 (two) times daily.  Qty: 60 g, Refills: 3      paroxetine (PAXIL) 10 MG tablet Take 1 tablet (10 mg total) by mouth every morning.  Qty: 90 tablet, Refills: 1           Time spent on the discharge of patient: <30 minutes    Suni Lyons PA-C  Department of Hospital Medicine  [facility]

## 2017-03-27 NOTE — ASSESSMENT & PLAN NOTE
- 2/2 h/o CVA   -PT/OT eval: Patient to be discharged with home health and aide, with PT/OT 5x week.

## 2017-03-28 VITALS
RESPIRATION RATE: 18 BRPM | TEMPERATURE: 98 F | HEART RATE: 61 BPM | DIASTOLIC BLOOD PRESSURE: 75 MMHG | WEIGHT: 150 LBS | OXYGEN SATURATION: 100 % | SYSTOLIC BLOOD PRESSURE: 145 MMHG | HEIGHT: 67 IN | BODY MASS INDEX: 23.54 KG/M2

## 2017-03-28 LAB
POCT GLUCOSE: 101 MG/DL (ref 70–110)
POCT GLUCOSE: 170 MG/DL (ref 70–110)
POCT GLUCOSE: 204 MG/DL (ref 70–110)

## 2017-03-28 PROCEDURE — 86580 TB INTRADERMAL TEST: CPT | Performed by: HOSPITALIST

## 2017-03-28 PROCEDURE — G0378 HOSPITAL OBSERVATION PER HR: HCPCS

## 2017-03-28 PROCEDURE — G8988 SELF CARE GOAL STATUS: HCPCS | Mod: CK

## 2017-03-28 PROCEDURE — G8989 SELF CARE D/C STATUS: HCPCS | Mod: CK

## 2017-03-28 PROCEDURE — 97530 THERAPEUTIC ACTIVITIES: CPT

## 2017-03-28 PROCEDURE — 25000003 PHARM REV CODE 250: Performed by: FAMILY MEDICINE

## 2017-03-28 PROCEDURE — 25000003 PHARM REV CODE 250: Performed by: HOSPITALIST

## 2017-03-28 PROCEDURE — 63600175 PHARM REV CODE 636 W HCPCS: Performed by: HOSPITALIST

## 2017-03-28 PROCEDURE — 99224 PR SUBSEQUENT OBSERVATION CARE,LEVEL I: CPT | Mod: ,,, | Performed by: PHYSICIAN ASSISTANT

## 2017-03-28 RX ADMIN — HYDRALAZINE HYDROCHLORIDE 50 MG: 25 TABLET, FILM COATED ORAL at 06:03

## 2017-03-28 RX ADMIN — PANTOPRAZOLE SODIUM 40 MG: 40 TABLET, DELAYED RELEASE ORAL at 09:03

## 2017-03-28 RX ADMIN — TUBERCULIN PURIFIED PROTEIN DERIVATIVE 5 UNITS: 5 INJECTION, SOLUTION INTRADERMAL at 02:03

## 2017-03-28 RX ADMIN — CALCITRIOL 0.5 MCG: 0.25 CAPSULE, LIQUID FILLED ORAL at 09:03

## 2017-03-28 RX ADMIN — HEPARIN SODIUM 5000 UNITS: 5000 INJECTION, SOLUTION INTRAVENOUS; SUBCUTANEOUS at 06:03

## 2017-03-28 RX ADMIN — HYDRALAZINE HYDROCHLORIDE 50 MG: 25 TABLET, FILM COATED ORAL at 02:03

## 2017-03-28 RX ADMIN — LEVETIRACETAM 1000 MG: 500 TABLET, FILM COATED ORAL at 09:03

## 2017-03-28 RX ADMIN — ASPIRIN 81 MG: 81 TABLET, COATED ORAL at 09:03

## 2017-03-28 RX ADMIN — HEPARIN SODIUM 5000 UNITS: 5000 INJECTION, SOLUTION INTRAVENOUS; SUBCUTANEOUS at 02:03

## 2017-03-28 RX ADMIN — NIFEDIPINE 90 MG: 30 TABLET, FILM COATED, EXTENDED RELEASE ORAL at 09:03

## 2017-03-28 RX ADMIN — INSULIN ASPART 5 UNITS: 100 INJECTION, SOLUTION INTRAVENOUS; SUBCUTANEOUS at 09:03

## 2017-03-28 RX ADMIN — CARVEDILOL 6.25 MG: 6.25 TABLET, FILM COATED ORAL at 09:03

## 2017-03-28 RX ADMIN — INSULIN ASPART 2 UNITS: 100 INJECTION, SOLUTION INTRAVENOUS; SUBCUTANEOUS at 02:03

## 2017-03-28 RX ADMIN — INSULIN ASPART 5 UNITS: 100 INJECTION, SOLUTION INTRAVENOUS; SUBCUTANEOUS at 02:03

## 2017-03-28 RX ADMIN — PAROXETINE HYDROCHLORIDE 10 MG: 10 TABLET, FILM COATED ORAL at 06:03

## 2017-03-28 NOTE — PROGRESS NOTES
Report called to Sharon Quiñones at [facility]. IV removed and awaiting transport via [facility].

## 2017-03-28 NOTE — PLAN OF CARE
Problem: Occupational Therapy Goal  Goal: Occupational Therapy Goal  Goals to be met by 4/25/17  1. Min A UBD  2. Min A LBD  3. SBA G/H standing at sink (MET 3/28/2017)  4. Mod A toilet hygiene   Outcome: Ongoing (interventions implemented as appropriate)  Pt required encouragement to participate in activity. Pt ambulated to sink using RW with SBA. Pt did G/H activity standing at the sink with SBA. Pt would continue to benefit from skilled OT services to improve safety and independence in ADLs and activity tolerance.      PARAS Jara

## 2017-03-28 NOTE — PLAN OF CARE
CM spoke with Christine Cardenas, [phone], to arrange discharge planning. Pt is discharged on today, however, Christine states pt does not live with her anymore, that he lives with a niece, Concetta Saucedo. Christine and pt both unable to provide telephone number for Concetta.   CM placed a third call to Christine who stated she would try to locate number.  Pt unable to discharge until niece can be located.  CM will continue to assist with discharge.     03/27/17 1600   Discharge Assessment   Assessment Type Discharge Planning Reassessment   Assessment information obtained from? Patient;Medical Record   Prior to hospitilization cognitive status: Alert/Oriented   Prior to hospitalization functional status: Needs Assistance   Current Functional Status: Independent;Needs Assistance   Arrived From home or self-care   Able to Return to Prior Arrangements yes   Is patient able to care for self after discharge? Yes   How many people do you have in your home that can help with your care after discharge? 2   Patient's perception of discharge disposition home or selfcare   Readmission Within The Last 30 Days no previous admission in last 30 days   Patient currently being followed by outpatient case management? No   Patient currently receives home health services? No   Does the patient currently use HME? No   Patient currently receives private duty nursing? No   Patient currently receives any other outside agency services? Yes  (pt goes to adult day care, Continue Care daily)   How many hours a day does the patient receive services? 8   Is it the patient/care giver preference to resume care with the current outside agency? Yes   Equipment Currently Used at Home none   Do you have any problems affording any of your prescribed medications? No   Is the patient taking medications as prescribed? yes   Do you have any financial concerns preventing you from receiving the healthcare you need? No   Does the patient have transportation to healthcare  appointments? Yes   Transportation Available family or friend will provide   Discharge Plan A Home with family   Discharge Plan B Home with family   Patient/Family In Agreement With Plan yes

## 2017-03-28 NOTE — PT/OT/SLP PROGRESS
Occupational Therapy  Treatment    Julius Cardenas   MRN: [id number]   Admitting Diagnosis: Hypoglycemia    OT Date of Treatment: 17   OT Start Time: 1143  OT Stop Time: 1203  OT Total Time (min): 20 min    Billable Minutes:  Therapeutic Activity 20    General Precautions: Standard, fall, diabetic  Orthopedic Precautions: N/A  Braces: N/A    Do you have any cultural, spiritual, Druze conflicts, given your current situation?: none    Subjective:  Communicated with nursing prior to session.  Pt found supine in bed. Pt expressed frustration with constant interruptions by staff when he is trying to rest.     Pain Ratin/10              Pain Rating Post-Intervention: 0/10    Objective:  Patient found with: peripheral IV, telemetry     Functional Mobility:  Bed Mobility:  Scooting/Bridging: Supervision  Supine to Sit: Supervision  Sit to Supine: Supervision    Transfers:   Sit <> Stand Assistance: Stand By Assistance  Sit <> Stand Assistive Device: Rolling Walker    Functional Ambulation: Pt ambulated to the sink in hospital room with SBA using RW with no LOB.     Activities of Daily Living:     Grooming Position: Standing at sink (wash hands and wash face with washcloth)  Grooming Level of Assistance: Stand by assistance      Balance:   Static Sit: GOOD: Takes MODERATE challenges from all directions  Dynamic Sit: GOOD: Maintains balance through MODERATE excursions of active trunk movement  Static Stand: GOOD-: Takes MODERATE challenges from all directions inconsistently  Dynamic stand: GOOD-: Needs SUPERVISION only during gait and able to self right with moderate     Therapeutic Activities and Exercises:  Transfers, bed mobility, functional ambulation    AM-PAC 6 CLICK ADL   How much help from another person does this patient currently need?   1 = Unable, Total/Dependent Assistance  2 = A lot, Maximum/Moderate Assistance  3 = A little, Minimum/Contact Guard/Supervision  4 = None, Modified  Nabb/Independent    Putting on and taking off regular lower body clothing? : 3  Bathing (including washing, rinsing, drying)?: 2  Toileting, which includes using toilet, bedpan, or urinal? : 3  Putting on and taking off regular upper body clothing?: 2  Taking care of personal grooming such as brushing teeth?: 3  Eating meals?: 4  Total Score: 17     AM-PAC Raw Score CMS G-Code Modifier Level of Impairment Assistance   6 % Total / Unable   7 - 9 CM 80 - 100% Maximal Assist   10 - 14 CL 60 - 80% Moderate Assist   15 - 19 CK 40 - 60% Moderate Assist   20 - 22 CJ 20 - 40% Minimal Assist   23 CI 1-20% SBA / CGA   24 CH 0% Independent/ Mod I     Patient left supine with all lines intact, call button in reach and bed alarm on    ASSESSMENT:  Julius Cardenas is a 84 y.o. male with a medical diagnosis of Hypoglycemia and presents with weakness, gait instability, decreased upper extremity function, decreased safety awareness, impaired cognition, impaired self care skills, impaired functional mobility and impaired balance. Pt required encouragement to participate in activity. Pt ambulated to sink using RW with SBA. Pt did G/H activity standing at the sink with SBA. Pt would continue to benefit from skilled OT services to improve safety and independence in ADLs and activity tolerance.     Rehab identified problem list/impairments: Rehab identified problem list/impairments: weakness, gait instability, decreased upper extremity function, decreased safety awareness, impaired cognition, impaired self care skills, impaired functional mobilty, impaired balance    Rehab potential is good.    Activity tolerance: Fair    Discharge recommendations: Discharge Facility/Level Of Care Needs: home health PT, home health OT     Barriers to discharge: Barriers to Discharge: Inaccessible home environment    Equipment recommendations: walker, rolling     GOALS:   Occupational Therapy Goals        Problem: Occupational Therapy Goal     Goal Priority Disciplines Outcome Interventions   Occupational Therapy Goal     OT, PT/OT Ongoing (interventions implemented as appropriate)    Description:  Goals to be met by 4/25/17  1. Min A UBD  2. Min A LBD  3. SBA G/H standing at sink (MET 3/28/2017)  4. Mod A toilet hygiene               Plan:  Patient to be seen 5 x/week to address the above listed problems via self-care/home management, therapeutic activities, therapeutic exercises  Plan of Care expires: 04/25/17  Plan of Care reviewed with: patient         Olivia Tj, SOT  03/28/2017

## 2017-03-28 NOTE — PROGRESS NOTES
[facility] Hospital Medicine  Progress Note    Patient Name: Julius Cardenas  MRN: [id number]  Patient Class: OP- Observation   Admission Date: 3/24/2017  Length of Stay: 0 days  Attending Physician: John Hill MD  Primary Care Provider: Mady Carson MD        Subjective:     Principal Problem:Hypoglycemia    HPI:  Mr. Julius Cardenas is an 84 yr old male,  with PMH of Type 1 diabetes mellitus, CKD stage 4, anemia of CKD, CVA w/deficits, MCI, polyneuropathy, who presented to the ED after having multiple falls, weakness and hypoglycemia. It started a week ago and gradually worsening. He also endorsed loose bowel associated with mild chest discomfort. He reports that he is not eating like he is supposed to but still taking his insulin. He reports multiple incidences of hypoglycemia. While his sugar drops, he experiences, palpitations, falls, sweating, confusion and chest discomfort. He feels better after eating anything, and often keeps soft drinks nearby to prevent hypoglycemia. He denies any active chest pain/SOB/abdominal pain/diarrhea/nausea/vomiting currently.     Hospital Course:  The patient was seen in the ED. He had a negative CXR and Head CT without acute intracranial injury. Labs showed NIALL on CKD. The patient was also found to be repeatedly hypoglycemia, for which he was treated with D50. He recently had dose change of insulin after admission for [facility] from which he was discharged on 2/20/2017. He was admitted for further workup and treatment. Initially insulin was held, and blood sugars were elevated. His insulin was re-started at home dose, and he was observed, and appears to have adequate control. He will be discharged on his previous home doses of 18 U Levemir, and 5 units Aspart with meals. He is to follow up with his PCP for continued monitoring and treatment.   03/28/2017 - Case Management reports patient's family has been difficult to reach, and is stating they are no  "longer able to care for the patient. Currently working on Nursing Home placement.       Interval History: Patient has no new concerns today. States he is "feeling better." Discussed potential NH placement with patient, and discussed reason for NH placement. Patient demonstrates understanding.     Review of Systems   Constitutional: Negative for activity change, appetite change, chills, diaphoresis and fever.   Respiratory: Negative for cough, shortness of breath and wheezing.    Cardiovascular: Negative for chest pain and leg swelling.   Gastrointestinal: Negative for abdominal pain, constipation, diarrhea, nausea and vomiting.   Genitourinary: Negative for decreased urine volume, difficulty urinating and frequency.   Musculoskeletal: Negative for back pain, joint swelling, neck pain and neck stiffness.   Skin: Negative for wound.   Neurological: Negative for dizziness, syncope, weakness, light-headedness, numbness and headaches.   Hematological: Does not bruise/bleed easily.   Psychiatric/Behavioral: Negative for confusion.     Objective:     Vital Signs (Most Recent):  Temp: 97.7 °F (36.5 °C) (03/28/17 0752)  Pulse: 67 (03/28/17 0752)  Resp: 18 (03/28/17 0752)  BP: (!) 155/73 (03/28/17 0752)  SpO2: 98 % (03/28/17 0752) Vital Signs (24h Range):  Temp:  [97.4 °F (36.3 °C)-98.5 °F (36.9 °C)] 97.7 °F (36.5 °C)  Pulse:  [62-78] 67  Resp:  [18] 18  SpO2:  [98 %-100 %] 98 %  BP: (113-157)/(56-76) 155/73     Weight: 68 kg (150 lb)  Body mass index is 23.49 kg/(m^2).    Intake/Output Summary (Last 24 hours) at 03/28/17 1120  Last data filed at 03/28/17 0515   Gross per 24 hour   Intake              582 ml   Output                0 ml   Net              582 ml      Physical Exam   Constitutional: He is oriented to person, place, and time. Vital signs are normal. He appears well-developed and well-nourished.  Non-toxic appearance. He does not have a sickly appearance. He does not appear ill. No distress.   HENT:   Head: " Normocephalic and atraumatic.   Eyes: Conjunctivae and EOM are normal. Pupils are equal, round, and reactive to light. No scleral icterus.   Neck: Normal range of motion. Neck supple. No JVD present. No tracheal deviation present.   Cardiovascular: Normal rate, regular rhythm, normal heart sounds and intact distal pulses.  Exam reveals no gallop and no friction rub.    No murmur heard.  Pulmonary/Chest: Effort normal and breath sounds normal. No stridor. No respiratory distress. He has no wheezes. He has no rales. He exhibits no tenderness.   Bilateral lung fields CTA.     Abdominal: Soft. Bowel sounds are normal. He exhibits no distension. There is no tenderness.   Musculoskeletal: Normal range of motion. He exhibits no edema or deformity.   Neurological: He is alert and oriented to person, place, and time. No cranial nerve deficit. He exhibits normal muscle tone.   Skin: Skin is warm and dry. He is not diaphoretic.   Psychiatric: He has a normal mood and affect. His behavior is normal. Judgment and thought content normal.   Nursing note and vitals reviewed.      Significant Labs:   POCT Glucose:   Recent Labs  Lab 03/27/17  1705 03/27/17  2102 03/28/17  0756   POCTGLUCOSE 139* 100 170*       Significant Imaging: I have reviewed all pertinent imaging results/findings within the past 24 hours.   Imaging Results         CT Head Without Contrast (Final result) Result time:  03/24/17 20:07:15    Final result by Gwyn Hermosillo MD (03/24/17 20:07:15)    Impression:       No acute intracranial process.    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        Electronically signed by: GWYN HERMOSILLO MD  Date:     03/24/17  Time:    20:07     Narrative:    Exam: [id number]  03/24/17  19:51:02 [id number] ([facility]) : CT HEAD WITHOUT CONTRAST    Technique:    Axial CT scan of the head was obtained from the vertex to the skull base without intravenous contrast. Coronal and  Sagittal reformats were obtained.     Comparison:    2/16/17.    Findings:      The subcutaneous tissues are within normal limits.  There are stable postoperative changes involving the calvarium.  The paranasal sinuses and mastoid air cells are clear.  There are postoperative changes in the orbits.    There are no extra-axial fluid collections.  There is no evidence of intracranial hemorrhage.  There is stable encephalomalacia in the right frontal lobe.  There is a stable appearance of right parasagittal meningioma. There are scattered hypodense foci within the periventricular and subcortical white matter, consistent with chronic small vessel ischemic disease.  Extensive vascular calcifications are present.  The ventricles and sulci are prominent, consistent with cerebral volume loss.  There is no evidence of midline shift.  There is no evidence of mass effect.            X-Ray Chest 1 View (Final result) Result time:  03/24/17 19:56:46    Final result by Annika Bynum MD (03/24/17 19:56:46)    Impression:        No radiographic acute intrathoracic process seen on this single view.      Electronically signed by: ANNIKA BYNUM MD, MD  Date:     03/24/17  Time:    19:56     Narrative:    COMPARISON: Chest radiograph 1/22/17    FINDINGS: AP portable upright view of the chest. Monitoring leads overlie the chest. Patient is slightly rotated. The lungs are symmetrically normally inflated without large consolidation or new focal opacity. Cardiomediastinal silhouette is within normal limits for age. No large pleural effusion or pneumothorax. No acute osseous process seen. PA and lateral views can be obtained.               Assessment/Plan:      * Hypoglycemia  - likely 2/2 insulin dosing  - sugars elevated without insulin at meals  - Continue Aspart to 5 U with meals & Levemir 18 units upon discharge  - BG improved   - Resolved.      CKD (chronic kidney disease) stage 4, GFR 15-29 ml/min  - GFR mildly improved from  admission  - appears stable        MCI (mild cognitive impairment)  - appears to be at baseline based upon previous discharge summaries  - no family at bedside to confirm       Anemia of chronic renal failure, stage 4 (severe)  - H&H mildly reduced from previous admission  - overall stable      Abnormality of gait  - 2/2 h/o CVA   -PT/OT eval: Patient to be discharged with home health and aide, with PT/OT 5x week.       Hypokalemia  - replaced upon admission      NIALL (acute kidney injury)  - renal function has been stable  - avoid nephrotoxic meds  - renally dose all meds      Uncontrolled type 2 diabetes mellitus with stage 4 chronic kidney disease, with long-term current use of insulin  - likely 2/2 medication non-compliance   - patient has been eating poorly/less lately leading to hypoglycemia   - Continue home medications upon discharge at 18 units long-acting, and 5 units of short acting insulin with meals   - mealtime insuline previously added at home dose   - A1C      Hemoglobin A1C   Date Value Ref Range Status   03/25/2017 8.7 (H) 4.5 - 6.2 % Final     Comment:     According to ADA guidelines, hemoglobin A1C <7.0% represents  optimal control in non-pregnant diabetic patients.  Different  metrics may apply to specific populations.   Standards of Medical Care in Diabetes - 2016.  For the purpose of screening for the presence of diabetes:  <5.7%     Consistent with the absence of diabetes  5.7-6.4%  Consistent with increasing risk for diabetes   (prediabetes)  >or=6.5%  Consistent with diabetes  Currently no consensus exists for use of hemoglobin A1C  for diagnosis of diabetes for children.     01/22/2017 9.5 (H) 4.5 - 6.2 % Final     Comment:     According to ADA guidelines, hemoglobin A1C <7.0% represents  optimal control in non-pregnant diabetic patients.  Different  metrics may apply to specific populations.   Standards of Medical Care in Diabetes - 2016.  For the purpose of screening for the presence of  diabetes:  <5.7%     Consistent with the absence of diabetes  5.7-6.4%  Consistent with increasing risk for diabetes   (prediabetes)  >or=6.5%  Consistent with diabetes  Currently no consensus exists for use of hemoglobin A1C  for diagnosis of diabetes for children.     12/08/2016 9.1 (H) 4.5 - 6.2 % Final     Comment:     According to ADA guidelines, hemoglobin A1C <7.0% represents  optimal control in non-pregnant diabetic patients.  Different  metrics may apply to specific populations.   Standards of Medical Care in Diabetes - 2016.  For the purpose of screening for the presence of diabetes:  <5.7%     Consistent with the absence of diabetes  5.7-6.4%  Consistent with increasing risk for diabetes   (prediabetes)  >or=6.5%  Consistent with diabetes  Currently no consensus exists for use of hemoglobin A1C  for diagnosis of diabetes for children.            Hypertension due to endocrine disorder  - BP appears to be fairly well controlled  - Continue home meds upon discharge:    - Carvedilol 6.25 mg BID    - Hydralazine 50 mg Q8h   - Nifedipine 90 QD       VTE Risk Mitigation         Ordered     heparin (porcine) injection 5,000 Units  Every 8 hours     Route:  Subcutaneous        03/25/17 0735     Place SRINIVAS hose  Until discontinued      03/25/17 0135     Medium Risk of VTE  Once      03/25/17 0104     Place sequential compression device  Until discontinued      03/25/17 0104          Suni Lyons PA-C  Department of Hospital Medicine   [facility]

## 2017-03-28 NOTE — ASSESSMENT & PLAN NOTE
- likely 2/2 insulin dosing  - sugars elevated without insulin at meals  - Continue Aspart to 5 U with meals & Levemir 18 units upon discharge  - BG improved   - Resolved.

## 2017-03-28 NOTE — ASSESSMENT & PLAN NOTE
- likely 2/2 medication non-compliance   - patient has been eating poorly/less lately leading to hypoglycemia   - Continue home medications upon discharge at 18 units long-acting, and 5 units of short acting insulin with meals   - mealtime insuline previously added at home dose   - A1C      Hemoglobin A1C   Date Value Ref Range Status   03/25/2017 8.7 (H) 4.5 - 6.2 % Final     Comment:     According to ADA guidelines, hemoglobin A1C <7.0% represents  optimal control in non-pregnant diabetic patients.  Different  metrics may apply to specific populations.   Standards of Medical Care in Diabetes - 2016.  For the purpose of screening for the presence of diabetes:  <5.7%     Consistent with the absence of diabetes  5.7-6.4%  Consistent with increasing risk for diabetes   (prediabetes)  >or=6.5%  Consistent with diabetes  Currently no consensus exists for use of hemoglobin A1C  for diagnosis of diabetes for children.     01/22/2017 9.5 (H) 4.5 - 6.2 % Final     Comment:     According to ADA guidelines, hemoglobin A1C <7.0% represents  optimal control in non-pregnant diabetic patients.  Different  metrics may apply to specific populations.   Standards of Medical Care in Diabetes - 2016.  For the purpose of screening for the presence of diabetes:  <5.7%     Consistent with the absence of diabetes  5.7-6.4%  Consistent with increasing risk for diabetes   (prediabetes)  >or=6.5%  Consistent with diabetes  Currently no consensus exists for use of hemoglobin A1C  for diagnosis of diabetes for children.     12/08/2016 9.1 (H) 4.5 - 6.2 % Final     Comment:     According to ADA guidelines, hemoglobin A1C <7.0% represents  optimal control in non-pregnant diabetic patients.  Different  metrics may apply to specific populations.   Standards of Medical Care in Diabetes - 2016.  For the purpose of screening for the presence of diabetes:  <5.7%     Consistent with the absence of diabetes  5.7-6.4%  Consistent with increasing risk for  diabetes   (prediabetes)  >or=6.5%  Consistent with diabetes  Currently no consensus exists for use of hemoglobin A1C  for diagnosis of diabetes for children.

## 2017-03-28 NOTE — PLAN OF CARE
[facility]   Department of Hospital Medicine  [address] [place] [id number]  [phone] (phone)  [phone] (fax)      Facility Transfer Orders                        03/28/2017    Julius Cardenas    Admit to: Nursing Home Placement  Diagnoses:  Active Hospital Problems    Diagnosis  POA    *Hypoglycemia [E16.2]  Yes    Trauma [T14.90]  Yes    Hypertension due to endocrine disorder [I15.2]  Yes    NIALL (acute kidney injury) [N17.9]  Yes    Diabetic polyneuropathy associated with type 2 diabetes mellitus [E11.42]  Yes    Uncontrolled type 2 diabetes mellitus with stage 4 chronic kidney disease, with long-term current use of insulin [E11.22, E11.65, N18.4, Z79.4]  Yes    Hypokalemia [E87.6]  Yes    Abnormality of gait [R26.9]  Yes    Anemia of chronic renal failure, stage 4 (severe) [N18.4, D63.1]  Yes     Chronic    MCI (mild cognitive impairment) [G31.84]  Yes     Chronic    CKD (chronic kidney disease) stage 4, GFR 15-29 ml/min [N18.4]  Yes     Chronic      Resolved Hospital Problems    Diagnosis Date Resolved POA    Hyperkalemia [E87.5] 07/18/2013 Yes       Allergies:Review of patient's allergies indicates:  No Known Allergies    Vitals:          Once weekly        Diet:     Diabetic Diet                              Activity:      - Up in a chair each morning as tolerated   - Ambulate with assistance to bathroom   - May ambulate independently       Nursing Precautions:            -  Upright 90 degrees befor during and after meals             - Fall precautions    CONSULTS:     PT to evaluate and treat - five times a week   OT to evaluate and treat - five times a week    DIABETES CARE:      Check blood sugar:      Fingerstick blood sugar a.m and p.m.    Fingerstick blood sugar AC and HS   Fingerstick blood sugar every 6 hours if unable to eat      Report CBG < 60 or > 400 to physician.    Medications:     Current Discharge Medication List      CONTINUE  "these medications which have CHANGED    Details   insulin detemir (LEVEMIR FLEXPEN) 100 unit/mL (3 mL) SubQ InPn pen Inject 18 Units into the skin once daily.  Qty: 1 Box, Refills: 6    Associated Diagnoses: 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; Type 1 diabetes mellitus with hyperglycemia; 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      nifedipine (PROCARDIA-XL) 90 MG (OSM) TR24 Take 1 tablet (90 mg total) by mouth once daily.  Qty: 30 tablet, Refills: 0         CONTINUE these medications which have NOT CHANGED    Details   aspirin (ECOTRIN) 81 MG EC tablet Take 81 mg by mouth once daily.      BD INSULIN PEN NEEDLE UF SHORT 31 gauge x 5/16" Ndle USE AS DIRECTED  Qty: 100 each, Refills: 0    Associated Diagnoses: 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      blood-glucose meter (CONTOUR METER) kit Use as instructed.  Please substitute appropriate meter to match his strips.  Qty: 1 each, Refills: 0    Associated Diagnoses: DM2 (diabetes mellitus, type 2); Type II or unspecified type diabetes mellitus with renal manifestations, uncontrolled      calcitRIOL (ROCALTROL) 0.5 MCG Cap TAKE 1 CAPSULE BY MOUTH ONCE DAILY  Qty: 90 capsule, Refills: 4      carvedilol (COREG) 6.25 MG tablet Take 1 tablet (6.25 mg total) by mouth 2 (two) times daily.  Qty: 60 tablet, Refills: 0      CONTOUR TEST STRIPS Strp USE AS DIRECTED THREE TIMES DAILY  Qty: 300 strip, Refills: 12    Comments: **Patient requests 90 days supply**  Associated Diagnoses: Diabetes mellitus due to abnormal insulin      hydrALAZINE (APRESOLINE) 50 MG tablet Take 1 tablet (50 mg total) by mouth every 8 (eight) hours.  Qty: 180 tablet, Refills: 0    Associated Diagnoses: Renovascular hypertension      insulin aspart (NOVOLOG FLEXPEN) 100 unit/mL InPn pen Inject 5 Units into the skin 3 (three) times daily with meals. Plus correction scale.  Qty: 1 Box, Refills: 6    Associated Diagnoses: Type 1 diabetes mellitus " with stage 4 chronic kidney disease; 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; Type 1 diabetes mellitus with hyperglycemia      lancets Misc Use three times daily for finger stick glucose monitoring.  Qty: 100 each, Refills: 3      levetiracetam (KEPPRA) 1000 MG tablet TAKE 1 TABLET BY MOUTH TWICE DAILY  Qty: 180 tablet, Refills: 6    Associated Diagnoses: Intractable epilepsy without status epilepticus      nystatin (MYCOSTATIN) cream Apply topically 2 (two) times daily.  Qty: 60 g, Refills: 3      paroxetine (PAXIL) 10 MG tablet Take 1 tablet (10 mg total) by mouth every morning.  Qty: 90 tablet, Refills: 1            aspirin  81 mg Oral Daily    calcitRIOL  0.5 mcg Oral Daily    carvedilol  6.25 mg Oral BID    heparin (porcine)  5,000 Units Subcutaneous Q8H    hydrALAZINE  50 mg Oral Q8H    insulin aspart  5 Units Subcutaneous TIDWM    insulin detemir  18 Units Subcutaneous Daily    levetiracetam  1,000 mg Oral BID    nifedipine  90 mg Oral Daily    pantoprazole  40 mg Oral Daily    paroxetine  10 mg Oral QAM    tuberculin  5 Units Intradermal Once               _________________________________  Suni Lyons PA-C  03/28/2017

## 2017-03-28 NOTE — PLAN OF CARE
Pt discharging to [facility], [phone], a residential facility. Pt and family in agreement with plan.  No further CM needs for discharge.     03/28/17 7212   Final Note   Assessment Type Final Discharge Note   Discharge Disposition Discharged   Discharge planning education complete? Yes   Hospital Follow Up  Appt(s) scheduled? No  (facility to schedule)   Discharge plans and expectations educations in teach back method with documentation complete? Yes   Offered [facility] -- Bedside Delivery? n/a   Discharge/Hospital Encounter Summary to ([facility]) PCP n/a   Referral to Outpatient Case Management complete? n/a   Referral to / orders for Home Health Complete? n/a   30 day supply of medicines given at discharge, if documented non-compliance / non-adherence? n/a   Any social issues identified prior to discharge? No   Did you assess the readiness or willingness of the family or caregiver to support self management of care? No   Right Care Referral Info   Post Acute Recommendation Other  ([facility])

## 2017-03-28 NOTE — NURSING
"Concetta's number obtained from the adult day care that patient attends.  [employer] called and spoke with Concetta regarding pt's discharge since yesterday, Concetta stated "he don't live here anymore".  When asked if there was another familiy member I could contact, Concetta stated "I don't know" and hung up.    Concetta [phone]  "

## 2017-03-28 NOTE — SUBJECTIVE & OBJECTIVE
"Interval History: Patient has no new concerns today. States he is "feeling better." Discussed potential NH placement with patient, and discussed reason for NH placement. Patient demonstrates understanding.     Review of Systems   Constitutional: Negative for activity change, appetite change, chills, diaphoresis and fever.   Respiratory: Negative for cough, shortness of breath and wheezing.    Cardiovascular: Negative for chest pain and leg swelling.   Gastrointestinal: Negative for abdominal pain, constipation, diarrhea, nausea and vomiting.   Genitourinary: Negative for decreased urine volume, difficulty urinating and frequency.   Musculoskeletal: Negative for back pain, joint swelling, neck pain and neck stiffness.   Skin: Negative for wound.   Neurological: Negative for dizziness, syncope, weakness, light-headedness, numbness and headaches.   Hematological: Does not bruise/bleed easily.   Psychiatric/Behavioral: Negative for confusion.     Objective:     Vital Signs (Most Recent):  Temp: 97.7 °F (36.5 °C) (03/28/17 0752)  Pulse: 67 (03/28/17 0752)  Resp: 18 (03/28/17 0752)  BP: (!) 155/73 (03/28/17 0752)  SpO2: 98 % (03/28/17 0752) Vital Signs (24h Range):  Temp:  [97.4 °F (36.3 °C)-98.5 °F (36.9 °C)] 97.7 °F (36.5 °C)  Pulse:  [62-78] 67  Resp:  [18] 18  SpO2:  [98 %-100 %] 98 %  BP: (113-157)/(56-76) 155/73     Weight: 68 kg (150 lb)  Body mass index is 23.49 kg/(m^2).    Intake/Output Summary (Last 24 hours) at 03/28/17 1120  Last data filed at 03/28/17 0515   Gross per 24 hour   Intake              582 ml   Output                0 ml   Net              582 ml      Physical Exam   Constitutional: He is oriented to person, place, and time. Vital signs are normal. He appears well-developed and well-nourished.  Non-toxic appearance. He does not have a sickly appearance. He does not appear ill. No distress.   HENT:   Head: Normocephalic and atraumatic.   Eyes: Conjunctivae and EOM are normal. Pupils are equal, " round, and reactive to light. No scleral icterus.   Neck: Normal range of motion. Neck supple. No JVD present. No tracheal deviation present.   Cardiovascular: Normal rate, regular rhythm, normal heart sounds and intact distal pulses.  Exam reveals no gallop and no friction rub.    No murmur heard.  Pulmonary/Chest: Effort normal and breath sounds normal. No stridor. No respiratory distress. He has no wheezes. He has no rales. He exhibits no tenderness.   Bilateral lung fields CTA.     Abdominal: Soft. Bowel sounds are normal. He exhibits no distension. There is no tenderness.   Musculoskeletal: Normal range of motion. He exhibits no edema or deformity.   Neurological: He is alert and oriented to person, place, and time. No cranial nerve deficit. He exhibits normal muscle tone.   Skin: Skin is warm and dry. He is not diaphoretic.   Psychiatric: He has a normal mood and affect. His behavior is normal. Judgment and thought content normal.   Nursing note and vitals reviewed.      Significant Labs:   POCT Glucose:   Recent Labs  Lab 03/27/17  1705 03/27/17  2102 03/28/17  0756   POCTGLUCOSE 139* 100 170*       Significant Imaging: I have reviewed all pertinent imaging results/findings within the past 24 hours.   Imaging Results         CT Head Without Contrast (Final result) Result time:  03/24/17 20:07:15    Final result by Gwyn Hermosillo MD (03/24/17 20:07:15)    Impression:       No acute intracranial process.    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        Electronically signed by: GWYN HERMOSILLO MD  Date:     03/24/17  Time:    20:07     Narrative:    Exam: [id number]  03/24/17  19:51:02 [id number] ([facility]) : CT HEAD WITHOUT CONTRAST    Technique:    Axial CT scan of the head was obtained from the vertex to the skull base without intravenous contrast. Coronal and Sagittal reformats were obtained.     Comparison:    2/16/17.    Findings:      The  subcutaneous tissues are within normal limits.  There are stable postoperative changes involving the calvarium.  The paranasal sinuses and mastoid air cells are clear.  There are postoperative changes in the orbits.    There are no extra-axial fluid collections.  There is no evidence of intracranial hemorrhage.  There is stable encephalomalacia in the right frontal lobe.  There is a stable appearance of right parasagittal meningioma. There are scattered hypodense foci within the periventricular and subcortical white matter, consistent with chronic small vessel ischemic disease.  Extensive vascular calcifications are present.  The ventricles and sulci are prominent, consistent with cerebral volume loss.  There is no evidence of midline shift.  There is no evidence of mass effect.            X-Ray Chest 1 View (Final result) Result time:  03/24/17 19:56:46    Final result by Annika Bynum MD (03/24/17 19:56:46)    Impression:        No radiographic acute intrathoracic process seen on this single view.      Electronically signed by: ANNIKA BYNUM MD, MD  Date:     03/24/17  Time:    19:56     Narrative:    COMPARISON: Chest radiograph 1/22/17    FINDINGS: AP portable upright view of the chest. Monitoring leads overlie the chest. Patient is slightly rotated. The lungs are symmetrically normally inflated without large consolidation or new focal opacity. Cardiomediastinal silhouette is within normal limits for age. No large pleural effusion or pneumothorax. No acute osseous process seen. PA and lateral views can be obtained.

## 2017-03-28 NOTE — PLAN OF CARE
CM spoke with Christine Cardenas, [phone], and Concetta Saucedo, niece, [phone], both are refusing to take pt home, stating they are unable to care for him any longer. CM discussed NH with pt, he stated he would go, but would prefer to go find a friend to stay by.   CM sent packets to local NH's, [facility] and [facility]. [facility] states they will have a bed on tomorrow, 3/29. Pending call from [place], [phone].  CM to follow for placement.     03/28/17 1345   Discharge Assessment   Assessment Type Discharge Planning Reassessment   Discharge Plan A New Nursing Home placement - [facility]   Discharge Plan B New Nursing Home placement - [facility] care facility   Patient/Family In Agreement With Plan other (see comments)  (pt states he would go there, would rather go to a friends.)

## 2017-03-29 NOTE — PROGRESS NOTES
Physical Therapy Discharge Summary    Julius Cardenas  MRN: [id number]   Hypoglycemia   Patient Discharged from acute Physical Therapy on 3/28/17.  Please refer to prior PT noted date on 3/28/17 for functional status.     Assessment:   Patient has not met goals.  GOALS:   Physical Therapy Goals        Problem: Physical Therapy Goal    Goal Priority Disciplines Outcome Goal Variances Interventions   Physical Therapy Goal     PT/OT, PT      Description:  Goals to be met by 4-3-17.  1. Sup<>sit mod I  2. Sit<>stand with RW mod I  3. amb 150' with RW mod I  4. Up/down 3 steps B rail mod I            Reasons for Discontinuation of Therapy Services  Transfer to alternate level of care.      Plan:  Patient Discharged to: [facility]. Residential living facility   PT of record not available for documentation.

## 2017-03-29 NOTE — PT/OT/SLP DISCHARGE
Occupational Therapy Discharge Summary    Julius Cardenas  MRN: [id number]   Hypoglycemia   Patient Discharged from acute Occupational Therapy on 3/28/17.  Please refer to prior OT note dated on 3/28/17 for functional status.     Assessment:   Patient appropriate for care in another setting.  GOALS:   Occupational Therapy Goals        Problem: Occupational Therapy Goal    Goal Priority Disciplines Outcome Interventions   Occupational Therapy Goal     OT, PT/OT Ongoing (interventions implemented as appropriate)    Description:  Goals to be met by 4/25/17  1. Min A UBD  2. Min A LBD  3. SBA G/H standing at sink (MET 3/28/2017)  4. Mod A toilet hygiene             Reasons for Discontinuation of Therapy Services  Transfer to alternate level of care.      Plan:  Patient Discharged to: Nursing [facility] 3/29/2017  .

## 2017-03-30 ENCOUNTER — HOSPITAL ENCOUNTER (INPATIENT)
Facility: HOSPITAL | Age: 82
LOS: 6 days | Discharge: NURSING, REHAB OR LONG-TERM CARE | DRG: 638 | End: 2017-04-05
Attending: EMERGENCY MEDICINE | Admitting: HOSPITALIST
Payer: MEDICARE

## 2017-03-30 DIAGNOSIS — E11.21 DIABETIC NEPHROPATHY ASSOCIATED WITH TYPE 2 DIABETES MELLITUS: ICD-10-CM

## 2017-03-30 DIAGNOSIS — R73.9 HYPERGLYCEMIA: Primary | ICD-10-CM

## 2017-03-30 DIAGNOSIS — E86.0 DEHYDRATION: ICD-10-CM

## 2017-03-30 DIAGNOSIS — E10.65 TYPE 1 DIABETES MELLITUS WITH HYPERGLYCEMIA: ICD-10-CM

## 2017-03-30 DIAGNOSIS — I36.9 NONRHEUMATIC TRICUSPID VALVE DISORDER: ICD-10-CM
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
## 2017-03-30 DIAGNOSIS — N18.4 CKD (CHRONIC KIDNEY DISEASE) STAGE 4, GFR 15-29 ML/MIN: Chronic | ICD-10-CM

## 2017-03-30 LAB
ALBUMIN SERPL BCP-MCNC: 3.6 G/DL
ALP SERPL-CCNC: 110 U/L
ALT SERPL W/O P-5'-P-CCNC: 71 U/L
ANION GAP SERPL CALC-SCNC: 10 MMOL/L
AST SERPL-CCNC: 55 U/L
B-OH-BUTYR BLD STRIP-SCNC: 0.2 MMOL/L
BACTERIA #/AREA URNS AUTO: NORMAL /HPF
BASOPHILS # BLD AUTO: 0.01 K/UL
BASOPHILS NFR BLD: 0.2 %
BILIRUB SERPL-MCNC: 0.4 MG/DL
BILIRUB UR QL STRIP: NEGATIVE
BUN SERPL-MCNC: 44 MG/DL
CALCIUM SERPL-MCNC: 9.4 MG/DL
CHLORIDE SERPL-SCNC: 109 MMOL/L
CLARITY UR REFRACT.AUTO: CLEAR
CO2 SERPL-SCNC: 18 MMOL/L
COLOR UR AUTO: COLORLESS
CREAT SERPL-MCNC: 2.9 MG/DL
DIFFERENTIAL METHOD: ABNORMAL
EOSINOPHIL # BLD AUTO: 0.1 K/UL
EOSINOPHIL NFR BLD: 2 %
ERYTHROCYTE [DISTWIDTH] IN BLOOD BY AUTOMATED COUNT: 13.7 %
EST. GFR  (AFRICAN AMERICAN): 22 ML/MIN/1.73 M^2
EST. GFR  (NON AFRICAN AMERICAN): 19 ML/MIN/1.73 M^2
GLUCOSE SERPL-MCNC: 604 MG/DL
GLUCOSE UR QL STRIP: ABNORMAL
HCT VFR BLD AUTO: 31.5 %
HGB BLD-MCNC: 11 G/DL
HGB UR QL STRIP: ABNORMAL
KETONES UR QL STRIP: NEGATIVE
LEUKOCYTE ESTERASE UR QL STRIP: NEGATIVE
LYMPHOCYTES # BLD AUTO: 1.2 K/UL
LYMPHOCYTES NFR BLD: 20.2 %
MAGNESIUM SERPL-MCNC: 1.9 MG/DL
MCH RBC QN AUTO: 29.6 PG
MCHC RBC AUTO-ENTMCNC: 34.9 %
MCV RBC AUTO: 85 FL
MICROSCOPIC COMMENT: NORMAL
MONOCYTES # BLD AUTO: 0.8 K/UL
MONOCYTES NFR BLD: 12.9 %
NEUTROPHILS # BLD AUTO: 3.9 K/UL
NEUTROPHILS NFR BLD: 64.5 %
NITRITE UR QL STRIP: NEGATIVE
PH UR STRIP: 5 [PH] (ref 5–8)
PHOSPHATE SERPL-MCNC: 4.8 MG/DL
PLATELET # BLD AUTO: 185 K/UL
PMV BLD AUTO: 11.6 FL
POCT GLUCOSE: 369 MG/DL (ref 70–110)
POCT GLUCOSE: 411 MG/DL (ref 70–110)
POCT GLUCOSE: 422 MG/DL (ref 70–110)
POCT GLUCOSE: 484 MG/DL (ref 70–110)
POTASSIUM SERPL-SCNC: 4.1 MMOL/L
PROT SERPL-MCNC: 7.7 G/DL
PROT UR QL STRIP: NEGATIVE
RBC # BLD AUTO: 3.71 M/UL
RBC #/AREA URNS AUTO: 0 /HPF (ref 0–4)
SODIUM SERPL-SCNC: 137 MMOL/L
SP GR UR STRIP: 1.01 (ref 1–1.03)
SQUAMOUS #/AREA URNS AUTO: 0 /HPF
URN SPEC COLLECT METH UR: ABNORMAL
UROBILINOGEN UR STRIP-ACNC: NEGATIVE EU/DL
WBC # BLD AUTO: 5.99 K/UL
WBC #/AREA URNS AUTO: 1 /HPF (ref 0–5)
YEAST UR QL AUTO: NORMAL

## 2017-03-30 PROCEDURE — 11000001 HC ACUTE MED/SURG PRIVATE ROOM

## 2017-03-30 PROCEDURE — P9612 CATHETERIZE FOR URINE SPEC: HCPCS

## 2017-03-30 PROCEDURE — 81001 URINALYSIS AUTO W/SCOPE: CPT

## 2017-03-30 PROCEDURE — 96372 THER/PROPH/DIAG INJ SC/IM: CPT

## 2017-03-30 PROCEDURE — 93010 ELECTROCARDIOGRAM REPORT: CPT | Mod: ,,, | Performed by: INTERNAL MEDICINE

## 2017-03-30 PROCEDURE — 84132 ASSAY OF SERUM POTASSIUM: CPT

## 2017-03-30 PROCEDURE — 99285 EMERGENCY DEPT VISIT HI MDM: CPT | Mod: ,,, | Performed by: EMERGENCY MEDICINE

## 2017-03-30 PROCEDURE — 85014 HEMATOCRIT: CPT

## 2017-03-30 PROCEDURE — 93005 ELECTROCARDIOGRAM TRACING: CPT

## 2017-03-30 PROCEDURE — 82803 BLOOD GASES ANY COMBINATION: CPT

## 2017-03-30 PROCEDURE — 80053 COMPREHEN METABOLIC PANEL: CPT

## 2017-03-30 PROCEDURE — 25000003 PHARM REV CODE 250: Performed by: PHYSICIAN ASSISTANT

## 2017-03-30 PROCEDURE — 63600175 PHARM REV CODE 636 W HCPCS: Performed by: HOSPITALIST

## 2017-03-30 PROCEDURE — 63600175 PHARM REV CODE 636 W HCPCS: Performed by: EMERGENCY MEDICINE

## 2017-03-30 PROCEDURE — 82962 GLUCOSE BLOOD TEST: CPT

## 2017-03-30 PROCEDURE — 84295 ASSAY OF SERUM SODIUM: CPT

## 2017-03-30 PROCEDURE — 83735 ASSAY OF MAGNESIUM: CPT

## 2017-03-30 PROCEDURE — 82330 ASSAY OF CALCIUM: CPT

## 2017-03-30 PROCEDURE — 96361 HYDRATE IV INFUSION ADD-ON: CPT

## 2017-03-30 PROCEDURE — 96374 THER/PROPH/DIAG INJ IV PUSH: CPT

## 2017-03-30 PROCEDURE — 85025 COMPLETE CBC W/AUTO DIFF WBC: CPT

## 2017-03-30 PROCEDURE — 25000003 PHARM REV CODE 250: Performed by: HOSPITALIST

## 2017-03-30 PROCEDURE — 99223 1ST HOSP IP/OBS HIGH 75: CPT | Mod: ,,, | Performed by: HOSPITALIST

## 2017-03-30 PROCEDURE — 82010 KETONE BODYS QUAN: CPT

## 2017-03-30 PROCEDURE — 99285 EMERGENCY DEPT VISIT HI MDM: CPT | Mod: 25

## 2017-03-30 PROCEDURE — 84100 ASSAY OF PHOSPHORUS: CPT

## 2017-03-30 PROCEDURE — 99900035 HC TECH TIME PER 15 MIN (STAT)

## 2017-03-30 RX ORDER — RAMELTEON 8 MG/1
8 TABLET ORAL NIGHTLY PRN
Status: DISCONTINUED | OUTPATIENT
Start: 2017-03-30 | End: 2017-04-05 | Stop reason: HOSPADM

## 2017-03-30 RX ORDER — INSULIN ASPART 100 [IU]/ML
3 INJECTION, SOLUTION INTRAVENOUS; SUBCUTANEOUS
Status: DISCONTINUED | OUTPATIENT
Start: 2017-03-30 | End: 2017-04-01

## 2017-03-30 RX ORDER — PAROXETINE 10 MG/1
10 TABLET, FILM COATED ORAL EVERY MORNING
Status: DISCONTINUED | OUTPATIENT
Start: 2017-03-30 | End: 2017-04-05 | Stop reason: HOSPADM

## 2017-03-30 RX ORDER — SODIUM CHLORIDE 9 MG/ML
100 INJECTION, SOLUTION INTRAVENOUS CONTINUOUS
Status: DISCONTINUED | OUTPATIENT
Start: 2017-03-30 | End: 2017-04-04

## 2017-03-30 RX ORDER — CALCITRIOL 0.25 UG/1
0.5 CAPSULE ORAL DAILY
Status: DISCONTINUED | OUTPATIENT
Start: 2017-03-31 | End: 2017-04-04

## 2017-03-30 RX ORDER — CARVEDILOL 3.12 MG/1
6.25 TABLET ORAL 2 TIMES DAILY
Status: DISCONTINUED | OUTPATIENT
Start: 2017-03-30 | End: 2017-04-05

## 2017-03-30 RX ORDER — HYDRALAZINE HYDROCHLORIDE 20 MG/ML
10 INJECTION INTRAMUSCULAR; INTRAVENOUS
Status: COMPLETED | OUTPATIENT
Start: 2017-03-30 | End: 2017-03-30

## 2017-03-30 RX ORDER — INSULIN ASPART 100 [IU]/ML
1-10 INJECTION, SOLUTION INTRAVENOUS; SUBCUTANEOUS
Status: DISCONTINUED | OUTPATIENT
Start: 2017-03-31 | End: 2017-04-04

## 2017-03-30 RX ORDER — HYDRALAZINE HYDROCHLORIDE 50 MG/1
50 TABLET, FILM COATED ORAL EVERY 8 HOURS
Status: DISCONTINUED | OUTPATIENT
Start: 2017-03-30 | End: 2017-04-04

## 2017-03-30 RX ORDER — IBUPROFEN 200 MG
16 TABLET ORAL
Status: DISCONTINUED | OUTPATIENT
Start: 2017-03-30 | End: 2017-04-05 | Stop reason: HOSPADM

## 2017-03-30 RX ORDER — GLUCAGON 1 MG
1 KIT INJECTION
Status: DISCONTINUED | OUTPATIENT
Start: 2017-03-30 | End: 2017-04-05 | Stop reason: HOSPADM

## 2017-03-30 RX ORDER — IBUPROFEN 200 MG
24 TABLET ORAL
Status: DISCONTINUED | OUTPATIENT
Start: 2017-03-30 | End: 2017-04-05 | Stop reason: HOSPADM

## 2017-03-30 RX ORDER — INSULIN ASPART 100 [IU]/ML
0-5 INJECTION, SOLUTION INTRAVENOUS; SUBCUTANEOUS
Status: DISCONTINUED | OUTPATIENT
Start: 2017-03-30 | End: 2017-03-30

## 2017-03-30 RX ORDER — NIFEDIPINE 30 MG/1
90 TABLET, EXTENDED RELEASE ORAL DAILY
Status: DISCONTINUED | OUTPATIENT
Start: 2017-03-30 | End: 2017-04-05 | Stop reason: HOSPADM

## 2017-03-30 RX ORDER — SODIUM CHLORIDE 9 MG/ML
1000 INJECTION, SOLUTION INTRAVENOUS
Status: DISCONTINUED | OUTPATIENT
Start: 2017-03-30 | End: 2017-03-30

## 2017-03-30 RX ORDER — LEVETIRACETAM 500 MG/1
1000 TABLET ORAL 2 TIMES DAILY
Status: DISCONTINUED | OUTPATIENT
Start: 2017-03-30 | End: 2017-04-05 | Stop reason: HOSPADM

## 2017-03-30 RX ADMIN — HYDRALAZINE HYDROCHLORIDE 50 MG: 50 TABLET ORAL at 08:03

## 2017-03-30 RX ADMIN — RAMELTEON 8 MG: 8 TABLET, FILM COATED ORAL at 10:03

## 2017-03-30 RX ADMIN — INSULIN DETEMIR 10 UNITS: 100 INJECTION, SOLUTION SUBCUTANEOUS at 04:03

## 2017-03-30 RX ADMIN — SODIUM CHLORIDE 1000 ML: 0.9 INJECTION, SOLUTION INTRAVENOUS at 02:03

## 2017-03-30 RX ADMIN — INSULIN ASPART 5 UNITS: 100 INJECTION, SOLUTION INTRAVENOUS; SUBCUTANEOUS at 11:03

## 2017-03-30 RX ADMIN — LEVETIRACETAM 1000 MG: 500 TABLET, FILM COATED ORAL at 08:03

## 2017-03-30 RX ADMIN — NIFEDIPINE 90 MG: 30 TABLET, FILM COATED, EXTENDED RELEASE ORAL at 08:03

## 2017-03-30 RX ADMIN — INSULIN ASPART 3 UNITS: 100 INJECTION, SOLUTION INTRAVENOUS; SUBCUTANEOUS at 05:03

## 2017-03-30 RX ADMIN — SODIUM CHLORIDE 100 ML/HR: 0.9 INJECTION, SOLUTION INTRAVENOUS at 10:03

## 2017-03-30 RX ADMIN — SODIUM CHLORIDE 1000 ML: 0.9 INJECTION, SOLUTION INTRAVENOUS at 12:03

## 2017-03-30 RX ADMIN — PAROXETINE HYDROCHLORIDE 10 MG: 10 TABLET, FILM COATED ORAL at 08:03

## 2017-03-30 RX ADMIN — HYDRALAZINE HYDROCHLORIDE 10 MG: 20 INJECTION INTRAMUSCULAR; INTRAVENOUS at 05:03

## 2017-03-30 RX ADMIN — CARVEDILOL 6.25 MG: 3.12 TABLET, FILM COATED ORAL at 10:03

## 2017-03-30 NOTE — ED NOTES
Patient identifiers verified and correct for Julius Cardenas.    LOC: The patient is awake, alert and aware of environment with an appropriate affect, the patient is oriented x 3 and speaking appropriately.  APPEARANCE: Patient resting comfortably and in no acute distress, patient is clean and well groomed, patient's clothing is properly fastened.  SKIN: The skin is warm and dry, color consistent with ethnicity, patient has normal skin turgor and moist mucus membranes, skin intact, no breakdown or bruising noted.  MUSCULOSKELETAL: Patient moving all extremities spontaneously, no obvious swelling or deformities noted.  RESPIRATORY: Airway is open and patent, respirations are spontaneous, patient has a normal effort and rate, no accessory muscle use noted, bilateral breath sounds clear and present.  CARDIAC: Patient has a normal rate and regular rhythm, no periphreal edema noted, capillary refill < 3 seconds.  ABDOMEN: Soft and non tender to palpation, no distention noted, normoactive bowel sounds present in all four quadrants.  NEUROLOGIC: PERRL, 3 mm bilaterally, eyes open spontaneously, behavior appropriate to situation, follows commands, facial expression symmetrical, bilateral hand grasp equal and even, purposeful motor response noted, normal sensation in all extremities when touched with a finger.

## 2017-03-30 NOTE — ED NOTES
Reports called to unit. RN accepted. Dr. Stevens paged about BP and need for med order. Awaiting call back.

## 2017-03-30 NOTE — ED PROVIDER NOTES
"Encounter Date: 3/30/2017    SCRIBE #1 NOTE: I, Sourav Rodgers, am scribing for, and in the presence of,  Dr. Thomas. I have scribed the following portions of the note - the APC attestation.       History     Chief Complaint   Patient presents with    Hyperglycemia     Pt c/o weakness, frequent urination and hyperglyemia.  Pt reports "high" on glucometer.  Pt given insulin last night.      Review of patient's allergies indicates:  No Known Allergies  HPI Comments: melody is a 84-year-old male with a past medical history of DM1, CKD4, seizures, TIA, BPH who presents the ED with hyperglycemia, weakness, polyuria.  Patient was recently discharged on 3/27/17 for hypoglycemia and hyperkalemia.  Patient states that he's been taking his insulin; last yesterday. He reports feeling weak and fatigue like he is "in between" and that he "tries to be right but ain't right". He endorses urinary frequency. Patient was brought in by EMS, and sugars were higher than 600. Patient lives with niece.  He denies any chest pain, SOB, abdominal pain, nausea, vomiting.    The history is provided by the patient.     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     TIA (transient ischemic attack) 2016    Type 1 diabetes     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 3/25/2013    Type 1 diabetes mellitus with hyperglycemia 4/8/2014    Type 1 diabetes " mellitus with hypoglycemia and without coma 4/8/2014    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     Poor control due to cognitive impairment, with history of hypoglycemia and DKA     Vitamin D deficiency disease 7/31/2013     Past Surgical History:   Procedure Laterality Date    CATARACT EXTRACTION W/  INTRAOCULAR LENS IMPLANT  8/26/2009, 10/14/2009    CRANIOTOMY  1995    CRANIOTOMY  12/21/2010    EYE SURGERY       Family History   Problem Relation Age of Onset    Diabetes Mother     Cataracts Mother     No Known Problems Father     Diabetes Sister     No Known Problems Son     No Known Problems Daughter     No Known Problems Maternal Grandmother     No Known Problems Maternal Grandfather     No Known Problems Paternal Grandmother     No Known Problems Paternal Grandfather     Diabetes Sister     Diabetes Brother     No Known Problems Maternal Aunt     No Known Problems Maternal Uncle     No Known Problems Paternal Aunt     No Known Problems Paternal Uncle     Diabetes Sister     Diabetes Brother     Amblyopia Neg Hx     Blindness Neg Hx     Cancer Neg Hx     Glaucoma Neg Hx     Hypertension Neg Hx     Macular degeneration Neg Hx     Retinal detachment Neg Hx     Strabismus Neg Hx     Stroke Neg Hx     Thyroid disease Neg Hx      Social History   Substance Use Topics    Smoking status: Former Smoker     Packs/day: 1.00     Types: Cigarettes     Quit date: 12/31/1994    Smokeless tobacco: Former User     Quit date: 4/3/2010    Alcohol use No     Review of Systems   Constitutional: Positive for fatigue. Negative for chills and fever.   HENT: Negative for sore throat.    Eyes: Negative for photophobia.   Respiratory: Negative for shortness of breath.    Cardiovascular: Negative for chest pain.   Gastrointestinal: Negative for abdominal pain, nausea and vomiting.   Endocrine: Positive for polyuria.   Genitourinary: Negative for dysuria and hematuria.    Musculoskeletal: Negative for back pain.   Skin: Negative for wound.   Neurological: Positive for weakness. Negative for numbness.       Physical Exam   Initial Vitals   BP Pulse Resp Temp SpO2   03/30/17 0930 03/30/17 0930 03/30/17 0930 03/30/17 0930 03/30/17 0930   174/82 60 20 97.7 °F (36.5 °C) 100 %     Physical Exam    Vitals reviewed.  Constitutional: He appears well-developed and well-nourished. He is not diaphoretic. No distress.   HENT:   Head: Normocephalic and atraumatic.   Nose: Nose normal.   Eyes: Conjunctivae and EOM are normal.   Neck: Normal range of motion.   Cardiovascular: Normal rate, regular rhythm and normal heart sounds. Exam reveals no friction rub.    No murmur heard.  Pulmonary/Chest: Breath sounds normal. No respiratory distress. He has no wheezes. He has no rales.   Abdominal: Soft. Bowel sounds are normal. He exhibits no distension. There is no tenderness.   Musculoskeletal: Normal range of motion.   Neurological: He is alert and oriented to person, place, and time. No sensory deficit.   Skin: Skin is warm and dry. No erythema.   Psychiatric: He has a normal mood and affect. Thought content normal.         ED Course   Procedures  Labs Reviewed   CBC W/ AUTO DIFFERENTIAL - Abnormal; Notable for the following:        Result Value    RBC 3.71 (*)     Hemoglobin 11.0 (*)     Hematocrit 31.5 (*)     All other components within normal limits   COMPREHENSIVE METABOLIC PANEL - Abnormal; Notable for the following:     CO2 18 (*)     Glucose 604 (*)     BUN, Bld 44 (*)     Creatinine 2.9 (*)     AST 55 (*)     ALT 71 (*)     eGFR if  22.0 (*)     eGFR if non  19.0 (*)     All other components within normal limits   PHOSPHORUS - Abnormal; Notable for the following:     Phosphorus 4.8 (*)     All other components within normal limits   URINALYSIS - Abnormal; Notable for the following:     Color, UA Colorless (*)     Glucose, UA 3+ (*)     Occult Blood UA 1+ (*)      All other components within normal limits    Narrative:     1 cup of urine   POCT GLUCOSE - Abnormal; Notable for the following:     POCT Glucose 484 (*)     All other components within normal limits   POCT GLUCOSE - Abnormal; Notable for the following:     POCT Glucose 422 (*)     All other components within normal limits   POCT GLUCOSE - Abnormal; Notable for the following:     POCT Glucose 369 (*)     All other components within normal limits   BETA - HYDROXYBUTYRATE, SERUM   MAGNESIUM   URINALYSIS MICROSCOPIC    Narrative:     1 cup of urine   CBC W/ AUTO DIFFERENTIAL   POCT GLUCOSE MONITORING CONTINUOUS   POCT GLUCOSE MONITORING CONTINUOUS             Medical Decision Making:   History:   Old Medical Records: I decided to obtain old medical records.  Clinical Tests:   Lab Tests: Ordered and Reviewed  Medical Tests: Ordered and Reviewed       APC / Resident Notes:   Patient with a history of DM1 presents the ED with hyperglycemia, weakness, and polyuria. Poor historian. On exam, NAD and nontoxic.  Abdomen soft, nontender nondistended.  DDX includes but is not limited to DKA, hyperglycemia, electrolyte abnormalities, NIALL, UTI.  Will order labs and reassess.    POCT glucose of 484.  UA with no signs of infection.  3+ sugar.  Negative ketones.  CBC with no leukocytosis or anemia. CMP with no electrolyte abnormalities.   Anion gap of 10. Glucose of 604. Beta hydroxybutyrate of 0.2.   BUN/Cr up to 44/2.9; was 38/2.6 three days ago.     Patient updated with results.  He reports improvement after 1st liter of IVF.  Will give second liter. Repeat POCT glucose of 422.    Given patient's weakness/fatigue and dehydration, physical exam, and lab, he will require admission for further evaluation and management of hyperglycemia in type 1 diabetic.        Scribe Attestation:   Scribe #1: I performed the above scribed service and the documentation accurately describes the services I performed. I attest to the accuracy of the  note.    Attending Attestation:     Physician Attestation Statement for NP/PA:   I have conducted a face to face encounter with this patient in addition to the NP/PA, due to Medical Complexity    Other NP/PA Attestation Additions:    History of Present Illness: 84 y.o. male, insulin dependent diabetic, who presents with hyperglycemia. Denied any fever, chills, n/v/d. No evidence of DKA on his lab work but appears severely dehydrated.  Will need significant hydration. Will also give insulin bolus. Pt to be admitted for further care.          Physician Attestation for Scribe:  Physician Attestation Statement for Scribe #1: I, Dr. Thomas, reviewed documentation, as scribed by Sourav Rodgesr in my presence, and it is both accurate and complete.                 ED Course     Clinical Impression:   The primary encounter diagnosis was Hyperglycemia. Diagnoses of Type 1 diabetes mellitus with hyperglycemia and Dehydration were also pertinent to this visit.    Disposition:   Disposition: Admitted  Condition: Stable       Yuki Orosco PA-C  03/30/17 2061

## 2017-03-30 NOTE — H&P
"History & Physical  Hospital Medicine      SUBJECTIVE:     Chief Complaint/Reason for Admission:   Severe hyperglycemia in DM-1    History of Present Illness:  Mr Julius Cardenas is a pleasant 84 y.o. 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 with HYPERglycemia/falls/weakness, says that he has been "feeling funny" and "sick" x 1 day, fell "a bunch of times" and thinks that he passed out but denies hitting his head.  He says that "they came and got me out of the bathroom" after he fell.  Also had nausea ("I felt like I wanted to throw up") but no vomiting. In ED, glucose 600s, started on IVFs but no insulin given.  Admit called in to Hospital Medicine.     I called his sister-in-law Kamini for collateral (see "post-acute care" in A/P, below)  I called the number listed for the group home that brought him in to ED today, and it went to voicemail without any identifying details for the facility or individual, so I did not leave a message due to concern for patient privacy.       Review of Systems:  Constitutional: no fever or chills  Eyes: no visual changes  ENT: no nasal congestion or sore throat  Respiratory: no cough or shortness of breath  Cardiovascular: no chest pain or palpitations  Gastrointestinal: +nausea, no vomiting, no abdominal pain or change in bowel habits  Genitourinary: no hematuria or dysuria  Integument/Breast: no rash or pruritis  Hematologic/Lymphatic: no easy bruising or " lymphadenopathy  Allergy/Immunology: none  Musculoskeletal: no arthralgias or myalgias  Neurological: no seizures or tremors  Behavioral/Psych: no auditory or visual hallucinations  Endocrine: no heat or cold intolerance    HISTORY:     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       Past Surgical History:   Procedure Laterality Date    CATARACT EXTRACTION W/  INTRAOCULAR LENS IMPLANT  8/26/2009, 10/14/2009    CRANIOTOMY  1995    CRANIOTOMY  12/21/2010    EYE SURGERY         Family History   Problem Relation Age of Onset    Diabetes Mother     Cataracts Mother     No Known Problems Father     Diabetes Sister     No Known Problems Son     No Known Problems Daughter     No Known Problems Maternal Grandmother     No Known Problems Maternal Grandfather     No Known Problems Paternal Grandmother     No Known Problems Paternal Grandfather     Diabetes Sister     Diabetes Brother      "No Known Problems Maternal Aunt     No Known Problems Maternal Uncle     No Known Problems Paternal Aunt     No Known Problems Paternal Uncle     Diabetes Sister     Diabetes Brother     Amblyopia Neg Hx     Blindness Neg Hx     Cancer Neg Hx     Glaucoma Neg Hx     Hypertension Neg Hx     Macular degeneration Neg Hx     Retinal detachment Neg Hx     Strabismus Neg Hx     Stroke Neg Hx     Thyroid disease Neg Hx        Social History     Social History    Marital status: Single     Spouse name: N/A    Number of children: N/A    Years of education: N/A     Social History Main Topics    Smoking status: Former Smoker     Packs/day: 1.00     Types: Cigarettes     Quit date: 12/31/1994    Smokeless tobacco: Former User     Quit date: 4/3/2010    Alcohol use No    Drug use: No    Sexual activity: Not on file     Other Topics Concern    Not on file     Social History Narrative    Single with adult aged children. Lives with Brother and Sister-in-Law. Quit alcohol and tobacco intake ~2007. Retired, but previously worked as installing Insulation.       MEDICATIONS & ALLERGIES:     Current Facility-Administered Medications   Medication    0.9%  NaCl infusion    0.9%  NaCl infusion    dextrose 50% injection 12.5 g    dextrose 50% injection 25 g    glucagon (human recombinant) injection 1 mg    glucose chewable tablet 16 g    glucose chewable tablet 24 g    insulin aspart pen 0-5 Units    insulin aspart pen 3 Units    insulin detemir pen 10 Units    [START ON 3/31/2017] insulin detemir pen 18 Units     Current Outpatient Prescriptions   Medication Sig    aspirin (ECOTRIN) 81 MG EC tablet Take 81 mg by mouth once daily.    BD INSULIN PEN NEEDLE UF SHORT 31 gauge x 5/16" Ndle USE AS DIRECTED    blood-glucose meter (CONTOUR METER) kit Use as instructed.  Please substitute appropriate meter to match his strips.    calcitRIOL (ROCALTROL) 0.5 MCG Cap TAKE 1 CAPSULE BY MOUTH ONCE DAILY    " carvedilol (COREG) 6.25 MG tablet Take 1 tablet (6.25 mg total) by mouth 2 (two) times daily.    CONTOUR TEST STRIPS Strp USE AS DIRECTED THREE TIMES DAILY    hydrALAZINE (APRESOLINE) 50 MG tablet Take 1 tablet (50 mg total) by mouth every 8 (eight) hours.    insulin aspart (NOVOLOG FLEXPEN) 100 unit/mL InPn pen Inject 5 Units into the skin 3 (three) times daily with meals. Plus correction scale.    insulin detemir (LEVEMIR FLEXPEN) 100 unit/mL (3 mL) SubQ InPn pen Inject 18 Units into the skin once daily.    lancets Misc Use three times daily for finger stick glucose monitoring.    levetiracetam (KEPPRA) 1000 MG tablet TAKE 1 TABLET BY MOUTH TWICE DAILY    nifedipine (PROCARDIA-XL) 90 MG (OSM) TR24 Take 1 tablet (90 mg total) by mouth once daily.    nystatin (MYCOSTATIN) cream Apply topically 2 (two) times daily.    paroxetine (PAXIL) 10 MG tablet Take 1 tablet (10 mg total) by mouth every morning.       Review of patient's allergies indicates:  No Known Allergies    OBJECTIVE:     Vital Signs:  Temp:  [97.7 °F (36.5 °C)] 97.7 °F (36.5 °C)  Pulse:  [60-65] 65  Resp:  [14-20] 14  SpO2:  [98 %-100 %] 100 %  BP: (174-207)/(82-92) 188/90    Physical Exam:  General: well developed, well nourished, no distress  Eyes: conjunctivae/corneas clear. PERRL..  HENT: Head:normocephalic, atraumatic. Ears:bilateral TM's and external ear canals normal. Nose: Nares normal. Septum midline. Mucosa normal. No drainage or sinus tenderness., no discharge. Throat: dry mucous membranes, and tongue normal; teeth and gums normal and no throat erythema.  Neck: supple, symmetrical, trachea midline, no JVD and thyroid not enlarged, symmetric, no tenderness/mass/nodules  Lungs:  clear to auscultation bilaterally and normal respiratory effort  Cardiovascular: Heart: regular rate and rhythm, S1, S2 normal, no murmur, click, rub or gallop. Chest Wall: no tenderness. Extremities: no cyanosis or edema, or clubbing. Pulses: 2+ and  "symmetric.  Abdomen/Rectal: Abdomen: soft, non-tender non-distented; bowel sounds normal; no masses,  no organomegaly. Rectal: not examined  Skin: Skin color, texture, turgor normal. No rashes or lesions  Musculoskeletal:normal gait and no clubbing, cyanosis  Lymph Nodes: No cervical or supraclavicular adenopathy  Neurologic: Normal strength and tone. No focal numbness or weakness  Psych/Behavioral:  Alert and oriented x 3 (self, "[facility]", "2017" but does not know current , stated "Teran" then "Bony"), appropriate affect., Speech: appropriate quality, quantity and organization of sentences and Thought content: normal    Laboratory  Recent Results (from the past 24 hour(s))   POCT glucose    Collection Time: 03/30/17  9:51 AM   Result Value Ref Range    POCT Glucose 484 (HH) 70 - 110 mg/dL   CBC auto differential    Collection Time: 03/30/17 10:02 AM   Result Value Ref Range    WBC 5.99 3.90 - 12.70 K/uL    RBC 3.71 (L) 4.60 - 6.20 M/uL    Hemoglobin 11.0 (L) 14.0 - 18.0 g/dL    Hematocrit 31.5 (L) 40.0 - 54.0 %    MCV 85 82 - 98 fL    MCH 29.6 27.0 - 31.0 pg    MCHC 34.9 32.0 - 36.0 %    RDW 13.7 11.5 - 14.5 %    Platelets 185 150 - 350 K/uL    MPV 11.6 9.2 - 12.9 fL    Gran # 3.9 1.8 - 7.7 K/uL    Lymph # 1.2 1.0 - 4.8 K/uL    Mono # 0.8 0.3 - 1.0 K/uL    Eos # 0.1 0.0 - 0.5 K/uL    Baso # 0.01 0.00 - 0.20 K/uL    Gran% 64.5 38.0 - 73.0 %    Lymph% 20.2 18.0 - 48.0 %    Mono% 12.9 4.0 - 15.0 %    Eosinophil% 2.0 0.0 - 8.0 %    Basophil% 0.2 0.0 - 1.9 %    Differential Method Automated    Comprehensive metabolic panel    Collection Time: 03/30/17 10:02 AM   Result Value Ref Range    Sodium 137 136 - 145 mmol/L    Potassium 4.1 3.5 - 5.1 mmol/L    Chloride 109 95 - 110 mmol/L    CO2 18 (L) 23 - 29 mmol/L    Glucose 604 (HH) 70 - 110 mg/dL    BUN, Bld 44 (H) 8 - 23 mg/dL    Creatinine 2.9 (H) 0.5 - 1.4 mg/dL    Calcium 9.4 8.7 - 10.5 mg/dL    Total Protein 7.7 6.0 - 8.4 g/dL    Albumin " 3.6 3.5 - 5.2 g/dL    Total Bilirubin 0.4 0.1 - 1.0 mg/dL    Alkaline Phosphatase 110 55 - 135 U/L    AST 55 (H) 10 - 40 U/L    ALT 71 (H) 10 - 44 U/L    Anion Gap 10 8 - 16 mmol/L    eGFR if African American 22.0 (A) >60 mL/min/1.73 m^2    eGFR if non African American 19.0 (A) >60 mL/min/1.73 m^2   Beta - Hydroxybutyrate, Serum    Collection Time: 03/30/17 10:02 AM   Result Value Ref Range    Beta-Hydroxybutyrate 0.2 0.0 - 0.5 mmol/L   Magnesium    Collection Time: 03/30/17 10:02 AM   Result Value Ref Range    Magnesium 1.9 1.6 - 2.6 mg/dL   Phosphorus    Collection Time: 03/30/17 10:02 AM   Result Value Ref Range    Phosphorus 4.8 (H) 2.7 - 4.5 mg/dL   Urinalysis Catheterized    Collection Time: 03/30/17 11:46 AM   Result Value Ref Range    Specimen UA Urine, Catheterized     Color, UA Colorless (A) Yellow, Straw, Eduarda    Appearance, UA Clear Clear    pH, UA 5.0 5.0 - 8.0    Specific Gravity, UA 1.010 1.005 - 1.030    Protein, UA Negative Negative    Glucose, UA 3+ (A) Negative    Ketones, UA Negative Negative    Bilirubin (UA) Negative Negative    Occult Blood UA 1+ (A) Negative    Nitrite, UA Negative Negative    Urobilinogen, UA Negative <2.0 EU/dL    Leukocytes, UA Negative Negative   Urinalysis Microscopic    Collection Time: 03/30/17 11:46 AM   Result Value Ref Range    RBC, UA 0 0 - 4 /hpf    WBC, UA 1 0 - 5 /hpf    Bacteria, UA Rare None-Occ /hpf    Yeast, UA None None    Squam Epithel, UA 0 /hpf    Microscopic Comment SEE COMMENT    POCT glucose    Collection Time: 03/30/17 12:35 PM   Result Value Ref Range    POCT Glucose 422 (H) 70 - 110 mg/dL   POCT glucose    Collection Time: 03/30/17  3:42 PM   Result Value Ref Range    POCT Glucose 369 (H) 70 - 110 mg/dL       Diagnostic Results:  6/2016 2D Echo with CFD: CONCLUSIONS     1 - Normal left ventricular systolic function (EF 60-65%).     2 - Concentric remodeling.     3 - Normal left ventricular diastolic function.     4 - Normal right ventricular  systolic function .     ASSESSMENT & PLAN:     DM-1 uncontrolled with hyperglycemia, with polydipsia and polyuria, likely due to medication noncompliance. UA negative for UTI.  No signs of infection. DM-1 uncontrolled (Dx age 18) with neuro/renal/PVD.    - levemir 10 units now and follow  - novolog 3-3-3 and levemir 18 qam (first dose tomorrow)    Severe dehydration due to polydipsia due to above  - aggressive IVFs    Head trauma - check CT head STAT    Transaminitis     dementia/cognitive dysfunction    renovascular HTN    CKD-4    Anemia of acute on chronic illness and CKD-4- stable- monitor    Gout    BPH    former tobacco smoking    h/o TIA    Post-acute care.  Debility due to dementia/cognitive dysfunction  - CM/SW to assist with finding him a SNF/NH as he is debilitated with dementia/cognitive dysfunction and DM-1, and his family can no longer care for him at home. He lived with his brother, Miguel Angel, and his sister-in-law, Kamini Cardenas ([phone]) for ten years up until recently when it was too much for Kamini and Miguel Angel as they are both elderly and disabled, so PCP Dr Carson suggested that he try living with a younger family member, so his niece (Kamini's daughter), Concetta Saucedo  ([phone]) took him in a couple of months ago. He was defecating on him self and on the ground and walking around without his diapers on, in front of Concetta's daughter (age 15) and granddaughter(age 11). In addition, Concetta is a  and has clients to the house, so this was not a good situation. He was then admitted to [facility] last week and CM spoke with Kamini and Concetta, sent referrals to [facility] and [facility], but ultimately the patient went to a group home called [facility] ([phone]) where he lived for the past two days and now re-admitted via ED with hyperglycemia, so likely not receiving his insulin.     Prophylaxis- no heparin until ICH ruled out on CT    Advance directives- full  code    Post-acute care- pending clinical condition,     Alexia Stevens MD  Hospital Medicine Staff

## 2017-03-30 NOTE — ED NOTES
Dr. Stevens returned page and aware. She is currently working of patient's admit orders and will place order for antihypertensives.

## 2017-03-31 LAB
ALBUMIN SERPL BCP-MCNC: 3.1 G/DL
ALP SERPL-CCNC: 86 U/L
ALT SERPL W/O P-5'-P-CCNC: 52 U/L
ANION GAP SERPL CALC-SCNC: 9 MMOL/L
AST SERPL-CCNC: 30 U/L
BILIRUB SERPL-MCNC: 0.4 MG/DL
BUN SERPL-MCNC: 40 MG/DL
CALCIUM SERPL-MCNC: 8.6 MG/DL
CHLORIDE SERPL-SCNC: 117 MMOL/L
CO2 SERPL-SCNC: 17 MMOL/L
CREAT SERPL-MCNC: 2.3 MG/DL
EST. GFR  (AFRICAN AMERICAN): 29.1 ML/MIN/1.73 M^2
EST. GFR  (NON AFRICAN AMERICAN): 25.1 ML/MIN/1.73 M^2
GLUCOSE SERPL-MCNC: 180 MG/DL
MAGNESIUM SERPL-MCNC: 1.7 MG/DL
PHOSPHATE SERPL-MCNC: 3.6 MG/DL
POCT GLUCOSE: 118 MG/DL (ref 70–110)
POCT GLUCOSE: 265 MG/DL (ref 70–110)
POCT GLUCOSE: 299 MG/DL (ref 70–110)
POTASSIUM SERPL-SCNC: 3.7 MMOL/L
PROT SERPL-MCNC: 6.7 G/DL
SODIUM SERPL-SCNC: 143 MMOL/L

## 2017-03-31 PROCEDURE — 84100 ASSAY OF PHOSPHORUS: CPT

## 2017-03-31 PROCEDURE — 25000003 PHARM REV CODE 250: Performed by: PHYSICIAN ASSISTANT

## 2017-03-31 PROCEDURE — 36415 COLL VENOUS BLD VENIPUNCTURE: CPT

## 2017-03-31 PROCEDURE — 80053 COMPREHEN METABOLIC PANEL: CPT

## 2017-03-31 PROCEDURE — 25000003 PHARM REV CODE 250: Performed by: HOSPITALIST

## 2017-03-31 PROCEDURE — 83735 ASSAY OF MAGNESIUM: CPT

## 2017-03-31 PROCEDURE — 63600175 PHARM REV CODE 636 W HCPCS: Performed by: HOSPITALIST

## 2017-03-31 PROCEDURE — 11000001 HC ACUTE MED/SURG PRIVATE ROOM

## 2017-03-31 PROCEDURE — 63600175 PHARM REV CODE 636 W HCPCS: Performed by: NURSE PRACTITIONER

## 2017-03-31 PROCEDURE — 99232 SBSQ HOSP IP/OBS MODERATE 35: CPT | Mod: ,,, | Performed by: HOSPITALIST

## 2017-03-31 RX ORDER — HEPARIN SODIUM 5000 [USP'U]/ML
5000 INJECTION, SOLUTION INTRAVENOUS; SUBCUTANEOUS EVERY 8 HOURS
Status: DISCONTINUED | OUTPATIENT
Start: 2017-03-31 | End: 2017-04-05 | Stop reason: HOSPADM

## 2017-03-31 RX ADMIN — LEVETIRACETAM 1000 MG: 500 TABLET, FILM COATED ORAL at 08:03

## 2017-03-31 RX ADMIN — CALCITRIOL 0.5 MCG: 0.25 CAPSULE, LIQUID FILLED ORAL at 08:03

## 2017-03-31 RX ADMIN — INSULIN ASPART 3 UNITS: 100 INJECTION, SOLUTION INTRAVENOUS; SUBCUTANEOUS at 05:03

## 2017-03-31 RX ADMIN — INSULIN ASPART 3 UNITS: 100 INJECTION, SOLUTION INTRAVENOUS; SUBCUTANEOUS at 01:03

## 2017-03-31 RX ADMIN — PAROXETINE HYDROCHLORIDE 10 MG: 10 TABLET, FILM COATED ORAL at 06:03

## 2017-03-31 RX ADMIN — LEVETIRACETAM 1000 MG: 500 TABLET, FILM COATED ORAL at 10:03

## 2017-03-31 RX ADMIN — SODIUM CHLORIDE 100 ML/HR: 0.9 INJECTION, SOLUTION INTRAVENOUS at 11:03

## 2017-03-31 RX ADMIN — HEPARIN SODIUM 5000 UNITS: 5000 INJECTION, SOLUTION INTRAVENOUS; SUBCUTANEOUS at 01:03

## 2017-03-31 RX ADMIN — NIFEDIPINE 90 MG: 30 TABLET, FILM COATED, EXTENDED RELEASE ORAL at 08:03

## 2017-03-31 RX ADMIN — HYDRALAZINE HYDROCHLORIDE 50 MG: 50 TABLET ORAL at 01:03

## 2017-03-31 RX ADMIN — CARVEDILOL 6.25 MG: 3.12 TABLET, FILM COATED ORAL at 08:03

## 2017-03-31 RX ADMIN — HYDRALAZINE HYDROCHLORIDE 50 MG: 50 TABLET ORAL at 10:03

## 2017-03-31 RX ADMIN — INSULIN DETEMIR 18 UNITS: 100 INJECTION, SOLUTION SUBCUTANEOUS at 09:03

## 2017-03-31 RX ADMIN — HEPARIN SODIUM 5000 UNITS: 5000 INJECTION, SOLUTION INTRAVENOUS; SUBCUTANEOUS at 10:03

## 2017-03-31 RX ADMIN — SODIUM CHLORIDE 100 ML/HR: 0.9 INJECTION, SOLUTION INTRAVENOUS at 01:03

## 2017-03-31 RX ADMIN — CARVEDILOL 6.25 MG: 3.12 TABLET, FILM COATED ORAL at 10:03

## 2017-03-31 RX ADMIN — HYDRALAZINE HYDROCHLORIDE 50 MG: 50 TABLET ORAL at 06:03

## 2017-03-31 NOTE — PROGRESS NOTES
Notified Dr. Stevens that sister of pt,Kamini,would like for her give her a call in reference to pt's medical status. Contact number for sister is [phone].

## 2017-03-31 NOTE — PROGRESS NOTES
Progress Note  Hospital Medicine      Admit Date: 3/30/2017    SUBJECTIVE:     Follow-up For:  Hyperglycemia due to type 1 diabetes mellitus    HPI: see my H&P    Hospital Course: see below    Interval History: Doing great, NIALL resolving, no complaints    Review of Systems:  Pain Scale: 0 /10   Constitutional: no fever or chills  Respiratory: no cough or shortness of breath  Cardiovascular: no chest pain or palpitations  Gastrointestinal: no nausea or vomiting, no abdominal pain or change in bowel habits  Genitourinary: no hematuria or dysuria  Integument/Breast: no rash or pruritis  Hematologic/Lymphatic: no easy bruising or lymphadenopathy  Musculoskeletal: no arthralgias or myalgias  Neurological: no seizures or tremors  Behavioral/Psych: no depression or anxiety    OBJECTIVE:     Vital Signs Range (Last 24H):  Temp:  [97.5 °F (36.4 °C)-98.2 °F (36.8 °C)]   Pulse:  [56-82]   Resp:  [14-20]   BP: (120-213)/(60-92)   SpO2:  [98 %-100 %]     I & O (Last 24H):  Intake/Output Summary (Last 24 hours) at 03/31/17 1212  Last data filed at 03/30/17 1538   Gross per 24 hour   Intake                0 ml   Output             1000 ml   Net            -1000 ml       Physical Exam:  General appearance: no distress, nontoxic  Mental status: Alert and oriented x 3  HEENT:  conjunctivae/corneas clear, PERRL  Neck: supple, thyroid not enlarged  Pulm:   normal respiratory effort, CTA B, no c/w/r  Card: RRR, S1, S2 normal, no murmur, click, rub or gallop  Abd: soft, NT, ND, BS present; no masses, no organomegaly  Ext: no c/c/e  Pulses: 2+, symmetric  Skin: color, texture, turgor normal. No rashes or lesions  Neuro: CN II-XII grossly intact, no focal numbness or weakness, normal strength and tone     Diagnostic Results:  Labs reviewed    Recent Results (from the past 24 hour(s))   POCT glucose    Collection Time: 03/30/17 12:35 PM   Result Value Ref Range    POCT Glucose 422 (H) 70 - 110 mg/dL   POCT glucose    Collection Time:  03/30/17  3:42 PM   Result Value Ref Range    POCT Glucose 369 (H) 70 - 110 mg/dL   POCT glucose    Collection Time: 03/30/17 10:46 PM   Result Value Ref Range    POCT Glucose 411 (H) 70 - 110 mg/dL   Comprehensive Metabolic Panel (CMP)    Collection Time: 03/31/17  4:18 AM   Result Value Ref Range    Sodium 143 136 - 145 mmol/L    Potassium 3.7 3.5 - 5.1 mmol/L    Chloride 117 (H) 95 - 110 mmol/L    CO2 17 (L) 23 - 29 mmol/L    Glucose 180 (H) 70 - 110 mg/dL    BUN, Bld 40 (H) 8 - 23 mg/dL    Creatinine 2.3 (H) 0.5 - 1.4 mg/dL    Calcium 8.6 (L) 8.7 - 10.5 mg/dL    Total Protein 6.7 6.0 - 8.4 g/dL    Albumin 3.1 (L) 3.5 - 5.2 g/dL    Total Bilirubin 0.4 0.1 - 1.0 mg/dL    Alkaline Phosphatase 86 55 - 135 U/L    AST 30 10 - 40 U/L    ALT 52 (H) 10 - 44 U/L    Anion Gap 9 8 - 16 mmol/L    eGFR if African American 29.1 (A) >60 mL/min/1.73 m^2    eGFR if non  25.1 (A) >60 mL/min/1.73 m^2   Magnesium    Collection Time: 03/31/17  4:18 AM   Result Value Ref Range    Magnesium 1.7 1.6 - 2.6 mg/dL   Phosphorus    Collection Time: 03/31/17  4:18 AM   Result Value Ref Range    Phosphorus 3.6 2.7 - 4.5 mg/dL   POCT glucose    Collection Time: 03/31/17  7:48 AM   Result Value Ref Range    POCT Glucose 118 (H) 70 - 110 mg/dL           ASSESSMENT/PLAN:     DM-1 uncontrolled with hyperglycemia, with polydipsia and polyuria, likely due to medication noncompliance. UA negative for UTI. No signs of infection. DM-1 uncontrolled (Dx age 18) with neuro/renal/PVD.   - levemir 10 units 3/30 and follow  - novolog 3-3-3 and levemir 18 qam (first dose today)     Severe dehydration due to polydipsia due to above  - resolved after aggressive IVFs     Head trauma - checked CT head STAT, and this was negative for acute findings     Transaminitis - improving - monitor     dementia/cognitive dysfunction     renovascular HTN     CKD-4     Anemia of acute on chronic illness and CKD-4- stable- monitor     Gout     BPH     former  tobacco smoking     h/o TIA     Post-acute care. Debility due to dementia/cognitive dysfunction  - CM/SW to assist with finding him a SNF/NH as he is debilitated with dementia/cognitive dysfunction and DM-1, and his family can no longer care for him at home. He lived with his brother, Miguel Angel, and his sister-in-law, Kamini Cardenas ([phone]) for ten years up until recently when it was too much for Kamini and Miguel nAgel as they are both elderly and disabled, so PCP Dr Carson suggested that he try living with a younger family member, so his niece (Kamini's daughter), Concetta Saucedo  ([phone]) took him in a couple of months ago. He was defecating on him self and on the ground and walking around without his diapers on, in front of Concetta's daughter (age 15) and granddaughter(age 11). In addition, Concetta is a  and has clients to the house, so this was not a good situation. He was then admitted to [facility] last week and CM spoke with Kamini and Concetta, sent referrals to [facility] and [facility], but ultimately the patient went to a group home called [facility] ([phone]) where he lived for the past two days and now re-admitted via ED with hyperglycemia, so likely not receiving his insulin.      Prophylaxis- no heparin until ICH ruled out on CT, start heparin 5K q8 now     Advance directives- full code     Post-acute care- pending securing placement in NH group home vs SNF/NH.   Spoke with Kamini menendez, she is available by phone any time and will go and look at facilities this weekend    Time spent in care of patient: 30 mins    Alexia Stevens MD  Hospital Medicine Staff

## 2017-03-31 NOTE — PLAN OF CARE
Pt remained calm and cooperative throughout shift. Blood glucose monitored and treated as ordered. NS running @ 100ml/hr continuously. Afebrile (see Epic flow sheet).Bed in lowest position and locked, call light within reach.

## 2017-03-31 NOTE — PLAN OF CARE
Sister-in-law, Kamini Cardenas ([phone]) = MAIN CONTACT.  Can not come in person due to debility but available by phone.   Concetta Saucedo ([facility]) [phone]      Pt states that he lives w niece but per chart notes, family interested in [facility] SNF  Called Kamini but she was unable to speak at this time (paying car insurance)  Pt uses cane but doesn't know where it is  Takes coumadin  SW consulted     03/31/17 0936   Discharge Assessment   Assessment Type Discharge Planning Assessment   Confirmed/corrected address and phone number on facesheet? Yes   Assessment information obtained from? Patient;Other   Communicated expected length of stay with patient/caregiver yes   Prior to hospitilization cognitive status: Alert/Oriented;No Deficits   Prior to hospitalization functional status: Needs Assistance;Assistive Equipment   Current Functional Status: Assistive Equipment;Needs Assistance   Arrived From home or self-care   Lives With other (see comments)   Able to Return to Prior Arrangements no   Is patient able to care for self after discharge? No   How many people do you have in your home that can help with your care after discharge? 2   Who are your caregiver(s) and their phone number(s)? Sister-in-law, Kamini Cardenas ([phone]) = MAIN CONTACT.  Can not come in person due to debility but available by phone.    Patient's perception of discharge disposition admitted as an inpatient   Patient currently being followed by outpatient case management? No   Patient currently receives home health services? No   Does the patient currently use HME? Yes   Patient currently receives private duty nursing? N/A   Patient currently receives any other outside agency services? No   Equipment Currently Used at Home cane, quad;wheelchair;bedside commode   Do you have any problems affording any of your prescribed medications? No   Is the patient taking medications as prescribed? yes   Do you have any financial concerns preventing  you from receiving the healthcare you need? No   Does the patient have transportation to healthcare appointments? Yes   Does the patient receive [facility]? Yes   Are there any open cases? No   Discharge Plan A Skilled Nursing Facility   Discharge Plan B New Nursing Home placement - [facility] care facility   Patient/Family In Agreement With Plan yes

## 2017-04-01 LAB
ALBUMIN SERPL BCP-MCNC: 3.1 G/DL
ALP SERPL-CCNC: 87 U/L
ALT SERPL W/O P-5'-P-CCNC: 45 U/L
ANION GAP SERPL CALC-SCNC: 9 MMOL/L
AST SERPL-CCNC: 30 U/L
BILIRUB SERPL-MCNC: 0.4 MG/DL
BUN SERPL-MCNC: 43 MG/DL
CALCIUM SERPL-MCNC: 8.6 MG/DL
CHLORIDE SERPL-SCNC: 113 MMOL/L
CO2 SERPL-SCNC: 17 MMOL/L
CREAT SERPL-MCNC: 2.4 MG/DL
EST. GFR  (AFRICAN AMERICAN): 27.6 ML/MIN/1.73 M^2
EST. GFR  (NON AFRICAN AMERICAN): 23.9 ML/MIN/1.73 M^2
GLUCOSE SERPL-MCNC: 312 MG/DL
MAGNESIUM SERPL-MCNC: 1.7 MG/DL
PHOSPHATE SERPL-MCNC: 3.4 MG/DL
POCT GLUCOSE: 102 MG/DL (ref 70–110)
POCT GLUCOSE: 217 MG/DL (ref 70–110)
POCT GLUCOSE: 376 MG/DL (ref 70–110)
POCT GLUCOSE: 40 MG/DL (ref 70–110)
POCT GLUCOSE: 46 MG/DL (ref 70–110)
POCT GLUCOSE: 96 MG/DL (ref 70–110)
POTASSIUM SERPL-SCNC: 3.8 MMOL/L
PROT SERPL-MCNC: 6.6 G/DL
SODIUM SERPL-SCNC: 139 MMOL/L

## 2017-04-01 PROCEDURE — 80053 COMPREHEN METABOLIC PANEL: CPT

## 2017-04-01 PROCEDURE — 97161 PT EVAL LOW COMPLEX 20 MIN: CPT

## 2017-04-01 PROCEDURE — 99232 SBSQ HOSP IP/OBS MODERATE 35: CPT | Mod: ,,, | Performed by: HOSPITALIST

## 2017-04-01 PROCEDURE — 83735 ASSAY OF MAGNESIUM: CPT

## 2017-04-01 PROCEDURE — 97116 GAIT TRAINING THERAPY: CPT

## 2017-04-01 PROCEDURE — 84100 ASSAY OF PHOSPHORUS: CPT

## 2017-04-01 PROCEDURE — 25000003 PHARM REV CODE 250: Performed by: HOSPITALIST

## 2017-04-01 PROCEDURE — 36415 COLL VENOUS BLD VENIPUNCTURE: CPT

## 2017-04-01 PROCEDURE — 25000003 PHARM REV CODE 250: Performed by: PHYSICIAN ASSISTANT

## 2017-04-01 PROCEDURE — 11000001 HC ACUTE MED/SURG PRIVATE ROOM

## 2017-04-01 RX ORDER — ASPIRIN 81 MG/1
81 TABLET ORAL DAILY
Status: DISCONTINUED | OUTPATIENT
Start: 2017-04-02 | End: 2017-04-05 | Stop reason: HOSPADM

## 2017-04-01 RX ORDER — INSULIN ASPART 100 [IU]/ML
7 INJECTION, SOLUTION INTRAVENOUS; SUBCUTANEOUS
Status: DISCONTINUED | OUTPATIENT
Start: 2017-04-01 | End: 2017-04-02

## 2017-04-01 RX ADMIN — INSULIN ASPART 3 UNITS: 100 INJECTION, SOLUTION INTRAVENOUS; SUBCUTANEOUS at 08:04

## 2017-04-01 RX ADMIN — HYDRALAZINE HYDROCHLORIDE 50 MG: 50 TABLET ORAL at 09:04

## 2017-04-01 RX ADMIN — SODIUM CHLORIDE 100 ML/HR: 0.9 INJECTION, SOLUTION INTRAVENOUS at 09:04

## 2017-04-01 RX ADMIN — HYDRALAZINE HYDROCHLORIDE 50 MG: 50 TABLET ORAL at 05:04

## 2017-04-01 RX ADMIN — CARVEDILOL 6.25 MG: 3.12 TABLET, FILM COATED ORAL at 09:04

## 2017-04-01 RX ADMIN — NIFEDIPINE 90 MG: 30 TABLET, FILM COATED, EXTENDED RELEASE ORAL at 09:04

## 2017-04-01 RX ADMIN — SODIUM CHLORIDE 100 ML/HR: 0.9 INJECTION, SOLUTION INTRAVENOUS at 11:04

## 2017-04-01 RX ADMIN — CALCITRIOL 0.5 MCG: 0.25 CAPSULE, LIQUID FILLED ORAL at 09:04

## 2017-04-01 RX ADMIN — HEPARIN SODIUM 5000 UNITS: 5000 INJECTION, SOLUTION INTRAVENOUS; SUBCUTANEOUS at 02:04

## 2017-04-01 RX ADMIN — PAROXETINE HYDROCHLORIDE 10 MG: 10 TABLET, FILM COATED ORAL at 05:04

## 2017-04-01 RX ADMIN — INSULIN DETEMIR 18 UNITS: 100 INJECTION, SOLUTION SUBCUTANEOUS at 09:04

## 2017-04-01 RX ADMIN — HEPARIN SODIUM 5000 UNITS: 5000 INJECTION, SOLUTION INTRAVENOUS; SUBCUTANEOUS at 09:04

## 2017-04-01 RX ADMIN — INSULIN ASPART 10 UNITS: 100 INJECTION, SOLUTION INTRAVENOUS; SUBCUTANEOUS at 08:04

## 2017-04-01 RX ADMIN — HYDRALAZINE HYDROCHLORIDE 50 MG: 50 TABLET ORAL at 02:04

## 2017-04-01 RX ADMIN — INSULIN ASPART 4 UNITS: 100 INJECTION, SOLUTION INTRAVENOUS; SUBCUTANEOUS at 11:04

## 2017-04-01 RX ADMIN — LEVETIRACETAM 1000 MG: 500 TABLET, FILM COATED ORAL at 09:04

## 2017-04-01 RX ADMIN — RAMELTEON 8 MG: 8 TABLET, FILM COATED ORAL at 09:04

## 2017-04-01 RX ADMIN — INSULIN ASPART 7 UNITS: 100 INJECTION, SOLUTION INTRAVENOUS; SUBCUTANEOUS at 05:04

## 2017-04-01 RX ADMIN — INSULIN ASPART 3 UNITS: 100 INJECTION, SOLUTION INTRAVENOUS; SUBCUTANEOUS at 11:04

## 2017-04-01 RX ADMIN — Medication 24 G: at 07:04

## 2017-04-01 RX ADMIN — HEPARIN SODIUM 5000 UNITS: 5000 INJECTION, SOLUTION INTRAVENOUS; SUBCUTANEOUS at 05:04

## 2017-04-01 NOTE — PT/OT/SLP EVAL
Physical Therapy  Evaluation    Julius Cardenas   MRN: [id number]   Admitting Diagnosis: Hyperglycemia due to type 1 diabetes mellitus    PT Received On: 04/01/17  PT Start Time: 1053     PT Stop Time: 1129    PT Total Time (min): 36 min       Billable Minutes:  Evaluation 20 and Gait [id number]    Diagnosis: Hyperglycemia due to type 1 diabetes mellitus  Impaired mobility    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      Past Surgical History:   Procedure Laterality Date    CATARACT EXTRACTION W/  INTRAOCULAR LENS IMPLANT  8/26/2009, 10/14/2009    CRANIOTOMY  1995    CRANIOTOMY  12/21/2010    EYE SURGERY         Referring physician:       Alexia Stevens MD       Date referred to PT: 3/31/2017    General Precautions: Standard, fall  Orthopedic Precautions: N/A   Braces: N/A       Do you have any cultural, spiritual, Rastafarian conflicts, given your current situation?: none given    Patient  "History:  Lives With:  (leela)  Living Arrangements: house  Home Accessibility: stairs to enter home  Home Layout: Able to live on 1st floor  Number of Stairs to Enter Home: 5  Stair Railings at Home: outside, present on right side  Transportation Available: family or friend will provide  Living Environment Comment: Per chart lives with leela but this is no longer viable DC placement due to level of assist needed.  DME owned (not currently used): unknown    Previous Level of Function:  Ambulation Skills: needs device  Transfer Skills: needs device  ADL Skills: needs device and assist  Work/Leisure Activity: needs device and assist    Subjective:  Communicated with nursing prior to session.  "I am not sure what I can do, I need to be changed"  Chief Complaint: "feeling sick"  Patient goals: go home    Pain Ratin/10               Pain Rating Post-Intervention: 0/10    Objective:   Patient found with: peripheral IV, telemetry, SCD     Cognitive Exam:  Oriented to: Person and Situation year and "[facility]'    Follows Commands/attention: Easily distracted and Follows one-step commands  Communication: clear/fluent  Safety awareness/insight to disability: impaired    Physical Exam:  Postural examination/scapula alignment: Rounded shoulder, Head forward and Posterior pelvic tilt    Skin integrity: Thin and Dry  Edema: None noted DIMAS LE    Sensation:   Intact per pt    Lower Extremity Range of Motion:  Right Lower Extremity: WFL  Left Lower Extremity: WFL    Lower Extremity Strength: grossly 4/5 with pt not consistent on muscle testing.  Right Lower Extremity: WFL  Left Lower Extremity: WFL     Functional Mobility:  Bed Mobility:  Rolling/Turning Right: Contact guard assistance, With side rail  Scooting/Bridging: Contact Guard Assistance, With side rail  Supine to Sit: Contact Guard Assistance, With side rail  Sit to Supine: Contact Guard Assistance, With siderail    Transfers:  Sit <> Stand Assistance: Contact " Guard Assistance  Sit <> Stand Assistive Device: Rolling Walker    Gait:   Gait Distance: 80' with RW and CCGA and mod A with walker managment, mod vc's for safe walker management.  Assistance 1: Contact Guard Assistance  Gait Assistive Device: Rolling walker  Gait Pattern: swing-through gait  Gait Deviation(s): increased time in double stance, decreased connie, decreased swing-to-stance ratio, decreased step length, decreased velocity of limb motion, decreased stride length, increased stride width      Balance:   Static Sit: FAIR+: Able to take MINIMAL challenges from all directions  Dynamic Sit: FAIR+: Maintains balance through MINIMAL excursions of active trunk motion  Static Stand: POOR+: Needs MINIMAL assist to maintain  Dynamic stand: FAIR: Needs CONTACT GUARD during gait    Therapeutic Activities and Exercises:  Evaluation. Education on POC, role of PT. Gait training with RW. Pt reports recent falls with use of cane. Bed mobility and rolling with CGA and cues for use of rail.  Pt very pleasant and agreeable to all activities.    AM-PAC 6 CLICK MOBILITY  How much help from another person does this patient currently need?   1 = Unable, Total/Dependent Assistance  2 = A lot, Maximum/Moderate Assistance  3 = A little, Minimum/Contact Guard/Supervision  4 = None, Modified Mayes/Independent    Turning over in bed (including adjusting bedclothes, sheets and blankets)?: 3  Sitting down on and standing up from a chair with arms (e.g., wheelchair, bedside commode, etc.): 3  Moving from lying on back to sitting on the side of the bed?: 3  Moving to and from a bed to a chair (including a wheelchair)?: 3  Need to walk in hospital room?: 3  Climbing 3-5 steps with a railing?: 2  Total Score: 17     AM-PAC Raw Score CMS G-Code Modifier Level of Impairment Assistance   6 % Total / Unable   7 - 9 CM 80 - 100% Maximal Assist   10 - 14 CL 60 - 80% Moderate Assist   15 - 19 CK 40 - 60% Moderate Assist   20 - 22  CJ 20 - 40% Minimal Assist   23 CI 1-20% SBA / CGA   24 CH 0% Independent/ Mod I     Patient left supine with all lines intact, call button in reach, bed alarm on and nursing notified.    Assessment:   Julius Cardenas is a 84 y.o. male with a medical diagnosis of Hyperglycemia due to type 1 diabetes mellitus and presents with impaired mobility, decreased balance and safety with gait and transfers..    Rehab identified problem list/impairments: Rehab identified problem list/impairments: weakness, gait instability, impaired coordination, decreased safety awareness, impaired balance, impaired endurance, impaired self care skills, impaired cognition, impaired functional mobilty    Rehab potential is good.    Activity tolerance: Good    Discharge recommendations: Discharge Facility/Level Of Care Needs: nursing facility, skilled     Barriers to discharge:      Equipment recommendations:       GOALS:   Physical Therapy Goals        Problem: Physical Therapy Goal    Goal Priority Disciplines Outcome Goal Variances Interventions   Physical Therapy Goal     PT/OT, PT Ongoing (interventions implemented as appropriate)     Description:  Goals to be met by: 2017     Patient will increase functional independence with mobility by performin. Supine to sit with Set-up Weber  2. Sit to stand transfer with Supervision  3. Gait  x 200 feet with Stand-by Assistance using Rolling Walker.   4. Ascend/descend 5 stair with right Handrails Stand-by Assistance using Rolling Walker.   5. Lower extremity exercise program x20 reps per handout, with supervision to increase strength and endurance to improve safety with above mobility goals.                PLAN:    Patient to be seen 4 x/week to address the above listed problems via gait training, therapeutic activities, therapeutic exercises, neuromuscular re-education  Plan of Care expires:    Plan of Care reviewed with: patient          Gloria E Arley, PT  2017

## 2017-04-01 NOTE — PLAN OF CARE
Problem: Physical Therapy Goal  Goal: Physical Therapy Goal  Goals to be met by: 2017     Patient will increase functional independence with mobility by performin. Supine to sit with Set-up Oelrichs  2. Sit to stand transfer with Supervision  3. Gait x 200 feet with Stand-by Assistance using Rolling Walker.   4. Ascend/descend 5 stair with right Handrails Stand-by Assistance using Rolling Walker.   5. Lower extremity exercise program x20 reps per handout, with supervision to increase strength and endurance to improve safety with above mobility goals.  Outcome: Ongoing (interventions implemented as appropriate)  Goals established on this day.

## 2017-04-01 NOTE — PROGRESS NOTES
Progress Note  Hospital Medicine      Admit Date: 3/30/2017    SUBJECTIVE:     Follow-up For:  Hyperglycemia due to type 1 diabetes mellitus    HPI: see my H&P    Hospital Course: see below    4/1: Doing great, INALL resolving, no complaints    Interval History: Continues to do great. CM Yris Carlson called Kamini and got the following 3 choices: [facility], [facility], and [facility]. I called Kamini with an update.  Aiming for d/c to SNF/NH on Monday.     Review of Systems:  Pain Scale: 0 /10   Constitutional: no fever or chills  Respiratory: no cough or shortness of breath  Cardiovascular: no chest pain or palpitations  Gastrointestinal: no nausea or vomiting, no abdominal pain or change in bowel habits  Genitourinary: no hematuria or dysuria  Integument/Breast: no rash or pruritis  Hematologic/Lymphatic: no easy bruising or lymphadenopathy  Musculoskeletal: no arthralgias or myalgias  Neurological: no seizures or tremors  Behavioral/Psych: no depression or anxiety    OBJECTIVE:     Vital Signs Range (Last 24H):  Temp:  [97.1 °F (36.2 °C)-98.4 °F (36.9 °C)]   Pulse:  [53-76]   Resp:  [16-18]   BP: (152-196)/(73-86)   SpO2:  [97 %-100 %]     I & O (Last 24H):    Intake/Output Summary (Last 24 hours) at 04/01/17 1139  Last data filed at 03/31/17 1720   Gross per 24 hour   Intake              600 ml   Output                0 ml   Net              600 ml       Physical Exam:  General appearance: no distress, nontoxic  Mental status: Alert and oriented x 3  HEENT:  conjunctivae/corneas clear, PERRL  Neck: supple, thyroid not enlarged  Pulm:   normal respiratory effort, CTA B, no c/w/r  Card: RRR, S1, S2 normal, no murmur, click, rub or gallop  Abd: soft, NT, ND, BS present; no masses, no organomegaly  Ext: no c/c/e  Pulses: 2+, symmetric  Skin: color, texture, turgor normal. No rashes or lesions  Neuro: CN II-XII grossly intact, no focal numbness or weakness, normal strength and tone     Diagnostic  Results:  Labs reviewed    Recent Results (from the past 24 hour(s))   POCT glucose    Collection Time: 03/31/17  1:38 PM   Result Value Ref Range    POCT Glucose 299 (H) 70 - 110 mg/dL   POCT glucose    Collection Time: 03/31/17  5:50 PM   Result Value Ref Range    POCT Glucose 265 (H) 70 - 110 mg/dL   Comprehensive Metabolic Panel (CMP)    Collection Time: 04/01/17  4:36 AM   Result Value Ref Range    Sodium 139 136 - 145 mmol/L    Potassium 3.8 3.5 - 5.1 mmol/L    Chloride 113 (H) 95 - 110 mmol/L    CO2 17 (L) 23 - 29 mmol/L    Glucose 312 (H) 70 - 110 mg/dL    BUN, Bld 43 (H) 8 - 23 mg/dL    Creatinine 2.4 (H) 0.5 - 1.4 mg/dL    Calcium 8.6 (L) 8.7 - 10.5 mg/dL    Total Protein 6.6 6.0 - 8.4 g/dL    Albumin 3.1 (L) 3.5 - 5.2 g/dL    Total Bilirubin 0.4 0.1 - 1.0 mg/dL    Alkaline Phosphatase 87 55 - 135 U/L    AST 30 10 - 40 U/L    ALT 45 (H) 10 - 44 U/L    Anion Gap 9 8 - 16 mmol/L    eGFR if African American 27.6 (A) >60 mL/min/1.73 m^2    eGFR if non  23.9 (A) >60 mL/min/1.73 m^2   Magnesium    Collection Time: 04/01/17  4:36 AM   Result Value Ref Range    Magnesium 1.7 1.6 - 2.6 mg/dL   Phosphorus    Collection Time: 04/01/17  4:36 AM   Result Value Ref Range    Phosphorus 3.4 2.7 - 4.5 mg/dL   POCT glucose    Collection Time: 04/01/17  7:33 AM   Result Value Ref Range    POCT Glucose 376 (H) 70 - 110 mg/dL           ASSESSMENT/PLAN:     DM-1 uncontrolled with hyperglycemia, with polydipsia and polyuria, likely due to medication noncompliance. UA negative for UTI. No signs of infection. DM-1 uncontrolled (Dx age 18) with neuro/renal/PVD.   - novolog 3-3-3 and levemir 18 qam     Severe dehydration due to polydipsia due to above  - resolved after aggressive IVFs     Head trauma - checked CT head STAT, and this was negative for acute findings     Transaminitis - resolved     Dementia/cognitive dysfunction - delirium hygeine    H/o craniotomy for meningioma  ?h/o seizures  - continue home keppra  1g PO BID    Mood disorder   - home paroxetine 10     Renovascular HTN  - home meds coreg 6.25 BID, hydralazine 50 q8, nifedipine 90  - ?no ACE-I or ARB.    - PCP to consider ACE-I or ARB after NIALL resolves, as ACE-I or ARB indicated for CAD equivalent (HTN/DM-1)     Niall on CKD-4 due to volume depletion from polyuria from hyperglycemia   - resolving      Anemia of acute on chronic illness and CKD-4- stable- monitor     Gout - not on any meds - no acute issues     BPH - no meds - no issues     Former tobacco smoking. No acute issues.      h/o TIA  - start ASA 81    Deconditioning   - PT recs SNF  - OT recs pending     Post-acute care. Debility due to dementia/cognitive dysfunction  - CM/SW to assist with finding him a SNF/NH as he is debilitated with dementia/cognitive dysfunction and DM-1, and his family can no longer care for him at home. He lived with his brother, Miguel Angel, and his sister-in-law, Kamini Cardenas ([phone]) for ten years up until recently when it was too much for Kamini and Miguel Angel as they are both elderly and disabled, so PCP Dr Carson suggested that he try living with a younger family member, so his niece (Kamini's daughter), Concetta Saucedo  ([phone]) took him in a couple of months ago. He was defecating on him self and on the ground and walking around without his diapers on, in front of Concetta's daughter (age 15) and granddaughter(age 11). In addition, Concetta is a  and has clients to the house, so this was not a good situation. He was then admitted to [facility] last week and CM spoke with Kamini and Concetta, sent referrals to [facility] and [facility], but ultimately the patient went to a group home called [facility] ([phone]) where he lived for the past two days and now re-admitted via ED with hyperglycemia, so likely not receiving his insulin.      Prophylaxis- heparin 5K q8; SCDs     Advance directives- full code     Post-acute care- pending securing placement in  NH assisted vs SNF/NH.     Time spent in care of patient: 30 mins    Alexia Stevens MD  [facility] Medicine Staff

## 2017-04-01 NOTE — PLAN OF CARE
CM called and spoke with patient's KOSTAKamini ([phone]). CM gave choices of SNHs in the [place] area, KOSTA would like patient as close to her house ([id number]) as possible so that family can visit patient. Preferences in order are: [facility], [facility], and [facility]. Referrals sent via ; to be followed up on Monday by SW/MJ team.

## 2017-04-01 NOTE — PLAN OF CARE
Problem: Diabetes, Type 2 (Adult)  Goal: Signs and Symptoms of Listed Potential Problems Will be Absent, Minimized or Managed (Diabetes, Type 2)  Signs and symptoms of listed potential problems will be absent, minimized or managed by discharge/transition of care (reference Diabetes, Type 2 (Adult) CPG).   Outcome: Ongoing (interventions implemented as appropriate)  Blood sugar checked as ordered by MD and insulin administered as well.     Problem: Fall Risk (Adult)  Goal: Absence of Falls  Patient will demonstrate the desired outcomes by discharge/transition of care.   Outcome: Ongoing (interventions implemented as appropriate)  Patient remained free of falls, trauma or injuries, low bed,side rails up X2, call light within reach, non skid socks on, bed alarm on, fall risk band on. Patient educated not to get OOb without calling nurse/PCT he verbalized understanding.     Problem: Pressure Ulcer Risk (Pedro Scale) (Adult,Obstetrics,Pediatric)  Goal: Skin Integrity  Patient will demonstrate the desired outcomes by discharge/transition of care.   Outcome: Ongoing (interventions implemented as appropriate)  Pateint turned Q 2 hrs. No development of pressure ulcers during shift time.

## 2017-04-02 LAB
ALBUMIN SERPL BCP-MCNC: 3 G/DL
ALP SERPL-CCNC: 84 U/L
ALT SERPL W/O P-5'-P-CCNC: 39 U/L
ANION GAP SERPL CALC-SCNC: 9 MMOL/L
AST SERPL-CCNC: 29 U/L
BILIRUB SERPL-MCNC: 0.3 MG/DL
BUN SERPL-MCNC: 41 MG/DL
CALCIUM SERPL-MCNC: 8.4 MG/DL
CHLORIDE SERPL-SCNC: 113 MMOL/L
CO2 SERPL-SCNC: 18 MMOL/L
CREAT SERPL-MCNC: 2.2 MG/DL
EST. GFR  (AFRICAN AMERICAN): 30.7 ML/MIN/1.73 M^2
EST. GFR  (NON AFRICAN AMERICAN): 26.5 ML/MIN/1.73 M^2
GLUCOSE SERPL-MCNC: 201 MG/DL
MAGNESIUM SERPL-MCNC: 1.6 MG/DL
PHOSPHATE SERPL-MCNC: 3.5 MG/DL
POCT GLUCOSE: 247 MG/DL (ref 70–110)
POCT GLUCOSE: 296 MG/DL (ref 70–110)
POCT GLUCOSE: 303 MG/DL (ref 70–110)
POCT GLUCOSE: 52 MG/DL (ref 70–110)
POCT GLUCOSE: 58 MG/DL (ref 70–110)
POCT GLUCOSE: 93 MG/DL (ref 70–110)
POTASSIUM SERPL-SCNC: 4 MMOL/L
PROT SERPL-MCNC: 6.6 G/DL
SODIUM SERPL-SCNC: 140 MMOL/L

## 2017-04-02 PROCEDURE — 25000003 PHARM REV CODE 250: Performed by: PHYSICIAN ASSISTANT

## 2017-04-02 PROCEDURE — 36415 COLL VENOUS BLD VENIPUNCTURE: CPT

## 2017-04-02 PROCEDURE — 97530 THERAPEUTIC ACTIVITIES: CPT

## 2017-04-02 PROCEDURE — 99232 SBSQ HOSP IP/OBS MODERATE 35: CPT | Mod: ,,, | Performed by: HOSPITALIST

## 2017-04-02 PROCEDURE — 80053 COMPREHEN METABOLIC PANEL: CPT

## 2017-04-02 PROCEDURE — 84100 ASSAY OF PHOSPHORUS: CPT

## 2017-04-02 PROCEDURE — 97165 OT EVAL LOW COMPLEX 30 MIN: CPT

## 2017-04-02 PROCEDURE — 97535 SELF CARE MNGMENT TRAINING: CPT

## 2017-04-02 PROCEDURE — 25000003 PHARM REV CODE 250: Performed by: HOSPITALIST

## 2017-04-02 PROCEDURE — 11000001 HC ACUTE MED/SURG PRIVATE ROOM

## 2017-04-02 PROCEDURE — 83735 ASSAY OF MAGNESIUM: CPT

## 2017-04-02 RX ORDER — INSULIN ASPART 100 [IU]/ML
5 INJECTION, SOLUTION INTRAVENOUS; SUBCUTANEOUS
Status: DISCONTINUED | OUTPATIENT
Start: 2017-04-02 | End: 2017-04-04

## 2017-04-02 RX ADMIN — PAROXETINE HYDROCHLORIDE 10 MG: 10 TABLET, FILM COATED ORAL at 06:04

## 2017-04-02 RX ADMIN — LEVETIRACETAM 1000 MG: 500 TABLET, FILM COATED ORAL at 10:04

## 2017-04-02 RX ADMIN — HEPARIN SODIUM 5000 UNITS: 5000 INJECTION, SOLUTION INTRAVENOUS; SUBCUTANEOUS at 10:04

## 2017-04-02 RX ADMIN — HYDRALAZINE HYDROCHLORIDE 50 MG: 50 TABLET ORAL at 10:04

## 2017-04-02 RX ADMIN — NIFEDIPINE 90 MG: 30 TABLET, FILM COATED, EXTENDED RELEASE ORAL at 08:04

## 2017-04-02 RX ADMIN — SODIUM CHLORIDE 100 ML/HR: 0.9 INJECTION, SOLUTION INTRAVENOUS at 08:04

## 2017-04-02 RX ADMIN — LEVETIRACETAM 1000 MG: 500 TABLET, FILM COATED ORAL at 08:04

## 2017-04-02 RX ADMIN — CARVEDILOL 6.25 MG: 3.12 TABLET, FILM COATED ORAL at 08:04

## 2017-04-02 RX ADMIN — INSULIN ASPART 6 UNITS: 100 INJECTION, SOLUTION INTRAVENOUS; SUBCUTANEOUS at 08:04

## 2017-04-02 RX ADMIN — INSULIN ASPART 7 UNITS: 100 INJECTION, SOLUTION INTRAVENOUS; SUBCUTANEOUS at 08:04

## 2017-04-02 RX ADMIN — INSULIN ASPART 5 UNITS: 100 INJECTION, SOLUTION INTRAVENOUS; SUBCUTANEOUS at 12:04

## 2017-04-02 RX ADMIN — ASPIRIN 81 MG: 81 TABLET, COATED ORAL at 08:04

## 2017-04-02 RX ADMIN — HEPARIN SODIUM 5000 UNITS: 5000 INJECTION, SOLUTION INTRAVENOUS; SUBCUTANEOUS at 05:04

## 2017-04-02 RX ADMIN — HYDRALAZINE HYDROCHLORIDE 50 MG: 50 TABLET ORAL at 02:04

## 2017-04-02 RX ADMIN — Medication 16 G: at 05:04

## 2017-04-02 RX ADMIN — HYDRALAZINE HYDROCHLORIDE 50 MG: 50 TABLET ORAL at 05:04

## 2017-04-02 RX ADMIN — CARVEDILOL 6.25 MG: 3.12 TABLET, FILM COATED ORAL at 10:04

## 2017-04-02 RX ADMIN — INSULIN ASPART 4 UNITS: 100 INJECTION, SOLUTION INTRAVENOUS; SUBCUTANEOUS at 10:04

## 2017-04-02 RX ADMIN — HEPARIN SODIUM 5000 UNITS: 5000 INJECTION, SOLUTION INTRAVENOUS; SUBCUTANEOUS at 02:04

## 2017-04-02 RX ADMIN — CALCITRIOL 0.5 MCG: 0.25 CAPSULE, LIQUID FILLED ORAL at 08:04

## 2017-04-02 RX ADMIN — INSULIN ASPART 4 UNITS: 100 INJECTION, SOLUTION INTRAVENOUS; SUBCUTANEOUS at 12:04

## 2017-04-02 NOTE — PROGRESS NOTES
At HS the patient's blood sugar level was 40, 24g of glucose tablets were given, rechecked and reading was 46, patient given a sandwich, crackers and juice. Rechecked again and reading was 96. Team and charge nurse notified.

## 2017-04-02 NOTE — PROGRESS NOTES
Progress Note  Hospital Medicine      Admit Date: 3/30/2017    SUBJECTIVE:     Follow-up For:  Hyperglycemia due to type 1 diabetes mellitus    HPI: see my H&P    Hospital Course: see below    3/31: Doing great, NIALL resolving, no complaints    4/1: Continues to do great. CM Yris Carlson called Kamini and got the following 3 choices: [facility], [facility], and [facility]. I called Kamini with an update.  Aiming for d/c to SNF/NH on Monday.     Interval History: Hypoglycemic, so adjusting insulin. Await placement    Review of Systems:  Pain Scale: 0 /10   Constitutional: no fever or chills  Respiratory: no cough or shortness of breath  Cardiovascular: no chest pain or palpitations  Gastrointestinal: no nausea or vomiting, no abdominal pain or change in bowel habits  Genitourinary: no hematuria or dysuria  Integument/Breast: no rash or pruritis  Hematologic/Lymphatic: no easy bruising or lymphadenopathy  Musculoskeletal: no arthralgias or myalgias  Neurological: no seizures or tremors  Behavioral/Psych: no depression or anxiety    OBJECTIVE:     Vital Signs Range (Last 24H):  Temp:  [97.5 °F (36.4 °C)-97.9 °F (36.6 °C)]   Pulse:  [58-75]   Resp:  [16-18]   BP: (148-184)/(66-85)   SpO2:  [95 %-100 %]     I & O (Last 24H):    Intake/Output Summary (Last 24 hours) at 04/02/17 1059  Last data filed at 04/01/17 1800   Gross per 24 hour   Intake              720 ml   Output                0 ml   Net              720 ml       Physical Exam:  General appearance: no distress, nontoxic  Mental status: Alert and oriented x 3  HEENT:  conjunctivae/corneas clear, PERRL  Neck: supple, thyroid not enlarged  Pulm:   normal respiratory effort, CTA B, no c/w/r  Card: RRR, S1, S2 normal, no murmur, click, rub or gallop  Abd: soft, NT, ND, BS present; no masses, no organomegaly  Ext: no c/c/e  Pulses: 2+, symmetric  Skin: color, texture, turgor normal. No rashes or lesions  Neuro: CN II-XII grossly intact, no focal numbness or  weakness, normal strength and tone     Diagnostic Results:  Labs reviewed    Recent Results (from the past 24 hour(s))   POCT glucose    Collection Time: 04/01/17 11:43 AM   Result Value Ref Range    POCT Glucose 217 (H) 70 - 110 mg/dL   POCT glucose    Collection Time: 04/01/17  5:15 PM   Result Value Ref Range    POCT Glucose 102 70 - 110 mg/dL   POCT glucose    Collection Time: 04/01/17  7:44 PM   Result Value Ref Range    POCT Glucose 40 (LL) 70 - 110 mg/dL   POCT glucose    Collection Time: 04/01/17  8:26 PM   Result Value Ref Range    POCT Glucose 46 (LL) 70 - 110 mg/dL   POCT glucose    Collection Time: 04/01/17  9:11 PM   Result Value Ref Range    POCT Glucose 96 70 - 110 mg/dL   Comprehensive Metabolic Panel (CMP)    Collection Time: 04/02/17  3:45 AM   Result Value Ref Range    Sodium 140 136 - 145 mmol/L    Potassium 4.0 3.5 - 5.1 mmol/L    Chloride 113 (H) 95 - 110 mmol/L    CO2 18 (L) 23 - 29 mmol/L    Glucose 201 (H) 70 - 110 mg/dL    BUN, Bld 41 (H) 8 - 23 mg/dL    Creatinine 2.2 (H) 0.5 - 1.4 mg/dL    Calcium 8.4 (L) 8.7 - 10.5 mg/dL    Total Protein 6.6 6.0 - 8.4 g/dL    Albumin 3.0 (L) 3.5 - 5.2 g/dL    Total Bilirubin 0.3 0.1 - 1.0 mg/dL    Alkaline Phosphatase 84 55 - 135 U/L    AST 29 10 - 40 U/L    ALT 39 10 - 44 U/L    Anion Gap 9 8 - 16 mmol/L    eGFR if African American 30.7 (A) >60 mL/min/1.73 m^2    eGFR if non  26.5 (A) >60 mL/min/1.73 m^2   Magnesium    Collection Time: 04/02/17  3:45 AM   Result Value Ref Range    Magnesium 1.6 1.6 - 2.6 mg/dL   Phosphorus    Collection Time: 04/02/17  3:45 AM   Result Value Ref Range    Phosphorus 3.5 2.7 - 4.5 mg/dL   POCT glucose    Collection Time: 04/02/17  7:27 AM   Result Value Ref Range    POCT Glucose 296 (H) 70 - 110 mg/dL           ASSESSMENT/PLAN:     DM-1 uncontrolled with hyperglycemia, with polydipsia and polyuria, likely due to medication noncompliance. UA negative for UTI. No signs of infection. DM-1 uncontrolled (Dx age  18) with neuro/renal/PVD.   - hypoglycemic overnight  - novolog 5-5-5 (from 7) and levemir 18 (from 20)     Severe dehydration due to polydipsia due to above  - resolved after aggressive IVFs     Head trauma - checked CT head STAT, and this was negative for acute findings     Transaminitis - resolved     Dementia/cognitive dysfunction - delirium hygeine    H/o craniotomy for meningioma  ?h/o seizures  - continue home keppra 1g PO BID    Mood disorder   - home paroxetine 10     Renovascular HTN  - home meds coreg 6.25 BID, hydralazine 50 q8, nifedipine 90  - ?no ACE-I or ARB.    - PCP to consider ACE-I or ARB after NIALL resolves, as ACE-I or ARB indicated for CAD equivalent (HTN/DM-1)     Niall on CKD-4 due to volume depletion from polyuria from hyperglycemia   - resolving      Anemia of acute on chronic illness and CKD-4- stable- monitor     Gout - not on any meds - no acute issues     BPH - no meds - no issues     Former tobacco smoking. No acute issues.      h/o TIA  - start ASA 81    Deconditioning   - PT recs SNF  - OT recs pending     Post-acute care. Debility due to dementia/cognitive dysfunction  - CM/SW to assist with finding him a SNF/NH as he is debilitated with dementia/cognitive dysfunction and DM-1, and his family can no longer care for him at home. He lived with his brother, Miguel Angel, and his sister-in-law, Kamini Cardenas ([phone]) for ten years up until recently when it was too much for Kamini and Miguel Angel as they are both elderly and disabled, so PCP Dr Carson suggested that he try living with a younger family member, so his niece (Kamini's daughter), Concetta Saucedo  ([phone]) took him in a couple of months ago. He was defecating on him self and on the ground and walking around without his diapers on, in front of Concetta's daughter (age 15) and granddaughter(age 11). In addition, Concetta is a  and has clients to the house, so this was not a good situation. He was then admitted to  [facility] last week and CM spoke with Kamini and Concetta, sent referrals to [facility] and [facility], but ultimately the patient went to a group home called [facility] ([phone]) where he lived for the past two days and now re-admitted via ED with hyperglycemia, so likely not receiving his insulin.      Prophylaxis- heparin 5K q8; SCDs     Advance directives- full code     Post-acute care- pending securing placement in NH shelter vs SNF/NH.     Time spent in care of patient: 20 mins    Alexia Stevens MD  Hospital Medicine Staff

## 2017-04-02 NOTE — PLAN OF CARE
Problem: Diabetes, Type 1 (Adult)  Goal: Signs and Symptoms of Listed Potential Problems Will be Absent, Minimized or Managed (Diabetes, Type 1)  Signs and symptoms of listed potential problems will be absent, minimized or managed by discharge/transition of care (reference Diabetes, Type 1 (Adult) CPG).   Outcome: Ongoing (interventions implemented as appropriate)  POCT glucose checked and insulin administered as ordered by MD. In pm patient's BG dropped to 58->53 PRN glucose tabs administered and BG increased to 90's. Patient was asymptomatic.     Problem: Patient Care Overview  Goal: Plan of Care Review  Outcome: Ongoing (interventions implemented as appropriate)  POC reviewed with patient at bedside, he verbalized understanding.     Problem: Fall Risk (Adult)  Goal: Absence of Falls  Patient will demonstrate the desired outcomes by discharge/transition of care.   Outcome: Ongoing (interventions implemented as appropriate)  Patient remained free of falls, trauma or injuries, low bed, side rails up X2, call light within reach, non skid socks on, fall risk band on. Patient educated not to get OOB without calling RN/PCT. He verbalized understanding.     Problem: Pressure Ulcer Risk (Pedro Scale) (Adult,Obstetrics,Pediatric)  Goal: Identify Related Risk Factors and Signs and Symptoms  Related risk factors and signs and symptoms are identified upon initiation of Human Response Clinical Practice Guideline (CPG)   Outcome: Ongoing (interventions implemented as appropriate)  No development of pressure ulcers during shift time. Patient was turned q 2 hrs and was able to reposition self in bed with minimal assistance.

## 2017-04-02 NOTE — PLAN OF CARE
Problem: Occupational Therapy Goal  Goal: Occupational Therapy Goal  Goals to be met by: 4/15/17     Patient will increase functional independence with ADLs by performing:    UE Dressing with Minimal Assistance.  LE Dressing with Moderate Assistance.  Grooming while standing at sink with Stand-by Assistance.  Toileting from bedside commode with Minimal Assistance for hygiene and clothing management.   Supine to sit with Modified [place].  Stand pivot transfers with Stand-by Assistance.  Toilet transfer to bedside commode with Stand-by Assistance.  Outcome: Ongoing (interventions implemented as appropriate)  OT eval completed. The above goals are established to improve functional (I) and mobility.     ROSA MARIA Brambila  4/2/2017  Pager: [phone]

## 2017-04-02 NOTE — PT/OT/SLP EVAL
Occupational Therapy Evaluation/  Treatment    Julius Cardenas   MRN: [id number]   Admitting Diagnosis: Hyperglycemia due to type 1 diabetes mellitus    OT Date of Treatment: 04/02/17   OT Start Time: 1314  OT Stop Time: 1338  OT Total Time (min): 24 min    Billable Minutes:  Evaluation 15 minutes  Therapeutic Activity 8 minutes    Diagnosis: Hyperglycemia due to type 1 diabetes mellitus   Decreased strength, endurance, balance, ROM    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      Past Surgical History:   Procedure Laterality Date    CATARACT EXTRACTION W/  INTRAOCULAR LENS IMPLANT  8/26/2009, 10/14/2009    CRANIOTOMY  1995    CRANIOTOMY  12/21/2010    EYE SURGERY         Referring physician: CAIN Stevens  Date referred to OT: 4/2/2017    General Precautions: Standard, fall    Do you have any cultural, spiritual, Taoist conflicts, given your current situation?: none stated     Patient  "History:  Living Environment  Lives With:  (niece)  Living Arrangements: house  Home Accessibility: stairs to enter home  Home Layout: Able to live on 1st floor  Number of Stairs to Enter Home: 5  Stair Railings at Home: outside, present on right side  Transportation Available: family or friend will provide  Living Environment Comment: Pt lives with his niece in a 1SH with 5 MONTY and R rail. Pt slightly agitated during evaluation and did not provide further history re bathroom setup and DME owned/used. Pt did report that his niece assists him with bathing and dressing.  Equipment Currently Used at Home: bedside commode, cane, quad, wheelchair    Prior level of function:   Bed Mobility/Transfers: needs device  Grooming: independent  Bathing: needs assist  Upper Body Dressing: needs assist  Lower Body Dressing: needs assist  Toileting: needs assist  Home Management Skills: unable to perform  Homemaking Responsibilities: No  Mode of Transportation: Family     Dominant hand: right    Subjective:  Communicated with RN prior to session.  "Man why you guys come so late, I'm eating dinner."  (lunch at 1:15)   "Touro would have your lunch at 11:30 every day."  Chief Complaint: therapy comes too late, L leg hurts and doesn't work.  Patient/Family stated goals: get better and go home.    Pain Rating:  (Did not rate, but c/o L hip/groin pain with movement)    Objective:  Patient found with: peripheral IV, pt found supine with HOB elevated and agreeable to OT eval.    Cognitive Exam:  Oriented to: Person, Place, Time and Situation  Follows Commands/attention: Follows multistep  commands  Communication: clear/fluent  Memory:  No Deficits noted  Safety awareness/insight to disability: intact  Coping skills/emotional control: Appropriate to situation    Visual/perceptual:  Intact    Physical Exam:  Postural examination/scapula alignment: Head forward  Skin integrity: Dry  Edema: Mild throughout UEs    Sensation:   Intact    Upper " Extremity Range of Motion:  Right Upper Extremity: WFL  Left Upper Extremity: AROM ~90 degrees of shoulder flexion/abduction    Upper Extremity Strength:  Right Upper Extremity: WFL  Left Upper Extremity: 2+/5   Strength: Fair    Fine motor coordination:   Impaired , but fxnl coordination    Gross motor coordination: decreased dynamic standing    Functional Mobility:  Bed Mobility:  Rolling/Turning to Left: Contact guard assistance  Supine to Sit: Minimum Assistance    Transfers:  Sit <> Stand Assistance: Minimum Assistance, Moderate Assistance (2 trials from EOB; Mod A first trial; Min A 2nd trial)  Sit <> Stand Assistive Device: Rolling Walker  Bed <> Chair Technique: Stand Pivot  Bed <> Chair Transfer Assistance: Contact Guard Assistance  Bed <> Chair Assistive Device: Rolling Walker    Functional Ambulation: CGA with RW around the bed to the chair.    Activities of Daily Living:  Feeding Level of Assistance: Set-up Assistance, Modified independent  UE Dressing Level of Assistance: Maximum assistance (2/2 fatigue, agitation and PIV)  LE Dressing Level of Assistance: Total assistance (socks)    Balance:   Static Sit: GOOD+: Takes MAXIMAL challenges from all directions.    Dynamic Sit: FAIR+: Maintains balance through MINIMAL excursions of active trunk motion  Static Stand: FAIR+: Takes MINIMAL challenges from all directions  Dynamic stand: FAIR: Needs CONTACT GUARD during gait    Therapeutic Activities and Exercises:  Pt ed re OT role and POC. Pt performed L log roll with CGA and supine to sit with Min A. Pt sat EOB and performed strength and ROM testing. Pt required Max A to don gown as robe and Total A to don socks. Pt performed sit to stand t/f from EOB with Mod A and RW. Pt stodo for 1 minute. Pt sat EOB and rested. Pt performed sit to stand t/f with Min A and RW and ambulated with CGA and RW to chair. Pt sat in chair and performed therex of L LE leg extensions 1x10 with AAROM to reach full extension  "(~15 degrees assisted). Pt performed leg press with LLE and Min resistance. Pt setup with food tray and able to feed self.    AM-PAC 6 CLICK ADL  How much help from another person does this patient currently need?  1 = Unable, Total/Dependent Assistance  2 = A lot, Maximum/Moderate Assistance  3 = A little, Minimum/Contact Guard/Supervision  4 = None, Modified [place]/Independent    Putting on and taking off regular lower body clothing? : 1  Bathing (including washing, rinsing, drying)?: 2  Toileting, which includes using toilet, bedpan, or urinal? : 2  Putting on and taking off regular upper body clothing?: 2  Taking care of personal grooming such as brushing teeth?: 3  Eating meals?: 4  Total Score: 14    AM-PAC Raw Score CMS "G-Code Modifier Level of Impairment Assistance   6 % Total / Unable   7 - 9 CM 80 - 100% Maximal Assist   10 - 14 CL 60 - 80% Moderate Assist   15 - 19 CK 40 - 60% Moderate Assist   20 - 22 CJ 20 - 40% Minimal Assist   23 CI 1-20% SBA / CGA   24 CH 0% Independent/ Mod I       Patient left up in chair with all lines intact, call button in reach and RN notified    Assessment:  Julius Cardenas is a 84 y.o. male with a medical diagnosis of Hyperglycemia due to type 1 diabetes mellitus and presents with the impairments listed below. Pt was slightly agitated during the evaluation, but receptive to therapy with moderate encouragement. Pt requiring Min-Mod A to stand, but CGA and RW for short distance ambulation. Pt requiring verbal cues for safety with mobility. Pt requiring Max A for dressing tasks 2/2 fatigue and frustration. Pt would benefit from cont OT to maximize independence in self care and mobility.    Rehab identified problem list/impairments: Rehab identified problem list/impairments: weakness, impaired endurance, impaired self care skills, impaired functional mobilty, gait instability, decreased lower extremity function, decreased upper extremity function, impaired balance, " decreased safety awareness, pain, decreased ROM    Rehab potential is good.    Activity tolerance: Fair    Discharge recommendations: Discharge Facility/Level Of Care Needs: nursing facility, skilled     Barriers to discharge: Barriers to Discharge: Inaccessible home environment, Decreased caregiver support    Equipment recommendations: walker, rolling     GOALS:   Occupational Therapy Goals        Problem: Occupational Therapy Goal    Goal Priority Disciplines Outcome Interventions   Occupational Therapy Goal     OT, PT/OT Ongoing (interventions implemented as appropriate)    Description:  Goals to be met by: 4/15/17     Patient will increase functional independence with ADLs by performing:    UE Dressing with Minimal Assistance.  LE Dressing with Moderate Assistance.  Grooming while standing at sink with Stand-by Assistance.  Toileting from bedside commode with Minimal Assistance for hygiene and clothing management.   Supine to sit with Modified [place].  Stand pivot transfers with Stand-by Assistance.  Toilet transfer to bedside commode with Stand-by Assistance.                PLAN:  Patient to be seen 4 x/week to address the above listed problems via self-care/home management, therapeutic activities, therapeutic exercises, neuromuscular re-education  Plan of Care expires: 05/02/17  Plan of Care reviewed with: patient    ROSA MARIA Brambila  4/2/2017  Pager: [phone]

## 2017-04-03 LAB
ALBUMIN SERPL BCP-MCNC: 2.9 G/DL
ALP SERPL-CCNC: 79 U/L
ALT SERPL W/O P-5'-P-CCNC: 38 U/L
ANION GAP SERPL CALC-SCNC: 8 MMOL/L
AST SERPL-CCNC: 29 U/L
BILIRUB SERPL-MCNC: 0.3 MG/DL
BUN SERPL-MCNC: 39 MG/DL
CALCIUM SERPL-MCNC: 8.4 MG/DL
CHLORIDE SERPL-SCNC: 114 MMOL/L
CO2 SERPL-SCNC: 19 MMOL/L
CREAT SERPL-MCNC: 2.1 MG/DL
EST. GFR  (AFRICAN AMERICAN): 32.4 ML/MIN/1.73 M^2
EST. GFR  (NON AFRICAN AMERICAN): 28.1 ML/MIN/1.73 M^2
GLUCOSE SERPL-MCNC: 103 MG/DL
MAGNESIUM SERPL-MCNC: 1.8 MG/DL
PHOSPHATE SERPL-MCNC: 3.5 MG/DL
POCT GLUCOSE: 117 MG/DL (ref 70–110)
POCT GLUCOSE: 140 MG/DL (ref 70–110)
POCT GLUCOSE: 180 MG/DL (ref 70–110)
POCT GLUCOSE: 346 MG/DL (ref 70–110)
POCT GLUCOSE: 46 MG/DL (ref 70–110)
POCT GLUCOSE: 51 MG/DL (ref 70–110)
POTASSIUM SERPL-SCNC: 4 MMOL/L
PROT SERPL-MCNC: 6.4 G/DL
SODIUM SERPL-SCNC: 141 MMOL/L

## 2017-04-03 PROCEDURE — 25000003 PHARM REV CODE 250: Performed by: HOSPITALIST

## 2017-04-03 PROCEDURE — 84100 ASSAY OF PHOSPHORUS: CPT

## 2017-04-03 PROCEDURE — 97530 THERAPEUTIC ACTIVITIES: CPT

## 2017-04-03 PROCEDURE — 86580 TB INTRADERMAL TEST: CPT | Performed by: HOSPITALIST

## 2017-04-03 PROCEDURE — 99231 SBSQ HOSP IP/OBS SF/LOW 25: CPT | Mod: ,,, | Performed by: HOSPITALIST

## 2017-04-03 PROCEDURE — 36415 COLL VENOUS BLD VENIPUNCTURE: CPT

## 2017-04-03 PROCEDURE — 83735 ASSAY OF MAGNESIUM: CPT

## 2017-04-03 PROCEDURE — 80053 COMPREHEN METABOLIC PANEL: CPT

## 2017-04-03 PROCEDURE — 11000001 HC ACUTE MED/SURG PRIVATE ROOM

## 2017-04-03 PROCEDURE — 97116 GAIT TRAINING THERAPY: CPT

## 2017-04-03 PROCEDURE — 63600175 PHARM REV CODE 636 W HCPCS: Performed by: HOSPITALIST

## 2017-04-03 RX ADMIN — INSULIN ASPART 5 UNITS: 100 INJECTION, SOLUTION INTRAVENOUS; SUBCUTANEOUS at 12:04

## 2017-04-03 RX ADMIN — PAROXETINE HYDROCHLORIDE 10 MG: 10 TABLET, FILM COATED ORAL at 05:04

## 2017-04-03 RX ADMIN — NIFEDIPINE 90 MG: 30 TABLET, FILM COATED, EXTENDED RELEASE ORAL at 08:04

## 2017-04-03 RX ADMIN — HYDRALAZINE HYDROCHLORIDE 50 MG: 50 TABLET ORAL at 02:04

## 2017-04-03 RX ADMIN — ASPIRIN 81 MG: 81 TABLET, COATED ORAL at 08:04

## 2017-04-03 RX ADMIN — CARVEDILOL 6.25 MG: 3.12 TABLET, FILM COATED ORAL at 08:04

## 2017-04-03 RX ADMIN — CALCITRIOL 0.5 MCG: 0.25 CAPSULE, LIQUID FILLED ORAL at 08:04

## 2017-04-03 RX ADMIN — Medication 24 G: at 04:04

## 2017-04-03 RX ADMIN — LEVETIRACETAM 1000 MG: 500 TABLET, FILM COATED ORAL at 09:04

## 2017-04-03 RX ADMIN — HYDRALAZINE HYDROCHLORIDE 50 MG: 50 TABLET ORAL at 05:04

## 2017-04-03 RX ADMIN — Medication 5 UNITS: at 02:04

## 2017-04-03 RX ADMIN — HEPARIN SODIUM 5000 UNITS: 5000 INJECTION, SOLUTION INTRAVENOUS; SUBCUTANEOUS at 09:04

## 2017-04-03 RX ADMIN — HYDRALAZINE HYDROCHLORIDE 50 MG: 50 TABLET ORAL at 09:04

## 2017-04-03 RX ADMIN — LEVETIRACETAM 1000 MG: 500 TABLET, FILM COATED ORAL at 08:04

## 2017-04-03 RX ADMIN — CARVEDILOL 6.25 MG: 3.12 TABLET, FILM COATED ORAL at 09:04

## 2017-04-03 RX ADMIN — INSULIN ASPART 5 UNITS: 100 INJECTION, SOLUTION INTRAVENOUS; SUBCUTANEOUS at 08:04

## 2017-04-03 RX ADMIN — HEPARIN SODIUM 5000 UNITS: 5000 INJECTION, SOLUTION INTRAVENOUS; SUBCUTANEOUS at 05:04

## 2017-04-03 RX ADMIN — HEPARIN SODIUM 5000 UNITS: 5000 INJECTION, SOLUTION INTRAVENOUS; SUBCUTANEOUS at 02:04

## 2017-04-03 NOTE — PT/OT/SLP PROGRESS
Physical Therapy  Treatment    Julius Cardenas   MRN: [id number]   Admitting Diagnosis: Hyperglycemia due to type 1 diabetes mellitus    PT Received On: 17  PT Start Time: 0953     PT Stop Time: 5    PT Total Time (min): 32 min       Billable Minutes:  Gait [id number] and Therapeutic Activity 12    Treatment Type: Treatment  PT/PTA: PTA     PTA Visit Number: 1       General Precautions: Standard, fall  Orthopedic Precautions: N/A   Braces:      Do you have any cultural, spiritual, Caodaism conflicts, given your current situation?: none given    Subjective:  Communicated with NSG prior to session.  I have gas, I'm not moving to fast    Pain Ratin/10                   Objective:   Patient found with: peripheral IV    Functional Mobility:  Bed Mobility:   Supine to Sit: Minimum Assistance (slow movements)    Transfers:  Sit <> Stand Assistance: Minimum Assistance (required blocking of L foot, VC's for technique)  Sit <> Stand Assistive Device: Rolling Walker    Gait:   Gait Distance: 90 feet  Assistance 1: Minimum assistance  Gait Assistive Device: Rolling walker  Gait Pattern: swing-to gait  Gait Deviation(s): decreased connie, increased time in double stance, decreased velocity of limb motion, decreased step length, decreased stride length, foot flat, decreased weight-shifting ability, lateral lean (noted left later al for the first 30 feet, VC's for walker mgmnt and safety)    Stairs:  Ascend and descend 1 step with R HR B UE support mod assistance.  VC's for technique and sequence.    Balance:   Static Sit: GOOD-: Takes MODERATE challenges from all directions but inconsistently  Dynamic Sit: FAIR+: Maintains balance through MINIMAL excursions of active trunk motion  Static Stand: FAIR: Maintains without assist but unable to take challenges  Dynamic stand: FAIR: Needs CONTACT GUARD during gait     Therapeutic Activities and Exercises:  Patient educated on safety with OOB mobility and benefits of  participating in therapy.  Patient tolerated sitting EOB 3-4 min with SBA. Noted L lateral lean.     AM-PAC 6 CLICK MOBILITY  How much help from another person does this patient currently need?   1 = Unable, Total/Dependent Assistance  2 = A lot, Maximum/Moderate Assistance  3 = A little, Minimum/Contact Guard/Supervision  4 = None, Modified [place]/Independent    Turning over in bed (including adjusting bedclothes, sheets and blankets)?: 3  Sitting down on and standing up from a chair with arms (e.g., wheelchair, bedside commode, etc.): 3  Moving from lying on back to sitting on the side of the bed?: 3  Moving to and from a bed to a chair (including a wheelchair)?: 3  Need to walk in hospital room?: 3  Climbing 3-5 steps with a railing?: 2  Total Score: 17    AM-PAC Raw Score CMS G-Code Modifier Level of Impairment Assistance   6 % Total / Unable   7 - 9 CM 80 - 100% Maximal Assist   10 - 14 CL 60 - 80% Moderate Assist   15 - 19 CK 40 - 60% Moderate Assist   20 - 22 CJ 20 - 40% Minimal Assist   23 CI 1-20% SBA / CGA   24 CH 0% Independent/ Mod I     Patient left up in chair with call button in reach.    Assessment:  Julius Cardenas is a 84 y.o. male with a medical diagnosis of Hyperglycemia due to type 1 diabetes mellitus and presents with decrease strength, mobility, balance and coordination.  Patient requires assistance with all mobility and transfers. Noted decrease strength and coordination with L LE.  Patient progressing well toward goals. Patient would benefit from further IP therapy.    Rehab identified problem list/impairments: Rehab identified problem list/impairments: weakness, impaired endurance, impaired self care skills, impaired balance, gait instability, impaired functional mobilty, impaired cognition, decreased coordination, decreased lower extremity function, decreased ROM, impaired fine motor, impaired coordination    Rehab potential is fair.    Activity tolerance: Fair    Discharge  recommendations: Discharge Facility/Level Of Care Needs: nursing facility, skilled     Barriers to discharge: Barriers to Discharge: Inaccessible home environment, Decreased caregiver support    Equipment recommendations: Equipment Needed After Discharge: walker, rolling     GOALS:   Physical Therapy Goals        Problem: Physical Therapy Goal    Goal Priority Disciplines Outcome Goal Variances Interventions   Physical Therapy Goal     PT/OT, PT Ongoing (interventions implemented as appropriate)     Description:  Goals to be met by: 2017     Patient will increase functional independence with mobility by performin. Supine to sit with Set-up [place]  2. Sit to stand transfer with Supervision  3. Gait  x 200 feet with Stand-by Assistance using Rolling Walker.   4. Ascend/descend 5 stair with right Handrails Stand-by Assistance using Rolling Walker.   5. Lower extremity exercise program x20 reps per handout, with supervision to increase strength and endurance to improve safety with above mobility goals.                PLAN:    Patient to be seen 4 x/week  to address the above listed problems via gait training, therapeutic activities, therapeutic exercises, neuromuscular re-education  Plan of Care expires:    Plan of Care reviewed with: patient         Sherif Wiley II, PTA  2017

## 2017-04-03 NOTE — PLAN OF CARE
Per MJ Arndt, referrals r care faxed to   [facility], [facility], and [facility]  She will update ADONIS Resendiz as well.     04/03/17 1214   Right Care Assessment   Can the patient answer the patient profile reliably? Yes, cognitively intact   How often would a person be available to care for the patient? Often   Describe the patient's ability to walk at the present time. Does not walk or unable to take any steps at all   How does the patient rate their overall health at the present time? Fair   Number of comorbid conditions (as recorded on the chart) Five or more   Have you felt down, depressed, or hopeless? 1   During the past month, has the patient often been bothered by little interest or pleasure in doing things? No   Have you felt little interest or pleasure in doing things? 1

## 2017-04-03 NOTE — PROGRESS NOTES
Progress Note  Hospital Medicine      Admit Date: 3/30/2017    SUBJECTIVE:     Follow-up For:  Hyperglycemia due to type 1 diabetes mellitus    HPI: see my H&P    Hospital Course: see below    3/31: Doing great, NIALL resolving, no complaints    4/1: Continues to do great. CM Yris Carlson called Kamini and got the following 3 choices: [facility], [facility], and [facility]. I called Kamini with an update.  Aiming for d/c to SNF/NH on Monday.     Interval History: Hypoglycemic, so adjusting insulin. Await placement    Review of Systems:  Pain Scale: 0 /10   Constitutional: no fever or chills  Respiratory: no cough or shortness of breath  Cardiovascular: no chest pain or palpitations  Gastrointestinal: no nausea or vomiting, no abdominal pain or change in bowel habits  Genitourinary: no hematuria or dysuria  Integument/Breast: no rash or pruritis  Hematologic/Lymphatic: no easy bruising or lymphadenopathy  Musculoskeletal: no arthralgias or myalgias  Neurological: no seizures or tremors  Behavioral/Psych: no depression or anxiety    OBJECTIVE:     Vital Signs Range (Last 24H):  Temp:  [97.8 °F (36.6 °C)-98.7 °F (37.1 °C)]   Pulse:  [56-72]   Resp:  [16-18]   BP: (129-164)/(60-77)   SpO2:  [95 %-100 %]     I & O (Last 24H):    Intake/Output Summary (Last 24 hours) at 04/03/17 1124  Last data filed at 04/03/17 0550   Gross per 24 hour   Intake          2823.33 ml   Output              400 ml   Net          2423.33 ml       Physical Exam:  General appearance: no distress, nontoxic  Mental status: Alert and oriented x 3  HEENT:  conjunctivae/corneas clear, PERRL  Neck: supple, thyroid not enlarged  Pulm:   normal respiratory effort, CTA B, no c/w/r  Card: RRR, S1, S2 normal, no murmur, click, rub or gallop  Abd: soft, NT, ND, BS present; no masses, no organomegaly  Ext: no c/c/e  Pulses: 2+, symmetric  Skin: color, texture, turgor normal. No rashes or lesions  Neuro: CN II-XII grossly intact, no focal numbness or  weakness, normal strength and tone     Diagnostic Results:  Labs reviewed    Recent Results (from the past 24 hour(s))   POCT glucose    Collection Time: 04/02/17 11:58 AM   Result Value Ref Range    POCT Glucose 247 (H) 70 - 110 mg/dL   POCT glucose    Collection Time: 04/02/17  5:19 PM   Result Value Ref Range    POCT Glucose 58 (L) 70 - 110 mg/dL   POCT glucose    Collection Time: 04/02/17  5:36 PM   Result Value Ref Range    POCT Glucose 52 (L) 70 - 110 mg/dL   POCT glucose    Collection Time: 04/02/17  5:50 PM   Result Value Ref Range    POCT Glucose 93 70 - 110 mg/dL   POCT glucose    Collection Time: 04/02/17 10:25 PM   Result Value Ref Range    POCT Glucose 303 (H) 70 - 110 mg/dL   Comprehensive Metabolic Panel (CMP)    Collection Time: 04/03/17  5:17 AM   Result Value Ref Range    Sodium 141 136 - 145 mmol/L    Potassium 4.0 3.5 - 5.1 mmol/L    Chloride 114 (H) 95 - 110 mmol/L    CO2 19 (L) 23 - 29 mmol/L    Glucose 103 70 - 110 mg/dL    BUN, Bld 39 (H) 8 - 23 mg/dL    Creatinine 2.1 (H) 0.5 - 1.4 mg/dL    Calcium 8.4 (L) 8.7 - 10.5 mg/dL    Total Protein 6.4 6.0 - 8.4 g/dL    Albumin 2.9 (L) 3.5 - 5.2 g/dL    Total Bilirubin 0.3 0.1 - 1.0 mg/dL    Alkaline Phosphatase 79 55 - 135 U/L    AST 29 10 - 40 U/L    ALT 38 10 - 44 U/L    Anion Gap 8 8 - 16 mmol/L    eGFR if African American 32.4 (A) >60 mL/min/1.73 m^2    eGFR if non  28.1 (A) >60 mL/min/1.73 m^2   Magnesium    Collection Time: 04/03/17  5:17 AM   Result Value Ref Range    Magnesium 1.8 1.6 - 2.6 mg/dL   Phosphorus    Collection Time: 04/03/17  5:17 AM   Result Value Ref Range    Phosphorus 3.5 2.7 - 4.5 mg/dL   POCT glucose    Collection Time: 04/03/17  7:16 AM   Result Value Ref Range    POCT Glucose 117 (H) 70 - 110 mg/dL           ASSESSMENT/PLAN:     DM-1 uncontrolled with hyperglycemia, with polydipsia and polyuria, likely due to medication noncompliance. UA negative for UTI. No signs of infection. DM-1 uncontrolled (Dx age  18) with neuro/renal/PVD.   - novolog 5-5-5 (from 7) and levemir 18 (from 20)     Severe dehydration due to polydipsia due to above  - resolved after aggressive IVFs     Head trauma - checked CT head STAT, and this was negative for acute findings     Transaminitis - resolved     Dementia/cognitive dysfunction - delirium hygeine    H/o craniotomy for meningioma  ?h/o seizures  - continue home keppra 1g PO BID    Mood disorder   - home paroxetine 10     Renovascular HTN  - home meds coreg 6.25 BID, hydralazine 50 q8, nifedipine 90  - ?no ACE-I or ARB.    - PCP to consider ACE-I or ARB after NIALL resolves, as ACE-I or ARB indicated for CAD equivalent (HTN/DM-1)     Niall on CKD-4 due to volume depletion from polyuria from hyperglycemia   - resolving      Anemia of acute on chronic illness and CKD-4- stable- monitor     Gout - not on any meds - no acute issues     BPH - no meds - no issues     Former tobacco smoking. No acute issues.      h/o TIA  - start ASA 81    Deconditioning   - PT recs SNF  - OT recs SNF     Post-acute care. Debility due to dementia/cognitive dysfunction  - CM/SW to assist with finding him a SNF/NH as he is debilitated with dementia/cognitive dysfunction and DM-1, and his family can no longer care for him at home. He lived with his brother, Miguel Angel, and his sister-in-law, Kamini Cardenas ([phone]) for ten years up until recently when it was too much for Kamini and Miguel Angel as they are both elderly and disabled, so PCP Dr Carson suggested that he try living with a younger family member, so his niece (Kmaini's daughter), Concetta Saucedo  ([phone]) took him in a couple of months ago. He was defecating on him self and on the ground and walking around without his diapers on, in front of Concetta's daughter (age 15) and granddaughter(age 11). In addition, Concetta is a  and has clients to the house, so this was not a good situation. He was then admitted to [facility] last week and CM spoke  with Nahid, sent referrals to [facility] and [facility], but ultimately the patient went to a group home called [facility] ([phone]) where he lived for the past two days and now re-admitted via ED with hyperglycemia, so likely not receiving his insulin.      Prophylaxis- heparin 5K q8; SCDs     Advance directives- full code     Post-acute care- pending securing placement in outside SNF/NH, long-term.     Time spent in care of patient: 10 mins    Alexia Stevens MD  Hospital Medicine Staff

## 2017-04-03 NOTE — PLAN OF CARE
Problem: Physical Therapy Goal  Goal: Physical Therapy Goal  Goals to be met by: 2017     Patient will increase functional independence with mobility by performin. Supine to sit with Set-up [place]  2. Sit to stand transfer with Supervision  3. Gait x 200 feet with Stand-by Assistance using Rolling Walker.   4. Ascend/descend 5 stair with right Handrails Stand-by Assistance using Rolling Walker.   5. Lower extremity exercise program x20 reps per handout, with supervision to increase strength and endurance to improve safety with above mobility goals.   Patient goals remain appropriate.  Patient will continue to benefit from further PT services.  Sherif Wiley, PTA

## 2017-04-03 NOTE — PLAN OF CARE
[facility] do not have beds available but offered family a sister facility on the [facility]. Family declined [facility] location.  Referral also sent to [facility].    Astrid Sheridan LMSW

## 2017-04-04 ENCOUNTER — TELEPHONE (OUTPATIENT)
Dept: NEPHROLOGY | Facility: CLINIC | Age: 82
End: 2017-04-04

## 2017-04-04 PROBLEM — I15.2 HYPERTENSION DUE TO ENDOCRINE DISORDER: Status: RESOLVED | Noted: 2017-03-25 | Resolved: 2017-04-04

## 2017-04-04 PROBLEM — T14.90XA TRAUMA: Status: RESOLVED | Noted: 2017-03-25 | Resolved: 2017-04-04

## 2017-04-04 PROBLEM — E11.00 HYPEROSMOLAR NON-KETOTIC STATE IN PATIENT WITH TYPE 2 DIABETES MELLITUS: Status: RESOLVED | Noted: 2017-02-16 | Resolved: 2017-04-04

## 2017-04-04 LAB
ALBUMIN SERPL BCP-MCNC: 3.1 G/DL
ALP SERPL-CCNC: 84 U/L
ALT SERPL W/O P-5'-P-CCNC: 38 U/L
ANION GAP SERPL CALC-SCNC: 7 MMOL/L
AST SERPL-CCNC: 30 U/L
BASOPHILS # BLD AUTO: 0.01 K/UL
BASOPHILS NFR BLD: 0.2 %
BILIRUB SERPL-MCNC: 0.2 MG/DL
BILIRUB UR QL STRIP: NEGATIVE
BUN SERPL-MCNC: 40 MG/DL
CALCIUM SERPL-MCNC: 9.2 MG/DL
CHLORIDE SERPL-SCNC: 110 MMOL/L
CLARITY UR REFRACT.AUTO: CLEAR
CO2 SERPL-SCNC: 22 MMOL/L
COLOR UR AUTO: ABNORMAL
CREAT SERPL-MCNC: 2.3 MG/DL
CREAT UR-MCNC: 42 MG/DL
DIASTOLIC DYSFUNCTION: NO
DIFFERENTIAL METHOD: ABNORMAL
EOSINOPHIL # BLD AUTO: 0.1 K/UL
EOSINOPHIL NFR BLD: 2.4 %
ERYTHROCYTE [DISTWIDTH] IN BLOOD BY AUTOMATED COUNT: 14.1 %
EST. GFR  (AFRICAN AMERICAN): 29.1 ML/MIN/1.73 M^2
EST. GFR  (NON AFRICAN AMERICAN): 25.1 ML/MIN/1.73 M^2
ESTIMATED AVG GLUCOSE: 209 MG/DL
ESTIMATED PA SYSTOLIC PRESSURE: 41.44
GLUCOSE SERPL-MCNC: 229 MG/DL
GLUCOSE UR QL STRIP: ABNORMAL
HBA1C MFR BLD HPLC: 8.9 %
HCT VFR BLD AUTO: 34 %
HGB BLD-MCNC: 11.4 G/DL
HGB UR QL STRIP: NEGATIVE
KETONES UR QL STRIP: NEGATIVE
LEUKOCYTE ESTERASE UR QL STRIP: NEGATIVE
LYMPHOCYTES # BLD AUTO: 1.7 K/UL
LYMPHOCYTES NFR BLD: 31.6 %
MAGNESIUM SERPL-MCNC: 1.9 MG/DL
MCH RBC QN AUTO: 29.2 PG
MCHC RBC AUTO-ENTMCNC: 33.5 %
MCV RBC AUTO: 87 FL
MONOCYTES # BLD AUTO: 0.5 K/UL
MONOCYTES NFR BLD: 9.3 %
NEUTROPHILS # BLD AUTO: 3.1 K/UL
NEUTROPHILS NFR BLD: 56.5 %
NITRITE UR QL STRIP: NEGATIVE
PH UR STRIP: 5 [PH] (ref 5–8)
PHOSPHATE SERPL-MCNC: 3.8 MG/DL
PLATELET # BLD AUTO: 205 K/UL
PMV BLD AUTO: 11.4 FL
POCT GLUCOSE: 182 MG/DL (ref 70–110)
POCT GLUCOSE: 225 MG/DL (ref 70–110)
POCT GLUCOSE: 228 MG/DL (ref 70–110)
POCT GLUCOSE: 48 MG/DL (ref 70–110)
POTASSIUM SERPL-SCNC: 4.3 MMOL/L
PROT SERPL-MCNC: 6.7 G/DL
PROT UR QL STRIP: NEGATIVE
PROT UR-MCNC: 24 MG/DL
PROT/CREAT RATIO, UR: 0.57
RBC # BLD AUTO: 3.9 M/UL
RETIRED EF AND QEF - SEE NOTES: 60 (ref 55–65)
SODIUM SERPL-SCNC: 139 MMOL/L
SP GR UR STRIP: 1.01 (ref 1–1.03)
TRICUSPID VALVE REGURGITATION: ABNORMAL
URN SPEC COLLECT METH UR: ABNORMAL
UROBILINOGEN UR STRIP-ACNC: NEGATIVE EU/DL
WBC # BLD AUTO: 5.48 K/UL

## 2017-04-04 PROCEDURE — 85025 COMPLETE CBC W/AUTO DIFF WBC: CPT

## 2017-04-04 PROCEDURE — 11000001 HC ACUTE MED/SURG PRIVATE ROOM

## 2017-04-04 PROCEDURE — 97116 GAIT TRAINING THERAPY: CPT

## 2017-04-04 PROCEDURE — 86334 IMMUNOFIX E-PHORESIS SERUM: CPT

## 2017-04-04 PROCEDURE — 99233 SBSQ HOSP IP/OBS HIGH 50: CPT | Mod: ,,, | Performed by: HOSPITALIST

## 2017-04-04 PROCEDURE — 99222 1ST HOSP IP/OBS MODERATE 55: CPT | Mod: GC,,, | Performed by: INTERNAL MEDICINE

## 2017-04-04 PROCEDURE — 81003 URINALYSIS AUTO W/O SCOPE: CPT

## 2017-04-04 PROCEDURE — 80053 COMPREHEN METABOLIC PANEL: CPT

## 2017-04-04 PROCEDURE — 36415 COLL VENOUS BLD VENIPUNCTURE: CPT

## 2017-04-04 PROCEDURE — 93306 TTE W/DOPPLER COMPLETE: CPT

## 2017-04-04 PROCEDURE — 83735 ASSAY OF MAGNESIUM: CPT

## 2017-04-04 PROCEDURE — 84100 ASSAY OF PHOSPHORUS: CPT

## 2017-04-04 PROCEDURE — 84156 ASSAY OF PROTEIN URINE: CPT

## 2017-04-04 PROCEDURE — 93306 TTE W/DOPPLER COMPLETE: CPT | Mod: 26,,, | Performed by: INTERNAL MEDICINE

## 2017-04-04 PROCEDURE — 97110 THERAPEUTIC EXERCISES: CPT

## 2017-04-04 PROCEDURE — 25000003 PHARM REV CODE 250: Performed by: HOSPITALIST

## 2017-04-04 PROCEDURE — 86334 IMMUNOFIX E-PHORESIS SERUM: CPT | Mod: 26,,, | Performed by: PATHOLOGY

## 2017-04-04 PROCEDURE — 83036 HEMOGLOBIN GLYCOSYLATED A1C: CPT

## 2017-04-04 RX ORDER — INSULIN ASPART 100 [IU]/ML
0-5 INJECTION, SOLUTION INTRAVENOUS; SUBCUTANEOUS
Status: DISCONTINUED | OUTPATIENT
Start: 2017-04-04 | End: 2017-04-05 | Stop reason: HOSPADM

## 2017-04-04 RX ORDER — GLUCAGON 1 MG
1 KIT INJECTION
Status: DISCONTINUED | OUTPATIENT
Start: 2017-04-04 | End: 2017-04-05 | Stop reason: HOSPADM

## 2017-04-04 RX ORDER — ERGOCALCIFEROL 1.25 MG/1
50000 CAPSULE ORAL
Status: DISCONTINUED | OUTPATIENT
Start: 2017-04-04 | End: 2017-04-05 | Stop reason: HOSPADM

## 2017-04-04 RX ORDER — INSULIN ASPART 100 [IU]/ML
3 INJECTION, SOLUTION INTRAVENOUS; SUBCUTANEOUS
Status: DISCONTINUED | OUTPATIENT
Start: 2017-04-04 | End: 2017-04-05 | Stop reason: HOSPADM

## 2017-04-04 RX ORDER — IBUPROFEN 200 MG
16 TABLET ORAL
Status: DISCONTINUED | OUTPATIENT
Start: 2017-04-04 | End: 2017-04-05 | Stop reason: HOSPADM

## 2017-04-04 RX ORDER — IBUPROFEN 200 MG
24 TABLET ORAL
Status: DISCONTINUED | OUTPATIENT
Start: 2017-04-04 | End: 2017-04-05 | Stop reason: HOSPADM

## 2017-04-04 RX ORDER — HYDRALAZINE HYDROCHLORIDE 50 MG/1
100 TABLET, FILM COATED ORAL EVERY 8 HOURS
Status: DISCONTINUED | OUTPATIENT
Start: 2017-04-04 | End: 2017-04-05 | Stop reason: HOSPADM

## 2017-04-04 RX ADMIN — INSULIN ASPART 5 UNITS: 100 INJECTION, SOLUTION INTRAVENOUS; SUBCUTANEOUS at 01:04

## 2017-04-04 RX ADMIN — ERGOCALCIFEROL 50000 UNITS: 1.25 CAPSULE ORAL at 03:04

## 2017-04-04 RX ADMIN — HEPARIN SODIUM 5000 UNITS: 5000 INJECTION, SOLUTION INTRAVENOUS; SUBCUTANEOUS at 09:04

## 2017-04-04 RX ADMIN — HEPARIN SODIUM 5000 UNITS: 5000 INJECTION, SOLUTION INTRAVENOUS; SUBCUTANEOUS at 01:04

## 2017-04-04 RX ADMIN — HYDRALAZINE HYDROCHLORIDE 100 MG: 50 TABLET ORAL at 09:04

## 2017-04-04 RX ADMIN — ASPIRIN 81 MG: 81 TABLET, COATED ORAL at 08:04

## 2017-04-04 RX ADMIN — HEPARIN SODIUM 5000 UNITS: 5000 INJECTION, SOLUTION INTRAVENOUS; SUBCUTANEOUS at 05:04

## 2017-04-04 RX ADMIN — CARVEDILOL 6.25 MG: 3.12 TABLET, FILM COATED ORAL at 08:04

## 2017-04-04 RX ADMIN — HYDRALAZINE HYDROCHLORIDE 75 MG: 50 TABLET ORAL at 01:04

## 2017-04-04 RX ADMIN — INSULIN ASPART 3 UNITS: 100 INJECTION, SOLUTION INTRAVENOUS; SUBCUTANEOUS at 05:04

## 2017-04-04 RX ADMIN — LEVETIRACETAM 1000 MG: 500 TABLET, FILM COATED ORAL at 09:04

## 2017-04-04 RX ADMIN — PAROXETINE HYDROCHLORIDE 10 MG: 10 TABLET, FILM COATED ORAL at 06:04

## 2017-04-04 RX ADMIN — HYDRALAZINE HYDROCHLORIDE 50 MG: 50 TABLET ORAL at 05:04

## 2017-04-04 RX ADMIN — LEVETIRACETAM 1000 MG: 500 TABLET, FILM COATED ORAL at 08:04

## 2017-04-04 RX ADMIN — CARVEDILOL 6.25 MG: 3.12 TABLET, FILM COATED ORAL at 09:04

## 2017-04-04 RX ADMIN — CALCITRIOL 0.5 MCG: 0.25 CAPSULE, LIQUID FILLED ORAL at 08:04

## 2017-04-04 RX ADMIN — INSULIN ASPART 2 UNITS: 100 INJECTION, SOLUTION INTRAVENOUS; SUBCUTANEOUS at 08:04

## 2017-04-04 RX ADMIN — INSULIN ASPART 5 UNITS: 100 INJECTION, SOLUTION INTRAVENOUS; SUBCUTANEOUS at 08:04

## 2017-04-04 RX ADMIN — RAMELTEON 8 MG: 8 TABLET, FILM COATED ORAL at 09:04

## 2017-04-04 RX ADMIN — INSULIN ASPART 4 UNITS: 100 INJECTION, SOLUTION INTRAVENOUS; SUBCUTANEOUS at 01:04

## 2017-04-04 RX ADMIN — NIFEDIPINE 90 MG: 30 TABLET, FILM COATED, EXTENDED RELEASE ORAL at 08:04

## 2017-04-04 NOTE — PROGRESS NOTES
External catheter ordered. Pt mostly incontinent and unable to get accurate 24 hr urine collection. Will let oncoming nurse know.

## 2017-04-04 NOTE — PHYSICIAN QUERY
PT Name: Julius Cardenas  MR #: [id number]     Physician Query Form - Documentation Clarification      CDS/: Carmen Freedman               Contact information: [phone]  This form is a permanent document in the medical record.     Query Date: April 4, 2017    By submitting this query, we are merely seeking further clarification of documentation. Please utilize your independent clinical judgment when addressing the question(s) below.    The Medical record reflects the following:    Supporting Clinical Findings Location in Medical Record   Danyelle on CKD-4 due to volume depletion from polyuria from hyperglycemia   - resolving        Progress Note 4/1     Bun 44...>.40    Creatine 2.9..>2.3       Labs 3/30...>4/4                                                                            Doctor, Please specify diagnosis or diagnoses associated with above clinical findings.    Provider Use Only    1.  (  x   )  Acute Kidney failure/ Injury    2.  (     )  Acute Kidney Insufficiency                                                                                                                             [  ] Clinically undetermined

## 2017-04-04 NOTE — PLAN OF CARE
Problem: Patient Care Overview  Goal: Plan of Care Review  Outcome: Ongoing (interventions implemented as appropriate)  Patient remains free from falls. Bed in low position, wheels locked, X2 side rails up. Patient disoriented to time. Patient remains afebrile. Patient's blood glucose monitored and treated as per orders. Denies pain. Skin intact. Ambulated with Pt, able to get out of bed with use of walker. VSS.

## 2017-04-04 NOTE — PLAN OF CARE
Sw did upload updated PT notes and staff notes to [facility] as last SNF choice is [facility].

## 2017-04-04 NOTE — CONSULTS
Consult Note  Nephrology    Consult Requested By: Patrice Aguila MD  Reason for Consult: CKD 4     SUBJECTIVE:     History of Present Illness:    Mr Julius Cardenas is a pleasant 84 y.o. man with DM-1 uncontrolled (Dx age 18) with CKD 4 proteinuria and baseline creatinine of 2.2 and GFR of 30. Also Hx of with neuro/renal/PVD, dementia/cognitive dysfunction, h/o craniotomy x 2  He was [facility] to [facility] 3/30 due to hyperglycemia/ falls. He had NIALL on admission to [facility] on 3/24 and this has slowly resolved to his basline. He has had labile blood sugars since admission. He has 3+ proteinuria without recent UPC. Renal US shows CKD.       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     Past Surgical History:   Procedure Laterality Date    CATARACT EXTRACTION W/  INTRAOCULAR LENS IMPLANT  8/26/2009, 10/14/2009    CRANIOTOMY  1995    CRANIOTOMY  12/21/2010    EYE SURGERY       Family History    Problem Relation Age of Onset    Diabetes Mother     Cataracts Mother     No Known Problems Father     Diabetes Sister     No Known Problems Son     No Known Problems Daughter     No Known Problems Maternal Grandmother     No Known Problems Maternal Grandfather     No Known Problems Paternal Grandmother     No Known Problems Paternal Grandfather     Diabetes Sister     Diabetes Brother     No Known Problems Maternal Aunt     No Known Problems Maternal Uncle     No Known Problems Paternal Aunt     No Known Problems Paternal Uncle     Diabetes Sister     Diabetes Brother     Amblyopia Neg Hx     Blindness Neg Hx     Cancer Neg Hx     Glaucoma Neg Hx     Hypertension Neg Hx     Macular degeneration Neg Hx     Retinal detachment Neg Hx     Strabismus Neg Hx     Stroke Neg Hx     Thyroid disease Neg Hx      Social History   Substance Use Topics    Smoking status: Former Smoker     Packs/day: 1.00     Types: Cigarettes     Quit date: 12/31/1994    Smokeless tobacco: Former User     Quit date: 4/3/2010    Alcohol use No       Review of patient's allergies indicates:  No Known Allergies     Review of Systems:  Constitutional: no fever or chills  Respiratory: no cough or shortness of breath  Cardiovascular: no chest pain or palpitations  Gastrointestinal: no nausea or vomiting, no abdominal pain or change in bowel habits  Musculoskeletal: no arthralgias or myalgias  Neurological: no seizures or tremors    OBJECTIVE:     Vital Signs (Most Recent)  Temp: 97.9 °F (36.6 °C) (04/04/17 0757)  Pulse: 77 (04/04/17 1305)  Resp: 18 (04/04/17 0757)  BP: (!) 166/77 (04/04/17 1305)  SpO2: 100 % (04/04/17 0757)    Vital Signs Range (Last 24H):  Temp:  [97.3 °F (36.3 °C)-98.1 °F (36.7 °C)]   Pulse:  [61-77]   Resp:  [16-18]   BP: (142-175)/(72-87)   SpO2:  [98 %-100 %]     Physical Exam:  General: well developed, cachectic, thin, frail, elderly  Lungs:  clear to auscultation bilaterally and normal  respiratory effort  Cardiovascular: Heart: regular rate and rhythm. Chest Wall: no tenderness. Extremities: no cyanosis or edema, or clubbing. Pulses: 2+ and symmetric.  Abdomen/Rectal: Abdomen: soft, non-tender non-distented; bowel sounds normal; no masses,  no organomegaly. Rectal: not examined  Musculoskeletal:no clubbing, cyanosis  Neurologic: Mental status: Alert, oriented, thought content appropriate    Laboratory:  CBC:   Recent Labs  Lab 04/04/17  0816   WBC 5.48   RBC 3.90*   HGB 11.4*   HCT 34.0*      MCV 87   MCH 29.2   MCHC 33.5     CMP:   Recent Labs  Lab 04/04/17  0358   *   CALCIUM 9.2   ALBUMIN 3.1*   PROT 6.7      K 4.3   CO2 22*      BUN 40*   CREATININE 2.3*   ALKPHOS 84   ALT 38   AST 30   BILITOT 0.2       Recent Labs  Lab 03/30/17  1146   COLORU Colorless*   SPECGRAV 1.010   PHUR 5.0   PROTEINUA Negative   BACTERIA Rare   NITRITE Negative   LEUKOCYTESUR Negative   UROBILINOGEN Negative       Diagnostic Results:  Labs: Reviewed  X-Ray: Reviewed    ASSESSMENT/PLAN:     Mr Julius Cardenas is a pleasant 84 y.o. man with DM-1 uncontrolled (Dx age 18) with CKD 4 proteinuria and baseline creatinine of 2.2 and GFR of 30. Also Hx of with neuro/renal/PVD, dementia/cognitive dysfunction, h/o craniotomy x 2  He was admitted to [facility] on 3/30 due to hyperglycemia/ falls. He had NIALL on admission to [facility] on 3/24 and this has slowly resolved to his basline. He has had labile blood sugars since admission. He has 3+ proteinuria without recent UPC. Renal US shows CKD.    CKD 4 with proteinuria.     Currently at his baseline creatinine  Will get more accurate UPC to assess proteinuria.   Due to protienuria with associated anemia- will get SPEP and UPEP and UBALDO to look for monoclonal peaks.  CKD with proteinuria likely due to long hx of difficult to control DM and HTN.   Will arrange for nephrology clinic follow up post hospital stay.     HTN  144/67, 60  On coreg 6.25 mg bid  Hydralazine  75 mg tid  Nifedipine 90 mg daily     Due to low HR, recommend to increase hydralazine to 100mg tid for improved BP control. Goal BP < 140/80   In this elderly age group.     Anemia due to CKD  February iron studies noted for Fe deficiency.   Will get iron studies in am.     Mineral bone disease in CKD  Phosphorus is WNL at 3.8- no need for phos binders   Corrected calcium - 9.92  Vitamin D 14- continue weekly Ergo 50K units   Will get PTH with am labs    Anita Villanueva MD  Nephrology Fellow  Pager [phone]   Patient seen and examined with Dr Villanueva;   I have reviewed and agree with assessment and plan

## 2017-04-04 NOTE — PLAN OF CARE
Patient resting quietly in bed when CM rounded. No family at the bedside. Patient was admitted with hyperglycemia. Blood sugar 346 this AM. Endo consult noted. Referrals sent to [facility], [facility], [facility], & [facility] for SNF placement. Awaiting response. Will continue to follow.

## 2017-04-04 NOTE — PLAN OF CARE
Per Juan C, with [facility] at , bed is available for Pt and Juan C has spoken with sister Kamini. Sw to call in locet and complete pasrr and ask for when Pt is stable for d/c and SNF orders. Pt's locet was called in last week and Pt has a waiver so only pasrr needs to be faxed for 142 form. Sw will need SNF d/c orders. Nicole left message with MJ Rodríguez. Pt not to d/c today per CM per staff, Sw to fax 142, pasrr and did inform Juan C at [phone].

## 2017-04-04 NOTE — PLAN OF CARE
MJ was informed by DAVID Bone that [facility] SNF has accepted the patient. Dr. Aguila stated that the patient is not medically stable for discharge today. CM requested SNF orders. Completed & signed PASRR obtained from Dr. Aguila & given to DAVID Bone. Will continue to follow.

## 2017-04-04 NOTE — ASSESSMENT & PLAN NOTE
Recommend patient start Ergocalciferol 50k weekly x 7 weeks then Vitamin D 1k daily  D/c calcitriol

## 2017-04-04 NOTE — PT/OT/SLP PROGRESS
"Physical Therapy  Treatment    Julius Cardenas   MRN: [id number]   Admitting Diagnosis: Hyperglycemia due to type 1 diabetes mellitus    PT Received On: 17  PT Start Time: 920     PT Stop Time: 948    PT Total Time (min): 28 min       Billable Minutes:  Gait Training 12, Therapeutic Exercise 10 and Therapeutic Activity 6    Treatment Type: Treatment  PT/PTA: PT        General Precautions: Standard, fall, diabetic  Orthopedic Precautions: N/A     Do you have any cultural, spiritual, Evangelical conflicts, given your current situation?: Pt easily confused, unable to accurately assess    Subjective:  Communicated with RN prior to session, ok to see for treatment this morning.    Pt supine (HOB elevated) in bed with alarm on, RN present giving AM meds upon my entry to room. He is initially resistant to getting up with PT, stating "I'm waiting for my ride to come pick me up." Evident that he is quite confused, only oriented to his name. Easily coaxed into getting up to walk before "his ride" arrives though.    Pain Ratin/10 before or during intervention  Pain Rating Post-Intervention: 0/10    Objective:   Patient found with: bed alarm on    Functional Mobility:    Bed Mobility:   Scooting/Bridging: Maximum Assistance (towards HOB in supine)    Supine to Sit: Stand by Assistance (with HOB elevated)  Sit to Supine: Minimum Assistance    Transfers:  Sit <> Stand Assistance: Contact Guard Assistance x 1 trial from EOB, cueing for only 1 hand on RW  Sit <> Stand Assistive Device: Rolling Walker    Gait:   Gait Distance: 130 ft in hallway with RW and therapist CGA, occasional min (A) due to poor RW management (pushes too far forward). Needs repeated cueing for lifting his head up; requires 1 std rest break due to SOB/fatigue. Noted decreased step on (L) vs (R) side    Assistance 1: Minimum assistance  Gait Assistive Device: Rolling walker  Gait Pattern: reciprocal  Gait Deviation(s): decreased connie, decreased " "velocity of limb motion, decreased step length, decreased stride length, decreased toe-to-floor clearance, decreased weight-shifting ability, lateral lean to (L) side    Balance:   Static Sit: Supervision at EOB    Static Stand: SBA with RW    Therapeutic Activities and Exercises:  1. Pt participated in the following therex at EOB:   A. Seated marching x 10 reps; SBA for (R) leg but needs mod AAROM for (L)   B. LAQ x 10 reps, SBA for each leg    2. MD arrived at end of session asking cognition questions and performing general assessment. He is oriented to his name but states he is at "[facility]", knows the year is "2017" but unable to state month or day. When asked why he is in the hospital, simply states because he is weak.    3. Assisted back to bed at end of session and placed bed alarm on, pt comfortable with no questions or needs.     AM-PAC 6 CLICK MOBILITY  How much help from another person does this patient currently need?   1 = Unable, Total/Dependent Assistance  2 = A lot, Maximum/Moderate Assistance  3 = A little, Minimum/Contact Guard/Supervision  4 = None, Modified [place]/Independent    Turning over in bed (including adjusting bedclothes, sheets and blankets)?: 4  Sitting down on and standing up from a chair with arms (e.g., wheelchair, bedside commode, etc.): 3  Moving from lying on back to sitting on the side of the bed?: 3  Moving to and from a bed to a chair (including a wheelchair)?: 3  Need to walk in hospital room?: 3  Climbing 3-5 steps with a railing?: 2  Total Score: 18    AM-PAC Raw Score CMS G-Code Modifier Level of Impairment Assistance   6 % Total / Unable   7 - 9 CM 80 - 100% Maximal Assist   10 - 14 CL 60 - 80% Moderate Assist   15 - 19 CK 40 - 60% Moderate Assist   20 - 22 CJ 20 - 40% Minimal Assist   23 CI 1-20% SBA / CGA   24 CH 0% Independent/ Mod I     Patient left supine with all lines intact, call button in reach and bed alarm on.    Assessment:  Julius BARLOW" Ronald tolerated treatment well this morning. Slight increase in distance for gait, walks 130 ft with rolling walker but requiring occasional min (A) due to poor safety with device (poor walker management). Remains confused, only truly oriented to his name. Participates in seated EOB LE therex; evident that he is generalized (L) sided weakness throughout UE and LE; MD present during session aware. Will benefit from SNF placement once medically appropriate. Will cont to benefit from acute PT services.    Rehab identified problem list/impairments: weakness, impaired endurance, impaired self care skills, impaired functional mobilty, gait instability, impaired balance, decreased lower extremity function, impaired cognition, decreased upper extremity function, decreased safety awareness, impaired cardiopulmonary response to activity, impaired coordination    Rehab potential is good.    Activity tolerance: Good    Discharge recommendations: nursing facility, skilled     Barriers to discharge: Inaccessible home environment, Decreased caregiver support (5 MONTY, requiring 24/7 supervision + physical assistance)    Equipment recommendations: bedside commode, walker, rolling, shower chair     GOALS:   Physical Therapy Goals        Problem: Physical Therapy Goal    Goal Priority Disciplines Outcome Goal Variances Interventions   Physical Therapy Goal     PT/OT, PT Ongoing (interventions implemented as appropriate)     Description:  Goals to be met by: 2017     Patient will increase functional independence with mobility by performin. Supine to sit with Set-up [place] - Not met  2. Sit to stand transfer with Supervision - Not met  3. Gait  x 200 feet with Stand-by Assistance using Rolling Walker - Not met  4. Ascend/descend 5 stair with right Handrails Stand-by Assistance using Rolling Walker - Not met  5. Lower extremity exercise program x20 reps per handout, with supervision to increase strength and endurance to  improve safety with above mobility goals - Not met               PLAN:    Patient to be seen 4x/week to address the above listed problems via gait training, therapeutic activities, therapeutic exercises, neuromuscular re-education     Plan of Care expires: 05/01/17  Plan of Care reviewed with: patient     Aydin Chaveztaylor, PT  4/4/2017

## 2017-04-04 NOTE — CONSULTS
[facility]  Endocrinology  Consult Note    Consult Requested by: Patrice Aguila MD   Reason for admit: Hyperglycemia due to type 1 diabetes mellitus    HISTORY OF PRESENT ILLNESS:  Reason for Consult: Management of T1DM, Hyperglycemia     Surgical Procedure and Date: n/a    Diabetes diagnosis year: Age 18    Home Diabetes Medications:  Levemir 15 units qam, Aspart 3-5 units with low dose correction    How often checking glucose at home? 1-3 x day   BG readings on regimen: 300s  Hypoglycemia on the regimen?  Yes 2x a week  Missed doses on regimen?  Yes Once every 2 weeks    Diabetes Complications include:     Hyperglycemia, Hypoglycemia , Diabetic nephropathy  , Diabetic chronic kidney disease     , Diabetic retinopathy  and Diabetic peripheral neuropathy     Complicating diabetes co morbidities:   History of CVA, CKD and Dementia      HPI:    Patient is an 84 year old gentleman with a h/o CKD 4, HTN, poorly controlled DM I, parasagittal meningioma with secondary seizure disorder, TIA, and mild cognitive impairment admitted from group home with Hyperglycemia.  BG found to have BG of 600.  Endocrinology consulted for Type 1 DM management.        Medications and/or Treatments Impacting Glycemic Control:  Immunotherapy:    Immunosuppressants     None        Steroids:   Hormones     None        Pressors:    Autonomic Drugs     None          Prescriptions Prior to Admission   Medication Sig Dispense Refill Last Dose    calcitRIOL (ROCALTROL) 0.5 MCG Cap TAKE 1 CAPSULE BY MOUTH ONCE DAILY 90 capsule 4 Past Week at Unknown time    carvedilol (COREG) 6.25 MG tablet Take 1 tablet (6.25 mg total) by mouth 2 (two) times daily. 60 tablet 0 3/29/2017 at Unknown time    hydrALAZINE (APRESOLINE) 50 MG tablet Take 1 tablet (50 mg total) by mouth every 8 (eight) hours. 180 tablet 0 Past Week at Unknown time    nifedipine (PROCARDIA-XL) 90 MG (OSM) TR24 Take 1 tablet (90 mg total) by mouth once daily. 30  "tablet 0 3/29/2017 at Unknown time    paroxetine (PAXIL) 10 MG tablet Take 1 tablet (10 mg total) by mouth every morning. 90 tablet 1 Past Month at Unknown time    aspirin (ECOTRIN) 81 MG EC tablet Take 81 mg by mouth once daily.   Unknown at Unknown time    BD INSULIN PEN NEEDLE UF SHORT 31 gauge x 5/16" Ndle USE AS DIRECTED 100 each 0     blood-glucose meter (CONTOUR METER) kit Use as instructed.  Please substitute appropriate meter to match his strips. 1 each 0 Unknown    CONTOUR TEST STRIPS Strp USE AS DIRECTED THREE TIMES DAILY 300 strip 12 Unknown    insulin aspart (NOVOLOG FLEXPEN) 100 unit/mL InPn pen Inject 5 Units into the skin 3 (three) times daily with meals. Plus correction scale. 1 Box 6 Unknown at Unknown time    insulin detemir (LEVEMIR FLEXPEN) 100 unit/mL (3 mL) SubQ InPn pen Inject 18 Units into the skin once daily. 1 Box 6     lancets Misc Use three times daily for finger stick glucose monitoring. 100 each 3 Unknown    levetiracetam (KEPPRA) 1000 MG tablet TAKE 1 TABLET BY MOUTH TWICE DAILY 180 tablet 6 Unknown at Unknown time    nystatin (MYCOSTATIN) cream Apply topically 2 (two) times daily. 60 g 3 Unknown       Current Facility-Administered Medications   Medication Dose Route Frequency Provider Last Rate Last Dose    0.9%  NaCl infusion  100 mL/hr Intravenous Continuous Yuki Orosco PA-C 100 mL/hr at 04/02/17 0848 100 mL/hr at 04/02/17 0848    aspirin EC tablet 81 mg  81 mg Oral Daily Alexia Stevens MD   81 mg at 04/04/17 0829    calcitRIOL capsule 0.5 mcg  0.5 mcg Oral Daily Alexia Stevens MD   0.5 mcg at 04/04/17 0829    carvedilol tablet 6.25 mg  6.25 mg Oral BID Alexia Stevens MD   6.25 mg at 04/04/17 0830    dextrose 50% injection 12.5 g  12.5 g Intravenous PRN Alexia Stevens MD        dextrose 50% injection 25 g  25 g Intravenous PRN Alexia Stevens MD        glucagon (human recombinant) injection 1 mg  1 mg Intramuscular PRN Alexia Stevens MD        glucose " chewable tablet 16 g  16 g Oral PRN Alexia Stevens MD   16 g at 04/02/17 1721    glucose chewable tablet 24 g  24 g Oral PRN Alexia Stevens MD   24 g at 04/03/17 1648    heparin (porcine) injection 5,000 Units  5,000 Units Subcutaneous Q8H Alexia Stevens MD   5,000 Units at 04/04/17 0541    hydrALAZINE tablet 75 mg  75 mg Oral Q8H Patrice Aguila MD        insulin aspart pen 1-10 Units  1-10 Units Subcutaneous QID (AC + HS) PRN Aminah Mohan NP   2 Units at 04/04/17 0837    insulin aspart pen 5 Units  5 Units Subcutaneous TIDWM Alexia Stevens MD   5 Units at 04/04/17 0836    insulin detemir pen 18 Units  18 Units Subcutaneous Daily Alexia Stevens MD   18 Units at 04/04/17 0835    levetiracetam tablet 1,000 mg  1,000 mg Oral BID Alexia Stevens MD   1,000 mg at 04/04/17 0829    nifedipine 30 MG ORAL TR24 24 hr tablet 90 mg  90 mg Oral Daily Alexia Stevens MD   90 mg at 04/04/17 0829    paroxetine tablet 10 mg  10 mg Oral QAM Alexia Stevens MD   10 mg at 04/04/17 0638    ramelteon tablet 8 mg  8 mg Oral Nightly PRN Alexia Stevens MD   8 mg at 04/01/17 2157         PMH, PSH, FH, SH updated and reviewed     Unable to perform ROS due dementia    Review of Systems    Labs Reviewed and Include:  BASELINE Creatinine:   [unfilled]  [unfilled]  [unfilled]    Recent Labs  Lab 04/04/17  0358   *   CALCIUM 9.2   ALBUMIN 3.1*   PROT 6.7      K 4.3   CO2 22*      BUN 40*   CREATININE 2.3*   ALKPHOS 84   ALT 38   AST 30   BILITOT 0.2     Lab Results   Component Value Date    HGBA1C 8.7 (H) 03/25/2017       Nutritional status:   Body mass index is 23.49 kg/(m^2).  Lab Results   Component Value Date    ALBUMIN 3.1 (L) 04/04/2017    ALBUMIN 2.9 (L) 04/03/2017    ALBUMIN 3.0 (L) 04/02/2017     No results found for: PREALBUMIN    Estimated Creatinine Clearance: 22.4 mL/min (based on Cr of 2.3).              Vitals:    04/04/17 0757   BP: (!) 175/87   Pulse: 73   Resp: 18   Temp: 97.9 °F (36.6 °C)  "    Body mass index is 23.49 kg/(m^2).    Physical Exam    PHYSICAL EXAMINATION:      PHYSICAL EXAMINATION  BP (!) 175/87 (BP Location: Left arm, Patient Position: Sitting, BP Method: Automatic)  Pulse 73  Temp 97.9 °F (36.6 °C) (Oral)   Resp 18  Ht 5' 7" (1.702 m)  Wt 68 kg (150 lb)  SpO2 100%  BMI 23.49 kg/m2  Constitutional:  Elderly male. NAD.  ENT: External ears no masses with nose patent; normal hearing.   Neck:  Supple; trachea midline; no thyromegaly.   Cardiovascular: Normal heart sounds, no LE edema.     Lungs:  Normal effort; lungs anterior bilaterally clear to auscultation.  Abdomen:  Soft, no masses,  no hernias.  MS: No clubbing or cyanosis of nails noted;  unable to assess gait.  Skin: No rashes, lesions, or ulcers; no nodules.  Psychiatric: Decreased cognitive ability; normal mood and affect.  Neurological: Cranial nerves are grossly intact. Moderate decrease in vibration sense in the bilateral lower extremities.          Current Medications and/or Treatments Impacting Glycemic Control  Immunotherapy:  Immunosuppressants     None        Steroids:   Hormones     None        Pressors:    Autonomic Drugs     None        Hyperglycemia/Diabetes Medications: Antihyperglycemics     Start     Stop Route Frequency Ordered    17 0003  insulin aspart pen 1-10 Units      -- SubQ Before meals & nightly PRN 17 2304    17 1130  insulin aspart pen 5 Units      -- SubQ 3 times daily with meals 17 1100    17 0900  insulin detemir pen 18 Units      -- SubQ Daily 17 1100            .     ASSESSMENT and PLAN:    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  B-180  Recommend Lev 15, Aspart 3 tid ac + low dose correction  AC/HS      CKD (chronic kidney disease) stage 4, GFR 15-29 ml/min  Avoid hypoglycemia      Vitamin D deficiency disease  Recommend patient start Ergocalciferol 50k weekly x 7 weeks then Vitamin D 1k daily  D/c calcitriol      Anemia of " chronic renal failure, stage 4 (severe)  Falsely lowers A1c      Uncontrolled type 2 diabetes mellitus with stage 4 chronic kidney disease, with long-term current use of insulin  As above      Diabetic nephropathy associated with type 2 diabetes mellitus  Better blood glucose control may improve kidney function        DISCHARGE NEEDS: will assess daily    Altagracia Myers MD  Endocrinology  [facility]

## 2017-04-04 NOTE — SUBJECTIVE & OBJECTIVE
"  PMH, PSH, FH, SH updated and reviewed     Unable to perform ROS due dementia    Review of Systems    Labs Reviewed and Include:  BASELINE Creatinine:   [unfilled]  [unfilled]  [unfilled]    Recent Labs  Lab 04/04/17  0358   *   CALCIUM 9.2   ALBUMIN 3.1*   PROT 6.7      K 4.3   CO2 22*      BUN 40*   CREATININE 2.3*   ALKPHOS 84   ALT 38   AST 30   BILITOT 0.2     Lab Results   Component Value Date    HGBA1C 8.7 (H) 03/25/2017       Nutritional status:   Body mass index is 23.49 kg/(m^2).  Lab Results   Component Value Date    ALBUMIN 3.1 (L) 04/04/2017    ALBUMIN 2.9 (L) 04/03/2017    ALBUMIN 3.0 (L) 04/02/2017     No results found for: PREALBUMIN    Estimated Creatinine Clearance: 22.4 mL/min (based on Cr of 2.3).              Vitals:    04/04/17 0757   BP: (!) 175/87   Pulse: 73   Resp: 18   Temp: 97.9 °F (36.6 °C)     Body mass index is 23.49 kg/(m^2).    Physical Exam    PHYSICAL EXAMINATION:      PHYSICAL EXAMINATION  BP (!) 175/87 (BP Location: Left arm, Patient Position: Sitting, BP Method: Automatic)  Pulse 73  Temp 97.9 °F (36.6 °C) (Oral)   Resp 18  Ht 5' 7" (1.702 m)  Wt 68 kg (150 lb)  SpO2 100%  BMI 23.49 kg/m2  Constitutional:  Elderly male. NAD.  ENT: External ears no masses with nose patent; normal hearing.   Neck:  Supple; trachea midline; no thyromegaly.   Cardiovascular: Normal heart sounds, no LE edema.     Lungs:  Normal effort; lungs anterior bilaterally clear to auscultation.  Abdomen:  Soft, no masses,  no hernias.  MS: No clubbing or cyanosis of nails noted;  unable to assess gait.  Skin: No rashes, lesions, or ulcers; no nodules.  Psychiatric: Decreased cognitive ability; normal mood and affect.  Neurological: Cranial nerves are grossly intact. Moderate decrease in vibration sense in the bilateral lower extremities.          Current Medications and/or Treatments Impacting Glycemic Control  Immunotherapy:  Immunosuppressants     None        Steroids:   Hormones     " None        Pressors:    Autonomic Drugs     None        Hyperglycemia/Diabetes Medications: Antihyperglycemics     Start     Stop Route Frequency Ordered    03/31/17 0003  insulin aspart pen 1-10 Units      -- SubQ Before meals & nightly PRN 03/30/17 2304    04/02/17 1130  insulin aspart pen 5 Units      -- SubQ 3 times daily with meals 04/02/17 1100    04/03/17 0900  insulin detemir pen 18 Units      -- SubQ Daily 04/02/17 1100

## 2017-04-04 NOTE — PLAN OF CARE
Problem: Patient Care Overview  Goal: Plan of Care Review  Julius Cardenas tolerated treatment well this morning. Slight increase in distance for gait, walks 130 ft with rolling walker but requiring occasional min (A) due to poor safety with device (poor walker management). Remains confused, only truly oriented to his name. Participates in seated EOB LE therex; evident that he is generalized (L) sided weakness throughout UE and LE; MD present during session aware. Will benefit from SNF placement once medically appropriate. Will cont to benefit from acute PT services.     Aydin Palencia, PT  4/4/2017

## 2017-04-05 VITALS
HEIGHT: 67 IN | TEMPERATURE: 98 F | RESPIRATION RATE: 16 BRPM | OXYGEN SATURATION: 98 % | HEART RATE: 61 BPM | BODY MASS INDEX: 23.54 KG/M2 | DIASTOLIC BLOOD PRESSURE: 60 MMHG | WEIGHT: 150 LBS | SYSTOLIC BLOOD PRESSURE: 134 MMHG

## 2017-04-05 PROBLEM — E10.65 HYPERGLYCEMIA DUE TO TYPE 1 DIABETES MELLITUS: Status: RESOLVED | Noted: 2017-01-23 | Resolved: 2017-04-05

## 2017-04-05 PROBLEM — E86.9 VOLUME DEPLETION: Status: RESOLVED | Noted: 2017-02-16 | Resolved: 2017-04-05

## 2017-04-05 LAB
INTERPRETATION SERPL IFE-IMP: NORMAL
PATHOLOGIST INTERPRETATION IFE: NORMAL
POCT GLUCOSE: 116 MG/DL (ref 70–110)
POCT GLUCOSE: 169 MG/DL (ref 70–110)
POCT GLUCOSE: 180 MG/DL (ref 70–110)
POCT GLUCOSE: 209 MG/DL (ref 70–110)
PTH-INTACT SERPL-MCNC: 38 PG/ML

## 2017-04-05 PROCEDURE — 63600175 PHARM REV CODE 636 W HCPCS: Performed by: HOSPITALIST

## 2017-04-05 PROCEDURE — 36415 COLL VENOUS BLD VENIPUNCTURE: CPT

## 2017-04-05 PROCEDURE — 99232 SBSQ HOSP IP/OBS MODERATE 35: CPT | Mod: ,,, | Performed by: INTERNAL MEDICINE

## 2017-04-05 PROCEDURE — 99239 HOSP IP/OBS DSCHRG MGMT >30: CPT | Mod: ,,, | Performed by: HOSPITALIST

## 2017-04-05 PROCEDURE — 97110 THERAPEUTIC EXERCISES: CPT

## 2017-04-05 PROCEDURE — 83970 ASSAY OF PARATHORMONE: CPT

## 2017-04-05 PROCEDURE — 25000003 PHARM REV CODE 250: Performed by: HOSPITALIST

## 2017-04-05 PROCEDURE — 99232 SBSQ HOSP IP/OBS MODERATE 35: CPT | Mod: GC,,, | Performed by: INTERNAL MEDICINE

## 2017-04-05 PROCEDURE — 97530 THERAPEUTIC ACTIVITIES: CPT

## 2017-04-05 RX ORDER — CARVEDILOL 12.5 MG/1
12.5 TABLET ORAL 2 TIMES DAILY
Status: DISCONTINUED | OUTPATIENT
Start: 2017-04-05 | End: 2017-04-05 | Stop reason: HOSPADM

## 2017-04-05 RX ORDER — ERGOCALCIFEROL 1.25 MG/1
50000 CAPSULE ORAL
Qty: 4 CAPSULE | Refills: 0 | Status: ON HOLD | OUTPATIENT
Start: 2017-04-05 | End: 2017-06-15

## 2017-04-05 RX ORDER — INSULIN ASPART 100 [IU]/ML
0-5 INJECTION, SOLUTION INTRAVENOUS; SUBCUTANEOUS
Refills: 0 | Status: ON HOLD
Start: 2017-04-05 | End: 2017-06-15

## 2017-04-05 RX ORDER — INSULIN ASPART 100 [IU]/ML
3 INJECTION, SOLUTION INTRAVENOUS; SUBCUTANEOUS
Refills: 0
Start: 2017-04-05 | End: 2017-06-13 | Stop reason: SDUPTHER

## 2017-04-05 RX ADMIN — NIFEDIPINE 90 MG: 30 TABLET, FILM COATED, EXTENDED RELEASE ORAL at 08:04

## 2017-04-05 RX ADMIN — CARVEDILOL 12.5 MG: 12.5 TABLET, FILM COATED ORAL at 08:04

## 2017-04-05 RX ADMIN — LEVETIRACETAM 1000 MG: 500 TABLET, FILM COATED ORAL at 08:04

## 2017-04-05 RX ADMIN — INSULIN ASPART 3 UNITS: 100 INJECTION, SOLUTION INTRAVENOUS; SUBCUTANEOUS at 12:04

## 2017-04-05 RX ADMIN — HEPARIN SODIUM 5000 UNITS: 5000 INJECTION, SOLUTION INTRAVENOUS; SUBCUTANEOUS at 04:04

## 2017-04-05 RX ADMIN — PAROXETINE HYDROCHLORIDE 10 MG: 10 TABLET, FILM COATED ORAL at 04:04

## 2017-04-05 RX ADMIN — INSULIN ASPART 3 UNITS: 100 INJECTION, SOLUTION INTRAVENOUS; SUBCUTANEOUS at 05:04

## 2017-04-05 RX ADMIN — INSULIN ASPART 2 UNITS: 100 INJECTION, SOLUTION INTRAVENOUS; SUBCUTANEOUS at 05:04

## 2017-04-05 RX ADMIN — HYDRALAZINE HYDROCHLORIDE 100 MG: 50 TABLET ORAL at 04:04

## 2017-04-05 RX ADMIN — INSULIN ASPART 3 UNITS: 100 INJECTION, SOLUTION INTRAVENOUS; SUBCUTANEOUS at 08:04

## 2017-04-05 RX ADMIN — HYDRALAZINE HYDROCHLORIDE 100 MG: 50 TABLET ORAL at 03:04

## 2017-04-05 RX ADMIN — HEPARIN SODIUM 5000 UNITS: 5000 INJECTION, SOLUTION INTRAVENOUS; SUBCUTANEOUS at 03:04

## 2017-04-05 RX ADMIN — ASPIRIN 81 MG: 81 TABLET, COATED ORAL at 08:04

## 2017-04-05 NOTE — PROGRESS NOTES
Nephrology progress note        SUBJECTIVE:     Interval Hx:  Stable creatinine.         Review of Systems:  Constitutional: no fever or chills  Respiratory: no cough or shortness of breath  Cardiovascular: no chest pain or palpitations  Gastrointestinal: no nausea or vomiting, no abdominal pain or change in bowel habits  Musculoskeletal: no arthralgias or myalgias  Neurological: no seizures or tremors    OBJECTIVE:     Vital Signs (Most Recent)  Temp: 97.6 °F (36.4 °C) (04/05/17 1525)  Pulse: 61 (04/05/17 1525)  Resp: 16 (04/05/17 1525)  BP: 134/60 (04/05/17 1525)  SpO2: 98 % (04/05/17 1525)    Vital Signs Range (Last 24H):  Temp:  [97.1 °F (36.2 °C)-98.2 °F (36.8 °C)]   Pulse:  [59-77]   Resp:  [16-28]   BP: (121-187)/(59-84)   SpO2:  [95 %-100 %]     Physical Exam:  General: well developed, cachectic, thin, frail, elderly  Lungs:  clear to auscultation bilaterally and normal respiratory effort  Cardiovascular: Heart: regular rate and rhythm. Chest Wall: no tenderness. Extremities: no cyanosis or edema, or clubbing. Pulses: 2+ and symmetric.  Abdomen/Rectal: Abdomen: soft, non-tender non-distented; bowel sounds normal; no masses,  no organomegaly. Rectal: not examined  Musculoskeletal:no clubbing, cyanosis  Neurologic: Mental status: Alert, oriented, thought content appropriate    Laboratory:  CBC:     Recent Labs  Lab 04/04/17  0816   WBC 5.48   RBC 3.90*   HGB 11.4*   HCT 34.0*      MCV 87   MCH 29.2   MCHC 33.5     CMP:     Recent Labs  Lab 04/04/17  0358   *   CALCIUM 9.2   ALBUMIN 3.1*   PROT 6.7      K 4.3   CO2 22*      BUN 40*   CREATININE 2.3*   ALKPHOS 84   ALT 38   AST 30   BILITOT 0.2       Recent Labs  Lab 03/30/17  1146 04/04/17  1519   COLORU Colorless* Straw   SPECGRAV 1.010 1.010   PHUR 5.0 5.0   PROTEINUA Negative Negative   BACTERIA Rare  --    NITRITE Negative Negative   LEUKOCYTESUR Negative Negative   UROBILINOGEN Negative Negative       Diagnostic Results:  Labs:  Reviewed  X-Ray: Reviewed    ASSESSMENT/PLAN:     Mr Julius Cardenas is a pleasant 84 y.o. man with DM-1 uncontrolled (Dx age 18) with CKD 4 proteinuria and baseline creatinine of 2.2 and GFR of 30. Also Hx of with neuro/renal/PVD, dementia/cognitive dysfunction, h/o craniotomy x 2  He was admitted to [facility] on 3/30 due to hyperglycemia/ falls. He had NIALL on admission to [facility] on 3/24 and this has slowly resolved to his basline. He has had labile blood sugars since admission. He has 3+ proteinuria without recent UPC. Renal US shows CKD.    CKD 4 with proteinuria.     UPC is 0.57. Will not start RAAS blockade at this time due to severe CKD and risk for hyperkalemia.   UBALDO negative for monoclonal peaks.   CKD with proteinuria likely due to long hx of difficult to control DM and HTN.   Scheduled for nephrology clinic follow up post hospital stay.     HTN  With improved control on current regimen    Anemia due to CKD  Iron studies pending    Mineral bone disease in CKD  Phosphorus is WNL at 3.8- no need for phos binders   Corrected calcium - 9.92  Vitamin D 14- continue weekly Ergo 50K units   Will get PTH with am labs    Anita Villanueva MD  Nephrology Fellow  Pager [phone]

## 2017-04-05 NOTE — DISCHARGE SUMMARY
"[facility] Hospital Medicine  Discharge Summary      Patient Name: Julius Cardenas  MRN: [id number]  Admission Date: 3/30/2017  Hospital Length of Stay: 6 days  Discharge Date and Time:  04/05/2017 9:26 PM  Attending Physician: Patrice Aguila MD   Discharging Provider: Patrice Aguila MD  Primary Care Provider: Mady Carson MD    Hospital Medicine Team: [facility] MED D Patrice Aguila MD    HPI: Mr Julius Cardenas is a pleasant 84 y.o. 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 He says that "they came and got me out of the bathroom" after he fell. Also had nausea ("I felt like I wanted to throw up") but no vomiting. In ED, glucose 600s, started on IVFs but no insulin given. Admit called in to Hospital Medicine.     * No surgery found *      Indwelling Lines/Drains at time of discharge:   Lines/Drains/Airways          No matching active lines, drains, or airways        Hospital Course: Patient was evaluated by Endocrinology, Insulin detemir and aspart adjusted to prevent hypoglycemic episodes in hyperglycemic episodes. Patient was evaluated by nephrology force chronic kidney disease stage IV, proteinuria 3+, proteinuria workup requested.  Was evaluated by PT/OT recommended skilled nursing facility. " Hydralazine dose was increased for uncontrolled blood pressure with good response. Telemetry did not show any acute cardiac events,, echocardiogram with Ejection fraction of 60%, no significant valvular abnormalities. Patient is being discharged with adjusted insulin regimen with endocrinology on nephrology follow-up in the clinic    Consults:   Consults         Status Ordering Provider     Inpatient consult to Endocrinology  Once     Provider:  (Not yet assigned)    Completed MAGGIE ACEVES     Inpatient consult to Nephrology  Once     Provider:  (Not yet assigned)    Completed MAGGIE ACEVES          Significant Diagnostic Studies: Labs:   BMP:   Recent Labs  Lab 04/04/17  0358   *      K 4.3      CO2 22*   BUN 40*   CREATININE 2.3*   CALCIUM 9.2   MG 1.9   , CMP   Recent Labs  Lab 04/04/17  0358      K 4.3      CO2 22*   *   BUN 40*   CREATININE 2.3*   CALCIUM 9.2   PROT 6.7   ALBUMIN 3.1*   BILITOT 0.2   ALKPHOS 84   AST 30   ALT 38   ANIONGAP 7*   ESTGFRAFRICA 29.1*   EGFRNONAA 25.1*   , CBC   Recent Labs  Lab 04/04/17  0816   WBC 5.48   HGB 11.4*   HCT 34.0*      , INR   Lab Results   Component Value Date    INR 1.0 06/27/2016    INR 1.0 06/03/2016    INR 0.9 03/30/2015   , Lipid Panel   Lab Results   Component Value Date    CHOL 130 06/27/2016    HDL 43 06/27/2016    LDLCALC 75.0 06/27/2016    TRIG 60 06/27/2016    CHOLHDL 33.1 06/27/2016   , Troponin No results for input(s): TROPONINI in the last 168 hours. and All labs within the past 24 hours have been reviewed  Microbiology:   Blood Culture   Lab Results   Component Value Date    LABBLOO No growth after 5 days. 12/08/2016   , Sputum Culture No results found for: GSRESP, RESPIRATORYC and Urine Culture    Lab Results   Component Value Date    LABURIN  10/30/2016     Multiple organisms isolated. None in predominance.  Repeat if    LABURIN clinically necessary. 10/30/2016     Radiology  Imaging Results          US Retroperitoneal Complete (Kidney and (Final result) Result time:  04/04/17 12:02:19    Final result by Moshe Last MD (04/04/17 12:02:19)    Impression:      Evidence of chronic bilateral medical renal disease.  ______________________________________     Electronically signed by resident: MOSHE LAST MD  Date:     04/04/17  Time:    11:44            As the supervising and teaching physician, I personally reviewed the images and resident's interpretation and I agree with the findings.            Electronically signed by: Dr. Oniel Duron MD  Date:     04/04/17  Time:    12:02     Narrative:    ULTRASOUND RETROPERITONEAL COMPLETE    INDICATION: CKD 4.     COMPARISON: None.    TECHNIQUE: Transabdominal ultrasonographic images in the longitudinal and transverse axes of the right and left kidneys were obtained. Additionally, spectral doppler images of mid segmental arteries in both kidneys with resistive indices were acquired. Longitudinal and transverse images of the bladder were subsequently obtained.     FINDINGS:     RIGHT KIDNEY:  Normal in size.  Measures 9.0 cm. There is poor cortical medullary differentiation, cortical thinning, and increased cortical echogenicity. There are no solid renal masses, nephrolithiasis, or hydronephrosis. Perfusion is slightly decreased. Resistive index is elevated, measuring 0.8.     LEFT KIDNEY:  Normal in size.  Measures 8.5 cm.  There is poor cortical medullary differentiation, cortical thinning, and increased cortical echogenicity.  There are no solid renal masses, nephrolithiasis, or hydronephrosis. Perfusion is slightly decreased. Resistive index is elevated, measuring 0.73.     BLADDER:  Unremarkable.            CT Head Without Contrast (Final result) Result time:  03/30/17 17:15:44    Final result by Gopi Arellano MD (03/30/17 17:15:44)    Impression:        No acute intracranial abnormalities.  Stable postsurgical changes, and right parasagittal  meningioma.            Electronically signed by: JIE SANABRIA MD  Date:     03/30/17  Time:    17:15     Narrative:    Comparison: 3/24/2017    Clinical history: Head trauma    Technique:    Axial images of the brain were obtained at 5-mm intervals from the skull base to the vertex without the administration of contrast.    Findings:    There is generalized cerebral volume loss.  There is hypoattenuation in a periventricular fashion, likely sequela of chronic microvascular ischemic change.There is stable bifrontal encephalomalacia, right greater than left, likely related to previous infarct or insult.  There is no evidence of acute major vascular territory infarct, or hemorrhage.  There is a right parasagittal frontal known meningioma, unchanged.  There is no hydrocephalus.  There are no abnormal extra-axial fluid collections.  The paranasal sinuses and mastoid air cells are clear, and there is no evidence of calvarial fracture.  The visualized soft tissues are unremarkable.  Stable postsurgical calvarial changes noted.            Cardiac Graphics: Normal sinus rhythm with first-degree AV block @ 67 bpm    Pending Diagnostic Studies:     Procedure Component Value Units Date/Time    Immunofixation electrophoresis [[id number]] Collected:  04/04/17 1942    Order Status:  Sent Lab Status:  In process Updated:  04/04/17 2028    Specimen:  Blood from Blood     Narrative:       Collection has been rescheduled by CAB2 at 4/4/2017 17:56 Reason:   patient is eating   Collection has been rescheduled by CAB2 at 4/4/2017 19:05 Reason:   hard stick stuck 2x     PTH, intact [[id number]]     Order Status:  Sent Lab Status:  No result     Specimen:  Blood from Blood         Final Active Diagnoses:    Diagnosis Date Noted POA    Diabetic nephropathy associated with type 2 diabetes mellitus [E11.21] 02/16/2017 Yes    Diabetic polyneuropathy associated with type 2 diabetes mellitus [E11.42] 02/16/2017 Yes    Uncontrolled type 2  diabetes mellitus with stage 4 chronic kidney disease, with long-term current use of insulin [E11.22, E11.65, N18.4, Z79.4] 02/16/2017 Not Applicable    Anemia of chronic renal failure, stage 4 (severe) [N18.4, D63.1] 04/08/2014 Yes     Chronic    Renovascular hypertension [I15.0] 08/31/2013 Yes     Chronic    Vitamin D deficiency disease [E55.9] 07/31/2013 Yes     Chronic    CKD (chronic kidney disease) stage 4, GFR 15-29 ml/min [N18.4] 12/03/2012 Yes     Chronic    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 [E10.22, N18.4]  Yes     Chronic      Problems Resolved During this Admission:    Diagnosis Date Noted Date Resolved POA    PRINCIPAL PROBLEM:  Hyperglycemia due to type 1 diabetes mellitus [E10.65] 01/23/2017 04/05/2017 Yes    Hypertension due to endocrine disorder [I15.2] 03/25/2017 04/04/2017 Yes    Volume depletion [E86.9] 02/16/2017 04/05/2017 Yes      Discharged Condition: fair    Disposition: Skilled Nursing Facility    Follow Up:  Follow-up Information     Follow up with Mady Carson MD In 1 week.    Specialty:  Internal Medicine    Contact information:    [address]  [place] [address] [phone]          Patient Instructions:     Ambulatory Referral to Nephrology   Referral Priority: Routine Referral Type: Consultation   Referral Reason: Specialty Services Required    Requested Specialty: Nephrology    Number of Visits Requested: 1      Ambulatory Referral to Endocrinology   Referral Priority: Routine Referral Type: Consultation   Requested Specialty: Endocrinology    Number of Visits Requested: 1      Vital signs per facility protocol     Skin assessment every shift      Full code       Medications:  Reconciled Home Medications:   Discharge Medication List as of 4/5/2017  7:35 PM      START taking these medications    Details   ergocalciferol (ERGOCALCIFEROL) 50,000 unit Cap Take 1 capsule (50,000 Units total) by mouth every 7 days., Starting 4/5/2017,  "Until Discontinued, Normal         CONTINUE these medications which have CHANGED    Details   !! insulin aspart (NOVOLOG) 100 unit/mL InPn pen Inject 0-5 Units into the skin 3 (three) times daily with meals., Starting 4/5/2017, Until Thu 4/5/18, No Print      !! insulin aspart (NOVOLOG) 100 unit/mL InPn pen Inject 3 Units into the skin 3 (three) times daily with meals., Starting 4/5/2017, Until Thu 4/5/18, No Print      insulin detemir (LEVEMIR FLEXTOUCH) 100 unit/mL (3 mL) SubQ InPn pen Inject 15 Units into the skin once daily., Starting 4/5/2017, Until Thu 4/5/18, No Print       !! - Potential duplicate medications found. Please discuss with provider.      CONTINUE these medications which have NOT CHANGED    Details   carvedilol (COREG) 6.25 MG tablet Take 1 tablet (6.25 mg total) by mouth 2 (two) times daily., Starting 2/19/2017, Until Mon 2/19/18, Normal      nifedipine (PROCARDIA-XL) 90 MG (OSM) TR24 Take 1 tablet (90 mg total) by mouth once daily., Starting 3/27/2017, Until Tue 3/27/18, Normal      paroxetine (PAXIL) 10 MG tablet Take 1 tablet (10 mg total) by mouth every morning., Starting 9/29/2015, Until Discontinued, Normal      aspirin (ECOTRIN) 81 MG EC tablet Take 81 mg by mouth once daily., Until Discontinued, Historical Med      BD INSULIN PEN NEEDLE UF SHORT 31 gauge x 5/16" Ndle USE AS DIRECTED, Normal      blood-glucose meter (CONTOUR METER) kit Use as instructed.  Please substitute appropriate meter to match his strips., Normal      CONTOUR TEST STRIPS Strp USE AS DIRECTED THREE TIMES DAILY, Normal      lancets Misc Use three times daily for finger stick glucose monitoring., Normal      levetiracetam (KEPPRA) 1000 MG tablet TAKE 1 TABLET BY MOUTH TWICE DAILY, Normal      nystatin (MYCOSTATIN) cream Apply topically 2 (two) times daily., Starting 10/22/2015, Until Discontinued, Normal         STOP taking these medications       calcitRIOL (ROCALTROL) 0.5 MCG Cap Comments:   Reason for Stopping:      "    hydrALAZINE (APRESOLINE) 50 MG tablet Comments:   Reason for Stopping:                 Patrice Aguila MD  Department of [facility] Medicine  [facility]

## 2017-04-05 NOTE — PLAN OF CARE
Pt not stable to d/c until tomorrow per staff per nephrology recs. Sw did update Juan C with [facility], Sw to follow in am.

## 2017-04-05 NOTE — ASSESSMENT & PLAN NOTE
B-180  Cont Lev 15, Aspart 3 tid ac + low dose correction  AC/HS    D/C planning: discharge on current regimen of Lev 15 qam, Aspt 3 tid ac + low dose correction

## 2017-04-05 NOTE — PLAN OF CARE
Patient asleep in bed when CM rounded. No family at the bedside. Plan to discharge patient to [facility] SNF when medically stable. Will continue to follow.

## 2017-04-05 NOTE — PT/OT/SLP PROGRESS
"Occupational Therapy  Treatment    Julius Cardenas   MRN: [id number]   Admitting Diagnosis: Hyperglycemia due to type 1 diabetes mellitus    OT Date of Treatment: 17   OT Start Time: 1300  OT Stop Time: 1334  OT Total Time (min): 34 min    Billable Minutes:  Therapeutic Activity 19 and Therapeutic Exercise 15    General Precautions: Standard, fall, diabetic    Do you have any cultural, spiritual, Voodoo conflicts, given your current situation?: none stated    Subjective:  Communicated with nurse prior to session.  "I can't do anything for myself."    Pain Ratin/10     Objective:  Patient found with: telemetry, lindsay catheter, bed alarm, peripheral IV     Functional Mobility:  Bed Mobility:  Rolling/Turning to Left: Stand by assistance  Scooting/Bridging: Stand by Assistance  Supine to Sit: Contact Guard Assistance  Sit to Supine: Minimum Assistance (For help with LEs)    Transfers:   Sit <> Stand Assistance: Contact Guard Assistance, Minimum Assistance (CGA from bed; Min A from lower chair)  Sit <> Stand Assistive Device: Rolling Walker  Bed <> Chair Technique: Stand Pivot  Bed <> Chair Transfer Assistance: Minimum Assistance  Bed <> Chair Assistive Device: Rolling Walker    Functional Ambulation: CGA with RW from bed<>chair, around 4 ft., no LOB; required Minimal cues for walker management    Activities of Daily Living:     UE Dressing Level of Assistance: Maximum assistance    Balance:   Static Sit: GOOD: Takes MODERATE challenges from all directions  Dynamic Sit: GOOD-: Maintains balance through MODERATE excursions of active trunk movement,     Static Stand: FAIR+: Takes MINIMAL challenges from all directions  Dynamic stand: FAIR: Needs CONTACT GUARD during gait    Therapeutic Activities and Exercises:  Bed mobility; static standing at RW; functional t/fs  BUE therex with 3lb dowel; shld flex/ext & chest press, x 15 reps    AM-PAC 6 CLICK ADL   How much help from another person does this patient " currently need?   1 = Unable, Total/Dependent Assistance  2 = A lot, Maximum/Moderate Assistance  3 = A little, Minimum/Contact Guard/Supervision  4 = None, Modified [place]/Independent    Putting on and taking off regular lower body clothing? : 1  Bathing (including washing, rinsing, drying)?: 2  Toileting, which includes using toilet, bedpan, or urinal? : 2  Putting on and taking off regular upper body clothing?: 2  Taking care of personal grooming such as brushing teeth?: 3  Eating meals?: 4  Total Score: 14     AM-PAC Raw Score CMS G-Code Modifier Level of Impairment Assistance   6 % Total / Unable   7 - 9 CM 80 - 100% Maximal Assist   10 - 14 CL 60 - 80% Moderate Assist   15 - 19 CK 40 - 60% Moderate Assist   20 - 22 CJ 20 - 40% Minimal Assist   23 CI 1-20% SBA / CGA   24 CH 0% Independent/ Mod I     Patient left supine with all lines intact, call button in reach and bed alarm on    ASSESSMENT:  Julius Cardenas is a 84 y.o. male with a medical diagnosis of Hyperglycemia due to type 1 diabetes mellitus and presents with decreased (I) in multiple ADL areas, functional mobility & t/fs as well as decreased overall strength, ROM, endurance and balance. Pt would continue to benefit from IP OT to address these deficits and to facilitate improving (I) with daily tasks.     Rehab identified problem list/impairments: Rehab identified problem list/impairments: weakness, impaired endurance, impaired self care skills, impaired functional mobilty, impaired cognition, decreased safety awareness, impaired balance, gait instability, decreased upper extremity function, decreased lower extremity function    Rehab potential is good.    Activity tolerance: Good    Discharge recommendations: Discharge Facility/Level Of Care Needs: nursing facility, skilled     Barriers to discharge: Barriers to Discharge: Inaccessible home environment, Decreased caregiver support    Equipment recommendations: bedside commode, walker,  rolling, shower chair     GOALS:   Occupational Therapy Goals        Problem: Occupational Therapy Goal    Goal Priority Disciplines Outcome Interventions   Occupational Therapy Goal     OT, PT/OT Ongoing (interventions implemented as appropriate)    Description:  Goals to be met by: 4/15/17     Patient will increase functional independence with ADLs by performing:    UE Dressing with Minimal Assistance.  LE Dressing with Moderate Assistance.  Grooming while standing at sink with Stand-by Assistance.  Toileting from bedside commode with Minimal Assistance for hygiene and clothing management.   Supine to sit with Modified [place].  Stand pivot transfers with Stand-by Assistance.  Toilet transfer to bedside commode with Stand-by Assistance.                Plan:  Patient to be seen 4 x/week to address the above listed problems via self-care/home management, therapeutic activities, therapeutic exercises  Plan of Care expires: 05/02/17  Plan of Care reviewed with: patient         ROSA MARIA Mauricio  04/05/2017

## 2017-04-05 NOTE — PLAN OF CARE
Sw did upload 142 and pasrr to [facility], still need SNF orders if Pt stable for d/c.

## 2017-04-05 NOTE — PLAN OF CARE
Galo did upload SNF orders and endocrine note to [facility] as Pt is now stable for d/c per CM Anne. Galo left message with Juan C at  informing of d/c today, Sw to follow. Galo did fax d/c SNF orders to [facility] to , Juan C to speak with Anival at [facility] and galo to follow with transport.

## 2017-04-05 NOTE — PROGRESS NOTES
"Progress Note  Hospital Medicine    Admit Date: 3/30/2017    SUBJECTIVE:     Follow-up For:  Hyperglycemia due to type 1 diabetes mellitus    HPI/Interval history (See H&P for complete P,F,SHx) 03/04/00:  04/04/17  uncontrolled diabetes mellitus with blood sugars alternating from hypoglycemia 50s to hyperglycemia 300s.  Endocrinology consulted    Review of Systems: List if applicable  Pain scale: 0/10  Constitutional- Positive for  Weakness  Neuro- Positive for Confusion      OBJECTIVE:     Vital Signs Range (Last 24H):  Temp:  [97.9 °F (36.6 °C)-98.3 °F (36.8 °C)]   Pulse:  [60-77]   Resp:  [18]   BP: (142-175)/(67-87)   SpO2:  [98 %-100 %]     I & O (Last 24H):  No intake or output data in the 24 hours ending 04/04/17 2032    Estimated body mass index is 23.49 kg/(m^2) as calculated from the following:    Height as of this encounter: 5' 7" (1.702 m).    Weight as of this encounter: 68 kg (150 lb).  Physical Exam:  General- Patient alert and oriented to self. Unable to tell the name of the Hospital  HEENT- PERRLA, EOMI, OP clear  Neck- No JVD, Lymphadenopathy, Thyromegaly  CV- Regular rate and rhythm, No Murmur/sherron/rubs  Resp- Lungs CTA Bilaterally, No increased WOB  Abdomen- Non tender/non-distended, BS normoactive x4 quads, no HSM  Extrem- No cyanosis, clubbing, edema.   Skin- No rashes, lesions, ulcers  Neuro- Strength 5/5 - right flexors/extensors,Intact sensation to light touch grossly.  Left hemiparesis      Medications:  Medication list was reviewed and changes noted under Assessment/Plan.      Current Facility-Administered Medications:     aspirin EC tablet 81 mg, 81 mg, Oral, Daily, Alexia Stevens MD, 81 mg at 04/04/17 0829    carvedilol tablet 6.25 mg, 6.25 mg, Oral, BID, Alexia Stevens MD, 6.25 mg at 04/04/17 0830    dextrose 50% injection 12.5 g, 12.5 g, Intravenous, PRN, Alexia Stevens MD    dextrose 50% injection 12.5 g, 12.5 g, Intravenous, PRN, Patrice Aguila MD    dextrose 50% " injection 25 g, 25 g, Intravenous, PRN, Alexia Stevens MD    dextrose 50% injection 25 g, 25 g, Intravenous, PRN, Patrice Aguila MD    ergocalciferol capsule 50,000 Units, 50,000 Units, Oral, Q7 Days, Patrice Aguila MD, 50,000 Units at 04/04/17 1536    glucagon (human recombinant) injection 1 mg, 1 mg, Intramuscular, PRN, Alexia Stevens MD    glucagon (human recombinant) injection 1 mg, 1 mg, Intramuscular, PRN, Patrice Aguila MD    glucose chewable tablet 16 g, 16 g, Oral, PRN, Alexia Stevens MD, 16 g at 04/02/17 1721    glucose chewable tablet 16 g, 16 g, Oral, PRN, Patrice Aguila MD    glucose chewable tablet 24 g, 24 g, Oral, PRN, Alexia Stevens MD, 24 g at 04/03/17 1648    glucose chewable tablet 24 g, 24 g, Oral, PRN, Patrice Aguila MD    heparin (porcine) injection 5,000 Units, 5,000 Units, Subcutaneous, Q8H, Alexia Stevens MD, 5,000 Units at 04/04/17 1343    hydrALAZINE tablet 100 mg, 100 mg, Oral, Q8H, Patrice Aguila MD    insulin aspart pen 0-5 Units, 0-5 Units, Subcutaneous, QID (AC + HS) PRN, Patrice Aguila MD    insulin aspart pen 3 Units, 3 Units, Subcutaneous, TIDWM, Patrice Aguila MD, 3 Units at 04/04/17 1741    [START ON 4/5/2017] insulin detemir pen 15 Units, 15 Units, Subcutaneous, Daily, Patrice Aguila MD    levetiracetam tablet 1,000 mg, 1,000 mg, Oral, BID, Alexia Stevens MD, 1,000 mg at 04/04/17 0829    nifedipine 30 MG ORAL TR24 24 hr tablet 90 mg, 90 mg, Oral, Daily, Alexia Stevens MD, 90 mg at 04/04/17 0829    paroxetine tablet 10 mg, 10 mg, Oral, QAM, Alexia Stevens MD, 10 mg at 04/04/17 0638    ramelteon tablet 8 mg, 8 mg, Oral, Nightly PRN, Alexia Stevens MD, 8 mg at 04/01/17 1759    dextrose 50%, dextrose 50%, dextrose 50%, dextrose 50%, glucagon (human recombinant), glucagon (human recombinant), glucose, glucose, glucose, glucose, insulin aspart, ramelteon    Laboratory/Diagnostic Data:  Reviewed and noted in plan where  applicable- Please see chart for full lab data.        Component Value Date/Time    WBC 5.48 04/04/2017 0816    WBC 5.99 03/30/2017 1002    WBC 6.60 03/25/2017 0459    HGB 11.4 (L) 04/04/2017 0816    HGB 11.0 (L) 03/30/2017 1002    HGB 10.7 (L) 03/25/2017 0459     04/04/2017 0816     03/30/2017 1002     03/25/2017 0459     04/04/2017 0358    K 4.3 04/04/2017 0358     04/04/2017 0358    CO2 22 (L) 04/04/2017 0358    BUN 40 (H) 04/04/2017 0358    CREATININE 2.3 (H) 04/04/2017 0358    CREATININE 2.1 (H) 04/03/2017 0517    CREATININE 2.2 (H) 04/02/2017 0345     (H) 04/04/2017 0358    MG 1.9 04/04/2017 0358    PHOS 3.8 04/04/2017 0358    ALKPHOS 84 04/04/2017 0358    ALT 38 04/04/2017 0358    AST 30 04/04/2017 0358    ALBUMIN 3.1 (L) 04/04/2017 0358    PROT 6.7 04/04/2017 0358    BILITOT 0.2 04/04/2017 0358    INR 1.0 06/27/2016 2147    INR 1.0 06/03/2016 1108    INR 0.9 03/30/2015 1544         Microbiology Results (last 7 days)     ** No results found for the last 168 hours. **            Estimated Creatinine Clearance: 22.4 mL/min (based on Cr of 2.3).      Imaging Results         US Retroperitoneal Complete (Kidney and (Final result) Result time:  04/04/17 12:02:19    Final result by Moshe Last MD (04/04/17 12:02:19)    Impression:      Evidence of chronic bilateral medical renal disease.  ______________________________________     Electronically signed by resident: MOSHE LAST MD  Date:     04/04/17  Time:    11:44            As the supervising and teaching physician, I personally reviewed the images and resident's interpretation and I agree with the findings.            Electronically signed by: Dr. Oniel Duron MD  Date:     04/04/17  Time:    12:02     Narrative:    ULTRASOUND RETROPERITONEAL COMPLETE    INDICATION: CKD 4.     COMPARISON: None.    TECHNIQUE: Transabdominal ultrasonographic images in the longitudinal and transverse axes of the right and left kidneys  were obtained. Additionally, spectral doppler images of mid segmental arteries in both kidneys with resistive indices were acquired. Longitudinal and transverse images of the bladder were subsequently obtained.     FINDINGS:     RIGHT KIDNEY:  Normal in size.  Measures 9.0 cm. There is poor cortical medullary differentiation, cortical thinning, and increased cortical echogenicity. There are no solid renal masses, nephrolithiasis, or hydronephrosis. Perfusion is slightly decreased. Resistive index is elevated, measuring 0.8.     LEFT KIDNEY:  Normal in size.  Measures 8.5 cm.  There is poor cortical medullary differentiation, cortical thinning, and increased cortical echogenicity.  There are no solid renal masses, nephrolithiasis, or hydronephrosis. Perfusion is slightly decreased. Resistive index is elevated, measuring 0.73.     BLADDER:  Unremarkable.            CT Head Without Contrast (Final result) Result time:  03/30/17 17:15:44    Final result by Jie Arellano MD (03/30/17 17:15:44)    Impression:        No acute intracranial abnormalities.  Stable postsurgical changes, and right parasagittal meningioma.            Electronically signed by: JIE ARELLANO MD  Date:     03/30/17  Time:    17:15     Narrative:    Comparison: 3/24/2017    Clinical history: Head trauma    Technique:    Axial images of the brain were obtained at 5-mm intervals from the skull base to the vertex without the administration of contrast.    Findings:    There is generalized cerebral volume loss.  There is hypoattenuation in a periventricular fashion, likely sequela of chronic microvascular ischemic change.There is stable bifrontal encephalomalacia, right greater than left, likely related to previous infarct or insult.  There is no evidence of acute major vascular territory infarct, or hemorrhage.  There is a right parasagittal frontal known meningioma, unchanged.  There is no hydrocephalus.  There are no abnormal extra-axial fluid  collections.  The paranasal sinuses and mastoid air cells are clear, and there is no evidence of calvarial fracture.  The visualized soft tissues are unremarkable.  Stable postsurgical calvarial changes noted.              ASSESSMENT/PLAN:     Active Problems:    Active Hospital Problems    Diagnosis  POA    *Hyperglycemia due to type 1 diabetes mellitus [E10.65]uncontrolled diabetes mellitus with blood sugars alternating from hypoglycemia 50s to hyperglycemia 300s.  Endocrinology consulted. Recommended to decrease Levemir to 15 units daily and change Novolog to 4 units AC with low dose correction scale.   Yes    Diabetic nephropathy associated with type 1 diabetes mellitus [E11.21]CKD -4  with proteinuria 3+. Renal US shows CKD.CKD with proteinuria likely due to long hx of difficult to control DM and HTN.   nephrology clinic follow up post hospital stay.   Yes    Diabetic polyneuropathy associated with type 1 diabetes mellitus [E11.42]Currently not on medication  Yes    Uncontrolled type 1 diabetes mellitus with stage 4 chronic kidney disease, with long-term current use of insulin [E11.22, E11.65, N18.4, Z79.4] as above  Not Applicable    Anemia of chronic renal failure, stage 4 (severe) [N18.4, D63.1]Likely iron deficiency.  Ferrokinetics  Yes     Chronic    Renovascular hypertension [I15.0]Hydralazine dose increased to hundred milligrams TID. On Coreg, Nifedipine  Yes     Chronic    Vitamin D deficiency disease [E55.9]Started on weekly Ergocalciferol . PTH pending  History of seizures - parasagittal meningioma (now resected) On Keppra  Yes     Chronic    CKD (chronic kidney disease) stage 4, GFR 15-29 ml/min [N18.4]As above  Yes     Chronic     Chronic     Poor control due to cognitive impairment, with history of hypoglycemia and DKA        Resolved Hospital Problems    Diagnosis Date Resolved POA    Hypertension due to endocrine disorder [I15.2] 04/04/2017 Yes         Disposition- SNF/NH    DVT  prophylaxis addressed with: Subcutaneous heparin            Patrice Aguila MD  Attending Staff Physician  [facility]  pager- [phone]  [place] - [phone]

## 2017-04-05 NOTE — PLAN OF CARE
Nicole did receive call from [place] with [facility], Pt to be admitted to room 527, nurse to call report to [phone]. Nicole setup w/c van with Mary Grace with [employer] at  for 5pm.

## 2017-04-05 NOTE — PLAN OF CARE
CM informed the patient's sister-in-law, Kamini Cardenas ([phone]), & the patient's nurse, Blair ([phone]), of discharge status. Kamini in agreement with discharge plan. Hospital follow up appointment scheduled for the patient with Dr. Carson (PCP) on 4/28/17 at 1100. Will continue to follow.

## 2017-04-05 NOTE — PLAN OF CARE
Problem: Occupational Therapy Goal  Goal: Occupational Therapy Goal  Goals to be met by: 4/15/17     Patient will increase functional independence with ADLs by performing:    UE Dressing with Minimal Assistance.  LE Dressing with Moderate Assistance.  Grooming while standing at sink with Stand-by Assistance.  Toileting from bedside commode with Minimal Assistance for hygiene and clothing management.   Supine to sit with Modified [place].  Stand pivot transfers with Stand-by Assistance.  Toilet transfer to bedside commode with Stand-by Assistance.   Outcome: Ongoing (interventions implemented as appropriate)  No goals met this session - POC remains appropriate.     ROSA MARIA Mauricio

## 2017-04-05 NOTE — PLAN OF CARE
[facility]     Department of Hospital Medicine     [address] [place] [address]     [phone] after hours  [phone] fax       SKILLED NURSING FACILITY ORDERS    Patient Name: Julius Cardenas  YOB: 1932 04/05/2017    Admit to Nursing Home:   Skilled Bed                                                Diagnoses:  Active Hospital Problems    Diagnosis  POA    Diabetic nephropathy associated with type 2 diabetes mellitus [E11.21]  Yes    Diabetic polyneuropathy associated with type 2 diabetes mellitus [E11.42]  Yes    Uncontrolled type 2 diabetes mellitus with stage 4 chronic kidney disease, with long-term current use of insulin [E11.22, E11.65, N18.4, Z79.4]  Not Applicable    Volume depletion [E86.9]  Yes    Anemia of chronic renal failure, stage 4 (severe) [N18.4, D63.1]  Yes     Chronic    Renovascular hypertension [I15.0]  Yes     Chronic    Vitamin D deficiency disease [E55.9]  Yes     Chronic    CKD (chronic kidney disease) stage 4, GFR 15-29 ml/min [N18.4]  Yes     Chronic    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 [E10.22, N18.4]  Yes     Chronic     Poor control due to cognitive impairment, with history of hypoglycemia and DKA        Resolved Hospital Problems    Diagnosis Date Resolved POA    *Hyperglycemia due to type 1 diabetes mellitus [E10.65] 04/05/2017 Yes    Hypertension due to endocrine disorder [I15.2] 04/04/2017 Yes       Patient is homebound due to:  Hyperglycemia due to type 1 diabetes mellitus    Allergies:Review of patient's allergies indicates:  No Known Allergies    Vitals:Every shift      Diet: diabetic diet: 1800 calorie and 2 gram sodium diet                 Acitivities:        - Weight bearing: as tolerated, Per PT/OT    LABS:  Per facility protocol    Nursing Precautions:    - Aspiration precautions:             - Total assistance with meals            -  Upright 90 degrees  "befor during and after meals             -  Suction at bedside          - Fall precautions per nursing home protocol     - Decubitus precautions:        -  for positioning   - Pressure reducing foam mattress   - Turn patient every two hours. Use wedge pillows to anchor patient    CONSULTS:     Physical Therapy to evaluate and treat     Occupational Therapy to evaluate and treat         MISCELLANEOUS CARE:     Routine Skin for Bedridden Patients:  Apply moisture barrier cream to all    skin folds and wet areas in perineal area daily and after baths and                           all bowel movements.            DIABETES CARE:      Check blood sugar:          Fingerstick blood sugar AC and HS        Report CBG < 60 or > 400 to physician.                          Medications: Discontinue all previous medication orders, if any. See new list below.     Julius Cardenas   Home Medication Instructions CAM:[id number]    Printed on:04/05/17 8077   Medication Information                      aspirin (ECOTRIN) 81 MG EC tablet  Take 81 mg by mouth once daily.             BD INSULIN PEN NEEDLE UF SHORT 31 gauge x 5/16" Ndle  USE AS DIRECTED             blood-glucose meter (CONTOUR METER) kit  Use as instructed.  Please substitute appropriate meter to match his strips.             carvedilol (COREG) 6.25 MG tablet  Take 1 tablet (6.25 mg total) by mouth 2 (two) times daily.             CONTOUR TEST STRIPS Strp  USE AS DIRECTED THREE TIMES DAILY             ergocalciferol (ERGOCALCIFEROL) 50,000 unit Cap  Take 1 capsule (50,000 Units total) by mouth every 7 days.             insulin aspart (NOVOLOG) 100 unit/mL InPn pen  Inject 0-5 Units into the skin 3 (three) times daily with meals.             insulin aspart (NOVOLOG) 100 unit/mL InPn pen  Inject 3 Units into the skin 3 (three) times daily with meals.             insulin detemir (LEVEMIR FLEXTOUCH) 100 unit/mL (3 mL) SubQ InPn pen  Inject 15 Units into the skin once " daily.             lancets Misc  Use three times daily for finger stick glucose monitoring.             levetiracetam (KEPPRA) 1000 MG tablet  TAKE 1 TABLET BY MOUTH TWICE DAILY             nifedipine (PROCARDIA-XL) 90 MG (OSM) TR24  Take 1 tablet (90 mg total) by mouth once daily.             nystatin (MYCOSTATIN) cream  Apply topically 2 (two) times daily.             paroxetine (PAXIL) 10 MG tablet  Take 1 tablet (10 mg total) by mouth every morning.                     _________________________________  Patrice Aguila MD  04/05/2017

## 2017-04-05 NOTE — SUBJECTIVE & OBJECTIVE
"Interval HPI:   Overnight events: bg at goal this am.  FB.  Eatin%  Nausea: No  Hypoglycemia and intervention: No  Fever: No  TPN and/or TF: No  If yes, type of TF/TPN and rate: N/A    BP (!) 159/70 (BP Location: Right arm, Patient Position: Lying, BP Method: Automatic)  Pulse 66  Temp 98.2 °F (36.8 °C) (Oral)   Resp (!) 28  Ht 5' 7" (1.702 m)  Wt 68 kg (150 lb)  SpO2 95%  BMI 23.49 kg/m2    Labs Reviewed and Include    No results for input(s): GLU, CALCIUM, ALBUMIN, PROT, NA, K, CO2, CL, BUN, CREATININE, ALKPHOS, ALT, AST, BILITOT in the last 24 hours.  Lab Results   Component Value Date    WBC 5.48 2017    HGB 11.4 (L) 2017    HCT 34.0 (L) 2017    MCV 87 2017     2017     No results for input(s): TSH, FREET4 in the last 168 hours.  Lab Results   Component Value Date    HGBA1C 8.9 (H) 2017       Nutritional status:   Body mass index is 23.49 kg/(m^2).  Lab Results   Component Value Date    ALBUMIN 3.1 (L) 2017    ALBUMIN 2.9 (L) 2017    ALBUMIN 3.0 (L) 2017     No results found for: PREALBUMIN    Estimated Creatinine Clearance: 22.4 mL/min (based on Cr of 2.3).    Accu-Checks  Recent Labs      17   1643  17   1701  17   1717  17   2259  17   0753  17   1212  17   1731  17   1732  17   2120  17   0814   POCTGLUCOSE  46*  51*  180*  346*  225*  228*  48*  182*  116*  169*       Current Medications and/or Treatments Impacting Glycemic Control  Immunotherapy:    Immunosuppressants     None        Steroids:   Hormones     None        Pressors:    Autonomic Drugs     None        Hyperglycemia/Diabetes Medications:   Antihyperglycemics     Start     Stop Route Frequency Ordered    17 0900  insulin detemir pen 15 Units      -- SubQ Daily 17 1324    17 1645  insulin aspart pen 3 Units      -- SubQ 3 times daily with meals 17 1324    17 1422  insulin " aspart pen 0-5 Units      -- SubQ Before meals & nightly PRN 04/04/17 2214

## 2017-04-05 NOTE — PLAN OF CARE
Nicole did upload tb placement, labs, MAR and chest xray to [facility] per Juan C's request. PPD to be read today, Sw to follow.

## 2017-04-05 NOTE — PLAN OF CARE
MJ was informed by Dr. Aguila that the patient is medically stable for discharge to [facility] today. MJ informed DAVID Bone of above. Will continue to follow.

## 2017-04-05 NOTE — PROGRESS NOTES
"[facility]  Endocrinology  Progress Note    Admit Date: 3/30/2017     Reason for Consult: Management of T1DM, Hyperglycemia     Surgical Procedure and Date: n/a    Diabetes diagnosis year: Age 18    Home Diabetes Medications:  Levemir 15 units qam, Aspart 3-5 units with low dose correction    How often checking glucose at home? 1-3 x day   BG readings on regimen: 300s  Hypoglycemia on the regimen?  Yes 2x a week  Missed doses on regimen?  Yes Once every 2 weeks    Diabetes Complications include:     Hyperglycemia, Hypoglycemia , Diabetic nephropathy  , Diabetic chronic kidney disease     , Diabetic retinopathy  and Diabetic peripheral neuropathy     Complicating diabetes co morbidities:   History of CVA, CKD and Dementia      HPI:    Patient is an 84 year old gentleman with a h/o CKD 4, HTN, poorly controlled DM I, parasagittal meningioma with secondary seizure disorder, TIA, and mild cognitive impairment admitted from group home with Hyperglycemia.  BG found to have BG of 600.  Endocrinology consulted for Type 1 DM management.        Interval HPI:   Overnight events: bg at goal this am.  FB.  Eatin%  Nausea: No  Hypoglycemia and intervention: No  Fever: No  TPN and/or TF: No  If yes, type of TF/TPN and rate: N/A    BP (!) 159/70 (BP Location: Right arm, Patient Position: Lying, BP Method: Automatic)  Pulse 66  Temp 98.2 °F (36.8 °C) (Oral)   Resp (!) 28  Ht 5' 7" (1.702 m)  Wt 68 kg (150 lb)  SpO2 95%  BMI 23.49 kg/m2    Labs Reviewed and Include    No results for input(s): GLU, CALCIUM, ALBUMIN, PROT, NA, K, CO2, CL, BUN, CREATININE, ALKPHOS, ALT, AST, BILITOT in the last 24 hours.  Lab Results   Component Value Date    WBC 5.48 2017    HGB 11.4 (L) 2017    HCT 34.0 (L) 2017    MCV 87 2017     2017     No results for input(s): TSH, FREET4 in the last 168 hours.  Lab Results   Component Value Date    HGBA1C 8.9 (H) 2017 "       Nutritional status:   Body mass index is 23.49 kg/(m^2).  Lab Results   Component Value Date    ALBUMIN 3.1 (L) 2017    ALBUMIN 2.9 (L) 2017    ALBUMIN 3.0 (L) 2017     No results found for: PREALBUMIN    Estimated Creatinine Clearance: 22.4 mL/min (based on Cr of 2.3).    Accu-Checks  Recent Labs      17   1643  17   1701  17   1717  17   2259  17   0753  17   1212  17   1731  17   1732  17   2120  17   0814   POCTGLUCOSE  46*  51*  180*  346*  225*  228*  48*  182*  116*  169*       Current Medications and/or Treatments Impacting Glycemic Control  Immunotherapy:    Immunosuppressants     None        Steroids:   Hormones     None        Pressors:    Autonomic Drugs     None        Hyperglycemia/Diabetes Medications:   Antihyperglycemics     Start     Stop Route Frequency Ordered    17 0900  insulin detemir pen 15 Units      -- SubQ Daily 17 1324    17 1645  insulin aspart pen 3 Units      -- SubQ 3 times daily with meals 17 1324    17 1422  insulin aspart pen 0-5 Units      -- SubQ Before meals & nightly PRN 17 1324          ASSESSMENT and PLAN    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  B-180  Cont Lev 15, Aspart 3 tid ac + low dose correction  AC/HS    D/C planning: discharge on current regimen of Lev 15 qam, Aspt 3 tid ac + low dose correction      CKD (chronic kidney disease) stage 4, GFR 15-29 ml/min  Avoid hypoglycemia      Vitamin D deficiency disease  Recommend patient start Ergocalciferol 50k weekly x 7 weeks then Vitamin D 1k daily  D/c calcitriol      Anemia of chronic renal failure, stage 4 (severe)  Falsely lowers A1c      Uncontrolled type 2 diabetes mellitus with stage 4 chronic kidney disease, with long-term current use of insulin  As above      Diabetic nephropathy associated with type 2 diabetes mellitus  Better blood glucose control may improve kidney  function        Altagracia Myers MD  Endocrinology  [facility]

## 2017-04-06 ENCOUNTER — PATIENT OUTREACH (OUTPATIENT)
Dept: ADMINISTRATIVE | Facility: CLINIC | Age: 82
End: 2017-04-06
Payer: MEDICARE

## 2017-04-06 NOTE — PROGRESS NOTES
04/06/2017-Good morning Ms. Goldsmith, my name is Mery May Post Acute Coordinator with [facility].  I just spoke with LUCRECIA Martinez at [facility] on Mr. Julius [id number] that discharge from [facility] yesterday.  She have a question about his discharge order.  Can you please give her a call at [phone].  Thank You

## 2017-04-06 NOTE — PT/OT/SLP DISCHARGE
Physical Therapy Discharge Summary    Julius Cardenas  MRN: [id number]   Hyperglycemia due to type 1 diabetes mellitus     Patient Discharged from acute Physical Therapy on 17.    Please refer to prior PT noted date on 17 for functional status.     Assessment:   Patient appropriate for care in another setting.     GOALS:   Physical Therapy Goals        Problem: Physical Therapy Goal    Goal Priority Disciplines Outcome Goal Variances Interventions   Physical Therapy Goal     PT/OT, PT Ongoing (interventions implemented as appropriate)     Description:  Goals to be met by: 2017     Patient will increase functional independence with mobility by performin. Supine to sit with Set-up [place] - Not met  2. Sit to stand transfer with Supervision - Not met  3. Gait  x 200 feet with Stand-by Assistance using Rolling Walker - Not met  4. Ascend/descend 5 stair with right Handrails Stand-by Assistance using Rolling Walker - Not met  5. Lower extremity exercise program x20 reps per handout, with supervision to increase strength and endurance to improve safety with above mobility goals - Not met               Reasons for Discontinuation of Therapy Services  Transfer to alternate level of care.      Plan:  Patient Discharged to: Skilled Nursing Facility.    Aydin Palencia, PT  2017

## 2017-04-10 NOTE — PT/OT/SLP DISCHARGE
Occupational Therapy Discharge Summary    Julius Cardenas  MRN: [id number]   Hyperglycemia due to type 1 diabetes mellitus   Patient Discharged from acute Occupational Therapy on 4/5/17.  Please refer to prior OT note dated on 4/5/17 for functional status.     Assessment:   Patient was discharge unexpectedly.  Information required to complete and accurate discharge summary is unknown.  Refer to therapy initial evaluation and last progress note for initial and most recent functional status and goal achievement.  Recommendations made may be found in medical record.  GOALS:   Occupational Therapy Goals        Problem: Occupational Therapy Goal    Goal Priority Disciplines Outcome Interventions   Occupational Therapy Goal     OT, PT/OT Ongoing (interventions implemented as appropriate)    Description:  Goals to be met by: 4/15/17     Patient will increase functional independence with ADLs by performing:    UE Dressing with Minimal Assistance.  LE Dressing with Moderate Assistance.  Grooming while standing at sink with Stand-by Assistance.  Toileting from bedside commode with Minimal Assistance for hygiene and clothing management.   Supine to sit with Modified [place].  Stand pivot transfers with Stand-by Assistance.  Toilet transfer to bedside commode with Stand-by Assistance.              Reasons for Discontinuation of Therapy Services  Transfer to alternate level of care.      Plan:  Patient Discharged to: Skilled Nursing Facility.

## 2017-04-20 ENCOUNTER — PATIENT OUTREACH (OUTPATIENT)
Dept: DIABETES | Facility: CLINIC | Age: 82
End: 2017-04-20

## 2017-04-20 NOTE — PROGRESS NOTES
Pt did not keep diabetes education appt scheduled for today. Called but no answer and unable to leave voicemail d/t mailbox is full.

## 2017-04-27 ENCOUNTER — TELEPHONE (OUTPATIENT)
Dept: INTERNAL MEDICINE | Facility: CLINIC | Age: 82
End: 2017-04-27

## 2017-04-27 NOTE — TELEPHONE ENCOUNTER
----- Message from Mady Carson MD sent at 4/26/2017  8:46 PM CDT -----  Please contact Kamini to see if he is coming on Friday. He was discharged to a nursing home earlier in the month  SF

## 2017-06-13 ENCOUNTER — HOSPITAL ENCOUNTER (INPATIENT)
Facility: OTHER | Age: 82
LOS: 3 days | Discharge: NURSING, REHAB OR LONG-TERM CARE | DRG: 638 | End: 2017-06-16
Attending: EMERGENCY MEDICINE | Admitting: EMERGENCY MEDICINE
Payer: MEDICARE

## 2017-06-13 DIAGNOSIS — N18.4 TYPE 1 DIABETES MELLITUS WITH STAGE 4 CHRONIC KIDNEY DISEASE: Chronic | ICD-10-CM

## 2017-06-13 DIAGNOSIS — E10.22 TYPE 1 DIABETES MELLITUS WITH STAGE 4 CHRONIC KIDNEY DISEASE: Chronic | ICD-10-CM

## 2017-06-13 DIAGNOSIS — R73.9 HYPERGLYCEMIA: Primary | ICD-10-CM

## 2017-06-13 DIAGNOSIS — E16.2 HYPOGLYCEMIA: ICD-10-CM

## 2017-06-13 DIAGNOSIS — N17.9 AKI (ACUTE KIDNEY INJURY): ICD-10-CM

## 2017-06-13 PROBLEM — K59.00 CONSTIPATION: Status: ACTIVE | Noted: 2017-06-13

## 2017-06-13 LAB
ALBUMIN SERPL BCP-MCNC: 3.5 G/DL
ALP SERPL-CCNC: 123 U/L
ALT SERPL W/O P-5'-P-CCNC: 57 U/L
ANION GAP SERPL CALC-SCNC: 10 MMOL/L
ANION GAP SERPL CALC-SCNC: 10 MMOL/L
ANION GAP SERPL CALC-SCNC: 8 MMOL/L
AST SERPL-CCNC: 55 U/L
B-OH-BUTYR BLD STRIP-SCNC: 0.1 MMOL/L
BACTERIA #/AREA URNS HPF: NORMAL /HPF
BASOPHILS # BLD AUTO: 0.01 K/UL
BASOPHILS # BLD AUTO: 0.02 K/UL
BASOPHILS NFR BLD: 0.2 %
BASOPHILS NFR BLD: 0.3 %
BILIRUB SERPL-MCNC: 0.3 MG/DL
BILIRUB UR QL STRIP: NEGATIVE
BUN SERPL-MCNC: 29 MG/DL
BUN SERPL-MCNC: 30 MG/DL
BUN SERPL-MCNC: 31 MG/DL
CALCIUM SERPL-MCNC: 8.3 MG/DL
CALCIUM SERPL-MCNC: 8.7 MG/DL
CALCIUM SERPL-MCNC: 8.7 MG/DL
CHLORIDE SERPL-SCNC: 108 MMOL/L
CHLORIDE SERPL-SCNC: 111 MMOL/L
CHLORIDE SERPL-SCNC: 115 MMOL/L
CLARITY UR: CLEAR
CO2 SERPL-SCNC: 18 MMOL/L
CO2 SERPL-SCNC: 20 MMOL/L
CO2 SERPL-SCNC: 20 MMOL/L
COLOR UR: YELLOW
CREAT SERPL-MCNC: 3 MG/DL
CREAT SERPL-MCNC: 3.2 MG/DL
CREAT SERPL-MCNC: 3.5 MG/DL
DIFFERENTIAL METHOD: ABNORMAL
DIFFERENTIAL METHOD: ABNORMAL
EOSINOPHIL # BLD AUTO: 0.2 K/UL
EOSINOPHIL # BLD AUTO: 0.2 K/UL
EOSINOPHIL NFR BLD: 3 %
EOSINOPHIL NFR BLD: 3 %
ERYTHROCYTE [DISTWIDTH] IN BLOOD BY AUTOMATED COUNT: 13.1 %
ERYTHROCYTE [DISTWIDTH] IN BLOOD BY AUTOMATED COUNT: 13.3 %
EST. GFR  (AFRICAN AMERICAN): 17 ML/MIN/1.73 M^2
EST. GFR  (AFRICAN AMERICAN): 19 ML/MIN/1.73 M^2
EST. GFR  (AFRICAN AMERICAN): 21 ML/MIN/1.73 M^2
EST. GFR  (NON AFRICAN AMERICAN): 15 ML/MIN/1.73 M^2
EST. GFR  (NON AFRICAN AMERICAN): 17 ML/MIN/1.73 M^2
EST. GFR  (NON AFRICAN AMERICAN): 18 ML/MIN/1.73 M^2
ESTIMATED AVG GLUCOSE: 226 MG/DL
GLUCOSE SERPL-MCNC: 207 MG/DL
GLUCOSE SERPL-MCNC: 390 MG/DL
GLUCOSE SERPL-MCNC: 563 MG/DL
GLUCOSE UR QL STRIP: ABNORMAL
HBA1C MFR BLD HPLC: 9.5 %
HCT VFR BLD AUTO: 33 %
HCT VFR BLD AUTO: 34.5 %
HGB BLD-MCNC: 11.1 G/DL
HGB BLD-MCNC: 11.5 G/DL
HGB UR QL STRIP: ABNORMAL
KETONES UR QL STRIP: NEGATIVE
LEUKOCYTE ESTERASE UR QL STRIP: NEGATIVE
LIPASE SERPL-CCNC: <3 U/L
LYMPHOCYTES # BLD AUTO: 2 K/UL
LYMPHOCYTES # BLD AUTO: 2.2 K/UL
LYMPHOCYTES NFR BLD: 32.7 %
LYMPHOCYTES NFR BLD: 33.1 %
MCH RBC QN AUTO: 29.4 PG
MCH RBC QN AUTO: 29.6 PG
MCHC RBC AUTO-ENTMCNC: 33.3 %
MCHC RBC AUTO-ENTMCNC: 33.6 %
MCV RBC AUTO: 88 FL
MCV RBC AUTO: 88 FL
MICROSCOPIC COMMENT: NORMAL
MONOCYTES # BLD AUTO: 0.5 K/UL
MONOCYTES # BLD AUTO: 0.7 K/UL
MONOCYTES NFR BLD: 10.5 %
MONOCYTES NFR BLD: 7.9 %
NEUTROPHILS # BLD AUTO: 3.4 K/UL
NEUTROPHILS # BLD AUTO: 3.6 K/UL
NEUTROPHILS NFR BLD: 53 %
NEUTROPHILS NFR BLD: 56 %
NITRITE UR QL STRIP: NEGATIVE
PH UR STRIP: 6 [PH] (ref 5–8)
PLATELET # BLD AUTO: 171 K/UL
PLATELET # BLD AUTO: 183 K/UL
PMV BLD AUTO: 10.6 FL
PMV BLD AUTO: 11 FL
POCT GLUCOSE: 167 MG/DL (ref 70–110)
POCT GLUCOSE: 183 MG/DL (ref 70–110)
POCT GLUCOSE: 244 MG/DL (ref 70–110)
POCT GLUCOSE: 295 MG/DL (ref 70–110)
POCT GLUCOSE: 303 MG/DL (ref 70–110)
POCT GLUCOSE: 400 MG/DL (ref 70–110)
POCT GLUCOSE: 410 MG/DL (ref 70–110)
POCT GLUCOSE: >500 MG/DL (ref 70–110)
POTASSIUM SERPL-SCNC: 4.1 MMOL/L
POTASSIUM SERPL-SCNC: 4.3 MMOL/L
POTASSIUM SERPL-SCNC: 4.5 MMOL/L
PROT SERPL-MCNC: 7.5 G/DL
PROT UR QL STRIP: ABNORMAL
RBC # BLD AUTO: 3.75 M/UL
RBC # BLD AUTO: 3.91 M/UL
RBC #/AREA URNS HPF: 1 /HPF (ref 0–4)
SODIUM SERPL-SCNC: 138 MMOL/L
SODIUM SERPL-SCNC: 139 MMOL/L
SODIUM SERPL-SCNC: 143 MMOL/L
SP GR UR STRIP: 1.01 (ref 1–1.03)
TROPONIN I SERPL DL<=0.01 NG/ML-MCNC: 0.02 NG/ML
URN SPEC COLLECT METH UR: ABNORMAL
UROBILINOGEN UR STRIP-ACNC: NEGATIVE EU/DL
WBC # BLD AUTO: 6.09 K/UL
WBC # BLD AUTO: 6.73 K/UL
WBC #/AREA URNS HPF: 3 /HPF (ref 0–5)
YEAST URNS QL MICRO: NORMAL

## 2017-06-13 PROCEDURE — 80053 COMPREHEN METABOLIC PANEL: CPT

## 2017-06-13 PROCEDURE — 83036 HEMOGLOBIN GLYCOSYLATED A1C: CPT

## 2017-06-13 PROCEDURE — 25000003 PHARM REV CODE 250: Performed by: INTERNAL MEDICINE

## 2017-06-13 PROCEDURE — 81000 URINALYSIS NONAUTO W/SCOPE: CPT

## 2017-06-13 PROCEDURE — 96361 HYDRATE IV INFUSION ADD-ON: CPT

## 2017-06-13 PROCEDURE — 63600175 PHARM REV CODE 636 W HCPCS: Performed by: HOSPITALIST

## 2017-06-13 PROCEDURE — 96375 TX/PRO/DX INJ NEW DRUG ADDON: CPT

## 2017-06-13 PROCEDURE — 25000003 PHARM REV CODE 250: Performed by: HOSPITALIST

## 2017-06-13 PROCEDURE — 83690 ASSAY OF LIPASE: CPT

## 2017-06-13 PROCEDURE — 80048 BASIC METABOLIC PNL TOTAL CA: CPT

## 2017-06-13 PROCEDURE — 99223 1ST HOSP IP/OBS HIGH 75: CPT | Mod: ,,, | Performed by: HOSPITALIST

## 2017-06-13 PROCEDURE — 80048 BASIC METABOLIC PNL TOTAL CA: CPT | Mod: 91

## 2017-06-13 PROCEDURE — 63600175 PHARM REV CODE 636 W HCPCS: Performed by: EMERGENCY MEDICINE

## 2017-06-13 PROCEDURE — 11000001 HC ACUTE MED/SURG PRIVATE ROOM

## 2017-06-13 PROCEDURE — 36415 COLL VENOUS BLD VENIPUNCTURE: CPT

## 2017-06-13 PROCEDURE — 96372 THER/PROPH/DIAG INJ SC/IM: CPT

## 2017-06-13 PROCEDURE — 84484 ASSAY OF TROPONIN QUANT: CPT

## 2017-06-13 PROCEDURE — 99285 EMERGENCY DEPT VISIT HI MDM: CPT | Mod: 25

## 2017-06-13 PROCEDURE — 63600175 PHARM REV CODE 636 W HCPCS: Performed by: INTERNAL MEDICINE

## 2017-06-13 PROCEDURE — 82962 GLUCOSE BLOOD TEST: CPT

## 2017-06-13 PROCEDURE — 25000003 PHARM REV CODE 250: Performed by: EMERGENCY MEDICINE

## 2017-06-13 PROCEDURE — 96374 THER/PROPH/DIAG INJ IV PUSH: CPT

## 2017-06-13 PROCEDURE — 82010 KETONE BODYS QUAN: CPT

## 2017-06-13 PROCEDURE — 93010 ELECTROCARDIOGRAM REPORT: CPT | Mod: ,,, | Performed by: INTERNAL MEDICINE

## 2017-06-13 PROCEDURE — 85025 COMPLETE CBC W/AUTO DIFF WBC: CPT | Mod: 91

## 2017-06-13 RX ORDER — POLYETHYLENE GLYCOL 3350 17 G/17G
17 POWDER, FOR SOLUTION ORAL DAILY
Status: DISCONTINUED | OUTPATIENT
Start: 2017-06-13 | End: 2017-06-16 | Stop reason: HOSPADM

## 2017-06-13 RX ORDER — INSULIN ASPART 100 [IU]/ML
0-5 INJECTION, SOLUTION INTRAVENOUS; SUBCUTANEOUS
Status: DISCONTINUED | OUTPATIENT
Start: 2017-06-13 | End: 2017-06-13

## 2017-06-13 RX ORDER — SODIUM CHLORIDE 9 MG/ML
INJECTION, SOLUTION INTRAVENOUS CONTINUOUS
Status: ACTIVE | OUTPATIENT
Start: 2017-06-13 | End: 2017-06-15

## 2017-06-13 RX ORDER — IBUPROFEN 200 MG
24 TABLET ORAL
Status: DISCONTINUED | OUTPATIENT
Start: 2017-06-13 | End: 2017-06-16 | Stop reason: HOSPADM

## 2017-06-13 RX ORDER — PAROXETINE 10 MG/1
10 TABLET, FILM COATED ORAL EVERY MORNING
Status: DISCONTINUED | OUTPATIENT
Start: 2017-06-13 | End: 2017-06-16 | Stop reason: HOSPADM

## 2017-06-13 RX ORDER — INSULIN ASPART 100 [IU]/ML
3 INJECTION, SOLUTION INTRAVENOUS; SUBCUTANEOUS
Status: DISCONTINUED | OUTPATIENT
Start: 2017-06-13 | End: 2017-06-13

## 2017-06-13 RX ORDER — INSULIN ASPART 100 [IU]/ML
5 INJECTION, SOLUTION INTRAVENOUS; SUBCUTANEOUS
Status: DISCONTINUED | OUTPATIENT
Start: 2017-06-13 | End: 2017-06-14

## 2017-06-13 RX ORDER — HEPARIN SODIUM 5000 [USP'U]/ML
5000 INJECTION, SOLUTION INTRAVENOUS; SUBCUTANEOUS EVERY 8 HOURS
Status: DISCONTINUED | OUTPATIENT
Start: 2017-06-13 | End: 2017-06-16 | Stop reason: HOSPADM

## 2017-06-13 RX ORDER — LEVETIRACETAM 500 MG/1
1000 TABLET ORAL 2 TIMES DAILY
Status: DISCONTINUED | OUTPATIENT
Start: 2017-06-13 | End: 2017-06-16 | Stop reason: HOSPADM

## 2017-06-13 RX ORDER — INSULIN ASPART 100 [IU]/ML
1-10 INJECTION, SOLUTION INTRAVENOUS; SUBCUTANEOUS
Status: DISCONTINUED | OUTPATIENT
Start: 2017-06-13 | End: 2017-06-16 | Stop reason: HOSPADM

## 2017-06-13 RX ORDER — INSULIN ASPART 100 [IU]/ML
1-10 INJECTION, SOLUTION INTRAVENOUS; SUBCUTANEOUS
Status: DISCONTINUED | OUTPATIENT
Start: 2017-06-13 | End: 2017-06-13

## 2017-06-13 RX ORDER — BISACODYL 5 MG
10 TABLET, DELAYED RELEASE (ENTERIC COATED) ORAL ONCE
Status: COMPLETED | OUTPATIENT
Start: 2017-06-13 | End: 2017-06-13

## 2017-06-13 RX ORDER — ASPIRIN 81 MG/1
81 TABLET ORAL DAILY
Status: DISCONTINUED | OUTPATIENT
Start: 2017-06-13 | End: 2017-06-16 | Stop reason: HOSPADM

## 2017-06-13 RX ORDER — SODIUM CHLORIDE 9 MG/ML
INJECTION, SOLUTION INTRAVENOUS CONTINUOUS
Status: DISCONTINUED | OUTPATIENT
Start: 2017-06-13 | End: 2017-06-13

## 2017-06-13 RX ORDER — GLUCAGON 1 MG
1 KIT INJECTION
Status: DISCONTINUED | OUTPATIENT
Start: 2017-06-13 | End: 2017-06-16 | Stop reason: HOSPADM

## 2017-06-13 RX ORDER — IBUPROFEN 200 MG
16 TABLET ORAL
Status: DISCONTINUED | OUTPATIENT
Start: 2017-06-13 | End: 2017-06-16 | Stop reason: HOSPADM

## 2017-06-13 RX ORDER — HYDRALAZINE HYDROCHLORIDE 20 MG/ML
20 INJECTION INTRAMUSCULAR; INTRAVENOUS
Status: COMPLETED | OUTPATIENT
Start: 2017-06-13 | End: 2017-06-13

## 2017-06-13 RX ORDER — INSULIN ASPART 100 [IU]/ML
5 INJECTION, SOLUTION INTRAVENOUS; SUBCUTANEOUS
Status: DISCONTINUED | OUTPATIENT
Start: 2017-06-14 | End: 2017-06-13

## 2017-06-13 RX ORDER — NIFEDIPINE 30 MG/1
90 TABLET, EXTENDED RELEASE ORAL DAILY
Status: DISCONTINUED | OUTPATIENT
Start: 2017-06-13 | End: 2017-06-16 | Stop reason: HOSPADM

## 2017-06-13 RX ORDER — HYDRALAZINE HYDROCHLORIDE 20 MG/ML
10 INJECTION INTRAMUSCULAR; INTRAVENOUS EVERY 4 HOURS PRN
Status: DISCONTINUED | OUTPATIENT
Start: 2017-06-13 | End: 2017-06-16 | Stop reason: HOSPADM

## 2017-06-13 RX ORDER — CARVEDILOL 6.25 MG/1
6.25 TABLET ORAL 2 TIMES DAILY
Status: DISCONTINUED | OUTPATIENT
Start: 2017-06-13 | End: 2017-06-16 | Stop reason: HOSPADM

## 2017-06-13 RX ORDER — ACETAMINOPHEN 325 MG/1
650 TABLET ORAL EVERY 6 HOURS PRN
Status: DISCONTINUED | OUTPATIENT
Start: 2017-06-13 | End: 2017-06-16 | Stop reason: HOSPADM

## 2017-06-13 RX ADMIN — PAROXETINE HYDROCHLORIDE 10 MG: 10 TABLET, FILM COATED ORAL at 06:06

## 2017-06-13 RX ADMIN — INSULIN ASPART 10 UNITS: 100 INJECTION, SOLUTION INTRAVENOUS; SUBCUTANEOUS at 05:06

## 2017-06-13 RX ADMIN — INSULIN DETEMIR 5 UNITS: 100 INJECTION, SOLUTION SUBCUTANEOUS at 05:06

## 2017-06-13 RX ADMIN — LEVETIRACETAM 1000 MG: 500 TABLET, FILM COATED ORAL at 10:06

## 2017-06-13 RX ADMIN — CARVEDILOL 6.25 MG: 6.25 TABLET, FILM COATED ORAL at 09:06

## 2017-06-13 RX ADMIN — BISACODYL 10 MG: 5 TABLET, COATED ORAL at 05:06

## 2017-06-13 RX ADMIN — NIFEDIPINE 90 MG: 30 TABLET, FILM COATED, EXTENDED RELEASE ORAL at 09:06

## 2017-06-13 RX ADMIN — HYDRALAZINE HYDROCHLORIDE 20 MG: 20 INJECTION INTRAMUSCULAR; INTRAVENOUS at 02:06

## 2017-06-13 RX ADMIN — HEPARIN SODIUM 5000 UNITS: 5000 INJECTION, SOLUTION INTRAVENOUS; SUBCUTANEOUS at 02:06

## 2017-06-13 RX ADMIN — INSULIN HUMAN 8 UNITS: 100 INJECTION, SOLUTION PARENTERAL at 03:06

## 2017-06-13 RX ADMIN — SODIUM CHLORIDE 1000 ML: 0.9 INJECTION, SOLUTION INTRAVENOUS at 02:06

## 2017-06-13 RX ADMIN — INSULIN DETEMIR 10 UNITS: 100 INJECTION, SOLUTION SUBCUTANEOUS at 02:06

## 2017-06-13 RX ADMIN — SODIUM CHLORIDE: 0.9 INJECTION, SOLUTION INTRAVENOUS at 02:06

## 2017-06-13 RX ADMIN — ASPIRIN 81 MG: 81 TABLET, COATED ORAL at 09:06

## 2017-06-13 RX ADMIN — INSULIN ASPART 1 UNITS: 100 INJECTION, SOLUTION INTRAVENOUS; SUBCUTANEOUS at 10:06

## 2017-06-13 RX ADMIN — HEPARIN SODIUM 5000 UNITS: 5000 INJECTION, SOLUTION INTRAVENOUS; SUBCUTANEOUS at 06:06

## 2017-06-13 RX ADMIN — POLYETHYLENE GLYCOL 3350 17 G: 17 POWDER, FOR SOLUTION ORAL at 05:06

## 2017-06-13 RX ADMIN — CARVEDILOL 6.25 MG: 6.25 TABLET, FILM COATED ORAL at 10:06

## 2017-06-13 RX ADMIN — INSULIN ASPART 5 UNITS: 100 INJECTION, SOLUTION INTRAVENOUS; SUBCUTANEOUS at 05:06

## 2017-06-13 RX ADMIN — HEPARIN SODIUM 5000 UNITS: 5000 INJECTION, SOLUTION INTRAVENOUS; SUBCUTANEOUS at 10:06

## 2017-06-13 RX ADMIN — SODIUM CHLORIDE: 0.9 INJECTION, SOLUTION INTRAVENOUS at 04:06

## 2017-06-13 RX ADMIN — INSULIN ASPART 3 UNITS: 100 INJECTION, SOLUTION INTRAVENOUS; SUBCUTANEOUS at 01:06

## 2017-06-13 RX ADMIN — LEVETIRACETAM 1000 MG: 500 TABLET, FILM COATED ORAL at 09:06

## 2017-06-13 RX ADMIN — SODIUM CHLORIDE 250 ML: 0.9 INJECTION, SOLUTION INTRAVENOUS at 06:06

## 2017-06-13 NOTE — PLAN OF CARE
06/13/17 0926   ED Admissions Case Approval   ED Admissions Case Approval (!) CM Approved  (IP )

## 2017-06-13 NOTE — H&P
History & Physical  Hospital Medicine      SUBJECTIVE:     Chief Complaint/Reason for Admission: sent from nursing home for elevated blood glucose; treated with insulin detemir (long acting) per EMS/nursing staff.    History of Present Illness:  Mr. Julius Cardenas is a 84 y.o. who presents with frequent admissions for complications of brittle diabetes, including hypo and hyperglycemia, now with hyperglycemia: 's in the Ed. 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    Reports from [facility] conveyed administration of long acting insulin to correct this ~15 units. He was found to have a Cr 3.5 which is elevated in contrast to baseline of 2.4. He reports polyuria but denies any chest pain, fevers, chills, shortness of breath, cough, dysuria, or diarrhea. Recently admitted to [facility] for hyperglycemia/falls/weakness for management of labile blood glucose levels. Endocrine was consulted and adjustments were made to Lev 15, Aspart 3 tid ac + low dose correctionAC/HS to prevent hyperglycemic episodes    Review of patient's allergies indicates:  No Known Allergies    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       Past Surgical History:   Procedure Laterality Date    CATARACT EXTRACTION W/  INTRAOCULAR LENS IMPLANT  8/26/2009, 10/14/2009    CRANIOTOMY  1995    CRANIOTOMY  12/21/2010    EYE SURGERY         Family History   Problem Relation Age of Onset    Diabetes Mother     Cataracts Mother     No Known Problems Father     Diabetes Sister     No Known Problems Son     No Known Problems Daughter     No Known Problems Maternal Grandmother     No Known Problems Maternal Grandfather     No Known Problems Paternal Grandmother     No Known Problems Paternal Grandfather     Diabetes Sister     Diabetes Brother     No Known Problems Maternal Aunt     No Known Problems Maternal Uncle     No Known Problems Paternal Aunt     No Known Problems Paternal Uncle     Diabetes Sister     Diabetes Brother     Amblyopia Neg Hx     Blindness Neg Hx     Cancer Neg Hx     Glaucoma Neg Hx     Hypertension Neg Hx     Macular degeneration Neg Hx     Retinal detachment Neg Hx     Strabismus Neg Hx     Stroke Neg Hx     Thyroid disease Neg Hx         Social History     Social History    Marital status: Single     Spouse name: N/A    Number of children: N/A    Years of education: N/A     Social History Main Topics    Smoking status: Former Smoker     Packs/day: 1.00     Types: Cigarettes     Quit date: 12/31/1994    Smokeless tobacco: Former User     Quit date: 4/3/2010     Alcohol use No    Drug use: No    Sexual activity: Not Asked     Other Topics Concern    None     Social History Narrative    Single with adult aged children. Lives with Brother and Sister-in-Law. Quit alcohol and tobacco intake ~2007. Retired, but previously worked as installing Insulation.       Review of Systems:  Constitutional: No fever or chills, no weight changes.  Eyes: No visual changes or photophobia  HEENT: No nasal congestion or sore throat  Respiratory: No cough or shorness of breath  Cardiovascular: No chest pain or palpitations  Gastrointestinal: No nausea or vomiting, no diarrhea or change in bowel habits  Genitourinary: No hematuria or dysuria  Skin: No rash or pruritis  Hematologic/lymphatic: No easy bruising, bleeding or lymphadenopathy  Musculoskeletal: No arthralgias or myalgias  Neurological: No seizures or tremors  Endocrine: No heat/cold intolerance.  No polyuria/polydipsia  Psychiatric:  No depression or anxiety.      OBJECTIVE:     Temp:  [97.6 °F (36.4 °C)] 97.6 °F (36.4 °C)  Pulse:  [62-78] 78  Resp:  [17-19] 17  SpO2:  [100 %] 100 %  BP: (137-236)/() 137/68  Body mass index is 23.49 kg/m².     Physical Exam:  General appearance: thin, frail AAM, NAD  Head: Normocephalic, atraumatic  Eyes:  Conjunctivae nl. Sclera anicteric. PERRL.  HEENT: Lips, mucosa, and tongue normal; teeth and gums normal and oropharynx clear.  Neck: Supple, trachea midline, no thyromegaly.  Lungs: Clear to auscultation bilaterally and normal respiratory effort  Heart: Regular rate and rhythm, S1, S2 normal, no murmur, click, rub or gallop  Abdomen: Soft, non-tender, non-distended; bowel sounds normal; no masses, no organomegaly  Extremities: No cyanosis or clubbing. No edema  Pulses: 2+ and symmetric  Skin: hyperpigmented papules over anterior lower ext   Lymph nodes: Cervical, supraclavicular, and axillary nodes normal.  Neurologic: Normal strength and tone. No focal numbness or weakness  Psychiatric:   Alert and oriented times to person place and year.    Laboratory: Reviewed and noted  where applicable- Please see chart for full lab data.  Recent Lab Results       06/13/17  0409 06/13/17  0335 06/13/17  0213      Albumin   3.5     Alkaline Phosphatase   123     ALT   57(H)     Anion Gap   10     AST   55(H)     Baso #   0.01     Basophil%   0.2     Beta-Hydroxybutyrate   0.1     Total Bilirubin   0.3  Comment:  For infants and newborns, interpretation of results should be based  on gestational age, weight and in agreement with clinical  observations.  Premature Infant recommended reference ranges:  Up to 24 hours.............<8.0 mg/dL  Up to 48 hours............<12.0 mg/dL  3-5 days..................<15.0 mg/dL  6-29 days.................<15.0 mg/dL       BUN, Bld   31(H)     Calcium   8.7     Chloride   108     CO2   20(L)     Creatinine   3.5(H)     Differential Method   Automated     eGFR if    17(A)     eGFR if non    15  Comment:  Calculation used to obtain the estimated glomerular filtration  rate (eGFR) is the CKD-EPI equation. Since race is unknown   in our information system, the eGFR values for   -American and Non--American patients are given   for each creatinine result.  (A)     Eos #   0.2     Eosinophil%   3.0     Glucose   563  Comment:  GLU critical result(s) called and verbal readback obtained from   Lianet Esqueda RN, 06/13/2017 03:01  (HH)     Gran #   3.4     Gran%   56.0     Hematocrit   34.5(L)     Hemoglobin   11.5(L)     Lipase   <3(L)     Lymph #   2.0     Lymph%   32.7     MCH   29.4     MCHC   33.3     MCV   88     Mono #   0.5     Mono%   7.9     MPV   10.6     Platelets   171     POCT Glucose 303(H) 400(H)      Potassium   4.3     Total Protein   7.5     RBC   3.91(L)     RDW   13.1     Sodium   138     Troponin I   0.017  Comment:  The reference interval for Troponin I represents the 99th percentile   cutoff   for our facility and is  consistent with 3rd generation assay   performance.       WBC   6.09           Diagnostic Tests:  Imaging Results    None         ASSESSMENT/PLAN:     Active Hospital Problems    Diagnosis  POA    Hyperglycemia [R73.9]  Yes    Anemia of chronic renal failure, stage 4 (severe) [N18.4, D63.1]  Yes     Chronic    CKD (chronic kidney disease) stage 4, GFR 15-29 ml/min [N18.4]  Yes     Chronic    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 [E10.22, 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     1. Acute kidney injury on ckd- baseline Cr 2.1-2.4 and now 3.5, likely related to intravascular volume depletion in the setting of hyperglycemia.  -plan: hydrate and reassess  2. Poorly controlled t1DM- brittle diabetes. It is unclear if the patient received compensatory doses of long acting (detemir) in lieu of short acting so we will wait ~12 hours prior to re-institution of long acting insulin. Ivfs with NS @ 100/h (received 1 L bolus in Ed), + 8 units regular insulin then reassess with accuchecks q 4 h. Treat with low dose sliding scale based on history of labile BG and possibly consult endocrine to optimize regimen in light of recurrent admissions.  3. Hx/o seizure d/o: resume keppra.

## 2017-06-13 NOTE — PROGRESS NOTES
Progress Note  Hospital Medicine    Admit Date: 6/13/2017   LOS: 0 days     SUBJECTIVE:     Follow-up For:  Hyperglycemia    Interval History:   Pt new to me.   Poor historian, but feels good currently and has no complaints. States has had urinary frequency lately.        Review of Systems:  Constitutional: No fever or chills  Respiratory: No cough or shorness of breath  Cardiovascular: No chest pain or palpitations  Gastrointestinal: No nausea or vomiting  Neurological: No confusion or weakness    OBJECTIVE:     Vital Signs Range (Last 24H):  Vitals:    06/13/17 1550   BP: (!) 162/83   Pulse: 65   Resp: 20   Temp: 97.7 °F (36.5 °C)       Temp:  [97.6 °F (36.4 °C)-97.7 °F (36.5 °C)]   Pulse:  [60-80]   Resp:  [12-22]   BP: (137-236)/()   SpO2:  [100 %]     I & O (Last 24H):    Intake/Output Summary (Last 24 hours) at 06/13/17 1722  Last data filed at 06/13/17 0647   Gross per 24 hour   Intake                0 ml   Output              400 ml   Net             -400 ml       Physical Exam:  General appearance: Calm, NAD  Eyes:  Conjunctivae/corneas clear. PERRL.  Lungs: Normal respiratory effort,   clear to auscultation bilaterally   Heart: Regular rate and rhythm, S1, S2 normal,  Abdomen: Soft, non-tender non-distended; bowel sounds normal;   Extremities: No cyanosis or clubbing. No edema.    Skin: Skin color, texture, turgor normal. No rashes or lesions  Neurologic:  No focal numbness or weakness     Laboratory Data:  Reviewed and noted  where applicable- Please see chart for full lab data.    Medications:  Medication list was reviewed and changes noted under Assessment/Plan.    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       Hyperglycemia  - Unclear compliance level  - Restarted insulin at lower dose given JAMAL    DM-1 Uncontrolled  - A1c 9.5, up from 8.9 4/17  - Levemir 10 AM started  - Novolog 3 TID c meal  - Accucheck and NISS    JAMAL on CKD-4  - Improving with IVFs  - Tolerating well  - Monitor    HTN  - Continue Procardia XL 90  - Hydralazine PRN    H/o Seizure disorder  - Keppra home dose    Constipation  - Miralax daily  - Give bisacodyl 10 mg PO now  - Monitor    Heparin

## 2017-06-13 NOTE — ED TRIAGE NOTES
EMS reports pt had been having elevated CBG per nursing home all day in the 400s initially and was treated this evening with 15units levamir around 5pm and then treated again with 15units of levamir at 9pm. Ems reports CBG was 587 upon arrival to [facility].

## 2017-06-13 NOTE — PROGRESS NOTES
Pt's blood sugar at 1740 was 410. Dr. Daniels notified. Ordered to give 5 units of detemir and 5 units of insulin aspart with PRN sliding scale. Will continue to monitor.

## 2017-06-13 NOTE — PLAN OF CARE
06/13/17 1612   Discharge Assessment   Assessment Type Discharge Planning Assessment   Confirmed/corrected address and phone number on facesheet? Yes   Assessment information obtained from? Patient   Communicated expected length of stay with patient/caregiver yes   Prior to hospitilization cognitive status: Alert/Oriented   Prior to hospitalization functional status: Assistive Equipment   Current cognitive status: Alert/Oriented   Current Functional Status: Assistive Equipment   Arrived From admitted as an inpatient   Lives With alone   Able to Return to Prior Arrangements yes   Is patient able to care for self after discharge? Yes   Patient's perception of discharge disposition admitted as an inpatient   Patient currently being followed by outpatient case management? No   Patient currently receives home health services? No   Does the patient currently use HME? No   Patient currently receives private duty nursing? N/A   Patient currently receives any other outside agency services? No   Equipment Currently Used at Home bedside commode;cane, quad;wheelchair   Do you have any problems affording any of your prescribed medications? TBD   Is the patient taking medications as prescribed? yes   Do you have any financial concerns preventing you from receiving the healthcare you need? No   Does the patient have transportation to healthcare appointments? No   On Dialysis? No   Does the patient receive services at the Coumadin Clinic? No   Are there any open cases? No   Discharge Plan A Skilled Nursing Facility  (RETURN )   Discharge Plan B Home Health   Patient/Family In Agreement With Plan yes

## 2017-06-13 NOTE — PLAN OF CARE
Problem: Patient Care Overview  Goal: Plan of Care Review  Outcome: Ongoing (interventions implemented as appropriate)  Plan of care reviewed with the pt. Pt resting comfortably in bed with no complaints at this time. Pt remains free from falls and injuries. Accuchecks completed per order. Insulin given as ordered. Purposeful rounding performed. Bed in lowest position. Call light in reach. Will continue to monitor.

## 2017-06-13 NOTE — ED NOTES
Pt family member flavia jorgensen called to check on pt status. Verified with pt it was ok to discuss care with her. Updated family member on plan for admission and observation of pt to hospital. Pt family member wishes to be contacted with any updates in pt health or care. Pt number is on face sheet.

## 2017-06-14 LAB
ANION GAP SERPL CALC-SCNC: 10 MMOL/L
BUN SERPL-MCNC: 27 MG/DL
CALCIUM SERPL-MCNC: 8.8 MG/DL
CHLORIDE SERPL-SCNC: 114 MMOL/L
CO2 SERPL-SCNC: 19 MMOL/L
CREAT SERPL-MCNC: 2.5 MG/DL
EST. GFR  (AFRICAN AMERICAN): 26 ML/MIN/1.73 M^2
EST. GFR  (NON AFRICAN AMERICAN): 23 ML/MIN/1.73 M^2
GLUCOSE SERPL-MCNC: 52 MG/DL
MAGNESIUM SERPL-MCNC: 1.8 MG/DL
PHOSPHATE SERPL-MCNC: 3.4 MG/DL
POCT GLUCOSE: 109 MG/DL (ref 70–110)
POCT GLUCOSE: 142 MG/DL (ref 70–110)
POCT GLUCOSE: 160 MG/DL (ref 70–110)
POCT GLUCOSE: 257 MG/DL (ref 70–110)
POCT GLUCOSE: 74 MG/DL (ref 70–110)
POTASSIUM SERPL-SCNC: 3.9 MMOL/L
SODIUM SERPL-SCNC: 143 MMOL/L

## 2017-06-14 PROCEDURE — 36415 COLL VENOUS BLD VENIPUNCTURE: CPT

## 2017-06-14 PROCEDURE — 97535 SELF CARE MNGMENT TRAINING: CPT

## 2017-06-14 PROCEDURE — 84100 ASSAY OF PHOSPHORUS: CPT

## 2017-06-14 PROCEDURE — 97162 PT EVAL MOD COMPLEX 30 MIN: CPT

## 2017-06-14 PROCEDURE — 83735 ASSAY OF MAGNESIUM: CPT

## 2017-06-14 PROCEDURE — 97165 OT EVAL LOW COMPLEX 30 MIN: CPT

## 2017-06-14 PROCEDURE — 97530 THERAPEUTIC ACTIVITIES: CPT

## 2017-06-14 PROCEDURE — 25000003 PHARM REV CODE 250: Performed by: HOSPITALIST

## 2017-06-14 PROCEDURE — 63600175 PHARM REV CODE 636 W HCPCS: Performed by: HOSPITALIST

## 2017-06-14 PROCEDURE — 11000001 HC ACUTE MED/SURG PRIVATE ROOM

## 2017-06-14 PROCEDURE — 25000003 PHARM REV CODE 250: Performed by: INTERNAL MEDICINE

## 2017-06-14 PROCEDURE — 80048 BASIC METABOLIC PNL TOTAL CA: CPT

## 2017-06-14 PROCEDURE — 99232 SBSQ HOSP IP/OBS MODERATE 35: CPT | Mod: ,,, | Performed by: HOSPITALIST

## 2017-06-14 RX ORDER — INSULIN ASPART 100 [IU]/ML
5 INJECTION, SOLUTION INTRAVENOUS; SUBCUTANEOUS
Status: DISCONTINUED | OUTPATIENT
Start: 2017-06-14 | End: 2017-06-15

## 2017-06-14 RX ORDER — INSULIN ASPART 100 [IU]/ML
3 INJECTION, SOLUTION INTRAVENOUS; SUBCUTANEOUS
Status: DISCONTINUED | OUTPATIENT
Start: 2017-06-14 | End: 2017-06-14

## 2017-06-14 RX ADMIN — HYDRALAZINE HYDROCHLORIDE 10 MG: 20 INJECTION INTRAMUSCULAR; INTRAVENOUS at 08:06

## 2017-06-14 RX ADMIN — HYDRALAZINE HYDROCHLORIDE 10 MG: 20 INJECTION INTRAMUSCULAR; INTRAVENOUS at 04:06

## 2017-06-14 RX ADMIN — ASPIRIN 81 MG: 81 TABLET, COATED ORAL at 08:06

## 2017-06-14 RX ADMIN — LEVETIRACETAM 1000 MG: 500 TABLET, FILM COATED ORAL at 08:06

## 2017-06-14 RX ADMIN — INSULIN ASPART 1 UNITS: 100 INJECTION, SOLUTION INTRAVENOUS; SUBCUTANEOUS at 09:06

## 2017-06-14 RX ADMIN — PAROXETINE HYDROCHLORIDE 10 MG: 10 TABLET, FILM COATED ORAL at 08:06

## 2017-06-14 RX ADMIN — SODIUM CHLORIDE: 0.9 INJECTION, SOLUTION INTRAVENOUS at 08:06

## 2017-06-14 RX ADMIN — CARVEDILOL 6.25 MG: 6.25 TABLET, FILM COATED ORAL at 08:06

## 2017-06-14 RX ADMIN — HEPARIN SODIUM 5000 UNITS: 5000 INJECTION, SOLUTION INTRAVENOUS; SUBCUTANEOUS at 09:06

## 2017-06-14 RX ADMIN — SODIUM CHLORIDE: 0.9 INJECTION, SOLUTION INTRAVENOUS at 09:06

## 2017-06-14 RX ADMIN — SODIUM CHLORIDE: 0.9 INJECTION, SOLUTION INTRAVENOUS at 12:06

## 2017-06-14 RX ADMIN — INSULIN ASPART 6 UNITS: 100 INJECTION, SOLUTION INTRAVENOUS; SUBCUTANEOUS at 01:06

## 2017-06-14 RX ADMIN — INSULIN ASPART 5 UNITS: 100 INJECTION, SOLUTION INTRAVENOUS; SUBCUTANEOUS at 09:06

## 2017-06-14 RX ADMIN — NIFEDIPINE 90 MG: 30 TABLET, FILM COATED, EXTENDED RELEASE ORAL at 08:06

## 2017-06-14 RX ADMIN — POLYETHYLENE GLYCOL 3350 17 G: 17 POWDER, FOR SOLUTION ORAL at 08:06

## 2017-06-14 RX ADMIN — INSULIN ASPART 3 UNITS: 100 INJECTION, SOLUTION INTRAVENOUS; SUBCUTANEOUS at 01:06

## 2017-06-14 RX ADMIN — INSULIN ASPART 5 UNITS: 100 INJECTION, SOLUTION INTRAVENOUS; SUBCUTANEOUS at 05:06

## 2017-06-14 NOTE — PLAN OF CARE
Problem: Occupational Therapy Goal  Goal: Occupational Therapy Goal  Goals to be met by: 6/21/2017     Patient will increase functional independence with ADLs by performing:    UE Dressing with Minimal Assistance.  LE Dressing with Minimal Assistance.  Grooming while seated with Set-up Assistance.  Toileting from bedside commode with Minimal Assistance for hygiene and clothing management.   Sitting at edge of bed x8-10 minutes with Supervision.  Supine to sit with Supervision.  Toilet transfer to bedside commode with Minimal Assistance.    Outcome: Ongoing (interventions implemented as appropriate)  OT eval completed and goals set.     Hoda Zepeda, OTR/L  6/14/2017

## 2017-06-14 NOTE — PT/OT/SLP EVAL
Occupational Therapy  Evaluation/Treatment    Julius Cardenas   MRN: [id number]   Admitting Diagnosis: Hyperglycemia    OT Date of Treatment: 06/14/17   OT Start Time: 1410  OT Stop Time: 1440  OT Total Time (min): 30 min    Billable Minutes:  Evaluation 20  Self Care/Home Management 10    Diagnosis: Hyperglycemia     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      Past Surgical History:   Procedure Laterality Date    CATARACT EXTRACTION W/  INTRAOCULAR LENS IMPLANT  8/26/2009, 10/14/2009    CRANIOTOMY  1995    CRANIOTOMY  12/21/2010    EYE SURGERY         Referring physician: Dr. Blair Daniels  Date referred to OT: 6/14/2017    General Precautions: Standard, fall  Orthopedic Precautions: N/A  Braces: N/A    Do you have any cultural, spiritual, Baptist conflicts, given your current situation?: Not reported     Patient History:  Living Environment  Lives With: alone  Living Arrangements:  "house  Home Accessibility: stairs to enter home  Number of Stairs to Enter Home: 5  Stair Railings at Home: outside, present on right side  Transportation Available: family or friend will provide  Living Environment Comment: Pt poor historian, therefore PLOF retrieved from chart review. PT telephoned pt's sister (Kamini Cardenas) earlier this AM. Pt's sister reports that pt has been residing at [facility] since discharge from hospital in April 2017. Prior to that hospitalization, pt was living with his brother and sister-in-law in 1-story house with x5 MONTY and cresencio HR. Pt was ambulating with a quad cane and has manual w/c that he was able to self-propel. He had an aide that came daily in the AM to (A) with bathing, dressing and meal prep prior to adult  M-F. Pt wears briefs for incontinence. Pt remains home during the day Saturday and Sunday, but has 24/7 (A)/SPV.   Equipment Currently Used at Home: cane, quad, wheelchair, shower chair    Subjective:  Communicated with RN (Brisa) prior to session.    Chief Complaint: "Why are you doing all of these funny tests on me? I'm not going to have you take advantage of me. I went to school."  Patient[facility]    Pain/Comfort  Pain Rating 1: 0/10  Pain Rating Post-Intervention 1: 0/10    Objective:  Patient found with: peripheral IV, bed alarm (condom catheter)    Cognitive Exam:  Oriented to: Person, Place and Time  Follows Commands/attention: Follows one-step commands  Communication: clear/fluent  Memory:  Impaired STM and Poor immediate recall  Safety awareness/insight to disability: impaired  Coping skills/emotional control: Lashes out    Visual/perceptual:  Intact    Physical Exam:  Postural examination/scapula alignment: Rounded shoulder, Head forward and Posterior pelvic tilt  Skin integrity: Visible skin intact  Edema: None noted B UE/LE    Sensation:   Pt reports numbness in (L) UE    Upper Extremity Range of " Motion:  Right Upper Extremity: WFL  Left Upper Extremity: WFL except shld flex limited to 90*, supination to just slightly past nuetral, and wrist extension     Upper Extremity Strength:  Right Upper Extremity: grossly 4-/5  Left Upper Extremity: grossly 3+/5, with exception of (L) shld and wrist 2-/5   Strength: (R) UE WFL. (L) UE 2-/5    Fine motor coordination:   Impaired (L) UE. Finger-to-thumb opposition very delayed.     Gross motor coordination: Impaired (L) UE     Functional Mobility:  Bed Mobility:  Scooting/Bridging: Stand by Assistance (to HOB and EOB using bed features)  Supine to Sit:  (for trunk elevation)  Sit to Supine: Stand by Assistance    Transfers:  Sit <> Stand Assistance: Moderate Assistance  Sit <> Stand Assistive Device: Rolling Walker (cues for hand placement)    Functional Ambulation: NT this date 2/2 poor standing balance.     Activities of Daily Living:     UE Dressing Level of Assistance: Maximum assistance (to don clean gown after incontinent episode)  UE adaptive equipment: None    LE Dressing Level of Assistance: Moderate assistance (Pt able to doff cresencio socks, however required (A) to don bilaterally )  LE adaptive equipment: None     Toileting Where Assessed: Bed level  Toileting Level of Assistance: Total assistance (Pt's condom catheter not properly in place, therefore pt with saturated linens upon arrival. Required (A) x2 persons to complete toileting. )    Balance:   Static Sit: FAIR+: Able to take MINIMAL challenges from all directions  Dynamic Sit: FAIR+: Maintains balance through MINIMAL excursions of active trunk motion  Static Stand: POOR: Needs MODERATE assist to maintain    Therapeutic Activities and Exercises:    Pt educated on OT role and POC.     AM-PAC 6 CLICK ADL  How much help from another person does this patient currently need?  1 = Unable, Total/Dependent Assistance  2 = A lot, Maximum/Moderate Assistance  3 = A little, Minimum/Contact Guard/Supervision  4 =  "None, Modified [place]/Independent    Putting on and taking off regular lower body clothing? : 2  Bathing (including washing, rinsing, drying)?: 2  Toileting, which includes using toilet, bedpan, or urinal? : 1  Putting on and taking off regular upper body clothing?: 2  Taking care of personal grooming such as brushing teeth?: 3  Eating meals?: 4  Total Score: 14    AM-PAC Raw Score CMS "G-Code Modifier Level of Impairment Assistance   6 % Total / Unable   7 - 9 CM 80 - 100% Maximal Assist   10-14 CL 60 - 80% Moderate Assist   15 - 19 CK 40 - 60% Moderate Assist   20 - 22 CJ 20 - 40% Minimal Assist   23 CI 1-20% SBA / CGA   24 CH 0% Independent/ Mod I       Patient left supine with all lines intact, bed alarm on and RN notified    Assessment:  Julius Cardenas is a 84 y.o. male with a medical diagnosis of Hyperglycemia and presents with below stated deficits which are impacting his (I)/safety with ADLs and functional mobility. Pt is currently requiring min (A) for bed mobility, min (A) for sitting balance at EOB 2/2 (L) sided lean and mod-max (A) for upright standing balance. Pt required max-total (A) with all self-care. Feel that pt would benefit from acute OT services to further address these deficits and maximize return to PLOF. Recommending return to SNF upon D/C.     Rehab identified problem list/impairments: Rehab identified problem list/impairments: weakness, impaired endurance, impaired sensation, impaired self care skills, impaired functional mobilty, gait instability, impaired balance, impaired cognition, decreased upper extremity function, decreased lower extremity function, decreased safety awareness, pain, abnormal tone, decreased ROM, impaired coordination    Rehab potential is fair.    Activity tolerance: Fair    Discharge recommendations: Discharge Facility/Level Of Care Needs: nursing facility, skilled (pt d/c'd from [facility] )     Barriers to discharge: Barriers to " Discharge: Inaccessible home environment, Decreased caregiver support    Equipment recommendations: none     GOALS:    Occupational Therapy Goals        Problem: Occupational Therapy Goal    Goal Priority Disciplines Outcome Interventions   Occupational Therapy Goal     OT, PT/OT Ongoing (interventions implemented as appropriate)    Description:  Goals to be met by: 6/21/2017     Patient will increase functional independence with ADLs by performing:    UE Dressing with Minimal Assistance.  LE Dressing with Minimal Assistance.  Grooming while seated with Set-up Assistance.  Toileting from bedside commode with Minimal Assistance for hygiene and clothing management.   Sitting at edge of bed x8-10 minutes with Supervision.  Supine to sit with Supervision.  Toilet transfer to bedside commode with Minimal Assistance.                      PLAN:  Patient to be seen 5 x/week to address the above listed problems via self-care/home management, therapeutic activities, therapeutic exercises  Plan of Care expires: 07/23/17  Plan of Care reviewed with: patient         Hoda OLIVIER Katelyn, OTR/L  06/14/2017

## 2017-06-14 NOTE — PLAN OF CARE
Problem: Patient Care Overview  Goal: Plan of Care Review  Outcome: Ongoing (interventions implemented as appropriate)  Plan of care reviewed with the pt. Pt resting comfortably in bed with no complaints at this time. Pt remains free from falls and injuries. Accuchecks completed per order. Insulin given as ordered. IV fluids maintained. Purposeful rounding performed. Bed in lowest position. Call light in reach. Will continue to monitor.

## 2017-06-14 NOTE — PROGRESS NOTES
Progress Note  Hospital Medicine    Admit Date: 6/13/2017   LOS: 1 day     SUBJECTIVE:     Follow-up For:  Hyperglycemia    Interval History:   Feels good currently and has no complaints.  Tolerating PO well.   BG has varied.          Review of Systems:  Constitutional: No fever or chills  Respiratory: No cough or shorness of breath  Cardiovascular: No chest pain or palpitations  Gastrointestinal: No nausea or vomiting  Neurological: No confusion or weakness    OBJECTIVE:     Vital Signs Range (Last 24H):  Vitals:    06/14/17 0810   BP: (!) 160/75   Pulse: 64   Resp: 20   Temp: 98 °F (36.7 °C)       Temp:  [97.6 °F (36.4 °C)-98 °F (36.7 °C)]   Pulse:  [64-75]   Resp:  [20]   BP: (150-198)/(75-98)   SpO2:  [98 %-100 %]     I & O (Last 24H):    Intake/Output Summary (Last 24 hours) at 06/14/17 1356  Last data filed at 06/14/17 0600   Gross per 24 hour   Intake                0 ml   Output             2350 ml   Net            -2350 ml       Physical Exam:  General appearance: Calm, NAD  Eyes:  Conjunctivae/corneas clear. PERRL.  Lungs: Normal respiratory effort,   clear to auscultation bilaterally   Heart: Regular rate and rhythm, S1, S2 normal,  Abdomen: Soft, non-tender non-distended; bowel sounds normal;   Extremities: No cyanosis or clubbing. No edema.    Skin: Skin color, texture, turgor normal. No rashes or lesions  Neurologic:  No focal numbness or weakness     Laboratory Data:  Reviewed and noted  where applicable- Please see chart for full lab data.    Medications:  Medication list was reviewed and changes noted under Assessment/Plan.    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       Hyperglycemia  - Unclear compliance level  - Restarted insulin at lower dose given JAMAL    DM-1 Uncontrolled  - A1c 9.5, up from 8.9 4/17  - Levemir 15 AM   - Novolog 3 TID c meal  - Accucheck and NISS    JAMAL on CKD-4  - Improving with IVFs  - Tolerating well  - Monitor.  Appears at baseline.     HTN  - Continue Procardia XL 90  - Hydralazine PRN    H/o Seizure disorder  - Keppra home dose    Constipation  - Miralax daily  - Give bisacodyl 10 mg PO now  - Monitor    Heparin

## 2017-06-14 NOTE — PLAN OF CARE
Problem: Patient Care Overview  Goal: Plan of Care Review  Outcome: Ongoing (interventions implemented as appropriate)  Plan of care reviewed with patient. Covered with 1 unit SS at bedtime. Condom cath on patient. Incontinence care provided. Pt verbally combative throughout shift. Purposeful rounding performed. Bed lowered and locked, call light and personal items within reach. No needs at this time. Will continue to monitor.

## 2017-06-14 NOTE — NURSING
Pt BP at 0400 was 198/98. PRN hydralazine given. Dr. Rod notified. Rechecking BP in 20 minutes. Per Dr. Rod, if SBP is still greater than 180, give another dose of hydralazine. Will continue to monitor.

## 2017-06-14 NOTE — PT/OT/SLP EVAL
Physical Therapy  Evaluation    Julius Cardenas   MRN: [id number]   Admitting Diagnosis: Hyperglycemia    PT Received On: 06/14/17  PT Start Time: 1043     PT Stop Time: 1103    PT Total Time (min): 20 min       Billable Minutes:  Evaluation 10 and Therapeutic Activity 10    Diagnosis: Hyperglycemia      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      Past Surgical History:   Procedure Laterality Date    CATARACT EXTRACTION W/  INTRAOCULAR LENS IMPLANT  8/26/2009, 10/14/2009    CRANIOTOMY  1995    CRANIOTOMY  12/21/2010    EYE SURGERY         Referring physician: Jeremiah  Date referred to PT: 6/13/17    General Precautions: Standard,  (fall risk; ambulate with assist)  Orthopedic Precautions: N/A   Braces: N/A       Do you have any cultural, spiritual, Jainism conflicts, given your current situation?: none specified    Patient History:  Living Environment Comment: PT telephoned sister-in-law  "Kamini Cardenas who reported that patient has been at [facility] since discharge from the hospital in April 2017. Prior to that previous hospital admission, pt was living with brother and sister-in-law with 24/7 support in 1 story house with 5 steps to enter, bilatearl hand rails. He was ambulating with a quad cane, had an aide daily in the AM to assist with bathing (tub/shower on shower chair), dressing, and meal prep prior to adult  during the week. Pt would stay home during the days on the weekens. He worse briefs for incontinence. He has a manual w/c that he was able to propel himself.   Equipment Currently Used at Home: cane, quad, wheelchair, shower chair  DME owned (not currently used): none    Previous Level of Function:  Ambulation Skills: needs device  Transfer Skills: needs device  ADL Skills: needs device and assist    Subjective:  Communicated with nurse prior to session.  Pt stated, "nobody in this hospital knows what they are doing. I'm not coming back here" (after IV began beeping). "That 'beep beep beep' is going all the time." (Pt mood agitated, at times uncooperative.) "Get me some juice."    Chief Complaint: bowel urgency  Patient goals: have bowel movement, use BSC    Pain/Comfort  Pain Rating 1: 0/10  Pain Rating Post-Intervention 1: 0/10      Objective:   Patient found with:  (condom catheter) sitting at edge of bed with beside nurse Brisa and PCT Giselle.      Cognitive Exam:  Oriented to: Person and Place (Time: correct year, incorrect month and day; incorrect situation, believes he had a stroke. Reports he drinks juice at home and checks his own sugar.)    Follows Commands/attention: Follows one-step commands approximately 50% of the time  Communication: agitated, expressed wants and needs  Safety awareness/insight to disability: impaired    Physical Exam:  Postural examination/scapula alignment: Rounded shoulder, Head forward, L UE resting in guarding/postured position at " "trunk    Skin integrity: Visible skin intact  Edema: None noted     Sensation:   Denied paresthesias    UE ROM/Strength: Impaired L shoulder and     Lower Extremity Range of Motion:  Right Lower Extremity: WFL  Left Lower Extremity: Deficits: knee extension, hip flexion, ankle dorsiflexion    Lower Extremity Strength:  Right Lower Extremity: WFL  Left Lower Extremity: Deficits: 0/5 ankle dorsiflexion, 3-/5 knee ext, 3-/5 hip flexion; MMT inconsistent due to impaired ability to follow commands and agitation     Noted hypertonicity at L LE; impaired coordination L UE and LE during functional mobility    Functional Mobility:  Bed Mobility:  Scooting/Bridging: Maximum Assistance, With side rail (supine to scoot to HOB)  Sit to Supine: Moderate Assistance    Transfers:  Sit <> Stand Assistance: Moderate Assistance (repeated verbal cues for correct technique, blocking L LE to prevent foot sliding and knee buckling)  Sit <> Stand Assistive Device: Rolling Walker  Toilet Transfer Technique: Stand Pivot  Toilet Transfer Assistance: Moderate Assistance (bed>BSC, no steps, verbal cues for UE placement)    Gait:   Gait Distance: x 6 small steps BSC>bed with mod A at trunk, constant verbal cues for sequencing, assist at trunk for stability and walker for management, pt repeating "you're in the way" throughout steps and following 50% of commands, decreased safety awareness and cooperation noted. Decreased foot clearance L LE  Assistance 1: Moderate assistance  Gait Assistive Device: Rolling walker    Balance:   Static Sit: FAIR+: Able to take MINIMAL challenges from all directions  Dynamic Sit: FAIR+: Maintains balance through MINIMAL excursions of active trunk motion  Static Stand: POOR: Needs MODERATE assist to maintain with bilateral UE support on rolling walker  Dynamic stand: POOR: Mod A at trunk with bilateral UE support at rolling walker, second person present for total assist jaspal-care after bowel movement on " [facility]    Pt required frequent verbal cues for safety and sequencing during all mobility, frequently agitated and required encouragement for cooperation for technique.     AM-PAC 6 CLICK MOBILITY  How much help from another person does this patient currently need?   1 = Unable, Total/Dependent Assistance  2 = A lot, Maximum/Moderate Assistance  3 = A little, Minimum/Contact Guard/Supervision  4 = None, Modified [place]/Independent    Turning over in bed (including adjusting bedclothes, sheets and blankets)?: 2  Sitting down on and standing up from a chair with arms (e.g., wheelchair, bedside commode, etc.): 2  Moving from lying on back to sitting on the side of the bed?: 2  Moving to and from a bed to a chair (including a wheelchair)?: 2  Need to walk in hospital room?: 1  Climbing 3-5 steps with a railing?: 1  Total Score: 10     AM-PAC Raw Score CMS G-Code Modifier Level of Impairment Assistance   6 % Total / Unable   7 - 9 CM 80 - 100% Maximal Assist   10 - 14 CL 60 - 80% Moderate Assist   15 - 19 CK 40 - 60% Moderate Assist   20 - 22 CJ 20 - 40% Minimal Assist   23 CI 1-20% SBA / CGA   24 CH 0% Independent/ Mod I     Patient left supine with all lines intact, call button in reach, bed alarm on and nurse notified.    Assessment:   Julius Cardenas is a 84 y.o. male with a medical diagnosis of Hyperglycemia. PT evaluation completed. Pt presents with impaired L sided AROM, strength and tone, impaired balance, impaired cognition. He also demonstrated agitation and inconsistent cooperation/participation that affects his safety and quality of mobility. He is not able to functionally ambulate this session and requires mod A for transfers (to and from [facility]). He is a very high fall risk. Pt has decreased independence with functional mobility presenting with below impairments. Pt would benefit from continued skilled PT to maximize independence and safety with functional mobility.      Rehab identified problem  list/impairments: Rehab identified problem list/impairments: weakness, impaired self care skills, impaired functional mobilty, gait instability, impaired balance, impaired cognition, impaired coordination, decreased safety awareness, abnormal tone, decreased ROM, decreased lower extremity function, decreased upper extremity function    Rehab potential is fair.    Activity tolerance: Fair    Discharge recommendations: Discharge Facility/Level Of Care Needs: nursing facility, skilled (sister in law reports that the patient was at [facility] prior to admission)     Barriers to discharge: Barriers to Discharge: Decreased caregiver support, Inaccessible home environment    Equipment recommendations:       GOALS:    Physical Therapy Goals        Problem: Physical Therapy Goal    Goal Priority Disciplines Outcome Goal Variances Interventions   Physical Therapy Goal     PT/OT, PT Ongoing (interventions implemented as appropriate)     Description:  Goals to be met by: 7/10/17     Patient will increase functional independence with mobility by performin. Supine to sit with modified independence.   2. Sit to supine with modified independence.   3. Sit<>stand transfer with supervision using rolling walker.   4. Gait > 30 feet with CGA using rolling walker.   5. Ascend/descend 5 stairs with bilateral handrails with min A.                      PLAN:    Patient to be seen 6 x/week to address the above listed problems via gait training, therapeutic activities, therapeutic exercises, neuromuscular re-education  Plan of Care expires: 17  Plan of Care reviewed with: patient (sister in law)          Kate Jerry, PT  2017

## 2017-06-14 NOTE — PLAN OF CARE
Problem: Physical Therapy Goal  Goal: Physical Therapy Goal  Goals to be met by: 7/10/17     Patient will increase functional independence with mobility by performin. Supine to sit with modified independence.   2. Sit to supine with modified independence.   3. Sit<>stand transfer with supervision using rolling walker.   4. Gait > 30 feet with CGA using rolling walker.   5. Ascend/descend 5 stairs with bilateral handrails with min A.    Outcome: Ongoing (interventions implemented as appropriate)  PT evaluation completed. Pt presents with impaired L sided AROM, strength and tone, impaired balance, impaired cognition. He also demonstrated agitation and inconsistent cooperation/participation that affects his safety and quality of mobility. He is not able to functionally ambulate this session and requires mod A for transfers (to and from [facility]). He is a very high fall risk. Will continue to follow and progress as tolerated. Please see progress note for detailed plan of care and recommendations including SNF.

## 2017-06-15 LAB
ANION GAP SERPL CALC-SCNC: 12 MMOL/L
BUN SERPL-MCNC: 28 MG/DL
CALCIUM SERPL-MCNC: 8.6 MG/DL
CHLORIDE SERPL-SCNC: 112 MMOL/L
CO2 SERPL-SCNC: 17 MMOL/L
CREAT SERPL-MCNC: 2.7 MG/DL
EST. GFR  (AFRICAN AMERICAN): 24 ML/MIN/1.73 M^2
EST. GFR  (NON AFRICAN AMERICAN): 21 ML/MIN/1.73 M^2
GLUCOSE SERPL-MCNC: 221 MG/DL
POCT GLUCOSE: 135 MG/DL (ref 70–110)
POCT GLUCOSE: 201 MG/DL (ref 70–110)
POCT GLUCOSE: 214 MG/DL (ref 70–110)
POCT GLUCOSE: 377 MG/DL (ref 70–110)
POCT GLUCOSE: 42 MG/DL (ref 70–110)
POCT GLUCOSE: 74 MG/DL (ref 70–110)
POTASSIUM SERPL-SCNC: 4 MMOL/L
SODIUM SERPL-SCNC: 141 MMOL/L

## 2017-06-15 PROCEDURE — 63600175 PHARM REV CODE 636 W HCPCS: Performed by: HOSPITALIST

## 2017-06-15 PROCEDURE — 97110 THERAPEUTIC EXERCISES: CPT

## 2017-06-15 PROCEDURE — 36415 COLL VENOUS BLD VENIPUNCTURE: CPT

## 2017-06-15 PROCEDURE — 97535 SELF CARE MNGMENT TRAINING: CPT

## 2017-06-15 PROCEDURE — 25000003 PHARM REV CODE 250: Performed by: HOSPITALIST

## 2017-06-15 PROCEDURE — 97116 GAIT TRAINING THERAPY: CPT

## 2017-06-15 PROCEDURE — 80048 BASIC METABOLIC PNL TOTAL CA: CPT

## 2017-06-15 PROCEDURE — 25000003 PHARM REV CODE 250: Performed by: INTERNAL MEDICINE

## 2017-06-15 PROCEDURE — 11000001 HC ACUTE MED/SURG PRIVATE ROOM

## 2017-06-15 RX ORDER — INSULIN ASPART 100 [IU]/ML
3 INJECTION, SOLUTION INTRAVENOUS; SUBCUTANEOUS
Start: 2017-06-15 | End: 2017-06-16

## 2017-06-15 RX ORDER — INSULIN ASPART 100 [IU]/ML
3 INJECTION, SOLUTION INTRAVENOUS; SUBCUTANEOUS
Status: DISCONTINUED | OUTPATIENT
Start: 2017-06-16 | End: 2017-06-16

## 2017-06-15 RX ORDER — ERGOCALCIFEROL 1.25 MG/1
50000 CAPSULE ORAL
Qty: 30 CAPSULE | Refills: 0 | Status: SHIPPED | OUTPATIENT
Start: 2017-06-15 | End: 2017-07-21

## 2017-06-15 RX ADMIN — INSULIN ASPART 2 UNITS: 100 INJECTION, SOLUTION INTRAVENOUS; SUBCUTANEOUS at 02:06

## 2017-06-15 RX ADMIN — INSULIN ASPART 5 UNITS: 100 INJECTION, SOLUTION INTRAVENOUS; SUBCUTANEOUS at 08:06

## 2017-06-15 RX ADMIN — LEVETIRACETAM 1000 MG: 500 TABLET, FILM COATED ORAL at 09:06

## 2017-06-15 RX ADMIN — INSULIN DETEMIR 2 UNITS: 100 INJECTION, SOLUTION SUBCUTANEOUS at 12:06

## 2017-06-15 RX ADMIN — CARVEDILOL 6.25 MG: 6.25 TABLET, FILM COATED ORAL at 08:06

## 2017-06-15 RX ADMIN — INSULIN ASPART 3 UNITS: 100 INJECTION, SOLUTION INTRAVENOUS; SUBCUTANEOUS at 05:06

## 2017-06-15 RX ADMIN — INSULIN ASPART 5 UNITS: 100 INJECTION, SOLUTION INTRAVENOUS; SUBCUTANEOUS at 12:06

## 2017-06-15 RX ADMIN — INSULIN ASPART 10 UNITS: 100 INJECTION, SOLUTION INTRAVENOUS; SUBCUTANEOUS at 09:06

## 2017-06-15 RX ADMIN — CARVEDILOL 6.25 MG: 6.25 TABLET, FILM COATED ORAL at 09:06

## 2017-06-15 RX ADMIN — POLYETHYLENE GLYCOL 3350 17 G: 17 POWDER, FOR SOLUTION ORAL at 08:06

## 2017-06-15 RX ADMIN — INSULIN ASPART 2 UNITS: 100 INJECTION, SOLUTION INTRAVENOUS; SUBCUTANEOUS at 09:06

## 2017-06-15 RX ADMIN — LEVETIRACETAM 1000 MG: 500 TABLET, FILM COATED ORAL at 08:06

## 2017-06-15 RX ADMIN — PAROXETINE HYDROCHLORIDE 10 MG: 10 TABLET, FILM COATED ORAL at 06:06

## 2017-06-15 RX ADMIN — ASPIRIN 81 MG: 81 TABLET, COATED ORAL at 08:06

## 2017-06-15 RX ADMIN — NIFEDIPINE 90 MG: 30 TABLET, FILM COATED, EXTENDED RELEASE ORAL at 08:06

## 2017-06-15 RX ADMIN — HEPARIN SODIUM 5000 UNITS: 5000 INJECTION, SOLUTION INTRAVENOUS; SUBCUTANEOUS at 09:06

## 2017-06-15 RX ADMIN — HYDRALAZINE HYDROCHLORIDE 10 MG: 20 INJECTION INTRAMUSCULAR; INTRAVENOUS at 04:06

## 2017-06-15 NOTE — PLAN OF CARE
Problem: Physical Therapy Goal  Goal: Physical Therapy Goal  Goals to be met by: 7/10/17     Patient will increase functional independence with mobility by performin. Supine to sit with modified independence.   2. Sit to supine with modified independence.   3. Sit<>stand transfer with supervision using rolling walker.   4. Gait > 30 feet with CGA using rolling walker.   5. Ascend/descend 5 stairs with bilateral handrails with min A.     -    Comments: Improved performance in therapy.  Pt able to amb 20' with RW and min A for balance/safety.

## 2017-06-15 NOTE — PROGRESS NOTES
Pt's blood sugar was 42 at 1715. Dr. Daniels notified. Pt refused glucose tablets. Pt said he would drink an orange juice. Gave pt orange juice. Will recheck sugar when meal arrives. Dr. Daniels ordered to give 3 units of insulin with meal. Will continue to monitor.

## 2017-06-15 NOTE — PT/OT/SLP PROGRESS
"Occupational Therapy  Treatment    Julius Cardenas   MRN: [id number]   Admitting Diagnosis: Hyperglycemia    OT Date of Treatment: 06/15/17   OT Start Time: 0915  OT Stop Time: 0940  OT Total Time (min): 25 min    Billable Minutes:  Self Care/Home Management 25    General Precautions: Standard, fall  Orthopedic Precautions: N/A  Braces: N/A    Do you have any cultural, spiritual, Samaritan conflicts, given your current situation?: Not reported    Subjective:  Communicated with RN, Brisa,  prior to session.  " I am a crippled, and you don't even want to help me. You are nothing but a wench. I can see it in your face, you are a racist. "- pt's response when OT requested patient to assist with donning of gown    Pain/Comfort  Pain Rating 1: 0/10  Pain Rating Post-Intervention 1: 0/10    Objective:  Patient found with: peripheral IV, bed alarm (condom catheter)     Functional Mobility:  Bed Mobility:  Scooting/Bridging: Moderate Assistance (to scoot to EOB)  Supine to Sit: Moderate Assistance (to assist wtih trunk and LE's)    Transfers:   Sit <> Stand Assistance: Moderate Assistance  Sit <> Stand Assistive Device: Rolling Walker  Bed <> Chair Technique: Stand Pivot  Bed <> Chair Transfer Assistance: Minimum Assistance  Bed <> Chair Assistive Device: Rolling Walker      Activities of Daily Living:  Feeding Level of Assistance: Set-up Assistance  UE Dressing Level of Assistance: Total assistance (to don gown around back, pt refused to attempt donning gown)  LE Dressing Level of Assistance: Maximum assistance (to don right sock)      AM-PAC 6 CLICK ADL   How much help from another person does this patient currently need?   1 = Unable, Total/Dependent Assistance  2 = A lot, Maximum/Moderate Assistance  3 = A little, Minimum/Contact Guard/Supervision  4 = None, Modified [place]/Independent      AM-PAC Raw Score CMS "G-Code Modifier Level of Impairment Assistance   6 % Total / Unable   7 - 8 CM 80 - 100% Maximal " Assist   9-13 CL 60 - 80% Moderate Assist   14 - 19 CK 40 - 60% Moderate Assist   20 - 22 CJ 20 - 40% Minimal Assist   23 CI 1-20% SBA / CGA   24 CH 0% Independent/ Mod I       Patient left up in chair with all lines intact, call button in reach and RN notified    ASSESSMENT:  Julius Cardenas is a 84 y.o. male with a medical diagnosis of Hyperglycemia and presents with the following.  Rehab identified problem list/impairments: Rehab identified problem list/impairments: weakness, impaired endurance, impaired self care skills, impaired functional mobilty, gait instability, impaired balance, decreased safety awareness, pain, abnormal tone, impaired fine motor. Pt angry throughout session and verbally combative to therapist throughout. Pt refused to participate in self care tasks.     Rehab potential is fair.    Activity tolerance: Poor    Discharge recommendations: Discharge Facility/Level Of Care Needs:  (transfer back to [facility])     Barriers to discharge: Barriers to Discharge: Inaccessible home environment, Decreased caregiver support    Equipment recommendations: none     GOALS:    Occupational Therapy Goals        Problem: Occupational Therapy Goal    Goal Priority Disciplines Outcome Interventions   Occupational Therapy Goal     OT, PT/OT Ongoing (interventions implemented as appropriate)    Description:  Goals to be met by: 6/21/2017     Patient will increase functional independence with ADLs by performing:    UE Dressing with Minimal Assistance.  LE Dressing with Minimal Assistance.  Grooming while seated with Set-up Assistance.  Toileting from bedside commode with Minimal Assistance for hygiene and clothing management.   Sitting at edge of bed x8-10 minutes with Supervision.  Supine to sit with Supervision.  Toilet transfer to bedside commode with Minimal Assistance.                      Plan:  Patient to be seen 5 x/week to address the above listed problems via self-care/home management,  therapeutic activities, therapeutic exercises  Plan of Care expires: 07/23/13  Plan of Care reviewed with: patient         Alma InmanROSA MARIA salcido  06/15/2017

## 2017-06-15 NOTE — PT/OT/SLP PROGRESS
Physical Therapy  Treatment    Julius Cardenas   MRN: [id number]   Admitting Diagnosis: Hyperglycemia    PT Received On: 06/15/17  PT Start Time: 0954     PT Stop Time: 1019    PT Total Time (min): 25 min       Billable Minutes:  Gait [id number] and Therapeutic Exercise 10    Treatment Type: Treatment  PT/PTA: PT             General Precautions: Standard, fall  Orthopedic Precautions: N/A   Braces:      Do you have any cultural, spiritual, Scientologist conflicts, given your current situation?: none specified    Subjective:  Communicated with patient prior to session.      Pain/Comfort  Pain Rating 1: 2/10  Location 1: abdomen  Pain Addressed 1: Distraction  Pain Rating Post-Intervention 1: 2/10    Objective:   Patient found with: peripheral IV (condom catheter)    Functional Mobility:  Bed Mobility:   Sit to Supine: Moderate Assistance    Transfers:  Sit <> Stand Assistance: Minimum Assistance  Sit <> Stand Assistive Device: Rolling Walker    Gait:   Gait Distance: 20' with RW high tone in L LE but functional allowing ambulation  Assistance 1: Minimum assistance  Gait Assistive Device: Rolling walker  Gait Pattern: swing-to gait  Gait Deviation(s): decreased connie, increased time in double stance, decreased velocity of limb motion, decreased step length      Balance:   Static Sit: GOOD-: Takes MODERATE challenges from all directions but inconsistently  Dynamic Sit: FAIR+: Maintains balance through MINIMAL excursions of active trunk motion  Static Stand: FAIR+: Takes MINIMAL challenges from all directions  Dynamic stand: POOR: N/A     Therapeutic Activities and Exercises:  In bs chair: apx20;slr x 15; ab x 15; sit<>stand x 5 EOB     AM-PAC 6 CLICK MOBILITY  How much help from another person does this patient currently need?   1 = Unable, Total/Dependent Assistance  2 = A lot, Maximum/Moderate Assistance  3 = A little, Minimum/Contact Guard/Supervision  4 = None, Modified [place]/Independent    Turning over in bed  (including adjusting bedclothes, sheets and blankets)?: 3  Sitting down on and standing up from a chair with arms (e.g., wheelchair, bedside commode, etc.): 3  Moving from lying on back to sitting on the side of the bed?: 3  Moving to and from a bed to a chair (including a wheelchair)?: 3  Need to walk in hospital room?: 3  Climbing 3-5 steps with a railing?: 1  Total Score: 16    AM-PAC Raw Score CMS G-Code Modifier Level of Impairment Assistance   6 % Total / Unable   7 - 9 CM 80 - 100% Maximal Assist   10 - 14 CL 60 - 80% Moderate Assist   15 - 19 CK 40 - 60% Moderate Assist   20 - 22 CJ 20 - 40% Minimal Assist   23 CI 1-20% SBA / CGA   24 CH 0% Independent/ Mod I     Patient left supine with all lines intact, call button in reach, bed alarm on, nurse notified and nurse present.    Assessment:  Julius Cardenas is a 84 y.o. male with a medical diagnosis of Hyperglycemia and presents with Improved performance in therapy.  Pt able to amb 20' with RW and min A for balance/safety.  Pt will continue to benefit from PT to improve functional mobility.      Rehab identified problem list/impairments: Rehab identified problem list/impairments: weakness, impaired balance, gait instability, decreased lower extremity function, impaired fine motor, impaired coordination, impaired joint extensibility, decreased coordination, impaired functional mobilty    Rehab potential is good.    Activity tolerance: Good    Discharge recommendations:       Barriers to discharge: Barriers to Discharge: Inaccessible home environment, Decreased caregiver support    Equipment recommendations: Equipment Needed After Discharge: none     GOALS:    Physical Therapy Goals        Problem: Physical Therapy Goal    Goal Priority Disciplines Outcome Goal Variances Interventions   Physical Therapy Goal     PT/OT, PT Ongoing (interventions implemented as appropriate)     Description:  Goals to be met by: 7/10/17     Patient will increase functional  independence with mobility by performin. Supine to sit with modified independence.   2. Sit to supine with modified independence.   3. Sit<>stand transfer with supervision using rolling walker.   4. Gait > 30 feet with CGA using rolling walker.   5. Ascend/descend 5 stairs with bilateral handrails with min A.                      PLAN:    Patient to be seen 6 x/week  to address the above listed problems via gait training, therapeutic activities, therapeutic exercises  Plan of Care expires: 17  Plan of Care reviewed with: patient         Miguel Angel Aragon, PT  06/15/2017

## 2017-06-15 NOTE — PLAN OF CARE
Problem: Patient Care Overview  Goal: Plan of Care Review  Outcome: Ongoing (interventions implemented as appropriate)  Plan of care reviewed with patient. Covered with 1 unit SS at bedtime and 2 units SS at 2am. Condom cath on patient. Incontinence care provided. Pt verbally combative throughout shift. Purposeful rounding performed. Bed lowered and locked, call light and personal items within reach. No needs at this time. Will continue to monitor

## 2017-06-15 NOTE — PROGRESS NOTES
Progress Note  Hospital Medicine    Admit Date: 6/13/2017   LOS: 2 days     SUBJECTIVE:     Follow-up For:  Hyperglycemia    Interval History:   Feels good currently and has no complaints.  Tolerating PO well.   BG has varied.          Review of Systems:  Constitutional: No fever or chills  Respiratory: No cough or shorness of breath  Cardiovascular: No chest pain or palpitations  Gastrointestinal: No nausea or vomiting  Neurological: No confusion or weakness    OBJECTIVE:     Vital Signs Range (Last 24H):  Vitals:    06/15/17 0754   BP: (!) 169/70   Pulse: 75   Resp: 18   Temp: 97.6 °F (36.4 °C)       Temp:  [97.5 °F (36.4 °C)-98 °F (36.7 °C)]   Pulse:  [68-76]   Resp:  [18-20]   BP: (154-184)/(70-88)   SpO2:  [100 %]     I & O (Last 24H):    Intake/Output Summary (Last 24 hours) at 06/15/17 1220  Last data filed at 06/15/17 0900   Gross per 24 hour   Intake                0 ml   Output             1275 ml   Net            -1275 ml       Physical Exam:  General appearance: Calm, NAD  Eyes:  Conjunctivae/corneas clear. PERRL.  Lungs: Normal respiratory effort,   clear to auscultation bilaterally   Heart: Regular rate and rhythm, S1, S2 normal,  Abdomen: Soft, non-tender non-distended; bowel sounds normal;   Extremities: No cyanosis or clubbing. No edema.    Skin: Skin color, texture, turgor normal. No rashes or lesions  Neurologic:  No focal numbness or weakness     Laboratory Data:  Reviewed and noted  where applicable- Please see chart for full lab data.    Medications:  Medication list was reviewed and changes noted under Assessment/Plan.    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       Hyperglycemia  - Unclear compliance level  - Control reasonable with resumption of home dose with slight increase.     DM-1 Uncontrolled  - A1c 9.5, up from 8.9 4/17  - Levemir to 15 AM and Novolog 3 TID c meal based on observed response in hospital.  Wish to avoid hypoglycemia.   - Accucheck and NISS    JAMAL on CKD-4  - Improved with IVFs  - Appears at baseline.     HTN  - Continue Procardia XL 90 and Coreg 6.25  - Follow up with PCP.     H/o Seizure disorder  - Keppra home dose    Constipation  - Miralax daily  - Had BMs after bisacodyl   - Monitor    Heparin

## 2017-06-16 VITALS
OXYGEN SATURATION: 100 % | DIASTOLIC BLOOD PRESSURE: 76 MMHG | BODY MASS INDEX: 23.54 KG/M2 | TEMPERATURE: 98 F | RESPIRATION RATE: 18 BRPM | WEIGHT: 150 LBS | HEIGHT: 67 IN | HEART RATE: 70 BPM | SYSTOLIC BLOOD PRESSURE: 159 MMHG

## 2017-06-16 PROBLEM — K59.00 CONSTIPATION: Status: RESOLVED | Noted: 2017-06-13 | Resolved: 2017-06-16

## 2017-06-16 PROBLEM — N17.9 AKI (ACUTE KIDNEY INJURY): Status: RESOLVED | Noted: 2017-02-16 | Resolved: 2017-06-16

## 2017-06-16 LAB
POCT GLUCOSE: 201 MG/DL (ref 70–110)
POCT GLUCOSE: 217 MG/DL (ref 70–110)
POCT GLUCOSE: 253 MG/DL (ref 70–110)
POCT GLUCOSE: 78 MG/DL (ref 70–110)

## 2017-06-16 PROCEDURE — 97116 GAIT TRAINING THERAPY: CPT

## 2017-06-16 PROCEDURE — 25000003 PHARM REV CODE 250: Performed by: INTERNAL MEDICINE

## 2017-06-16 PROCEDURE — 25000003 PHARM REV CODE 250: Performed by: HOSPITALIST

## 2017-06-16 PROCEDURE — 97112 NEUROMUSCULAR REEDUCATION: CPT

## 2017-06-16 PROCEDURE — 97110 THERAPEUTIC EXERCISES: CPT

## 2017-06-16 PROCEDURE — 99238 HOSP IP/OBS DSCHRG MGMT 30/<: CPT | Mod: ,,, | Performed by: HOSPITALIST

## 2017-06-16 PROCEDURE — 63600175 PHARM REV CODE 636 W HCPCS: Performed by: HOSPITALIST

## 2017-06-16 RX ORDER — INSULIN ASPART 100 [IU]/ML
4 INJECTION, SOLUTION INTRAVENOUS; SUBCUTANEOUS
Start: 2017-06-16 | End: 2017-07-21 | Stop reason: SDUPTHER

## 2017-06-16 RX ORDER — INSULIN ASPART 100 [IU]/ML
4 INJECTION, SOLUTION INTRAVENOUS; SUBCUTANEOUS
Status: DISCONTINUED | OUTPATIENT
Start: 2017-06-16 | End: 2017-06-16 | Stop reason: HOSPADM

## 2017-06-16 RX ADMIN — NIFEDIPINE 90 MG: 30 TABLET, FILM COATED, EXTENDED RELEASE ORAL at 08:06

## 2017-06-16 RX ADMIN — INSULIN ASPART 3 UNITS: 100 INJECTION, SOLUTION INTRAVENOUS; SUBCUTANEOUS at 08:06

## 2017-06-16 RX ADMIN — LEVETIRACETAM 1000 MG: 500 TABLET, FILM COATED ORAL at 08:06

## 2017-06-16 RX ADMIN — INSULIN ASPART 4 UNITS: 100 INJECTION, SOLUTION INTRAVENOUS; SUBCUTANEOUS at 12:06

## 2017-06-16 RX ADMIN — ASPIRIN 81 MG: 81 TABLET, COATED ORAL at 08:06

## 2017-06-16 RX ADMIN — POLYETHYLENE GLYCOL 3350 17 G: 17 POWDER, FOR SOLUTION ORAL at 08:06

## 2017-06-16 RX ADMIN — INSULIN ASPART 6 UNITS: 100 INJECTION, SOLUTION INTRAVENOUS; SUBCUTANEOUS at 08:06

## 2017-06-16 RX ADMIN — PAROXETINE HYDROCHLORIDE 10 MG: 10 TABLET, FILM COATED ORAL at 06:06

## 2017-06-16 RX ADMIN — INSULIN ASPART 3 UNITS: 100 INJECTION, SOLUTION INTRAVENOUS; SUBCUTANEOUS at 12:06

## 2017-06-16 RX ADMIN — INSULIN ASPART 2 UNITS: 100 INJECTION, SOLUTION INTRAVENOUS; SUBCUTANEOUS at 01:06

## 2017-06-16 RX ADMIN — CARVEDILOL 6.25 MG: 6.25 TABLET, FILM COATED ORAL at 08:06

## 2017-06-16 RX ADMIN — HYDRALAZINE HYDROCHLORIDE 10 MG: 20 INJECTION INTRAMUSCULAR; INTRAVENOUS at 04:06

## 2017-06-16 NOTE — PROGRESS NOTES
Attempted to call report to [facility]. Was told that the nurses for the fourth floor were unavailable. Left my name and number to have them call back and informed that the pt's transport would be arriving at 5pm. Will await for phone call or will call back if not heard back.

## 2017-06-16 NOTE — HPI
MrJovanni Cardenas is a 84 y.o. who presents with frequent admissions for complications of brittle diabetes, including hypo and hyperglycemia, now with hyperglycemia: 's in the Ed. 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     Reports from [facility] conveyed administration of long acting insulin to correct this ~15 units. He was found to have a Cr 3.5 which is elevated in contrast to baseline of 2.4. He reports polyuria but denies any chest pain, fevers, chills, shortness of breath, cough, dysuria, or diarrhea. Recently admitted to [facility] for hyperglycemia/falls/weakness for management of labile blood glucose levels. Endocrine was consulted and adjustments were made to Lev 15, Aspart 3 tid ac + low dose correctionAC/HS to prevent hyperglycemic episodes

## 2017-06-16 NOTE — HOSPITAL COURSE
Hyperglycemia addressed with IV fluids and regular insulin, and readministration of long acting insulin after 12 hours, as initial reports from NH were long-acting had been administered as compensatory measure for hyperglycemia PTA. BG readings were overall labile, and medication compliance as outpatient was questionable as A1C was elevated to 9.5 as compared to previous reading of 8.9 on 4/17. After hydration and monitoring, the patient will be discharged to his home/NH on Levemir 15 units QAM, and novolog 4 units with meals.     NIALL addressed with IV fluids as suspected cause was intravascular depletion. This did improve with IV fluids back to patient's baseline.      Patient to follow up with PCP for continued management of Diabetes (Type 1), and encouraged compliance with current dosing of home meds.  He is provided referral for PCP as well as endocrinology.

## 2017-06-16 NOTE — ASSESSMENT & PLAN NOTE
- Uncontrolled  - A1c 9.5, up from 8.9 4/17  - Levemir to 15 AM and Novolog 3 TID c meal based on observed response in hospital.  Wish to avoid hypoglycemia.   - Accucheck and NISS

## 2017-06-16 NOTE — PLAN OF CARE
Pt discharging to [facility] today, transportation to be provided by [facility] wheelchair van. RN informed to call report. Pick-up will be around 1700. No further CM needs for discharge.     06/16/17 1514   Final Note   Discharge Disposition SNF   Discharge planning education complete? Yes   What phone number can be called within the next 1-3 days to see how you are doing after discharge? [phone]   Hospital Follow Up  Appt(s) scheduled? Yes   Discharge plans and expectations educations in teach back method with documentation complete? Yes   [facility] Bedside Delivery? n/a   Discharge/Hospital Encounter Summary to (non-[facility]) PCP Yes   Referral to Outpatient Case Management complete? n/a   Referral to / orders for Home Health Complete? No  (pt going to snf)   30 day supply of medicines given at discharge, if documented non-compliance / non-adherence? n/a   Any social issues identified prior to discharge? No   Did you assess the readiness or willingness of the family or caregiver to support self management of care? Yes   Right Care Referral Info   Post Acute Recommendation SNF

## 2017-06-16 NOTE — PLAN OF CARE
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]  Yes     Chronic    CKD (chronic kidney disease) stage 4, GFR 15-29 ml/min [N18.4]  Yes     Chronic    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 [E10.22, N18.4]  Yes     Chronic     Poor control due to cognitive impairment, with history of hypoglycemia and DKA        Resolved Hospital Problems    Diagnosis Date Resolved POA    Constipation [K59.00] 06/16/2017 Yes    NIALL (acute kidney injury) [N17.9] 06/16/2017 Yes       Allergies:Review of patient's allergies indicates:  No Known Allergies    Vitals:    Every shift (Skilled Nursing patients)    Diet:   2400 Diabetic    Activity:     - Up in a chair each morning as tolerated   - Ambulate per PT OT recommendation    Nursing Precautions:   - Fall precautions   - Decubitus precautions per facility protocol    CONSULTS:     PT to evaluate and treat - five times a week     OT to evaluate and treat - five times a week      LABS: Per facility protocol      DIABETES CARE:      Check blood sugar:      Fingerstick blood sugar AC and HS         Report CBG < 60 or > 400 to physician.                                          Insulin Sliding Scale          Glucose  Novolog Insulin Subcutaneous        0 - 60   Orange juice or glucose tablet, hold insulin      No insulin   201-250  2 units   251-300  4 units   301-350  6 units   351-400  8 units   >400   10 units then call physician      Medications: Discontinue all  "previous medication orders, if any. See new list below.        Current Discharge Medication List      CONTINUE these medications which have CHANGED    Details   ergocalciferol (ERGOCALCIFEROL) 50,000 unit Cap Take 1 capsule (50,000 Units total) by mouth every 7 days. on Tuesday.  Qty: 30 capsule, Refills: 0      insulin aspart (NOVOLOG) 100 unit/mL InPn pen Inject 4 Units into the skin 3 (three) times daily with meals.    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  Qty: 60 tablet, Refills: 0      CONTOUR TEST STRIPS Strp USE AS DIRECTED THREE TIMES DAILY  Qty: 300 strip, Refills: 12    Comments: **Patient requests 90 days supply**  Associated Diagnoses: Diabetes mellitus due to abnormal insulin      insulin detemir (LEVEMIR FLEXTOUCH) 100 unit/mL (3 mL) SubQ InPn pen Inject 15 Units into the skin once daily.  Refills: 0      lancets Misc Use three times daily for finger stick glucose monitoring.  Qty: 100 each, Refills: 3      levetiracetam (KEPPRA) 1000 MG tablet TAKE 1 TABLET BY MOUTH TWICE DAILY  Qty: 180 tablet, Refills: 6    Associated Diagnoses: Intractable epilepsy without status epilepticus      nifedipine (PROCARDIA-XL) 90 MG (OSM) TR24 Take 1 tablet (90 mg total) by mouth once daily.  Qty: 30 tablet, Refills: 0      nystatin (MYCOSTATIN) cream Apply topically 2 (two) times daily.  Qty: 60 g, Refills: 3      paroxetine (PAXIL) 10 MG tablet Take 1 tablet (10 mg total) by mouth every morning.  Qty: 90 tablet, Refills: 1      blood-glucose meter (CONTOUR " METER) kit Use as instructed.  Please substitute appropriate meter to match his strips.  Qty: 1 each, Refills: 0    Associated Diagnoses: DM2 (diabetes mellitus, type 2); Type II or unspecified type diabetes mellitus with renal manifestations, uncontrolled            aspirin  81 mg Oral Daily    carvedilol  6.25 mg Oral BID    heparin (porcine)  5,000 Units Subcutaneous Q8H    insulin aspart  4 Units Subcutaneous TIDWM    insulin detemir  15 Units Subcutaneous Daily    levetiracetam  1,000 mg Oral BID    nifedipine  90 mg Oral Daily    paroxetine  10 mg Oral QAM    polyethylene glycol  17 g Oral Daily               _________________________________  Blair Daniels MD  06/16/2017

## 2017-06-16 NOTE — PLAN OF CARE
Problem: Occupational Therapy Goal  Goal: Occupational Therapy Goal  Goals to be met by: 6/21/2017     Patient will increase functional independence with ADLs by performing:    UE Dressing with Minimal Assistance.  LE Dressing with Minimal Assistance.  Grooming while seated with Set-up Assistance.  Toileting from bedside commode with Minimal Assistance for hygiene and clothing management.   Sitting at edge of bed x8-10 minutes with Supervision.  Supine to sit with Supervision.  Toilet transfer to bedside commode with Minimal Assistance.     Outcome: Ongoing (interventions implemented as appropriate)  Continue OT services to progress toward goals    Comments: ROSA MARIA Redmond, 6/16/2017

## 2017-06-16 NOTE — PROGRESS NOTES
Progress Note  Hospital Medicine    Admit Date: 6/13/2017   LOS: 3 days     SUBJECTIVE:     Follow-up For:  Hyperglycemia    Interval History:   Doing okay.  Tolerating PO well.   BG has varied.          Review of Systems:  Constitutional: No fever or chills  Respiratory: No cough or shorness of breath  Cardiovascular: No chest pain or palpitations  Gastrointestinal: No nausea or vomiting  Neurological: No confusion or weakness    OBJECTIVE:     Vital Signs Range (Last 24H):  Vitals:    06/16/17 0710   BP: (!) 161/81   Pulse: 75   Resp: 18   Temp: 97.9 °F (36.6 °C)       Temp:  [97.5 °F (36.4 °C)-98.1 °F (36.7 °C)]   Pulse:  [61-76]   Resp:  [18]   BP: (134-188)/(65-97)   SpO2:  [97 %-100 %]     I & O (Last 24H):    Intake/Output Summary (Last 24 hours) at 06/16/17 1023  Last data filed at 06/16/17 0600   Gross per 24 hour   Intake                0 ml   Output              100 ml   Net             -100 ml       Physical Exam:  General appearance: Calm, NAD  Eyes:  Conjunctivae/corneas clear. PERRL.  Lungs: Normal respiratory effort,   clear to auscultation bilaterally   Heart: Regular rate and rhythm, S1, S2 normal,  Abdomen: Soft, non-tender non-distended; bowel sounds normal;   Extremities: No cyanosis or clubbing. No edema.    Skin: Skin color, texture, turgor normal. No rashes or lesions  Neurologic:  No focal numbness or weakness     Laboratory Data:  Reviewed and noted  where applicable- Please see chart for full lab data.    Medications:  Medication list was reviewed and changes noted under Assessment/Plan.    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       Hyperglycemia  - Unclear compliance level  - Control reasonable with resumption of home dose with slight increase.     DM-1 Uncontrolled  - A1c 9.5, up from 8.9 4/17  - Levemir to 15 AM and Novolog 4 TID c meal based on observed response in hospital.  Wish to avoid hypoglycemia.   - Accucheck and NISS    JAMAL on CKD-4  - Improved with IVFs  - Appears at baseline.     HTN  - Continue Procardia XL 90 and Coreg 6.25  - Follow up with PCP.     H/o Seizure disorder  - Keppra home dose    Constipation  - Miralax daily  - Had BMs after bisacodyl   - Monitor    Heparin

## 2017-06-16 NOTE — DISCHARGE SUMMARY
[facility] Hospital Medicine  Discharge Summary      Patient Name: Julius Cardenas  MRN: [id number]  Admission Date: 6/13/2017  Hospital Length of Stay: 3 days  Discharge Date and Time:  06/16/2017 9:34 AM  Attending Physician: Blair Daniels MD   Discharging Provider: Suni Lyons PA-C  Primary Care Provider: No primary care provider on file.      HPI:   Mr. Julius Cardenas is a 84 y.o. who presents with frequent admissions for complications of brittle diabetes, including hypo and hyperglycemia, now with hyperglycemia: 's in the Ed. 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     Reports from [facility] conveyed administration of long acting insulin to correct this ~15 units. He was found to have a Cr 3.5 which is elevated in contrast to baseline of 2.4. He reports polyuria but denies any chest pain, fevers, chills, shortness of breath, cough, dysuria, or diarrhea. Recently admitted to [facility] for hyperglycemia/falls/weakness for management of labile blood glucose levels. Endocrine was consulted and adjustments were made to Lev 15, Aspart 3 tid ac + low dose correctionAC/HS to prevent hyperglycemic episodes    * No surgery found *      Indwelling Lines/Drains at time of discharge:   Lines/Drains/Airways          No matching active lines, drains, or airways        Hospital Course:   Hyperglycemia addressed with IV fluids and regular insulin, and readministration of long acting insulin after 12 hours, as initial reports from NH were long-acting had been administered as compensatory measure for hyperglycemia PTA. BG readings were overall labile, and medication compliance as  outpatient was questionable as A1C was elevated to 9.5 as compared to previous reading of 8.9 on 4/17. After hydration and monitoring, the patient will be discharged to his home/NH on Levemir 15 units QAM, and novolog 3 units with meals.     NIALL addressed with IV fluids as suspected cause was intravascular depletion. This did improve with IV fluids back to patient's baseline.      Patient to follow up with PCP for continued management of Diabetes (Type 1), and encouraged compliance with current dosing of home meds.      Consults:     Significant Diagnostic Studies: Labs:   BMP:   Recent Labs  Lab 06/15/17  0333   *      K 4.0   *   CO2 17*   BUN 28*   CREATININE 2.7*   CALCIUM 8.6*   , CMP   Recent Labs  Lab 06/15/17  0333      K 4.0   *   CO2 17*   *   BUN 28*   CREATININE 2.7*   CALCIUM 8.6*   ANIONGAP 12   ESTGFRAFRICA 24*   EGFRNONAA 21*   , CBC No results for input(s): WBC, HGB, HCT, PLT in the last 48 hours., A1C:   Recent Labs  Lab 03/25/17  0459 04/04/17  0816 06/13/17  0539   HGBA1C 8.7* 8.9* 9.5*    and All labs within the past 24 hours have been reviewed    Pending Diagnostic Studies:     None        Final Active Diagnoses:    Diagnosis Date Noted POA    PRINCIPAL PROBLEM:  Hyperglycemia [R73.9] 06/13/2017 Yes    Constipation [K59.00] 06/13/2017 Yes    NIALL (acute kidney injury) [N17.9] 02/16/2017 Yes    Anemia of chronic renal failure, stage 4 (severe) [N18.4, D63.1] 04/08/2014 Yes     Chronic    Renovascular hypertension [I15.0] 08/31/2013 Yes    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           Anemia of chronic renal failure, stage 4 (severe)              Renovascular hypertension    - Continue Procardia XL 90 and Coreg 6.25  - Follow up with PCP.           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  Wish to avoid hypoglycemia.   - Accucheck and NISS            Discharged Condition: stable    Disposition: Home or Self Care    Follow Up:  Follow-up Information     Chas Pablo MD. Schedule an appointment as soon as possible for a visit in 5 days.    Specialty:  Internal Medicine  Contact information:  [address]  [address]  [place] [address]  [phone]             Viki Arita MD. Schedule an appointment as soon as possible for a visit in 1 week.    Specialty:  Endocrinology  Why:  For diabetes management  Contact information:  [address]  [address]  [facility]  [facility] [address]  [phone]                 Patient Instructions:     Diet Diabetic 2200 Calories     Activity as tolerated     Call MD for:  increased confusion or weakness     Call MD for:  persistent dizziness, light-headedness, or visual disturbances     Call MD for:  difficulty breathing or increased cough     Call MD for:  persistent nausea and vomiting or diarrhea     Call MD for:  temperature >100.4       Medications:  Reconciled Home Medications:   Current Discharge Medication List      CONTINUE these medications which have CHANGED    Details   ergocalciferol (ERGOCALCIFEROL) 50,000 unit Cap Take 1 capsule (50,000 Units total) by mouth every 7 days. on Tuesday.  Qty: 30 capsule, Refills: 0     "  insulin aspart (NOVOLOG) 100 unit/mL InPn pen Inject 3 Units into the skin 3 (three) times daily with meals.         CONTINUE these medications which have NOT CHANGED    Details   aspirin (ECOTRIN) 81 MG EC tablet Take 81 mg by mouth once daily.      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  Qty: 60 tablet, Refills: 0      CONTOUR TEST STRIPS Strp USE AS DIRECTED THREE TIMES DAILY  Qty: 300 strip, Refills: 12    Comments: **Patient requests 90 days supply**  Associated Diagnoses: Diabetes mellitus due to abnormal insulin      insulin detemir (LEVEMIR FLEXTOUCH) 100 unit/mL (3 mL) SubQ InPn pen Inject 15 Units into the skin once daily.  Refills: 0      lancets Misc Use three times daily for finger stick glucose monitoring.  Qty: 100 each, Refills: 3      levetiracetam (KEPPRA) 1000 MG tablet TAKE 1 TABLET BY MOUTH TWICE DAILY  Qty: 180 tablet, Refills: 6    Associated Diagnoses: Intractable epilepsy without status epilepticus      nifedipine (PROCARDIA-XL) 90 MG (OSM) TR24 Take 1 tablet (90 mg total) by mouth once daily.  Qty: 30 tablet, Refills: 0      nystatin (MYCOSTATIN) cream Apply topically 2 (two) times daily.  Qty: 60 g, Refills: 3      paroxetine (PAXIL) 10 MG tablet Take 1 tablet (10 mg total) by mouth every morning.  Qty: 90 tablet, Refills: 1      blood-glucose meter (CONTOUR METER) kit Use as instructed.  Please substitute appropriate meter to match his strips.  Qty: 1 each, Refills: 0    Associated Diagnoses: DM2 (diabetes mellitus, type 2); Type II or unspecified type diabetes mellitus with renal manifestations, uncontrolled           Time spent on the discharge of patient: < 30 minutes    Suni Lyons PA-C  Department of Hospital Medicine  [facility]

## 2017-06-16 NOTE — PT/OT/SLP PROGRESS
"Physical Therapy  Treatment    Julius Cardenas   MRN: [id number]   Admitting Diagnosis: Hyperglycemia    PT Received On: 06/16/17  PT Start Time: 1020     PT Stop Time: 1044    PT Total Time (min): 24 min       Billable Minutes:  Gait Training 14 and Neuromuscular Re-education 10    Treatment Type: Treatment  PT/PTA: PT     PTA Visit Number: 0       General Precautions: Standard,  (fall risk; ambulate with assist)  Orthopedic Precautions: N/A   Braces: N/A    Do you have any cultural, spiritual, Rastafari conflicts, given your current situation?: none specified    Subjective:  Communicated with nurse prior to session.  Pt stated, "if you were smart you would have wet that towel and brought it to me in the bed. This is a hospital, not a place where you make people walk." Pt agitated throughout session.     Pain/Comfort  Pain Rating 1: 0/10  Pain Rating Post-Intervention 1: 0/10    Objective:    Pt found supine in bed with HOB elevated with condom catheter intact.     Functional Mobility:  Bed Mobility:   Supine to Sit: Moderate Assistance, With side rail  Sit to Supine: Moderate Assistance, With siderail    Transfers:  Sit <> Stand Assistance: Minimum Assistance (x 2 trials, verbal cues for technique)  Sit <> Stand Assistive Device: Rolling Walker    Gait:   Gait Distance: 2 x 12 ft with seated rest break due to c/o fatigue; decreased step length and gait speed, min A at trunk,. intermittent min A at L LE for advancement. Decreased knee flexion and ankle dorsiflexion during L swing (increased tone noted in L LE)  Assistance 1: Minimum assistance  Gait Assistive Device: Rolling walker  Gait Pattern: reciprocal      Balance:   Static Sit: GOOD: Takes MODERATE challenges from all directions   Dynamic Sit: GOOD: Maintains balance through MODERATE excursions of active trunk movement; after standing at sink, pt required seated rest break on BSC at sink. Demonstrated reaching for sink, towel, and washing face in sitting " without UE or trunk support with SBA.   Static Stand: with CGA and bilateral UE support  Dynamic stand: with CGA<>min A due to minor LOB x 1 while reaching for faucet handle, soap, towel and washing face in standing. Required seated rest break after approximately 1 minute.          AM-PAC 6 CLICK MOBILITY  How much help from another person does this patient currently need?   1 = Unable, Total/Dependent Assistance  2 = A lot, Maximum/Moderate Assistance  3 = A little, Minimum/Contact Guard/Supervision  4 = None, Modified [place]/Independent    Turning over in bed (including adjusting bedclothes, sheets and blankets)?: 3  Sitting down on and standing up from a chair with arms (e.g., wheelchair, bedside commode, etc.): 3  Moving from lying on back to sitting on the side of the bed?: 2  Moving to and from a bed to a chair (including a wheelchair)?: 3  Need to walk in hospital room?: 3  Climbing 3-5 steps with a railing?: 1  Total Score: 15    AM-PAC Raw Score CMS G-Code Modifier Level of Impairment Assistance   6 % Total / Unable   7 - 9 CM 80 - 100% Maximal Assist   10 - 14 CL 60 - 80% Moderate Assist   15 - 19 CK 40 - 60% Moderate Assist   20 - 22 CJ 20 - 40% Minimal Assist   23 CI 1-20% SBA / CGA   24 CH 0% Independent/ Mod I     Patient left supine with all lines intact, call button in reach, bed alarm on and nurse notified.    Assessment:  Julius Cardenas is a 84 y.o. male with a medical diagnosis of Hyperglycemia. Pt progressing with mobility, although demonstrated agitation when encouraged to perform OOB mobility including gait 2 trials x 12 ft with rolling walker and standing to wash face at sink.      Rehab identified problem list/impairments: Rehab identified problem list/impairments: weakness, impaired self care skills, impaired functional mobilty, gait instability, impaired balance, decreased ROM, decreased lower extremity function, decreased upper extremity function, abnormal tone, decreased  safety awareness, impaired cognition    Rehab potential is fair.    Activity tolerance: Fair    Discharge recommendations: Discharge Facility/Level Of Care Needs:  (return to SNF)     Barriers to discharge: Barriers to Discharge: Decreased caregiver support, Inaccessible home environment    Equipment recommendations:       GOALS:    Physical Therapy Goals        Problem: Physical Therapy Goal    Goal Priority Disciplines Outcome Goal Variances Interventions   Physical Therapy Goal     PT/OT, PT Ongoing (interventions implemented as appropriate)     Description:  Goals to be met by: 7/10/17     Patient will increase functional independence with mobility by performin. Supine to sit with modified independence.   2. Sit to supine with modified independence.   3. Sit<>stand transfer with supervision using rolling walker.   4. Gait > 30 feet with CGA using rolling walker.   5. Ascend/descend 5 stairs with bilateral handrails with min A.                      PLAN:    Patient to be seen 6 x/week  to address the above listed problems via gait training, therapeutic activities, therapeutic exercises  Plan of Care expires: 17  Plan of Care reviewed with: patient         Kate Claritza, PT  2017

## 2017-06-16 NOTE — DISCHARGE SUMMARY
[facility] Hospital Medicine  Discharge Summary      Patient Name: Julius Cardenas  MRN: [id number]  Admission Date: 6/13/2017  Hospital Length of Stay: 3 days  Discharge Date and Time:  06/16/2017 11:58 AM  Attending Physician: Blair Daniels MD   Discharging Provider: Blair Daniels MD  Primary Care Provider: Chas Pablo MD      HPI:   Mr. Julius Cardenas is a 84 y.o. who presents with frequent admissions for complications of brittle diabetes, including hypo and hyperglycemia, now with hyperglycemia: 's in the Ed. 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     Reports from [facility] conveyed administration of long acting insulin to correct this ~15 units. He was found to have a Cr 3.5 which is elevated in contrast to baseline of 2.4. He reports polyuria but denies any chest pain, fevers, chills, shortness of breath, cough, dysuria, or diarrhea. Recently admitted to [facility] for hyperglycemia/falls/weakness for management of labile blood glucose levels. Endocrine was consulted and adjustments were made to Lev 15, Aspart 3 tid ac + low dose correctionAC/HS to prevent hyperglycemic episodes    * No surgery found *      Indwelling Lines/Drains at time of discharge:   Lines/Drains/Airways          No matching active lines, drains, or airways        Hospital Course:   Hyperglycemia addressed with IV fluids and regular insulin, and readministration of long acting insulin after 12 hours, as initial reports from NH were long-acting had been administered as compensatory measure for hyperglycemia PTA. BG readings were overall labile, and medication compliance as outpatient was  questionable as A1C was elevated to 9.5 as compared to previous reading of 8.9 on 4/17. After hydration and monitoring, the patient will be discharged to his home/NH on Levemir 15 units QAM, and novolog 4 units with meals.     NIALL addressed with IV fluids as suspected cause was intravascular depletion. This did improve with IV fluids back to patient's baseline.      Patient to follow up with PCP for continued management of Diabetes (Type 1), and encouraged compliance with current dosing of home meds.  He is provided referral for PCP as well as endocrinology.      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  Service: Hospital Medicine      Discharged Condition: good    Disposition: Home or Self Care    Follow Up:  Follow-up Information     Chas Pablo MD. Schedule an appointment as soon as possible for a visit in 5 days.    Specialty:  Internal Medicine  Contact information:  [address]  [address]  [facility] [address] [phone]             Viki Arita MD. Schedule an appointment as soon as possible  "for a visit in 1 week.    Specialty:  Endocrinology  Why:  For diabetes management  Contact information:  [address] [place] [address] [address]  [facility]  [facility] [address]  [phone]                 Patient Instructions:     Diet Diabetic 2200 Calories     Activity as tolerated     Call MD for:  increased confusion or weakness     Call MD for:  persistent dizziness, light-headedness, or visual disturbances     Call MD for:  difficulty breathing or increased cough     Call MD for:  persistent nausea and vomiting or diarrhea     Call MD for:  temperature >100.4       Medications:  Reconciled Home Medications:   Current Discharge Medication List      CONTINUE these medications which have CHANGED    Details   ergocalciferol (ERGOCALCIFEROL) 50,000 unit Cap Take 1 capsule (50,000 Units total) by mouth every 7 days. on Tuesday.  Qty: 30 capsule, Refills: 0      insulin aspart (NOVOLOG) 100 unit/mL InPn pen Inject 4 Units into the skin 3 (three) times daily with meals.    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  Qty: 60 tablet, Refills: 0      CONTOUR TEST STRIPS Strp USE AS DIRECTED THREE TIMES DAILY  Qty: 300 strip, Refills: 12    Comments: **Patient requests 90 days supply**  Associated Diagnoses: Diabetes mellitus due to abnormal insulin      insulin detemir (LEVEMIR FLEXTOUCH) 100 unit/mL (3 mL) SubQ InPn pen Inject 15 Units into the skin once daily.  Refills: 0      lancets Misc Use three times daily for finger stick glucose monitoring.  Qty: " 100 each, Refills: 3      levetiracetam (KEPPRA) 1000 MG tablet TAKE 1 TABLET BY MOUTH TWICE DAILY  Qty: 180 tablet, Refills: 6    Associated Diagnoses: Intractable epilepsy without status epilepticus      nifedipine (PROCARDIA-XL) 90 MG (OSM) TR24 Take 1 tablet (90 mg total) by mouth once daily.  Qty: 30 tablet, Refills: 0      nystatin (MYCOSTATIN) cream Apply topically 2 (two) times daily.  Qty: 60 g, Refills: 3      paroxetine (PAXIL) 10 MG tablet Take 1 tablet (10 mg total) by mouth every morning.  Qty: 90 tablet, Refills: 1      blood-glucose meter (CONTOUR METER) kit Use as instructed.  Please substitute appropriate meter to match his strips.  Qty: 1 each, Refills: 0    Associated Diagnoses: DM2 (diabetes mellitus, type 2); Type II or unspecified type diabetes mellitus with renal manifestations, uncontrolled           Time spent on the discharge of patient: < 30 minutes    Blair Daniels MD  Department of Hospital Medicine  [facility]

## 2017-06-16 NOTE — PT/OT/SLP PROGRESS
"Occupational Therapy  Treatment    Julius Cardenas   MRN: [id number]   Admitting Diagnosis: Hyperglycemia    OT Date of Treatment: 06/16/17   OT Start Time: 1059  OT Stop Time: 1120  OT Total Time (min): 21 min    Billable Minutes:  Therapeutic Exercise 21    General Precautions: Standard, anti-coagulation medicine, diabetic, fall (low sodium diet)  Orthopedic Precautions: N/A  Braces: N/A    Do you have any cultural, spiritual, Mandaeism conflicts, given your current situation?: Not reported    Subjective:  Communicated with nsg, PT prior to session.  "I had a stroke"  Pain/Comfort  Pain Rating 1: 0/10  Pain Rating Post-Intervention 1: 0/10    Objective:  Patient found with: bed alarm, peripheral IV (condom catheter)     Functional Mobility:  Bed Mobility:NT       Transfers:   Sit <> Stand Assistance: Activity did not occur    Functional Ambulation: NT    Activities of Daily Living:  Feeding Level of Assistance: Set-up Assistance    Balance:   Static Sit: NT  Dynamic Sit: NT  Static Stand: NT  Dynamic stand: NT    Therapeutic Activities and Exercises:  Performed (L) UE AAROM, shoulder and elbow 1 set x 10 resp, then forearm wrist and hand 2 sets x 10 reps in pain-free range with light stretch at comfortable end points, to promote improved  and strength for feeding grooming and functional transfers.    AM-PAC 6 CLICK ADL   How much help from another person does this patient currently need?   1 = Unable, Total/Dependent Assistance  2 = A lot, Maximum/Moderate Assistance  3 = A little, Minimum/Contact Guard/Supervision  4 = None, Modified [place]/Independent    Putting on and taking off regular lower body clothing? : 2  Bathing (including washing, rinsing, drying)?: 2  Toileting, which includes using toilet, bedpan, or urinal? : 1  Putting on and taking off regular upper body clothing?: 2  Taking care of personal grooming such as brushing teeth?: 3  Eating meals?: 3  Total Score: 13     AM-PAC Raw Score CMS " ""G-Code Modifier Level of Impairment Assistance   6 % Total / Unable   7 - 8 CM 80 - 100% Maximal Assist   9-13 CL 60 - 80% Moderate Assist   14 - 19 CK 40 - 60% Moderate Assist   20 - 22 CJ 20 - 40% Minimal Assist   23 CI 1-20% SBA / CGA   24 CH 0% Independent/ Mod I       Patient left HOB elevated with all lines intact, call button in reach, bed alarm on and nsg notified    ASSESSMENT:  Julius Cardenas is a 84 y.o. male with a medical diagnosis of Hyperglycemia and presents with impaired strength and endurance for ADL tasks and functional transfers and mobility.  Agreeable to (L) UE therex, as "that's wear I had the stroke".  Continue OT services to maximize patient functioning.    Rehab identified problem list/impairments: Rehab identified problem list/impairments: weakness, impaired endurance, impaired self care skills, impaired functional mobilty, gait instability, impaired balance, impaired coordination, impaired fine motor, impaired joint extensibility, decreased ROM, decreased upper extremity function, decreased lower extremity function    Rehab potential is good.    Activity tolerance: Good    Discharge recommendations: Discharge Facility/Level Of Care Needs: other (see comments) (return to [facility])     Barriers to discharge: Barriers to Discharge: Inaccessible home environment, Decreased caregiver support    Equipment recommendations:  (TBD)     GOALS:    Occupational Therapy Goals        Problem: Occupational Therapy Goal    Goal Priority Disciplines Outcome Interventions   Occupational Therapy Goal     OT, PT/OT Ongoing (interventions implemented as appropriate)    Description:  Goals to be met by: 6/21/2017     Patient will increase functional independence with ADLs by performing:    UE Dressing with Minimal Assistance.  LE Dressing with Minimal Assistance.  Grooming while seated with Set-up Assistance.  Toileting from bedside commode with Minimal Assistance for hygiene and clothing " management.   Sitting at edge of bed x8-10 minutes with Supervision.  Supine to sit with Supervision.  Toilet transfer to bedside commode with Minimal Assistance.                      Plan:  Patient to be seen 5 x/week to address the above listed problems via self-care/home management, therapeutic activities, therapeutic exercises  Plan of Care expires: 07/23/17  Plan of Care reviewed with: patient         Sparkle ROSA MARIA Ann  06/16/2017

## 2017-06-17 NOTE — PROGRESS NOTES
Physical Therapy Discharge Summary    Julius Cardenas  MRN: [id number]   Hyperglycemia   Patient Discharged from acute Physical Therapy on 17.  Please refer to prior PT noted date on 17 for functional status.     Assessment:   Patient appropriate for care in another setting.  GOALS:    Physical Therapy Goals        Problem: Physical Therapy Goal    Goal Priority Disciplines Outcome Goal Variances Interventions   Physical Therapy Goal     PT/OT, PT Ongoing (interventions implemented as appropriate)     Description:  Goals to be met by: 7/10/17     Patient will increase functional independence with mobility by performin. Supine to sit with modified independence.   2. Sit to supine with modified independence.   3. Sit<>stand transfer with supervision using rolling walker.   4. Gait > 30 feet with CGA using rolling walker.   5. Ascend/descend 5 stairs with bilateral handrails with min A.                    Reasons for Discontinuation of Therapy Services  Transfer to alternate level of care.      Plan:  Patient Discharged to: Skilled Nursing Facility.

## 2017-06-17 NOTE — PT/OT/SLP DISCHARGE
Occupational Therapy Discharge Summary    Julius Cardenas  MRN: [id number]   Hyperglycemia   Patient Discharged from acute Occupational Therapy on 6/16/17.  Please refer to prior OT note dated on 6/16/17 for functional status.     Assessment:   Patient appropriate for care in another setting.  GOALS:    Occupational Therapy Goals        Problem: Occupational Therapy Goal    Goal Priority Disciplines Outcome Interventions   Occupational Therapy Goal     OT, PT/OT Ongoing (interventions implemented as appropriate)    Description:  Goals to be met by: 6/21/2017     Patient will increase functional independence with ADLs by performing:    UE Dressing with Minimal Assistance.  LE Dressing with Minimal Assistance.  Grooming while seated with Set-up Assistance.  Toileting from bedside commode with Minimal Assistance for hygiene and clothing management.   Sitting at edge of bed x8-10 minutes with Supervision.  Supine to sit with Supervision.  Toilet transfer to bedside commode with Minimal Assistance.                    Reasons for Discontinuation of Therapy Services  Transfer to alternate level of care.      Plan:  Patient Discharged to: Skilled Nursing Facility   ROSA MARIA Mohan 6/17/2017  .

## 2017-06-19 ENCOUNTER — PATIENT OUTREACH (OUTPATIENT)
Dept: ADMINISTRATIVE | Facility: CLINIC | Age: 82
End: 2017-06-19
Payer: MEDICARE

## 2017-07-03 DIAGNOSIS — E11.9 TYPE 2 DIABETES MELLITUS WITHOUT COMPLICATION: ICD-10-CM

## 2017-07-21 ENCOUNTER — LAB VISIT (OUTPATIENT)
Dept: LAB | Facility: HOSPITAL | Age: 82
End: 2017-07-21
Attending: INTERNAL MEDICINE
Payer: MEDICARE

## 2017-07-21 ENCOUNTER — TELEPHONE (OUTPATIENT)
Dept: INTERNAL MEDICINE | Facility: CLINIC | Age: 82
End: 2017-07-21

## 2017-07-21 ENCOUNTER — OUTPATIENT CASE MANAGEMENT (OUTPATIENT)
Dept: ADMINISTRATIVE | Facility: OTHER | Age: 82
End: 2017-07-21

## 2017-07-21 ENCOUNTER — OFFICE VISIT (OUTPATIENT)
Dept: INTERNAL MEDICINE | Facility: CLINIC | Age: 82
End: 2017-07-21
Payer: MEDICARE

## 2017-07-21 VITALS — SYSTOLIC BLOOD PRESSURE: 150 MMHG | OXYGEN SATURATION: 99 % | DIASTOLIC BLOOD PRESSURE: 70 MMHG | HEART RATE: 64 BPM

## 2017-07-21 DIAGNOSIS — I15.0 RENOVASCULAR HYPERTENSION: ICD-10-CM

## 2017-07-21 DIAGNOSIS — E10.22 TYPE 1 DIABETES MELLITUS WITH STAGE 4 CHRONIC KIDNEY DISEASE: Primary | Chronic | ICD-10-CM

## 2017-07-21 DIAGNOSIS — N18.4 TYPE 1 DIABETES MELLITUS WITH STAGE 4 CHRONIC KIDNEY DISEASE: Primary | Chronic | ICD-10-CM

## 2017-07-21 DIAGNOSIS — G40.909 SECONDARY SEIZURE DISORDER: Chronic | ICD-10-CM

## 2017-07-21 DIAGNOSIS — E10.22 TYPE 1 DIABETES MELLITUS WITH STAGE 4 CHRONIC KIDNEY DISEASE: Chronic | ICD-10-CM

## 2017-07-21 DIAGNOSIS — G40.119 PARTIAL SYMPTOMATIC EPILEPSY WITH SIMPLE PARTIAL SEIZURES, INTRACTABLE, WITHOUT STATUS EPILEPTICUS: ICD-10-CM

## 2017-07-21 DIAGNOSIS — N18.4 TYPE 1 DIABETES MELLITUS WITH STAGE 4 CHRONIC KIDNEY DISEASE: Chronic | ICD-10-CM

## 2017-07-21 DIAGNOSIS — G31.84 MCI (MILD COGNITIVE IMPAIRMENT): Chronic | ICD-10-CM

## 2017-07-21 LAB
ALBUMIN SERPL BCP-MCNC: 3.6 G/DL
ALP SERPL-CCNC: 131 U/L
ALT SERPL W/O P-5'-P-CCNC: 25 U/L
ANION GAP SERPL CALC-SCNC: 11 MMOL/L
AST SERPL-CCNC: 27 U/L
BASOPHILS # BLD AUTO: 0.02 K/UL
BASOPHILS NFR BLD: 0.4 %
BILIRUB SERPL-MCNC: 0.4 MG/DL
BUN SERPL-MCNC: 37 MG/DL
CALCIUM SERPL-MCNC: 8.3 MG/DL
CHLORIDE SERPL-SCNC: 113 MMOL/L
CO2 SERPL-SCNC: 19 MMOL/L
CREAT SERPL-MCNC: 3.3 MG/DL
DIFFERENTIAL METHOD: ABNORMAL
EOSINOPHIL # BLD AUTO: 0.3 K/UL
EOSINOPHIL NFR BLD: 4.7 %
ERYTHROCYTE [DISTWIDTH] IN BLOOD BY AUTOMATED COUNT: 13 %
EST. GFR  (AFRICAN AMERICAN): 19 ML/MIN/1.73 M^2
EST. GFR  (NON AFRICAN AMERICAN): 16 ML/MIN/1.73 M^2
ESTIMATED AVG GLUCOSE: 194 MG/DL
GLUCOSE SERPL-MCNC: 317 MG/DL
HBA1C MFR BLD HPLC: 8.4 %
HCT VFR BLD AUTO: 34.7 %
HGB BLD-MCNC: 11 G/DL
LYMPHOCYTES # BLD AUTO: 1.5 K/UL
LYMPHOCYTES NFR BLD: 26.5 %
MCH RBC QN AUTO: 28.4 PG
MCHC RBC AUTO-ENTMCNC: 31.7 G/DL
MCV RBC AUTO: 90 FL
MONOCYTES # BLD AUTO: 0.4 K/UL
MONOCYTES NFR BLD: 6.9 %
NEUTROPHILS # BLD AUTO: 3.5 K/UL
NEUTROPHILS NFR BLD: 60.8 %
PLATELET # BLD AUTO: 126 K/UL
PMV BLD AUTO: 12.3 FL
POTASSIUM SERPL-SCNC: 4.3 MMOL/L
PROT SERPL-MCNC: 7.6 G/DL
RBC # BLD AUTO: 3.87 M/UL
SODIUM SERPL-SCNC: 143 MMOL/L
TSH SERPL DL<=0.005 MIU/L-ACNC: 1.62 UIU/ML
WBC # BLD AUTO: 5.69 K/UL

## 2017-07-21 PROCEDURE — 83036 HEMOGLOBIN GLYCOSYLATED A1C: CPT

## 2017-07-21 PROCEDURE — 84443 ASSAY THYROID STIM HORMONE: CPT

## 2017-07-21 PROCEDURE — 99212 OFFICE O/P EST SF 10 MIN: CPT | Mod: PBBFAC | Performed by: INTERNAL MEDICINE

## 2017-07-21 PROCEDURE — 85025 COMPLETE CBC W/AUTO DIFF WBC: CPT

## 2017-07-21 PROCEDURE — 99214 OFFICE O/P EST MOD 30 MIN: CPT | Mod: S$PBB,,, | Performed by: INTERNAL MEDICINE

## 2017-07-21 PROCEDURE — 80053 COMPREHEN METABOLIC PANEL: CPT

## 2017-07-21 PROCEDURE — 99999 PR PBB SHADOW E&M-EST. PATIENT-LVL II: CPT | Mod: PBBFAC,,, | Performed by: INTERNAL MEDICINE

## 2017-07-21 PROCEDURE — 1159F MED LIST DOCD IN RCRD: CPT | Mod: ,,, | Performed by: INTERNAL MEDICINE

## 2017-07-21 PROCEDURE — 36415 COLL VENOUS BLD VENIPUNCTURE: CPT

## 2017-07-21 RX ORDER — PAROXETINE 10 MG/1
10 TABLET, FILM COATED ORAL EVERY MORNING
Qty: 90 TABLET | Refills: 11 | Status: ON HOLD | OUTPATIENT
Start: 2017-07-21 | End: 2017-10-21 | Stop reason: HOSPADM

## 2017-07-21 RX ORDER — LEVETIRACETAM 1000 MG/1
1000 TABLET ORAL 2 TIMES DAILY
Qty: 180 TABLET | Refills: 6 | Status: ON HOLD | OUTPATIENT
Start: 2017-07-21 | End: 2017-10-21 | Stop reason: HOSPADM

## 2017-07-21 RX ORDER — INSULIN ASPART 100 [IU]/ML
5 INJECTION, SOLUTION INTRAVENOUS; SUBCUTANEOUS
Qty: 15 ML | Refills: 11 | Status: ON HOLD | OUTPATIENT
Start: 2017-07-21 | End: 2017-09-21

## 2017-07-21 RX ORDER — NYSTATIN 100000 U/G
CREAM TOPICAL 2 TIMES DAILY
Qty: 120 G | Refills: 3 | Status: ON HOLD | OUTPATIENT
Start: 2017-07-21 | End: 2017-10-20

## 2017-07-21 RX ORDER — PEN NEEDLE, DIABETIC 30 GX3/16"
1 NEEDLE, DISPOSABLE MISCELLANEOUS 4 TIMES DAILY
Qty: 120 EACH | Refills: 11 | Status: ON HOLD | OUTPATIENT
Start: 2017-07-21 | End: 2017-10-21 | Stop reason: HOSPADM

## 2017-07-21 RX ORDER — ASPIRIN 81 MG/1
81 TABLET ORAL DAILY
Qty: 30 TABLET | Refills: 11 | Status: ON HOLD | OUTPATIENT
Start: 2017-07-21 | End: 2017-10-21 | Stop reason: HOSPADM

## 2017-07-21 RX ORDER — NIFEDIPINE 90 MG/1
90 TABLET, EXTENDED RELEASE ORAL DAILY
Qty: 30 TABLET | Refills: 11 | Status: ON HOLD | OUTPATIENT
Start: 2017-07-21 | End: 2017-10-21 | Stop reason: HOSPADM

## 2017-07-21 RX ORDER — CARVEDILOL 6.25 MG/1
6.25 TABLET ORAL 2 TIMES DAILY
Qty: 60 TABLET | Refills: 6 | Status: ON HOLD | OUTPATIENT
Start: 2017-07-21 | End: 2017-08-14 | Stop reason: HOSPADM

## 2017-07-21 RX ORDER — FLUCONAZOLE 100 MG/1
100 TABLET ORAL DAILY
Qty: 5 TABLET | Refills: 0 | Status: SHIPPED | OUTPATIENT
Start: 2017-07-21 | End: 2017-07-26

## 2017-07-21 NOTE — PROGRESS NOTES
Thank you for the referral.  Patient has been assigned to CORDELIA Cornell  for low risk screening for Outpatient Case Management.     Reason for referral: Low risk    Partial symptomatic epilepsy with simple partial seizures, intractable, without status epilepticus  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    Thank you,      Beth Becerra, SSC

## 2017-07-21 NOTE — TELEPHONE ENCOUNTER
----- Message from Beth Becerra sent at 7/21/2017  4:19 PM CDT -----  Thank you for the referral.  Patient has been assigned to CORDELIA Cornell  for low risk screening for Outpatient Case Management.      Reason for referral: Low risk     Partial symptomatic epilepsy with simple partial seizures, intractable, without status epilepticus  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     Thank you,        Beth Becerra, SSC

## 2017-07-23 NOTE — PROGRESS NOTES
Chief Complaint: follow up diabetes, hypertension, dementia, seizure disorder      HPI: This is a 84 year old man who presents with his brother and sister in law, Miguel Angel and Christine Cardenas for follow up of above. He was in skilled nursing at [facility] for 100 days. He did well there. He ate well. Blood sugars have been better controlled. His sister in  states he is more alert. He is not as ambulatory. He was discharged July 18th.  [facility] this [facility].  Blood sugars have been fine. He is eating well. He is now taking Levemir 18 units daily and Novolog 5 units with meals.  No hypoglycemia since discharge.  HE has a good appetite  No fever, chills, chest pain, shortness of breath, nausea, vomiting, constipation, diarrhea, dysuria, hematuria.  He has an itch in in inguinal area.     He is taking coreg 6.25 mg twice daily and nifedipine 90 mg daily  for his blood pressure.      No rashes or headaches. He has not had any seizures lately. He continues to take Keppra 1000 mg twice daily for seizure prevention.        Mood is good on Paxil 10 mg daily. He is not as irritable.                  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 Sedated in the office    HEENT: Conjunctivae: anicteric,    Neck: supple,    Resp: effort normal, lungs clear bilaterally    CV: Regular rate and rhythm without murmurs, gallops or rubs, no lower extremity edema      Protective Sensation (w/ 10 gram monofilament):  Right: Decreased  Left: Decreased    Visual Inspection:  Dry Skin -  Bilateral and Onychomycosis -  Bilateral    Pedal Pulses:   Right: Diminshed  Left: Diminshed    Posterior tibialis:   Right:Diminshed  Left: Diminshed          Assessment/Plan    1. Diabetes -watch sugars closely due to history of hyper and hypglycemia. Case management referral as he is now home and has high rate of admissions   2. Hypertension - stable    3. Hyperlipidemia - off pravastatin    4. Renal insufficiency -labs today  5. Dementia - affects is ability to care for himself.    6. Irritability - stable on paxil 10 Mg daily    7. I will see him back in 1 months, sooner if problems arise

## 2017-07-24 ENCOUNTER — OUTPATIENT CASE MANAGEMENT (OUTPATIENT)
Dept: ADMINISTRATIVE | Facility: OTHER | Age: 82
End: 2017-07-24

## 2017-07-25 ENCOUNTER — TELEPHONE (OUTPATIENT)
Dept: INTERNAL MEDICINE | Facility: CLINIC | Age: 82
End: 2017-07-25

## 2017-07-25 ENCOUNTER — OUTPATIENT CASE MANAGEMENT (OUTPATIENT)
Dept: ADMINISTRATIVE | Facility: OTHER | Age: 82
End: 2017-07-25

## 2017-07-25 NOTE — TELEPHONE ENCOUNTER
Called pt left voice message for pt to give the office a call back based off of blood work        Please notify Yoan that Zac's blood work is stable. Sugars are doing better

## 2017-07-25 NOTE — TELEPHONE ENCOUNTER
I will be happy to order. Find out what kind of lift. Randi lift? See if you can get the 's phone number and contact him/her

## 2017-07-25 NOTE — PROGRESS NOTES
Attempt #:  1  This CSW attempted to reach patient/caregiver to provide resource and left a message requesting a return call.

## 2017-07-25 NOTE — TELEPHONE ENCOUNTER
----- Message from Skye Lorenzo sent at 7/25/2017 12:36 PM CDT -----  Contact: sister-in-law/flavia/[phone]  Sister-in-law called in regards to getting some equipment (wheel chair and lift) for the pt.         Please advise

## 2017-07-25 NOTE — TELEPHONE ENCOUNTER
Pt sister in law states that the pt  would like a lift and wheelchair ordered to help assist the pt at home. Please advise if this is possible.

## 2017-07-26 ENCOUNTER — TELEPHONE (OUTPATIENT)
Dept: INTERNAL MEDICINE | Facility: CLINIC | Age: 82
End: 2017-07-26

## 2017-07-26 ENCOUNTER — OUTPATIENT CASE MANAGEMENT (OUTPATIENT)
Dept: ADMINISTRATIVE | Facility: OTHER | Age: 82
End: 2017-07-26

## 2017-07-26 DIAGNOSIS — R26.9 GAIT ABNORMALITY: Primary | ICD-10-CM

## 2017-07-26 NOTE — TELEPHONE ENCOUNTER
Pt sister in law doesn't know, she states that this information came from . Sister riley says the pt is dead weight and the agency needs help. She will have that office give us a call. Sister in law would also like a table tray to sit his food food on to feed him

## 2017-07-26 NOTE — TELEPHONE ENCOUNTER
----- Message from CORDELIA Cantu sent at 7/26/2017  3:29 PM CDT -----  This CSW received a referral on the above patient. This CSW provided caregiver with the following resource: RTA Application, Advance Directives.. The resource can be located within [facility] under the transit, legal category.    Dr. Carson Caregiver is asking for a Randi Lift and a Wheel Chair. If you are in agreement with request please place orders. CSW provided caregiver with RTA application . Dr Carson are you willing to complete the professional verification form for RTA. If so please provide your e-mail address. CSW will e-mail you the form for completion.     Thank you for the referral,    CORDELIA Cornell

## 2017-07-26 NOTE — PROGRESS NOTES
"This CSW received a referral on the above patient.   Reason for referral:parial symtomatic  Name of the community resource that was provided:RTA application, Advance Directive,   Resource given to: Patient sister-in-law via [employer] Mail and Telephone    This CSW completed assessment with caregiver/Kamini. Kamini reports she is patient primary caregiver. Caregiver reports patient is connected with Medicaid Community Choice Waiver for service.Caregiver reports patient was going to Adult Day care prior to  last SNF visit . Kamini reports patient spent 100 days in facility. Kamini reports NH placement will not be an option. Caregiver would like to keep patient home long as she can. Caregiver reports agency with Medicaid assist patient with ADL"s. Caregiver is asking for a Randi Lift and a Wheel Chair. CSW sent an in basket message to Dr. Carson to please place orders if she is agreement with request. Caregiver reports her and spouse provide transportation for patient ,however it is becoming difficult. Caregiver report Medicaid is assisting patient with obtaining  a ramp. CSW ask caregiver if she would be interested in filling out a RTA application ,since it is becoming difficult getting patient in and out of car. Caregiver willing to accept RTA application. CSW mailed RTA application to home. CSW sent an in basket message to Dr Carson asking if she would be willing to fill out professional verification for RTA   "

## 2017-07-27 NOTE — TELEPHONE ENCOUNTER
I have ordered alisson lift and wheelchair    Email - [email or web address]    The day program used to provide transportation for him

## 2017-08-11 ENCOUNTER — HOSPITAL ENCOUNTER (INPATIENT)
Facility: HOSPITAL | Age: 82
LOS: 5 days | DRG: 637 | End: 2017-08-16
Attending: EMERGENCY MEDICINE | Admitting: INTERNAL MEDICINE
Payer: MEDICARE

## 2017-08-11 DIAGNOSIS — E11.10 DKA (DIABETIC KETOACIDOSES): ICD-10-CM

## 2017-08-11 DIAGNOSIS — E11.9 DIABETES: ICD-10-CM

## 2017-08-11 DIAGNOSIS — R73.9 HYPERGLYCEMIA: ICD-10-CM

## 2017-08-11 DIAGNOSIS — E10.22 TYPE 1 DIABETES MELLITUS WITH STAGE 4 CHRONIC KIDNEY DISEASE: Chronic | ICD-10-CM

## 2017-08-11 DIAGNOSIS — E44.0 MALNUTRITION OF MODERATE DEGREE: ICD-10-CM

## 2017-08-11 DIAGNOSIS — G40.909 SECONDARY SEIZURE DISORDER: Chronic | ICD-10-CM

## 2017-08-11 DIAGNOSIS — N18.4 TYPE 1 DIABETES MELLITUS WITH STAGE 4 CHRONIC KIDNEY DISEASE: Chronic | ICD-10-CM

## 2017-08-11 DIAGNOSIS — R33.9 URINARY RETENTION: ICD-10-CM

## 2017-08-11 DIAGNOSIS — I15.0 RENOVASCULAR HYPERTENSION: ICD-10-CM

## 2017-08-11 LAB
ALBUMIN SERPL BCP-MCNC: 3.2 G/DL
ALLENS TEST: ABNORMAL
ALLENS TEST: ABNORMAL
ALP SERPL-CCNC: 119 U/L
ALT SERPL W/O P-5'-P-CCNC: 47 U/L
ANION GAP SERPL CALC-SCNC: 11 MMOL/L
AST SERPL-CCNC: 75 U/L
B-OH-BUTYR BLD STRIP-SCNC: 0 MMOL/L
BASOPHILS # BLD AUTO: 0.02 K/UL
BASOPHILS NFR BLD: 0.3 %
BILIRUB SERPL-MCNC: 0.3 MG/DL
BNP SERPL-MCNC: 109 PG/ML
BUN SERPL-MCNC: 42 MG/DL
CALCIUM SERPL-MCNC: 8.1 MG/DL
CHLORIDE SERPL-SCNC: 112 MMOL/L
CO2 SERPL-SCNC: 23 MMOL/L
CREAT SERPL-MCNC: 3.7 MG/DL
DELSYS: ABNORMAL
DIFFERENTIAL METHOD: ABNORMAL
EOSINOPHIL # BLD AUTO: 0.1 K/UL
EOSINOPHIL NFR BLD: 1.9 %
ERYTHROCYTE [DISTWIDTH] IN BLOOD BY AUTOMATED COUNT: 14 %
EST. GFR  (AFRICAN AMERICAN): 16.4 ML/MIN/1.73 M^2
EST. GFR  (NON AFRICAN AMERICAN): 14.1 ML/MIN/1.73 M^2
FIO2: 21
GLUCOSE SERPL-MCNC: 466 MG/DL
HCO3 UR-SCNC: 21.8 MMOL/L (ref 24–28)
HCT VFR BLD AUTO: 33.3 %
HGB BLD-MCNC: 11.5 G/DL
LDH SERPL L TO P-CCNC: 2.33 MMOL/L (ref 0.5–2.2)
LYMPHOCYTES # BLD AUTO: 1.4 K/UL
LYMPHOCYTES NFR BLD: 22 %
MCH RBC QN AUTO: 29.4 PG
MCHC RBC AUTO-ENTMCNC: 34.5 G/DL
MCV RBC AUTO: 85 FL
MODE: ABNORMAL
MONOCYTES # BLD AUTO: 0.4 K/UL
MONOCYTES NFR BLD: 5.8 %
NEUTROPHILS # BLD AUTO: 4.4 K/UL
NEUTROPHILS NFR BLD: 70 %
PCO2 BLDA: 41.6 MMHG (ref 35–45)
PH SMN: 7.33 [PH] (ref 7.35–7.45)
PLATELET # BLD AUTO: 210 K/UL
PMV BLD AUTO: 12.8 FL
PO2 BLDA: 82 MMHG (ref 80–100)
POC BE: -4 MMOL/L
POC SATURATED O2: 95 % (ref 95–100)
POC TCO2: 23 MMOL/L (ref 23–27)
POCT GLUCOSE: 384 MG/DL (ref 70–110)
POCT GLUCOSE: 436 MG/DL (ref 70–110)
POTASSIUM SERPL-SCNC: 3.5 MMOL/L
PROT SERPL-MCNC: 7.4 G/DL
RBC # BLD AUTO: 3.91 M/UL
SAMPLE: ABNORMAL
SAMPLE: ABNORMAL
SITE: ABNORMAL
SITE: ABNORMAL
SODIUM SERPL-SCNC: 146 MMOL/L
SP02: 100
TROPONIN I SERPL DL<=0.01 NG/ML-MCNC: 0.03 NG/ML
WBC # BLD AUTO: 6.37 K/UL

## 2017-08-11 PROCEDURE — 99283 EMERGENCY DEPT VISIT LOW MDM: CPT | Mod: ,,, | Performed by: EMERGENCY MEDICINE

## 2017-08-11 PROCEDURE — 99285 EMERGENCY DEPT VISIT HI MDM: CPT | Mod: 25

## 2017-08-11 PROCEDURE — 84484 ASSAY OF TROPONIN QUANT: CPT

## 2017-08-11 PROCEDURE — 12000002 HC ACUTE/MED SURGE SEMI-PRIVATE ROOM

## 2017-08-11 PROCEDURE — 63600175 PHARM REV CODE 636 W HCPCS: Performed by: STUDENT IN AN ORGANIZED HEALTH CARE EDUCATION/TRAINING PROGRAM

## 2017-08-11 PROCEDURE — 99900035 HC TECH TIME PER 15 MIN (STAT)

## 2017-08-11 PROCEDURE — 93010 ELECTROCARDIOGRAM REPORT: CPT | Mod: ,,, | Performed by: INTERNAL MEDICINE

## 2017-08-11 PROCEDURE — 96372 THER/PROPH/DIAG INJ SC/IM: CPT

## 2017-08-11 PROCEDURE — 99223 1ST HOSP IP/OBS HIGH 75: CPT | Mod: AI,GC,, | Performed by: INTERNAL MEDICINE

## 2017-08-11 PROCEDURE — 36600 WITHDRAWAL OF ARTERIAL BLOOD: CPT

## 2017-08-11 PROCEDURE — 84295 ASSAY OF SERUM SODIUM: CPT

## 2017-08-11 PROCEDURE — 25000003 PHARM REV CODE 250: Performed by: STUDENT IN AN ORGANIZED HEALTH CARE EDUCATION/TRAINING PROGRAM

## 2017-08-11 PROCEDURE — 82803 BLOOD GASES ANY COMBINATION: CPT

## 2017-08-11 PROCEDURE — 96361 HYDRATE IV INFUSION ADD-ON: CPT

## 2017-08-11 PROCEDURE — 96374 THER/PROPH/DIAG INJ IV PUSH: CPT

## 2017-08-11 PROCEDURE — 83605 ASSAY OF LACTIC ACID: CPT

## 2017-08-11 PROCEDURE — 84132 ASSAY OF SERUM POTASSIUM: CPT

## 2017-08-11 PROCEDURE — 93005 ELECTROCARDIOGRAM TRACING: CPT

## 2017-08-11 PROCEDURE — 85014 HEMATOCRIT: CPT

## 2017-08-11 PROCEDURE — 82962 GLUCOSE BLOOD TEST: CPT

## 2017-08-11 PROCEDURE — 83930 ASSAY OF BLOOD OSMOLALITY: CPT

## 2017-08-11 PROCEDURE — 82010 KETONE BODYS QUAN: CPT

## 2017-08-11 PROCEDURE — 80053 COMPREHEN METABOLIC PANEL: CPT

## 2017-08-11 PROCEDURE — 83880 ASSAY OF NATRIURETIC PEPTIDE: CPT

## 2017-08-11 PROCEDURE — 85025 COMPLETE CBC W/AUTO DIFF WBC: CPT

## 2017-08-11 RX ORDER — ONDANSETRON 8 MG/1
8 TABLET, ORALLY DISINTEGRATING ORAL EVERY 8 HOURS PRN
Status: DISCONTINUED | OUTPATIENT
Start: 2017-08-11 | End: 2017-08-16 | Stop reason: HOSPADM

## 2017-08-11 RX ORDER — HYDRALAZINE HYDROCHLORIDE 25 MG/1
25 TABLET, FILM COATED ORAL EVERY 8 HOURS
Status: DISCONTINUED | OUTPATIENT
Start: 2017-08-12 | End: 2017-08-12

## 2017-08-11 RX ORDER — LEVETIRACETAM 500 MG/1
1000 TABLET ORAL 2 TIMES DAILY
Status: DISCONTINUED | OUTPATIENT
Start: 2017-08-12 | End: 2017-08-16 | Stop reason: HOSPADM

## 2017-08-11 RX ORDER — IBUPROFEN 200 MG
24 TABLET ORAL
Status: DISCONTINUED | OUTPATIENT
Start: 2017-08-11 | End: 2017-08-16 | Stop reason: HOSPADM

## 2017-08-11 RX ORDER — INSULIN ASPART 100 [IU]/ML
0-5 INJECTION, SOLUTION INTRAVENOUS; SUBCUTANEOUS
Status: DISCONTINUED | OUTPATIENT
Start: 2017-08-11 | End: 2017-08-12

## 2017-08-11 RX ORDER — ONDANSETRON 2 MG/ML
4 INJECTION INTRAMUSCULAR; INTRAVENOUS EVERY 12 HOURS PRN
Status: DISCONTINUED | OUTPATIENT
Start: 2017-08-11 | End: 2017-08-16 | Stop reason: HOSPADM

## 2017-08-11 RX ORDER — IBUPROFEN 200 MG
16 TABLET ORAL
Status: DISCONTINUED | OUTPATIENT
Start: 2017-08-11 | End: 2017-08-16 | Stop reason: HOSPADM

## 2017-08-11 RX ORDER — GLUCAGON 1 MG
1 KIT INJECTION
Status: DISCONTINUED | OUTPATIENT
Start: 2017-08-11 | End: 2017-08-16 | Stop reason: HOSPADM

## 2017-08-11 RX ORDER — HEPARIN SODIUM 5000 [USP'U]/ML
5000 INJECTION, SOLUTION INTRAVENOUS; SUBCUTANEOUS EVERY 8 HOURS
Status: DISCONTINUED | OUTPATIENT
Start: 2017-08-12 | End: 2017-08-16 | Stop reason: HOSPADM

## 2017-08-11 RX ORDER — PAROXETINE 10 MG/1
10 TABLET, FILM COATED ORAL EVERY MORNING
Status: DISCONTINUED | OUTPATIENT
Start: 2017-08-12 | End: 2017-08-16 | Stop reason: HOSPADM

## 2017-08-11 RX ORDER — NIFEDIPINE 30 MG/1
90 TABLET, EXTENDED RELEASE ORAL
Status: COMPLETED | OUTPATIENT
Start: 2017-08-11 | End: 2017-08-11

## 2017-08-11 RX ORDER — ASPIRIN 81 MG/1
81 TABLET ORAL DAILY
Status: DISCONTINUED | OUTPATIENT
Start: 2017-08-12 | End: 2017-08-16 | Stop reason: HOSPADM

## 2017-08-11 RX ORDER — CARVEDILOL 3.12 MG/1
6.25 TABLET ORAL ONCE
Status: COMPLETED | OUTPATIENT
Start: 2017-08-11 | End: 2017-08-11

## 2017-08-11 RX ORDER — HYDRALAZINE HYDROCHLORIDE 20 MG/ML
5 INJECTION INTRAMUSCULAR; INTRAVENOUS ONCE
Status: COMPLETED | OUTPATIENT
Start: 2017-08-12 | End: 2017-08-12

## 2017-08-11 RX ORDER — CARVEDILOL 6.25 MG/1
6.25 TABLET ORAL 2 TIMES DAILY
Status: DISCONTINUED | OUTPATIENT
Start: 2017-08-12 | End: 2017-08-14

## 2017-08-11 RX ORDER — ACETAMINOPHEN 325 MG/1
650 TABLET ORAL EVERY 8 HOURS PRN
Status: DISCONTINUED | OUTPATIENT
Start: 2017-08-11 | End: 2017-08-15

## 2017-08-11 RX ORDER — SODIUM CHLORIDE AND POTASSIUM CHLORIDE 150; 900 MG/100ML; MG/100ML
INJECTION, SOLUTION INTRAVENOUS CONTINUOUS
Status: DISCONTINUED | OUTPATIENT
Start: 2017-08-12 | End: 2017-08-12

## 2017-08-11 RX ORDER — IPRATROPIUM BROMIDE AND ALBUTEROL SULFATE 2.5; .5 MG/3ML; MG/3ML
3 SOLUTION RESPIRATORY (INHALATION) EVERY 4 HOURS
Status: DISCONTINUED | OUTPATIENT
Start: 2017-08-11 | End: 2017-08-12

## 2017-08-11 RX ADMIN — CARVEDILOL 6.25 MG: 3.12 TABLET, FILM COATED ORAL at 09:08

## 2017-08-11 RX ADMIN — INSULIN HUMAN 10 UNITS: 100 INJECTION, SOLUTION PARENTERAL at 09:08

## 2017-08-11 RX ADMIN — NIFEDIPINE 90 MG: 30 TABLET, FILM COATED, EXTENDED RELEASE ORAL at 10:08

## 2017-08-11 RX ADMIN — SODIUM CHLORIDE 1000 ML: 0.9 INJECTION, SOLUTION INTRAVENOUS at 06:08

## 2017-08-11 NOTE — ED TRIAGE NOTES
"Pt is a poor historian; states he was at "work" when his body "locked up" on him.  See triage note.  "

## 2017-08-12 LAB
ANION GAP SERPL CALC-SCNC: 11 MMOL/L
ANION GAP SERPL CALC-SCNC: 12 MMOL/L
ANISOCYTOSIS BLD QL SMEAR: ABNORMAL
BACTERIA #/AREA URNS AUTO: NORMAL /HPF
BASOPHILS # BLD AUTO: 0.03 K/UL
BASOPHILS NFR BLD: 0.4 %
BILIRUB UR QL STRIP: NEGATIVE
BUN SERPL-MCNC: 37 MG/DL
BUN SERPL-MCNC: 38 MG/DL
BUN SERPL-MCNC: 40 MG/DL
BUN SERPL-MCNC: 40 MG/DL
BURR CELLS BLD QL SMEAR: ABNORMAL
C PEPTIDE SERPL-MCNC: 0.36 NG/ML
CALCIUM SERPL-MCNC: 8.2 MG/DL
CHLORIDE SERPL-SCNC: 116 MMOL/L
CHLORIDE SERPL-SCNC: 117 MMOL/L
CLARITY UR REFRACT.AUTO: CLEAR
CO2 SERPL-SCNC: 18 MMOL/L
CO2 SERPL-SCNC: 18 MMOL/L
CO2 SERPL-SCNC: 20 MMOL/L
CO2 SERPL-SCNC: 20 MMOL/L
COLOR UR AUTO: YELLOW
CREAT SERPL-MCNC: 3.1 MG/DL
CREAT SERPL-MCNC: 3.2 MG/DL
CREAT SERPL-MCNC: 3.3 MG/DL
CREAT SERPL-MCNC: 3.3 MG/DL
DIFFERENTIAL METHOD: ABNORMAL
EOSINOPHIL # BLD AUTO: 0.2 K/UL
EOSINOPHIL NFR BLD: 2.9 %
ERYTHROCYTE [DISTWIDTH] IN BLOOD BY AUTOMATED COUNT: 13.8 %
EST. GFR  (AFRICAN AMERICAN): 18.8 ML/MIN/1.73 M^2
EST. GFR  (AFRICAN AMERICAN): 18.8 ML/MIN/1.73 M^2
EST. GFR  (AFRICAN AMERICAN): 19.5 ML/MIN/1.73 M^2
EST. GFR  (AFRICAN AMERICAN): 20.3 ML/MIN/1.73 M^2
EST. GFR  (NON AFRICAN AMERICAN): 16.2 ML/MIN/1.73 M^2
EST. GFR  (NON AFRICAN AMERICAN): 16.2 ML/MIN/1.73 M^2
EST. GFR  (NON AFRICAN AMERICAN): 16.9 ML/MIN/1.73 M^2
EST. GFR  (NON AFRICAN AMERICAN): 17.5 ML/MIN/1.73 M^2
GLUCOSE SERPL-MCNC: 193 MG/DL
GLUCOSE SERPL-MCNC: 193 MG/DL
GLUCOSE SERPL-MCNC: 249 MG/DL
GLUCOSE SERPL-MCNC: 98 MG/DL
GLUCOSE UR QL STRIP: ABNORMAL
HCT VFR BLD AUTO: 34.1 %
HGB BLD-MCNC: 11.7 G/DL
HGB UR QL STRIP: ABNORMAL
HYALINE CASTS UR QL AUTO: 0 /LPF
KETONES UR QL STRIP: NEGATIVE
LACTATE SERPL-SCNC: 1.4 MMOL/L
LEUKOCYTE ESTERASE UR QL STRIP: NEGATIVE
LYMPHOCYTES # BLD AUTO: 2.3 K/UL
LYMPHOCYTES NFR BLD: 33.5 %
MAGNESIUM SERPL-MCNC: 1.6 MG/DL
MCH RBC QN AUTO: 30.7 PG
MCHC RBC AUTO-ENTMCNC: 34.3 G/DL
MCV RBC AUTO: 90 FL
MICROSCOPIC COMMENT: NORMAL
MONOCYTES # BLD AUTO: 0.5 K/UL
MONOCYTES NFR BLD: 7.2 %
NEUTROPHILS # BLD AUTO: 3.8 K/UL
NEUTROPHILS NFR BLD: 56 %
NITRITE UR QL STRIP: NEGATIVE
OVALOCYTES BLD QL SMEAR: ABNORMAL
PH UR STRIP: 5 [PH] (ref 5–8)
PHOSPHATE SERPL-MCNC: 3.4 MG/DL
PLATELET # BLD AUTO: 122 K/UL
PLATELET BLD QL SMEAR: ABNORMAL
PMV BLD AUTO: 12.9 FL
POCT GLUCOSE: 100 MG/DL (ref 70–110)
POCT GLUCOSE: 160 MG/DL (ref 70–110)
POCT GLUCOSE: 203 MG/DL (ref 70–110)
POCT GLUCOSE: 342 MG/DL (ref 70–110)
POCT GLUCOSE: 98 MG/DL (ref 70–110)
POIKILOCYTOSIS BLD QL SMEAR: SLIGHT
POLYCHROMASIA BLD QL SMEAR: ABNORMAL
POTASSIUM SERPL-SCNC: 3 MMOL/L
POTASSIUM SERPL-SCNC: 3.5 MMOL/L
POTASSIUM SERPL-SCNC: 3.6 MMOL/L
PROT UR QL STRIP: ABNORMAL
RBC # BLD AUTO: 3.81 M/UL
RBC #/AREA URNS AUTO: 1 /HPF (ref 0–4)
SCHISTOCYTES BLD QL SMEAR: PRESENT
SODIUM SERPL-SCNC: 145 MMOL/L
SODIUM SERPL-SCNC: 147 MMOL/L
SP GR UR STRIP: 1.01 (ref 1–1.03)
SQUAMOUS #/AREA URNS AUTO: 1 /HPF
TARGETS BLD QL SMEAR: ABNORMAL
TROPONIN I SERPL DL<=0.01 NG/ML-MCNC: 0.04 NG/ML
TROPONIN I SERPL DL<=0.01 NG/ML-MCNC: 0.04 NG/ML
URN SPEC COLLECT METH UR: ABNORMAL
UROBILINOGEN UR STRIP-ACNC: NEGATIVE EU/DL
WBC # BLD AUTO: 6.96 K/UL
WBC #/AREA URNS AUTO: 2 /HPF (ref 0–5)

## 2017-08-12 PROCEDURE — 94640 AIRWAY INHALATION TREATMENT: CPT

## 2017-08-12 PROCEDURE — 25000003 PHARM REV CODE 250: Performed by: STUDENT IN AN ORGANIZED HEALTH CARE EDUCATION/TRAINING PROGRAM

## 2017-08-12 PROCEDURE — 84100 ASSAY OF PHOSPHORUS: CPT

## 2017-08-12 PROCEDURE — 36415 COLL VENOUS BLD VENIPUNCTURE: CPT

## 2017-08-12 PROCEDURE — 25000242 PHARM REV CODE 250 ALT 637 W/ HCPCS: Performed by: STUDENT IN AN ORGANIZED HEALTH CARE EDUCATION/TRAINING PROGRAM

## 2017-08-12 PROCEDURE — 63600175 PHARM REV CODE 636 W HCPCS: Performed by: STUDENT IN AN ORGANIZED HEALTH CARE EDUCATION/TRAINING PROGRAM

## 2017-08-12 PROCEDURE — 84484 ASSAY OF TROPONIN QUANT: CPT

## 2017-08-12 PROCEDURE — 80048 BASIC METABOLIC PNL TOTAL CA: CPT | Mod: 91

## 2017-08-12 PROCEDURE — 84681 ASSAY OF C-PEPTIDE: CPT

## 2017-08-12 PROCEDURE — 85025 COMPLETE CBC W/AUTO DIFF WBC: CPT

## 2017-08-12 PROCEDURE — 20600001 HC STEP DOWN PRIVATE ROOM

## 2017-08-12 PROCEDURE — 83605 ASSAY OF LACTIC ACID: CPT

## 2017-08-12 PROCEDURE — 93010 ELECTROCARDIOGRAM REPORT: CPT | Mod: ,,, | Performed by: INTERNAL MEDICINE

## 2017-08-12 PROCEDURE — 81001 URINALYSIS AUTO W/SCOPE: CPT

## 2017-08-12 PROCEDURE — 83735 ASSAY OF MAGNESIUM: CPT

## 2017-08-12 RX ORDER — INSULIN ASPART 100 [IU]/ML
3 INJECTION, SOLUTION INTRAVENOUS; SUBCUTANEOUS
Status: DISCONTINUED | OUTPATIENT
Start: 2017-08-12 | End: 2017-08-14

## 2017-08-12 RX ORDER — SODIUM CHLORIDE 9 MG/ML
INJECTION, SOLUTION INTRAVENOUS CONTINUOUS
Status: DISCONTINUED | OUTPATIENT
Start: 2017-08-12 | End: 2017-08-12

## 2017-08-12 RX ORDER — INSULIN ASPART 100 [IU]/ML
5-10 INJECTION, SOLUTION INTRAVENOUS; SUBCUTANEOUS
Status: DISCONTINUED | OUTPATIENT
Start: 2017-08-12 | End: 2017-08-16 | Stop reason: HOSPADM

## 2017-08-12 RX ORDER — MAGNESIUM SULFATE HEPTAHYDRATE 40 MG/ML
2 INJECTION, SOLUTION INTRAVENOUS ONCE
Status: COMPLETED | OUTPATIENT
Start: 2017-08-12 | End: 2017-08-12

## 2017-08-12 RX ORDER — SODIUM CHLORIDE, SODIUM LACTATE, POTASSIUM CHLORIDE, CALCIUM CHLORIDE 600; 310; 30; 20 MG/100ML; MG/100ML; MG/100ML; MG/100ML
INJECTION, SOLUTION INTRAVENOUS CONTINUOUS
Status: ACTIVE | OUTPATIENT
Start: 2017-08-12 | End: 2017-08-12

## 2017-08-12 RX ORDER — POTASSIUM CHLORIDE 20 MEQ/1
20 TABLET, EXTENDED RELEASE ORAL ONCE
Status: COMPLETED | OUTPATIENT
Start: 2017-08-12 | End: 2017-08-12

## 2017-08-12 RX ORDER — HYDRALAZINE HYDROCHLORIDE 20 MG/ML
5 INJECTION INTRAMUSCULAR; INTRAVENOUS ONCE
Status: DISCONTINUED | OUTPATIENT
Start: 2017-08-12 | End: 2017-08-12

## 2017-08-12 RX ORDER — IPRATROPIUM BROMIDE AND ALBUTEROL SULFATE 2.5; .5 MG/3ML; MG/3ML
3 SOLUTION RESPIRATORY (INHALATION) EVERY 4 HOURS PRN
Status: DISCONTINUED | OUTPATIENT
Start: 2017-08-12 | End: 2017-08-16 | Stop reason: HOSPADM

## 2017-08-12 RX ADMIN — INSULIN ASPART 3 UNITS: 100 INJECTION, SOLUTION INTRAVENOUS; SUBCUTANEOUS at 11:08

## 2017-08-12 RX ADMIN — INSULIN ASPART 3 UNITS: 100 INJECTION, SOLUTION INTRAVENOUS; SUBCUTANEOUS at 04:08

## 2017-08-12 RX ADMIN — MAGNESIUM SULFATE IN WATER 2 G: 40 INJECTION, SOLUTION INTRAVENOUS at 09:08

## 2017-08-12 RX ADMIN — LEVETIRACETAM 1000 MG: 500 TABLET, FILM COATED ORAL at 09:08

## 2017-08-12 RX ADMIN — CARVEDILOL 6.25 MG: 6.25 TABLET, FILM COATED ORAL at 09:08

## 2017-08-12 RX ADMIN — POTASSIUM CHLORIDE 20 MEQ: 1500 TABLET, EXTENDED RELEASE ORAL at 05:08

## 2017-08-12 RX ADMIN — HEPARIN SODIUM 5000 UNITS: 5000 INJECTION, SOLUTION INTRAVENOUS; SUBCUTANEOUS at 09:08

## 2017-08-12 RX ADMIN — HYDRALAZINE HYDROCHLORIDE 25 MG: 25 TABLET, FILM COATED ORAL at 05:08

## 2017-08-12 RX ADMIN — IPRATROPIUM BROMIDE AND ALBUTEROL SULFATE 3 ML: .5; 3 SOLUTION RESPIRATORY (INHALATION) at 08:08

## 2017-08-12 RX ADMIN — INSULIN DETEMIR 12 UNITS: 100 INJECTION, SOLUTION SUBCUTANEOUS at 04:08

## 2017-08-12 RX ADMIN — POTASSIUM BICARBONATE 50 MEQ: 25 TABLET, EFFERVESCENT ORAL at 09:08

## 2017-08-12 RX ADMIN — HEPARIN SODIUM 5000 UNITS: 5000 INJECTION, SOLUTION INTRAVENOUS; SUBCUTANEOUS at 05:08

## 2017-08-12 RX ADMIN — HYDRALAZINE HYDROCHLORIDE 25 MG: 25 TABLET, FILM COATED ORAL at 12:08

## 2017-08-12 RX ADMIN — INSULIN ASPART 3 UNITS: 100 INJECTION, SOLUTION INTRAVENOUS; SUBCUTANEOUS at 08:08

## 2017-08-12 RX ADMIN — INSULIN DETEMIR 12 UNITS: 100 INJECTION, SOLUTION SUBCUTANEOUS at 09:08

## 2017-08-12 RX ADMIN — HYDRALAZINE HYDROCHLORIDE 5 MG: 20 INJECTION INTRAMUSCULAR; INTRAVENOUS at 12:08

## 2017-08-12 RX ADMIN — SODIUM CHLORIDE, SODIUM LACTATE, POTASSIUM CHLORIDE, AND CALCIUM CHLORIDE: .6; .31; .03; .02 INJECTION, SOLUTION INTRAVENOUS at 10:08

## 2017-08-12 RX ADMIN — PAROXETINE HYDROCHLORIDE 10 MG: 10 TABLET, FILM COATED ORAL at 09:08

## 2017-08-12 RX ADMIN — HEPARIN SODIUM 5000 UNITS: 5000 INJECTION, SOLUTION INTRAVENOUS; SUBCUTANEOUS at 02:08

## 2017-08-12 RX ADMIN — ASPIRIN 81 MG: 81 TABLET, COATED ORAL at 09:08

## 2017-08-12 RX ADMIN — SODIUM CHLORIDE: 0.9 INJECTION, SOLUTION INTRAVENOUS at 05:08

## 2017-08-12 RX ADMIN — NIFEDIPINE 90 MG: 30 TABLET, FILM COATED, EXTENDED RELEASE ORAL at 09:08

## 2017-08-12 RX ADMIN — INSULIN ASPART 4 UNITS: 100 INJECTION, SOLUTION INTRAVENOUS; SUBCUTANEOUS at 11:08

## 2017-08-12 RX ADMIN — IPRATROPIUM BROMIDE AND ALBUTEROL SULFATE 3 ML: .5; 3 SOLUTION RESPIRATORY (INHALATION) at 04:08

## 2017-08-12 RX ADMIN — INSULIN DETEMIR 10 UNITS: 100 INJECTION, SOLUTION SUBCUTANEOUS at 12:08

## 2017-08-12 NOTE — PROGRESS NOTES
"Pt arrived to [facility] via stretch c pt transport. Pt assisted with moving from stretcher to bed by pt transport this RN, the pct, and another RN. Pt bed placed in lowest locked postion, non skid socks on, urinal at bedside, bed alarm set, fall risk band applied, call light next to pt. Pt is oriented to self and place, but not time or situation. VS and assessment complete, tele box requested. No c/o pain at this time. Hand hygiene performed during pt care. Pt asked about last time he had a BM, pt stated "about a week or two ago".Pt does not have any questions at this time. Pt reminded to call for assistance, verbalized understanding. Will continue to monitor.   "

## 2017-08-12 NOTE — HPI
Mr. 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 He is a very poor historian and had no documentation with him.   He had changes in mental status with confusion and agitation today at the [facility] - they attempted to measure his blood sugar and it was too high to be able to measure accurately.   In the Ed, patient had BG unable to measure too due to how high it was and his BP was extremely elevated also at 235/107mmHg.  He was agitated and refusing treatment. He admitted to being confused and feeling weak and admitted to chills and SOB. He denied any seizures and states that he is compliant with all medications, including his insulin.

## 2017-08-12 NOTE — H&P
"[facility] Hospital Medicine  History & Physical    Patient Name: Julius Cardenas  MRN: [id number]  Admission Date: 8/11/2017  Attending Physician: Sheldon Bradley MD   Primary Care Provider: Mady Carson MD    [facility] Medicine Team: [facility] MED 3 Marycarmen Daniels MD     Patient information was obtained from patient and ER records.     Subjective:     Principal Problem:Hyperglycemia    Chief Complaint:   Chief Complaint   Patient presents with    Hyperglycemia     Adult  reports episode of disorientation. CBG reads "high" HX. dementia, CVA.        HPI:  84 year old with HTN, type 1 DM, CKD-4, seizure disorder on Keppra. CVA. He came in from Adult  center with AMS. He is very poor historian. I didn't see any documents with him.   He had change in mental status with confusion and agitation today. His BS was measured and it was so high it couldn't be measured in  and in ED. His BP was very high up to 235/107 in ED. He was agitated and refusing treatment. He has frequent admissions to the ED with uncontrolled DM, with both hyper and hypoglycemia.  He admits to be confused and feeling weak earlier today. He states " I lost strength and that doesn't come back quickly", " I am sick about the job", " I am in a bad shape", I am scared to be locked up", " I see demons in the corners, I don't talk to them, the don't know me".   He is feeling cold and SOB. He can't walk? Didn't try to walk. He denies any seizures.   He says he is taking his medications every day including his insulin. He denies having HTN or seizure disorder.  He says he has 7 or 8 children and he is not . He used to smoke for 5-6 years. Drinks alcohol occasionally. Denies drug abuse.     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       Past Surgical History:   Procedure Laterality Date    CATARACT EXTRACTION W/  INTRAOCULAR LENS IMPLANT  8/26/2009, 10/14/2009    CRANIOTOMY  1995    CRANIOTOMY  12/21/2010    EYE SURGERY         Review of patient's allergies indicates:  No Known Allergies    No current facility-administered medications on file prior to encounter.      Current Outpatient Prescriptions on File Prior to Encounter   Medication Sig    aspirin (ECOTRIN) 81 MG EC tablet Take 1 tablet (81 mg total) by mouth once daily.    blood-glucose meter (CONTOUR METER) kit Use as instructed.  Please substitute appropriate meter to match his strips.    carvedilol (COREG) 6.25 MG tablet Take 1 tablet (6.25 mg total) by  "mouth 2 (two) times daily.    CONTOUR TEST STRIPS Strp USE AS DIRECTED THREE TIMES DAILY    insulin aspart (NOVOLOG) 100 unit/mL InPn pen Inject 5 Units into the skin 3 (three) times daily with meals.    insulin detemir (LEVEMIR FLEXTOUCH) 100 unit/mL (3 mL) SubQ InPn pen Inject 18 Units into the skin once daily.    lancets Misc Use three times daily for finger stick glucose monitoring.    levetiracetam (KEPPRA) 1000 MG tablet Take 1 tablet (1,000 mg total) by mouth 2 (two) times daily.    nifedipine (PROCARDIA-XL) 90 MG (OSM) 24 hr tablet Take 1 tablet (90 mg total) by mouth once daily.    nystatin (MYCOSTATIN) cream Apply topically 2 (two) times daily.    paroxetine (PAXIL) 10 MG tablet Take 1 tablet (10 mg total) by mouth every morning.    pen needle, diabetic (BD INSULIN PEN NEEDLE UF SHORT) 31 gauge x 5/16" Ndle Inject 1 Device into the skin 4 (four) times daily.     Family History     Problem Relation (Age of Onset)    Cataracts Mother    Diabetes Mother, Sister, Sister, Brother, Sister, Brother    No Known Problems Father, Son, Daughter, Maternal Grandmother, Maternal Grandfather, Paternal Grandmother, Paternal Grandfather, Maternal Aunt, Maternal Uncle, Paternal Aunt, Paternal Uncle        Social History Main Topics    Smoking status: Former Smoker     Packs/day: 1.00     Types: Cigarettes     Quit date: 12/31/1994    Smokeless tobacco: Former User     Quit date: 4/3/2010    Alcohol use No    Drug use: No    Sexual activity: Not on file     Review of Systems   Unable to perform ROS: Dementia     Objective:     Vital Signs (Most Recent):  Temp: 97.9 °F (36.6 °C) (08/11/17 1542)  Pulse: 68 (08/11/17 2224)  Resp: 16 (08/11/17 1542)  BP: (!) 218/107 (08/11/17 2224)  SpO2: 100 % (08/11/17 2224) Vital Signs (24h Range):  Temp:  [97.9 °F (36.6 °C)] 97.9 °F (36.6 °C)  Pulse:  [66-72] 68  Resp:  [16] 16  SpO2:  [100 %] 100 %  BP: (182-235)/() 218/107     Weight: 72.6 kg (160 lb)  Body mass index " is 21.11 kg/m².    Physical Exam   Constitutional:   Patient is calm, cachectic. Hair and facial hair poorly groomed.    HENT:   He has a linear horizantal surgical scar in his forehead.   He has no teeth.   Eyes: Pupils are equal, round, and reactive to light. No scleral icterus.   Bilateral arcus senalis   Neck: Neck supple. No JVD present.   Cardiovascular: Normal rate, regular rhythm and intact distal pulses.    Murmur (faint diastolic murmer in aortic area) heard.  Pulmonary/Chest: Effort normal. No respiratory distress. He has wheezes.   Audible wheezing.   Right upper lobe coarse inspiratory crackles. Solid on percussion.       Abdominal: Bowel sounds are normal. He exhibits no distension and no mass. There is no tenderness. There is guarding.   Musculoskeletal: He exhibits no edema.   Left hand swelling.   Bilateral arm rigidity more in the left.      Neurological: He is alert.   He knows what day it is but not the time.   He think Jeffry is the president.   He knows he is in a hospital but doesn't know the name.    Skin: Skin is dry. No rash noted. There is pallor.   Cold extremities.   Multiple small wounds in LE.    Psychiatric: He has a normal mood and affect.   Vitals reviewed.       Significant Labs:   BMP:   Recent Labs  Lab 08/11/17  1836   *   *   K 3.5   *   CO2 23   BUN 42*   CREATININE 3.7*   CALCIUM 8.1*     CBC:   Recent Labs  Lab 08/11/17  1836   WBC 6.37   HGB 11.5*   HCT 33.3*          Significant Imaging: CXR: I have reviewed all pertinent results/findings within the past 24 hours and my personal findings are:  no acute changes    Assessment/Plan:     * Hyperglycemia    When he came in to ED had had AMS and his BS was so high not able to measure.   He is not compliant to medications.   Received regular insulin at Ed, last BS was ~450.           Renovascular hypertension    Poorly controlled Bp.  His BP in ED was  235/107.   - he was started on nifedipine and  "carvedilol in ED.   - will continue his home BP meds.           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    He has IDDM since he was 18 year old.   Not compliant to insulin use even though he says he is taking it daily.   He has repeated admissions related to poorly controlled DM.   Will put him on ISS.           Secondary seizure disorder    - He had meningioma-related seizure disorder.   - Meningioma was resected.  - he is on Keppra, will continue his home dose.             Malnutrition of moderate degree    He has poor dental hygiene, and lhas muscle wasting.              VTE Risk Mitigation         Ordered     heparin (porcine) injection 5,000 Units  Every 8 hours     Route:  Subcutaneous        08/11/17 2259     Place SRINIVAS hose  Until discontinued      08/11/17 2259     Place sequential compression device  Until discontinued      08/11/17 2259     Medium Risk of VTE  Once      08/11/17 2259             Marycarmen Daniels MD  Department of Hospital Medicine   [facility]    I HAVE PERSONALLY TAKEN THE HISTORY, EXAMINED THIS PATIENT,  AND AGREE WITH THE RESIDENT'S NOTE AS STATED ABOVE WITH THE FOLLOWING EXCEPTIONS:  Patient seen and examined with the intern at 11:40pm on 8/11/17    Mr. Cardenas is a 83yo man with a past medical history of DM1, HTN, CKD IV, and seizure disorder on Keppra.  He was sent from an adult  center due to confusion and a glucose measure of "unmeasurable."    Assessment and plan:  Diabetes mellitus 1 with hyperosmolar hyperglycemia  -Multiple Endocrine notes over last 2 years state "type 1 DM" though prior to this, all reported "type 2 DM"   -I'm not sure if he is truly type 1 or 2 at this point (has been on insulin since 17yo...)      -Check C-peptide  -BHB is negative, so no evidence of ketoacidosis  -High risk of DKA, however, so will start IV insulin infusion until controlled  -Compliance does seem to be an issue by chart review  -Check U/A and " cultures to rule out infection    Metabolic encephalopathy  -Suspect some degree of Alzheimers dementia (he is in adult , but likely acutely confused too)    Hypernatremia due to dehydration  -Got 1L NS in ed  -Start 1/2NS at 125cc/hr and check BMP with Mg and Phos q4 hours while on insulin infusion    Malignant renovascular hypertension  -Continue Coreg and Nifedipine  -He remains very elevated currently, so add Hydralazine 25mg po Q8 hours, first now  -Hydralazine 5mg iv x 1 now    Lactic acidosis  -Recheck now after fluids (got 1L NS only in ED)    Acute kidney injury  Anemia of CKD IV  -Likely due to dehydration currently, though accelerated HTN is likely also contributing  -WILLY kidney US in am to evaluate for WILLY    Elevated troponin I measurement  -Serial Trop q6 x 2 more  -ASA 81mg po qday, first now  -2D echo with cfd in am if continues to trend up  -EKG unchanged and no chest pain complaints    Seizure disorder due to past meningioma  -Resected  -Continue Keppra at home dose    Hemiparesis affecting left side as late effect of stroke  MCI (mild cognitive impairment)  -Stable. Has good family support.  -Adult     Temp:  [97.9 °F (36.6 °C)]   Pulse:  [66-72]   Resp:  [16]   BP: (182-235)/()   SpO2:  [100 %]      11-AUG-2017 18:50:00 EKG   Sinus rhythm with 1st degree A-V block, rate 68  Moderate voltage criteria for LVH, may be normal variant  Nonspecific T wave abnormality inferior and high anterior  Abnormal ECG  When compared with ECG of 13-JUN-2017 01:57,  No significant change was found    Recent Results (from the past 24 hour(s))   CBC auto differential    Collection Time: 08/11/17  6:36 PM   Result Value Ref Range    WBC 6.37 3.90 - 12.70 K/uL    RBC 3.91 (L) 4.60 - 6.20 M/uL    Hemoglobin 11.5 (L) 14.0 - 18.0 g/dL    Hematocrit 33.3 (L) 40.0 - 54.0 %    MCV 85 82 - 98 fL    MCH 29.4 27.0 - 31.0 pg    MCHC 34.5 32.0 - 36.0 g/dL    RDW 14.0 11.5 - 14.5 %    Platelets 210 150 - 350  K/uL    MPV 12.8 9.2 - 12.9 fL    Gran # 4.4 1.8 - 7.7 K/uL    Lymph # 1.4 1.0 - 4.8 K/uL    Mono # 0.4 0.3 - 1.0 K/uL    Eos # 0.1 0.0 - 0.5 K/uL    Baso # 0.02 0.00 - 0.20 K/uL    Gran% 70.0 38.0 - 73.0 %    Lymph% 22.0 18.0 - 48.0 %    Mono% 5.8 4.0 - 15.0 %    Eosinophil% 1.9 0.0 - 8.0 %    Basophil% 0.3 0.0 - 1.9 %    Differential Method Automated    Comprehensive metabolic panel    Collection Time: 08/11/17  6:36 PM   Result Value Ref Range    Sodium 146 (H) 136 - 145 mmol/L    Potassium 3.5 3.5 - 5.1 mmol/L    Chloride 112 (H) 95 - 110 mmol/L    CO2 23 23 - 29 mmol/L    Glucose 466 (HH) 70 - 110 mg/dL    BUN, Bld 42 (H) 8 - 23 mg/dL    Creatinine 3.7 (H) 0.5 - 1.4 mg/dL    Calcium 8.1 (L) 8.7 - 10.5 mg/dL    Total Protein 7.4 6.0 - 8.4 g/dL    Albumin 3.2 (L) 3.5 - 5.2 g/dL    Total Bilirubin 0.3 0.1 - 1.0 mg/dL    Alkaline Phosphatase 119 55 - 135 U/L    AST 75 (H) 10 - 40 U/L    ALT 47 (H) 10 - 44 U/L    Anion Gap 11 8 - 16 mmol/L    eGFR if African American 16.4 (A) >60 mL/min/1.73 m^2    eGFR if non  14.1 (A) >60 mL/min/1.73 m^2   Beta - Hydroxybutyrate, Serum    Collection Time: 08/11/17  6:36 PM   Result Value Ref Range    Beta-Hydroxybutyrate 0.0 0.0 - 0.5 mmol/L   Troponin I    Collection Time: 08/11/17  6:36 PM   Result Value Ref Range    Troponin I 0.031 (H) 0.000 - 0.026 ng/mL   Brain natriuretic peptide    Collection Time: 08/11/17  6:36 PM   Result Value Ref Range     (H) 0 - 99 pg/mL   Osmolality    Collection Time: 08/11/17  6:36 PM   Result Value Ref Range    Osmolality 339 (HH) 280 - 300 mOsm/kg   ISTAT Lactate    Collection Time: 08/11/17  6:44 PM   Result Value Ref Range    POC Lactate 2.33 (H) 0.5 - 2.2 mmol/L    Sample VENOUS     Site Other     Allens Test N/A    ISTAT PROCEDURE    Collection Time: 08/11/17  8:10 PM   Result Value Ref Range    POC PH 7.327 (L) 7.35 - 7.45    POC PCO2 41.6 35 - 45 mmHg    POC PO2 82 80 - 100 mmHg    POC HCO3 21.8 (L) 24 - 28 mmol/L     POC BE -4 -2 to 2 mmol/L    POC SATURATED O2 95 95 - 100 %    POC TCO2 23 23 - 27 mmol/L    Sample ARTERIAL     Site LR     Allens Test Pass     DelSys Room Air     Mode SPONT     FiO2 21     Sp02 100    POCT glucose    Collection Time: 08/11/17  9:17 PM   Result Value Ref Range    POCT Glucose 436 (H) 70 - 110 mg/dL   POCT glucose    Collection Time: 08/11/17 10:01 PM   Result Value Ref Range    POCT Glucose 384 (H) 70 - 110 mg/dL   Results for SANDIP MARCANO (MRN [id number]) as of 8/11/2017 23:42   Ref. Range 6/14/2017 04:26 6/15/2017 03:33 7/21/2017 11:55 8/11/2017 18:36   Creatinine Latest Ref Range: 0.5 - 1.4 mg/dL 2.5 (H) 2.7 (H) 3.3 (H) 3.7 (H)   eGFR if non African American Latest Ref Range: >60 mL/min/1.73 m^2 23 (A) 21 (A) 16 (A) 14.1 (A)   eGFR if African American Latest Ref Range: >60 mL/min/1.73 m^2 26 (A) 24 (A) 19 (A) 16.4 (A)     X-Ray Chest AP Portable 08/11/2017 DKA          RESULTS:  COMPARISON: 03/24/2017.     CLINICAL INFORMATION: Diabetic ketoacidosis.     FINDINGS: AP portable view of the chest was obtained.  The cardiac silhouette is not enlarged. No air space disease. No pleural effusion or pneumothorax. No acute bony abnormality is identified.  IMPRESSION:         No acute cardiopulmonary abnormality or significant change when compared with 03/24/2017.        Electronically signed by:           Adriel Hollis  Date:                                                                                    08/11/17  Time:                                                                                   19:25           Signed By:  Adriel Hollis MD on 8/11/2017  7:25 PM

## 2017-08-12 NOTE — ED PROVIDER NOTES
"Encounter Date: 8/11/2017    SCRIBE #1 NOTE: I, Ludy Gallego, am scribing for, and in the presence of,  Dr. Ramirez. I have scribed the following portions of the note - the Resident attestation.       History     Chief Complaint   Patient presents with    Hyperglycemia     Adult  reports episode of disorientation. CBG reads "high" HX. dementia, CVA.     HPI   Pt is a 85 YO M with history of Dm, HTN, CKD4 who presents with AMS and hyperglycemia. Pt is a poor historian, history taken from chart and nursing notes. Pt was disoriented at his adult day care program and brought into the Ed. Pt was found to have an unreadable blood glucose at intake. Known to be non compliant with medications. Denies chest pain, headache, problems with urination and bowel movements at this time.    Review of patient's allergies indicates:  No Known Allergies  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     Past " Surgical History:   Procedure Laterality Date    CATARACT EXTRACTION W/  INTRAOCULAR LENS IMPLANT  8/26/2009, 10/14/2009    CRANIOTOMY  1995    CRANIOTOMY  12/21/2010    EYE SURGERY       Family History   Problem Relation Age of Onset    Diabetes Mother     Cataracts Mother     No Known Problems Father     Diabetes Sister     No Known Problems Son     No Known Problems Daughter     No Known Problems Maternal Grandmother     No Known Problems Maternal Grandfather     No Known Problems Paternal Grandmother     No Known Problems Paternal Grandfather     Diabetes Sister     Diabetes Brother     No Known Problems Maternal Aunt     No Known Problems Maternal Uncle     No Known Problems Paternal Aunt     No Known Problems Paternal Uncle     Diabetes Sister     Diabetes Brother     Amblyopia Neg Hx     Blindness Neg Hx     Cancer Neg Hx     Glaucoma Neg Hx     Hypertension Neg Hx     Macular degeneration Neg Hx     Retinal detachment Neg Hx     Strabismus Neg Hx     Stroke Neg Hx     Thyroid disease Neg Hx      Social History   Substance Use Topics    Smoking status: Former Smoker     Packs/day: 1.00     Types: Cigarettes     Quit date: 12/31/1994    Smokeless tobacco: Former User     Quit date: 4/3/2010    Alcohol use No     Review of Systems   Unable to perform ROS: Mental status change   Respiratory: Negative for shortness of breath.    Cardiovascular: Negative for chest pain.   Gastrointestinal: Negative for abdominal distention, diarrhea, nausea and vomiting.   Genitourinary: Negative for dysuria.       Physical Exam     Initial Vitals [08/11/17 1542]   BP Pulse Resp Temp SpO2   (!) 182/86 72 16 97.9 °F (36.6 °C) 100 %      MAP       118         Physical Exam    Nursing note and vitals reviewed.  Constitutional: He is not diaphoretic. No distress.   HENT:   Head: Normocephalic and atraumatic.   Eyes: EOM are normal. Pupils are equal, round, and reactive to light.   Neck: Normal range  "of motion.   Cardiovascular: Normal rate, regular rhythm and normal heart sounds.   Pulmonary/Chest: Breath sounds normal. No respiratory distress.   Abdominal: Soft. He exhibits no distension. There is no tenderness.   Neurological: He is alert. He is disoriented.         ED Course   Procedures  Labs Reviewed   CBC W/ AUTO DIFFERENTIAL - Abnormal; Notable for the following:        Result Value    RBC 3.91 (*)     Hemoglobin 11.5 (*)     Hematocrit 33.3 (*)     All other components within normal limits   ISTAT LACTATE - Abnormal; Notable for the following:     POC Lactate 2.33 (*)     All other components within normal limits   BETA - HYDROXYBUTYRATE, SERUM   OSMOLALITY   COMPREHENSIVE METABOLIC PANEL   URINALYSIS   TROPONIN I   B-TYPE NATRIURETIC PEPTIDE   POCT GLUCOSE MONITORING CONTINUOUS        Resident MDM PGY-1  Pt presents with hyperglycemia. Charted non compliance in the past. Altered during the interview and non responsive to questioning when asked direct questions. Physical exam significant for Lungs CTA Differentials include DKA, HHNK, Hyperglycemia, sepsis  Plan CBC,CMP, POCT Glucose, ISTAT lactate, electrolytes, VBG, serum ketones, Ua, bnp, TROP, CXR, 1L Ns, osm, EKG  Khushbu Jordan MD MPH  [employer] Emergency Medicine PGY-1  9:29 PM 8/11/2017    Reevaluation PGY-1 10:01 PM 8/11/2017 - Repeat glucose 466, no anion gap (11), lactate 2.33, no ketones present in serum, osm 339. Will begin 10U subq insulin, and home BP meds 230s/100s. Patient refused insulin for reason that "I cant have insulin because insulin makes me stupid at night" Patient has no insight into medical condition . Poses a risk to self. Will PEC and admit to hospital medicine for further medical management of Blood sugar and hypertension    Reevaluation PGY-1 10:22 PM 8/11/2017 -Nursing reports that patient has decided to take his medications with no PEC                    Scribe Attestation:   Scribe #1: I performed the above scribed service " and the documentation accurately describes the services I performed. I attest to the accuracy of the note.    Attending Attestation:   Physician Attestation Statement for Resident:  As the supervising MD   Physician Attestation Statement: I have personally seen and examined this patient.   I agree with the above history. -: Hyperglycemia.   As the supervising MD I agree with the above PE.    As the supervising MD I agree with the above treatment, course, plan, and disposition.          Physician Attestation for Scribe:  Physician Attestation Statement for Scribe #1: I, Dr. Ramirez, reviewed documentation, as scribed by Ludy Gallego in my presence, and it is both accurate and complete.                 ED Course     Clinical Impression:   Diagnoses of Hyperglycemia and DKA (diabetic ketoacidoses) were pertinent to this visit.                           Khushbu Jordan MD  Resident  08/11/17 [id number]

## 2017-08-12 NOTE — PROGRESS NOTES
Tele called, stated they have a box but no lead wires, will bring up box once wires are available.

## 2017-08-12 NOTE — ASSESSMENT & PLAN NOTE
When he came in to ED had had AMS and his BS was so high not able to measure.   He is not compliant to medications.   Received regular insulin at Ed, last BS was ~450.

## 2017-08-12 NOTE — ED NOTES
4ps addressed, no s/s of distress, stable. Stretcher to ultrasound via transport. Informed transport of pt bed/number, pt to be transported to 8th floor after US

## 2017-08-12 NOTE — PROGRESS NOTES
No measured or unmeasured urine output noted thus far this shift. Bladder scan performed >568 noted in bladder. Patient denying discomfort. IM3 team paged. Will wait for further orders.

## 2017-08-12 NOTE — PROGRESS NOTES
Lab called, stated pt's lactic acid that was sent off while in the ED was too hemolyzed to process. Will do a recollect.

## 2017-08-12 NOTE — PLAN OF CARE
Problem: Patient Care Overview  Goal: Plan of Care Review  Outcome: Ongoing (interventions implemented as appropriate)  Confusion continued throughout shift -only oriented to self. Bed alarm on and audible. Fall precautions maintained. Patient repositioned as needed throughout shift. IVFs changed to LR at 100 ml/hr. Patient adequate food and fluid intake. CBG monitored before meals -ranging 160-342. Pre meal and SSI given as ordered. Bladder scan performed >568 cc noted. PRN straight cath orders placed by Dr. Rodriges. Potassium and magnesium replacements given for K of 3.0 and Mg of 1.6. Bed locked and low. Side rails up x's 3. Call bell and personal belongings in close reach.

## 2017-08-12 NOTE — ED NOTES
8th floor notified of patient status /94, and blood sugar. CALDERON Agarwal notified of vitals and pt to be transported up soon

## 2017-08-12 NOTE — ED NOTES
Charge nurse sushma Wylie MD in regards to pt blood pressure 190/80 (too high) and accucheck results 203, verifying need for insulin drip.

## 2017-08-12 NOTE — ASSESSMENT & PLAN NOTE
Poorly controlled Bp.  His BP in ED was  235/107.   - he was started on nifedipine and carvedilol in ED.   - will continue his home BP meds.

## 2017-08-12 NOTE — SUBJECTIVE & OBJECTIVE
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       Past Surgical History:   Procedure Laterality Date    CATARACT EXTRACTION W/  INTRAOCULAR LENS IMPLANT  8/26/2009, 10/14/2009    CRANIOTOMY  1995    CRANIOTOMY  12/21/2010    EYE SURGERY         Review of patient's allergies indicates:  No Known Allergies    No current facility-administered medications on file prior to encounter.      Current Outpatient Prescriptions on File Prior to Encounter   Medication Sig    aspirin (ECOTRIN) 81 MG EC tablet Take 1 tablet (81 mg total) by mouth once daily.    blood-glucose meter (CONTOUR METER) kit Use as instructed.  Please substitute appropriate meter to match his strips.    carvedilol (COREG) 6.25 MG tablet Take 1 tablet (6.25 mg total) by mouth 2 (two) times daily.    CONTOUR TEST STRIPS Strp USE AS DIRECTED THREE TIMES DAILY    insulin aspart (NOVOLOG) 100 unit/mL InPn pen Inject 5 Units into  "the skin 3 (three) times daily with meals.    insulin detemir (LEVEMIR FLEXTOUCH) 100 unit/mL (3 mL) SubQ InPn pen Inject 18 Units into the skin once daily.    lancets Misc Use three times daily for finger stick glucose monitoring.    levetiracetam (KEPPRA) 1000 MG tablet Take 1 tablet (1,000 mg total) by mouth 2 (two) times daily.    nifedipine (PROCARDIA-XL) 90 MG (OSM) 24 hr tablet Take 1 tablet (90 mg total) by mouth once daily.    nystatin (MYCOSTATIN) cream Apply topically 2 (two) times daily.    paroxetine (PAXIL) 10 MG tablet Take 1 tablet (10 mg total) by mouth every morning.    pen needle, diabetic (BD INSULIN PEN NEEDLE UF SHORT) 31 gauge x 5/16" Ndle Inject 1 Device into the skin 4 (four) times daily.     Family History     Problem Relation (Age of Onset)    Cataracts Mother    Diabetes Mother, Sister, Sister, Brother, Sister, Brother    No Known Problems Father, Son, Daughter, Maternal Grandmother, Maternal Grandfather, Paternal Grandmother, Paternal Grandfather, Maternal Aunt, Maternal Uncle, Paternal Aunt, Paternal Uncle        Social History Main Topics    Smoking status: Former Smoker     Packs/day: 1.00     Types: Cigarettes     Quit date: 12/31/1994    Smokeless tobacco: Former User     Quit date: 4/3/2010    Alcohol use No    Drug use: No    Sexual activity: Not on file     Review of Systems   Unable to perform ROS: Dementia     Objective:     Vital Signs (Most Recent):  Temp: 97.9 °F (36.6 °C) (08/11/17 1542)  Pulse: 68 (08/11/17 2224)  Resp: 16 (08/11/17 1542)  BP: (!) 218/107 (08/11/17 2224)  SpO2: 100 % (08/11/17 2224) Vital Signs (24h Range):  Temp:  [97.9 °F (36.6 °C)] 97.9 °F (36.6 °C)  Pulse:  [66-72] 68  Resp:  [16] 16  SpO2:  [100 %] 100 %  BP: (182-235)/() 218/107     Weight: 72.6 kg (160 lb)  Body mass index is 21.11 kg/m².    Physical Exam   Constitutional:   Patient is calm, cachectic. Hair and facial hair poorly groomed.    HENT:   He has a linear horizantal " surgical scar in his forehead.   He has no teeth.   Eyes: Pupils are equal, round, and reactive to light. No scleral icterus.   Bilateral arcus senalis   Neck: Neck supple. No JVD present.   Cardiovascular: Normal rate, regular rhythm and intact distal pulses.    Murmur (faint diastolic murmer in aortic area) heard.  Pulmonary/Chest: Effort normal. No respiratory distress. He has wheezes.   Audible wheezing.   Right upper lobe coarse inspiratory crackles. Solid on percussion.       Abdominal: Bowel sounds are normal. He exhibits no distension and no mass. There is no tenderness. There is guarding.   Musculoskeletal: He exhibits no edema.   Left hand swelling.   Bilateral arm rigidity more in the left.      Neurological: He is alert.   He knows what day it is but not the time.   He think Jeffry is the president.   He knows he is in a hospital but doesn't know the name.    Skin: Skin is dry. No rash noted. There is pallor.   Cold extremities.   Multiple small wounds in LE.    Psychiatric: He has a normal mood and affect.   Vitals reviewed.       Significant Labs:   BMP:   Recent Labs  Lab 08/11/17  1836   *   *   K 3.5   *   CO2 23   BUN 42*   CREATININE 3.7*   CALCIUM 8.1*     CBC:   Recent Labs  Lab 08/11/17  1836   WBC 6.37   HGB 11.5*   HCT 33.3*          Significant Imaging: CXR: I have reviewed all pertinent results/findings within the past 24 hours and my personal findings are:  no acute changes

## 2017-08-12 NOTE — ED NOTES
LOC: The patient is awake, alert, and oriented to place and situation, but disoriented to time. Affect is appropriated.  Speech is moderately garbled.  Pt is edentulous.     APPEARANCE: Patient resting comfortably in no acute distress.  Patient is clean and well groomed.    SKIN: The skin is warm and dry; color consistent with ethnicity.  Patient has normal skin turgor and moist mucus membranes.  Skin intact; no breakdown or bruising noted.     MUSCULOSKELETAL: Patient right upper and lower extremities without difficulty but has some residual left sided weakness related to previous stroke.     RESPIRATORY: Airway is open and patent. Lungs clear to auscultation in all lobes. Respirations spontaneous, even, easy, and non-labored.  Patient has a normal effort and rate.  No accessory muscle use noted. Denies cough.     CARDIAC:  Normal rhythm and rate noted.  Pt is hypertensive.  No peripheral edema noted.  No complaints of chest pain.      ABDOMEN: Soft and non tender to palpation.  No distention noted.     NEUROLOGIC: PERRLA;  eyes open spontaneously.  Behavior appropriate to situation.  Follows commands; facial expression symmetrical; right hand grasps greater than left.  Purposeful motor response noted; normal sensation in all extremities.

## 2017-08-12 NOTE — PROGRESS NOTES
Straight cath performed. 800 cc of clear, yellow urine drained from bladder. Urinalysis collected per orders. Patient verbalized relief. Will continue to monitor.

## 2017-08-12 NOTE — PROGRESS NOTES
Spoke to Dr. Rodriges, w/ [facility], who stated to straight cath patient as this time due to bladder scan showing >568 cc. Will comply.

## 2017-08-12 NOTE — ASSESSMENT & PLAN NOTE
- He had meningioma-related seizure disorder.   - Meningioma was resected.  - he is on Keppra, will continue his home dose.

## 2017-08-12 NOTE — ASSESSMENT & PLAN NOTE
He has IDDM since he was 18 year old.   Not compliant to insulin use even though he says he is taking it daily.   He has repeated admissions related to poorly controlled DM.   Will put him on ISS.

## 2017-08-13 PROBLEM — R33.9 URINARY RETENTION: Status: ACTIVE | Noted: 2017-08-13

## 2017-08-13 LAB
ANION GAP SERPL CALC-SCNC: 10 MMOL/L
ANION GAP SERPL CALC-SCNC: 10 MMOL/L
ANION GAP SERPL CALC-SCNC: 12 MMOL/L
ANION GAP SERPL CALC-SCNC: 13 MMOL/L
ANION GAP SERPL CALC-SCNC: 9 MMOL/L
BACTERIA #/AREA URNS AUTO: ABNORMAL /HPF
BASOPHILS # BLD AUTO: 0.02 K/UL
BASOPHILS NFR BLD: 0.4 %
BILIRUB UR QL STRIP: NEGATIVE
BUN SERPL-MCNC: 36 MG/DL
BUN SERPL-MCNC: 37 MG/DL
BUN SERPL-MCNC: 37 MG/DL
BUN SERPL-MCNC: 38 MG/DL
CALCIUM SERPL-MCNC: 8.2 MG/DL
CALCIUM SERPL-MCNC: 8.2 MG/DL
CALCIUM SERPL-MCNC: 8.3 MG/DL
CALCIUM SERPL-MCNC: 8.5 MG/DL
CALCIUM SERPL-MCNC: 8.8 MG/DL
CHLORIDE SERPL-SCNC: 114 MMOL/L
CHLORIDE SERPL-SCNC: 114 MMOL/L
CHLORIDE SERPL-SCNC: 116 MMOL/L
CHLORIDE SERPL-SCNC: 117 MMOL/L
CHLORIDE SERPL-SCNC: 117 MMOL/L
CHLORIDE SERPL-SCNC: 119 MMOL/L
CHLORIDE SERPL-SCNC: 119 MMOL/L
CLARITY UR REFRACT.AUTO: CLEAR
CO2 SERPL-SCNC: 15 MMOL/L
CO2 SERPL-SCNC: 15 MMOL/L
CO2 SERPL-SCNC: 16 MMOL/L
CO2 SERPL-SCNC: 16 MMOL/L
CO2 SERPL-SCNC: 17 MMOL/L
CO2 SERPL-SCNC: 19 MMOL/L
CO2 SERPL-SCNC: 20 MMOL/L
COLOR UR AUTO: YELLOW
CREAT SERPL-MCNC: 2.8 MG/DL
CREAT SERPL-MCNC: 2.9 MG/DL
CREAT SERPL-MCNC: 3.1 MG/DL
CREAT SERPL-MCNC: 3.1 MG/DL
CREAT SERPL-MCNC: 3.2 MG/DL
DIFFERENTIAL METHOD: ABNORMAL
EOSINOPHIL # BLD AUTO: 0.2 K/UL
EOSINOPHIL NFR BLD: 3.7 %
ERYTHROCYTE [DISTWIDTH] IN BLOOD BY AUTOMATED COUNT: 13.5 %
EST. GFR  (AFRICAN AMERICAN): 19.5 ML/MIN/1.73 M^2
EST. GFR  (AFRICAN AMERICAN): 20.3 ML/MIN/1.73 M^2
EST. GFR  (AFRICAN AMERICAN): 20.3 ML/MIN/1.73 M^2
EST. GFR  (AFRICAN AMERICAN): 22 ML/MIN/1.73 M^2
EST. GFR  (AFRICAN AMERICAN): 22.9 ML/MIN/1.73 M^2
EST. GFR  (NON AFRICAN AMERICAN): 16.9 ML/MIN/1.73 M^2
EST. GFR  (NON AFRICAN AMERICAN): 17.5 ML/MIN/1.73 M^2
EST. GFR  (NON AFRICAN AMERICAN): 17.5 ML/MIN/1.73 M^2
EST. GFR  (NON AFRICAN AMERICAN): 19 ML/MIN/1.73 M^2
EST. GFR  (NON AFRICAN AMERICAN): 19.8 ML/MIN/1.73 M^2
GLUCOSE SERPL-MCNC: 129 MG/DL
GLUCOSE SERPL-MCNC: 230 MG/DL
GLUCOSE SERPL-MCNC: 252 MG/DL
GLUCOSE SERPL-MCNC: 41 MG/DL
GLUCOSE SERPL-MCNC: 52 MG/DL
GLUCOSE SERPL-MCNC: 62 MG/DL
GLUCOSE SERPL-MCNC: 95 MG/DL
GLUCOSE UR QL STRIP: ABNORMAL
HCT VFR BLD AUTO: 34.2 %
HGB BLD-MCNC: 11.5 G/DL
HGB UR QL STRIP: ABNORMAL
HYALINE CASTS UR QL AUTO: 1 /LPF
KETONES UR QL STRIP: NEGATIVE
LEUKOCYTE ESTERASE UR QL STRIP: NEGATIVE
LYMPHOCYTES # BLD AUTO: 1.4 K/UL
LYMPHOCYTES NFR BLD: 27.9 %
MCH RBC QN AUTO: 29.6 PG
MCHC RBC AUTO-ENTMCNC: 33.6 G/DL
MCV RBC AUTO: 88 FL
MICROSCOPIC COMMENT: ABNORMAL
MONOCYTES # BLD AUTO: 0.4 K/UL
MONOCYTES NFR BLD: 7.2 %
NEUTROPHILS # BLD AUTO: 3.1 K/UL
NEUTROPHILS NFR BLD: 60.8 %
NITRITE UR QL STRIP: NEGATIVE
PH UR STRIP: 5 [PH] (ref 5–8)
PLATELET # BLD AUTO: 162 K/UL
PMV BLD AUTO: 11.5 FL
POCT GLUCOSE: 118 MG/DL (ref 70–110)
POCT GLUCOSE: 148 MG/DL (ref 70–110)
POCT GLUCOSE: 148 MG/DL (ref 70–110)
POCT GLUCOSE: 229 MG/DL (ref 70–110)
POCT GLUCOSE: 260 MG/DL (ref 70–110)
POCT GLUCOSE: 34 MG/DL (ref 70–110)
POCT GLUCOSE: 40 MG/DL (ref 70–110)
POCT GLUCOSE: 47 MG/DL (ref 70–110)
POTASSIUM SERPL-SCNC: 3.4 MMOL/L
POTASSIUM SERPL-SCNC: 3.6 MMOL/L
POTASSIUM SERPL-SCNC: 4.1 MMOL/L
POTASSIUM SERPL-SCNC: 4.1 MMOL/L
POTASSIUM SERPL-SCNC: 4.2 MMOL/L
POTASSIUM SERPL-SCNC: 4.2 MMOL/L
POTASSIUM SERPL-SCNC: 5.1 MMOL/L
PROT UR QL STRIP: ABNORMAL
RBC # BLD AUTO: 3.89 M/UL
RBC #/AREA URNS AUTO: 83 /HPF (ref 0–4)
SODIUM SERPL-SCNC: 141 MMOL/L
SODIUM SERPL-SCNC: 143 MMOL/L
SODIUM SERPL-SCNC: 144 MMOL/L
SODIUM SERPL-SCNC: 146 MMOL/L
SP GR UR STRIP: 1.01 (ref 1–1.03)
SQUAMOUS #/AREA URNS AUTO: 0 /HPF
URN SPEC COLLECT METH UR: ABNORMAL
UROBILINOGEN UR STRIP-ACNC: NEGATIVE EU/DL
WBC # BLD AUTO: 5.12 K/UL
WBC #/AREA URNS AUTO: 3 /HPF (ref 0–5)

## 2017-08-13 PROCEDURE — 81001 URINALYSIS AUTO W/SCOPE: CPT

## 2017-08-13 PROCEDURE — 36415 COLL VENOUS BLD VENIPUNCTURE: CPT

## 2017-08-13 PROCEDURE — 85025 COMPLETE CBC W/AUTO DIFF WBC: CPT

## 2017-08-13 PROCEDURE — 25000003 PHARM REV CODE 250: Performed by: STUDENT IN AN ORGANIZED HEALTH CARE EDUCATION/TRAINING PROGRAM

## 2017-08-13 PROCEDURE — 63600175 PHARM REV CODE 636 W HCPCS: Performed by: STUDENT IN AN ORGANIZED HEALTH CARE EDUCATION/TRAINING PROGRAM

## 2017-08-13 PROCEDURE — 80048 BASIC METABOLIC PNL TOTAL CA: CPT | Mod: 91

## 2017-08-13 PROCEDURE — 97161 PT EVAL LOW COMPLEX 20 MIN: CPT

## 2017-08-13 PROCEDURE — 20600001 HC STEP DOWN PRIVATE ROOM

## 2017-08-13 PROCEDURE — 99232 SBSQ HOSP IP/OBS MODERATE 35: CPT | Mod: GC,,, | Performed by: HOSPITALIST

## 2017-08-13 RX ADMIN — INSULIN ASPART 3 UNITS: 100 INJECTION, SOLUTION INTRAVENOUS; SUBCUTANEOUS at 04:08

## 2017-08-13 RX ADMIN — ASPIRIN 81 MG: 81 TABLET, COATED ORAL at 09:08

## 2017-08-13 RX ADMIN — LEVETIRACETAM 1000 MG: 500 TABLET, FILM COATED ORAL at 09:08

## 2017-08-13 RX ADMIN — INSULIN ASPART 1 UNITS: 100 INJECTION, SOLUTION INTRAVENOUS; SUBCUTANEOUS at 09:08

## 2017-08-13 RX ADMIN — CARVEDILOL 6.25 MG: 6.25 TABLET, FILM COATED ORAL at 09:08

## 2017-08-13 RX ADMIN — INSULIN ASPART 2 UNITS: 100 INJECTION, SOLUTION INTRAVENOUS; SUBCUTANEOUS at 04:08

## 2017-08-13 RX ADMIN — PAROXETINE HYDROCHLORIDE 10 MG: 10 TABLET, FILM COATED ORAL at 09:08

## 2017-08-13 RX ADMIN — HEPARIN SODIUM 5000 UNITS: 5000 INJECTION, SOLUTION INTRAVENOUS; SUBCUTANEOUS at 05:08

## 2017-08-13 RX ADMIN — POTASSIUM BICARBONATE 50 MEQ: 25 TABLET, EFFERVESCENT ORAL at 09:08

## 2017-08-13 RX ADMIN — Medication 24 G: at 12:08

## 2017-08-13 RX ADMIN — NIFEDIPINE 90 MG: 30 TABLET, FILM COATED, EXTENDED RELEASE ORAL at 09:08

## 2017-08-13 RX ADMIN — HEPARIN SODIUM 5000 UNITS: 5000 INJECTION, SOLUTION INTRAVENOUS; SUBCUTANEOUS at 09:08

## 2017-08-13 RX ADMIN — HEPARIN SODIUM 5000 UNITS: 5000 INJECTION, SOLUTION INTRAVENOUS; SUBCUTANEOUS at 02:08

## 2017-08-13 RX ADMIN — Medication 24 G: at 07:08

## 2017-08-13 RX ADMIN — INSULIN ASPART 3 UNITS: 100 INJECTION, SOLUTION INTRAVENOUS; SUBCUTANEOUS at 11:08

## 2017-08-13 RX ADMIN — INSULIN DETEMIR 10 UNITS: 100 INJECTION, SOLUTION SUBCUTANEOUS at 09:08

## 2017-08-13 RX ADMIN — DEXTROSE MONOHYDRATE 12.5 G: 25 INJECTION, SOLUTION INTRAVENOUS at 01:08

## 2017-08-13 NOTE — PLAN OF CARE
Problem: Patient Care Overview  Goal: Plan of Care Review  Outcome: Ongoing (interventions implemented as appropriate)  Fall precautions maintained. Bed alarm on and audible during shift. Patient seen by PT today. Confusion continued to improve during shift. Patient still at times loud - using profanity. Patient adequate food and fluid intake. CBG monitored before meals -ranging . MD made adjustments to insulin orders. Pre meal and SSI given as ordered. Monzon catheter inserted due to urinary retention. Potassium given for K of 3.4. Bed locked and low. Side rails up x's 3. Call bell and personal belongings in close reach. Pending dc'd to SNF or NH per MD note.

## 2017-08-13 NOTE — ASSESSMENT & PLAN NOTE
-hyperosmolar hyperglycemia  -When he came in to ED, had AMS and his BS was so high not able to measure.   -Received 10 U regular insulin at Ed, and BS was measured ~450.   -home insulin regimen: short acting 5/5/5, Long acting 18 daily  -He is not compliant to medications at home  -Pt hypoglycemic overnight with BS in 30's, improved to 118 with dextrose and glucose tabs- long acting insulin was given PM  -adjusting in pt insulin regimen to 10 U detemir AM and 3/3/3 aspart, LD SSI  -will continue to monitor BG and adjust regimen as needed

## 2017-08-13 NOTE — PROGRESS NOTES
Did a bladder scan on pt, last time pt had UOP was @ 2100 via straight cath. Bladder scan showed 654cc of urine in bladder. Straight cath performed on pt, put out 900cc of clear, yellow urine. Will continue to monitor.

## 2017-08-13 NOTE — PLAN OF CARE
Problem: Patient Care Overview  Goal: Plan of Care Review  Outcome: Ongoing (interventions implemented as appropriate)  Pt is oriented to self and place, but not time or situation. Pt bed placed in lowest locked postion, non skid socks on, urinal at bedside, bed alarm set, fall risk band on, call light next to pt. Pt assisted with repositioning during this shift, no S/S of skin breakdown. Pt VS stable, tele monitor in place. Pt has no c/o pain at this time. Hand hygiene performed during pt care. Pt has prn order for straight cath if bladder scan shows >300cc of urine in bladder, straight cath the pt twice this shift, see previous shift notes. Bg monitored as ordered, no SSI needed. Pt BG dropped this shift, and as a result the pt had to receive 24g of glucose tabs and dextrose 12.5g IV, see previous notes from shift. Pt reminded to call for assistance, verbalized understanding. Will continue to monitor.

## 2017-08-13 NOTE — PROGRESS NOTES
"[facility] Hospital Medicine  Progress Note    Patient Name: Julius Cardenas  MRN: [id number]  Patient Class: IP- Inpatient   Admission Date: 8/11/2017  Length of Stay: 2 days  Attending Physician: Sheldon Bradley  Primary Care Provider: Mady Carson MD    [facility] Medicine Team: [facility] HOSP MED 3 Mariposa Boggs MD    Subjective:     Principal Problem:Hyperglycemia    HPI:   84 year old with HTN, type 1 DM, CKD-4, seizure disorder on Keppra. CVA. [facility] with [facility]. He is very poor historian. I didn't see any documents with him.   He had change in mental status with confusion and agitation today. His BS was measured and it was so high it couldn't be measured in  and in ED. His BP was very high up to 235/107 in ED. He was agitated and refusing treatment. He has frequent admissions to the ED with uncontrolled DM, with both hyper and hypoglycemia.  He admits to be confused and feeling weak earlier today. He states " I lost strength and that doesn't come back quickly", " I am sick about the job", " I am in a bad shape", I am scared to be locked up", " I see demons in the corners, I don't talk to them, the don't know me".   He is feeling cold and SOB. He can't walk? Didn't try to walk. He denies any seizures.   He says he is taking his medications every day including his insulin. He denies having HTN or seizure disorder.  He says he has 7 or 8 children and he is not . He used to smoke for 5-6 years. Drinks alcohol occasionally. Denies drug abuse.     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    Hospital Course:  8/12- Pt " admitted to internal medicine.      Interval History: Patient hypoglycemic overnight with BG in 30s. Improved to 118 with dextrose and glucose tabs. Pt asymptomatic throughout. Denies nausea, vomiting, abdo pain, light headedness, or any other complaints at this time. Pt required straight cath multiple times overnight due to urinary retention. Attempting to contact family regarding patient's home living situation and disposition.     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       Past Surgical History:   Procedure Laterality Date    CATARACT EXTRACTION W/  INTRAOCULAR LENS IMPLANT  8/26/2009, 10/14/2009    CRANIOTOMY  1995    CRANIOTOMY  12/21/2010    EYE SURGERY         Review of patient's allergies indicates:  No Known Allergies    No current facility-administered medications on file prior to encounter.      Current Outpatient Prescriptions on File Prior to Encounter   Medication Sig     "aspirin (ECOTRIN) 81 MG EC tablet Take 1 tablet (81 mg total) by mouth once daily.    blood-glucose meter (CONTOUR METER) kit Use as instructed.  Please substitute appropriate meter to match his strips.    carvedilol (COREG) 6.25 MG tablet Take 1 tablet (6.25 mg total) by mouth 2 (two) times daily.    CONTOUR TEST STRIPS Strp USE AS DIRECTED THREE TIMES DAILY    insulin aspart (NOVOLOG) 100 unit/mL InPn pen Inject 5 Units into the skin 3 (three) times daily with meals.    insulin detemir (LEVEMIR FLEXTOUCH) 100 unit/mL (3 mL) SubQ InPn pen Inject 18 Units into the skin once daily.    lancets Misc Use three times daily for finger stick glucose monitoring.    levetiracetam (KEPPRA) 1000 MG tablet Take 1 tablet (1,000 mg total) by mouth 2 (two) times daily.    nifedipine (PROCARDIA-XL) 90 MG (OSM) 24 hr tablet Take 1 tablet (90 mg total) by mouth once daily.    nystatin (MYCOSTATIN) cream Apply topically 2 (two) times daily.    paroxetine (PAXIL) 10 MG tablet Take 1 tablet (10 mg total) by mouth every morning.    pen needle, diabetic (BD INSULIN PEN NEEDLE UF SHORT) 31 gauge x 5/16" Ndle Inject 1 Device into the skin 4 (four) times daily.     Family History     Problem Relation (Age of Onset)    Cataracts Mother    Diabetes Mother, Sister, Sister, Brother, Sister, Brother    No Known Problems Father, Son, Daughter, Maternal Grandmother, Maternal Grandfather, Paternal Grandmother, Paternal Grandfather, Maternal Aunt, Maternal Uncle, Paternal Aunt, Paternal Uncle        Social History Main Topics    Smoking status: Former Smoker     Packs/day: 1.00     Types: Cigarettes     Quit date: 12/31/1994    Smokeless tobacco: Former User     Quit date: 4/3/2010    Alcohol use No    Drug use: No    Sexual activity: Not on file     Review of Systems   Unable to perform ROS: Dementia     Objective:     Vital Signs (Most Recent):  Temp: 97.4 °F (36.3 °C) (08/13/17 0722)  Pulse: 75 (08/13/17 0817)  Resp: 20 (08/13/17 " 0722)  BP: (!) 168/76 (08/13/17 0722)  SpO2: 100 % (08/13/17 0722) Vital Signs (24h Range):  Temp:  [97.4 °F (36.3 °C)-98.7 °F (37.1 °C)] 97.4 °F (36.3 °C)  Pulse:  [60-81] 75  Resp:  [15-20] 20  SpO2:  [95 %-100 %] 100 %  BP: (120-168)/(61-81) 168/76     Weight: 70.1 kg (154 lb 8 oz)  Body mass index is 20.38 kg/m².    Physical Exam   Constitutional:   Patient is calm, cachectic. Hair and facial hair poorly groomed.    HENT:   He has a linear horizantal surgical scar in his forehead.   He has no teeth.   Eyes: Pupils are equal, round, and reactive to light. No scleral icterus.   Bilateral arcus senalis   Neck: Neck supple. No JVD present.   Cardiovascular: Normal rate, regular rhythm and intact distal pulses.    Murmur (faint diastolic murmer in aortic area) heard.  Pulmonary/Chest: Effort normal. No respiratory distress.   Abdominal: Bowel sounds are normal. He exhibits no distension and no mass. There is no tenderness. There is guarding.   Musculoskeletal: He exhibits no edema.   Left hand swelling.   Bilateral arm rigidity more in the left.    Neurological: He is alert.   He knows what day it is but not the time.   He think Jeffry is the president.   He knows he is in a hospital but doesn't know the name.    Skin: Skin is dry. No rash noted. There is pallor.   Cold extremities.   Multiple small wounds in LE.    Psychiatric: He has a normal mood and affect.   Vitals reviewed.       Significant Labs:   BMP:   Recent Labs  Lab 08/12/17  0138  08/13/17  0758   *  193*  < > 62*   *  147*  < > 146*   K 3.0*  3.0*  3.0*  < > 3.6   *  116*  < > 117*   CO2 20*  20*  < > 20*   BUN 40*  40*  < > 37*   CREATININE 3.3*  3.3*  < > 2.8*   CALCIUM 8.2*  8.2*  < > 8.5*   MG 1.6  --   --    < > = values in this interval not displayed.  CBC:     Recent Labs  Lab 08/11/17  1836 08/12/17  0138   WBC 6.37 6.96   HGB 11.5* 11.7*   HCT 33.3* 34.1*    122*       Significant Imaging: CXR: I have reviewed  all pertinent results/findings within the past 24 hours and my personal findings are:  no acute changes    Assessment/Plan:      Urinary retention    -pt required straight cath multiple times overnight with large volume UO  -lindsay placed  -UA ordered  -urology consulted for further evaluation          Malnutrition of moderate degree    He has poor dental hygiene, and lhas muscle wasting.            Renovascular hypertension    -Poorly controlled Bp.  -His BP in ED was  235/107.   -he was started on nifedipine and carvedilol in ED.   -BP today improved to 168/86  -will continue his home BP meds          Secondary seizure disorder    - He had meningioma-related seizure disorder.   - Meningioma was resected.  - he is on Keppra, will continue his home dose.             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    -He has IDDM since he was 18 year old.   -Not compliant to insulin use even though he says he is taking it daily.   -He has repeated admissions related to poorly controlled DM.   -a1c 8.4 7/2017  -Please see hyperglycemia section for insulin regimen          * Hyperglycemia    -hyperosmolar hyperglycemia  -When he came in to ED, had AMS and his BS was so high not able to measure.   -Received 10 U regular insulin at Ed, and BS was measured ~450.   -home insulin regimen: short acting 5/5/5, Long acting 18 daily  -He is not compliant to medications at home  -Pt hypoglycemic overnight with BS in 30's, improved to 118 with dextrose and glucose tabs- long acting insulin was given PM  -adjusting in pt insulin regimen to 10 U detemir AM and 3/3/3 aspart, LD SSI  -will continue to monitor BG and adjust regimen as needed               VTE Risk Mitigation         Ordered     heparin (porcine) injection 5,000 Units  Every 8 hours     Route:  Subcutaneous        08/11/17 2259     Place SRINIVAS hose  Until discontinued      08/11/17 2259     Place sequential compression device  Until discontinued      08/11/17  2259     Medium Risk of VTE  Once      08/11/17 2259              Mariposa Boggs MD  Department of Hospital Medicine   [facility]

## 2017-08-13 NOTE — PROGRESS NOTES
Bladder scanned pt, pt had >444cc of urine in bladder. Last time pt scanned and straight cath utilized was 6 hours ago around 1400, and pt hasn't voided since. Orders in place to straight cath pt if bladder scan shows >300cc of urine prn. Will carry out orders.

## 2017-08-13 NOTE — PT/OT/SLP EVAL
Physical Therapy  Evaluation    Juilus Cardenas   MRN: [id number]   Admitting Diagnosis: Hyperglycemia    PT Received On: 08/13/17  PT Start Time: 0300     PT Stop Time: 0333    PT Total Time (min): 33 min       Billable Minutes:  Evaluation 33    Diagnosis: Hyperglycemia    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      Past Surgical History:   Procedure Laterality Date    CATARACT EXTRACTION W/  INTRAOCULAR LENS IMPLANT  8/26/2009, 10/14/2009    CRANIOTOMY  1995    CRANIOTOMY  12/21/2010    EYE SURGERY     Referring physician: Mariposa Boggs MD  Date referred to PT: 8/12/2017  General Precautions: Standard, fall, seizure  Orthopedic Precautions: N/A   Braces: N/A     Patient History:  Lives With: sibling(s) (Pt's sister in law states he lives with her and his brother)  Living Arrangements: house  Home Accessibility: stairs to enter home  Home Layout: Able to live on 1st floor  Number of  "Stairs to Enter Home: 4  Transportation Available: family or friend will provide (Pt goes to Adult , W/c van transports pt)  Equipment Currently Used at Home: wheelchair (borrowed from friend)  DME owned (not currently used): none   Pt has someone who comes to bathe him, change him, transfer him to/from w/c, and requires friends in the neighborhood to carry him up and down the steps in his w/c. Pt has hospital bed and w/c, but they are at his prior residence and is unable to contact them.    Previous Level of Function:  Ambulation Skills: needs assist  Transfer Skills: needs assist  ADL Skills: needs assist  Work/Leisure Activity: needs assist    Subjective:  Communicated with nurse prior to session.  "I am not doing exercises with you."    Pain/Comfort  Pain Rating 1:  (Pt did not rate pain)      Objective:   Patient found with: lindsay catheter, telemetry     Cognitive Exam:  Oriented to: Person, Place, not oriented to time or situation  Pt agitated, angry, and speaking with profanity throughout eval, but participated with complaint.    Follows Commands/attention: Follows one-step commands  Communication: dysarthria  Safety awareness/insight to disability: impaired    Physical Exam:  Postural examination/scapula alignment: Rounded shoulder and Head forward    Skin integrity: small wounds B LE, Thin and Dry  Edema: Mild in L hand    Upper Extremity Range of Motion:  L UE with gross deficits in ROM in all joints with hypertonicity throughout    Lower Extremity Range of Motion:  Right Lower Extremity: WFL  Left Lower Extremity: WFL passively, hypertonicity limiting full AROM, MAS 3 (passive movement difficult)    Lower Extremity Strength:  Right Lower Extremity: 4/5 observed in function  Left Lower Extremity: 2/5 observed in function (s/p stroke in April)     Fine motor coordination:  Impaired  L UE secondary to decreased and increased tone    Gross motor coordination: impaired, demonstrates L UE and LLE pushing " in standing    Functional Mobility:  Bed Mobility:  Rolling/Turning to Left: Moderate assistance, With leg lift, With assist of 2  Supine to Sit: Moderate Assistance, With leg lift, With assist of 2  Sit to Supine: Minimum Assistance, With leg lift, With assist of  2    Transfers:  Sit <> Stand Assistance: Moderate Assistance (mod A x 2)  Sit <> Stand Assistive Device: Rolling Walker      Balance:   Static Sit: FAIR-: Maintains without assist but inconsistent   Dynamic Sit: FAIR: Cannot move trunk without losing balance  Static Stand: POOR: Needs MODERATE assist to maintain  Dynamic stand: 0: N/A    Therapeutic Activities and Exercises:  Pt sat at EOB for about 10 minutes with min A to not lose his balance. Pt stood for about 2 minutes with L lateral lean with max A to keep upright. PT educated to bring L foot under him, PT passively brought L foot into wider KAIT pt unable to weight shift R in order to offload L LE. With wider KAIT, pt required mod A to maintain balance.    AM-PAC 6 CLICK MOBILITY  How much help from another person does this patient currently need?   1 = Unable, Total/Dependent Assistance  2 = A lot, Maximum/Moderate Assistance  3 = A little, Minimum/Contact Guard/Supervision  4 = None, Modified [place]/Independent    Turning over in bed (including adjusting bedclothes, sheets and blankets)?: 3  Sitting down on and standing up from a chair with arms (e.g., wheelchair, bedside commode, etc.): 2  Moving from lying on back to sitting on the side of the bed?: 3  Moving to and from a bed to a chair (including a wheelchair)?: 2  Need to walk in hospital room?: 2  Climbing 3-5 steps with a railing?: 1  Total Score: 13     AM-PAC Raw Score CMS G-Code Modifier Level of Impairment Assistance   6 % Total / Unable   7 - 9 CM 80 - 100% Maximal Assist   10 - 14 CL 60 - 80% Moderate Assist   15 - 19 CK 40 - 60% Moderate Assist   20 - 22 CJ 20 - 40% Minimal Assist   23 CI 1-20% SBA / CGA   24 CH 0%  Independent/ Mod I     Patient left supine with all lines intact, call button in reach and nurse notified.    Assessment:   Julius Cardenas is a 84 y.o. male with a medical diagnosis of Hyperglycemia and presents with decreased functional mobility and the following deficits noted. Pt lives with brother and sister in law in one story home with 4 MONTY. Family expressed increasing difficulty in managing patient at home. Pt required assistance with everything prior to admission. Pt very agitated throughout session, but participated in PT. Pt will continue to benefit from a skilled trial of PT services to improve functional mobility.    Rehab identified problem list/impairments: Rehab identified problem list/impairments: weakness, impaired endurance, impaired self care skills, impaired balance, gait instability, impaired functional mobilty, impaired cognition, decreased upper extremity function, decreased lower extremity function, pain, decreased safety awareness, edema    Rehab potential is fair.    Activity tolerance: Fair    Discharge recommendations: SNF    Barriers to discharge: inaccessible home environment, decreased caregiver support    Equipment recommendations: w/c    GOALS:    Physical Therapy Goals        Problem: Physical Therapy Goal    Goal Priority Disciplines Outcome Goal Variances Interventions   Physical Therapy Goal     PT/OT, PT Ongoing (interventions implemented as appropriate)     Description:  Goals to be met by: 2017    Patient will increase functional independence with mobility by performin. Supine to sit with MInimal Assistance  2. Rolling to Left and Right with Minimal Assistance.  3. Sit to stand transfer with Minimal Assistance  4. Bed to chair transfer with Moderate Assistance using squat pivot  5. Wheelchair propulsion x50 feet with Minimal Assistance using bilateral upper and lower extremities  6. Gait 10 feet with mod A with appropriate AD if able                  PLAN:     Patient to be seen 3x/week to address the above listed problems via therapeutic exercise, therapeutic activity, and wheelchair training  Plan of Care expires: 9/13/2017  Plan of Care reviewed with: patient    Molly Mathis, SPT  08/13/2017

## 2017-08-13 NOTE — HOSPITAL COURSE
8/12- Pt admitted to internal medicine with acute encephalopathy thought to be due to HHNK.  8/13 - Patient hypoglycemic overnight with BG in 30s. Improved to 118 with dextrose and glucose tabs. Pt asymptomatic throughout.   8/14 -  He had no episodes of hypoglycemia overnight but ranges in BG from 198 to 252 - given an extra dose of long-acting detemir 5unit for this episode. Patient BP has remained elevated, so increased home dose of coreg to 12.5 and added lisinopril 2.5 in addition as patient is also CKD Stage 4.   8/15 - Patient had episode of hypogylcemia yesterday evening to 55 and evening dose of aspart 5U was held. Patient was asymptomatic. Patient ate about 50% of dinner. Patient also continues to have elevated Bps and yesterday carvedilol was increased to 12.5mg and lisinopril 2.5mg was added as patient is also CKD4  8/16 - Patient was not given his dinner aspart because his glucose was 68. Increased patient's coreg to 12.5mg 2 days prior and patient's BP is better this morning at 158/75. Increased lisinopril to 10mg for better blood pressure control. Patient seems to tolerate detemir 10U nightly and better suited to 3U aspart due to lower nutritional intake in hospital. Patient awaiting nursing home placement and otherwise medically stable. Suggest follow-up with nephrology soon for CKD-4.

## 2017-08-13 NOTE — PROGRESS NOTES
Straight cath performed on pt. 700cc of clear, yellow urine emptied from bladder. Will recheck bladder via bladder scanner around 0300 if pt does not void post straight cath. Will continue to monitor.

## 2017-08-13 NOTE — PROGRESS NOTES
16 Fr urinary lindsay placed per MD Ambrose orders. Patient tolerated well. 500 cc of clear, yellow urine drained from bladder during insertion. No complaints at this time. POC discussed w/ patient. Will continue to monitor.

## 2017-08-13 NOTE — ASSESSMENT & PLAN NOTE
-Poorly controlled Bp.  -His BP in ED was  235/107.   -he was started on nifedipine and carvedilol in ED.   -BP today improved to 168/86  -will continue his home BP meds

## 2017-08-13 NOTE — ASSESSMENT & PLAN NOTE
-He has IDDM since he was 18 year old.   -Not compliant to insulin use even though he says he is taking it daily.   -He has repeated admissions related to poorly controlled DM.   -a1c 8.4 7/2017  -Please see hyperglycemia section for insulin regimen

## 2017-08-13 NOTE — PROGRESS NOTES
Dr. Boggs at bedside. Updated MD regarding patient's sporadic low/high blood sugars - pt has a history of DM1. Patient's CBG was 40 this AM; pt was asymptomatic -pt ate breakfast and given glucose tabs per PRN orders. Pre meal insulin held. MD also notified regarding patient's urinary retention - pt put out 2400 cc of urine since 1400 yesterday via straight cath. MD stated to placed a urinary catheter at this time. MD will also assess patients insulin orders. Endocrinology and urology not consulted at this time. Will continue to monitor.     0900: glucose now 148 post breakfast and glucose tabs. No change in LOC noted thus far. Night time rupali ambrose'd per Dr. Boggs.

## 2017-08-13 NOTE — SUBJECTIVE & OBJECTIVE
Interval History: Patient hypoglycemic overnight with BG in 30s. Improved to 118 with dextrose and glucose tabs. Pt asymptomatic throughout. Denies nausea, vomiting, abdo pain, light headedness, or any other complaints at this time. Pt required straight cath multiple times overnight due to urinary retention. Attempting to contact family regarding patient's home living situation and disposition.     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       Past Surgical History:   Procedure Laterality Date    CATARACT EXTRACTION W/  INTRAOCULAR LENS IMPLANT  8/26/2009, 10/14/2009    CRANIOTOMY  1995    CRANIOTOMY  12/21/2010    EYE SURGERY         Review of patient's allergies indicates:  No Known Allergies    No current facility-administered medications on file prior to encounter.      Current Outpatient Prescriptions on File Prior to Encounter   Medication Sig    aspirin (ECOTRIN) 81 MG EC tablet  "Take 1 tablet (81 mg total) by mouth once daily.    blood-glucose meter (CONTOUR METER) kit Use as instructed.  Please substitute appropriate meter to match his strips.    carvedilol (COREG) 6.25 MG tablet Take 1 tablet (6.25 mg total) by mouth 2 (two) times daily.    CONTOUR TEST STRIPS Strp USE AS DIRECTED THREE TIMES DAILY    insulin aspart (NOVOLOG) 100 unit/mL InPn pen Inject 5 Units into the skin 3 (three) times daily with meals.    insulin detemir (LEVEMIR FLEXTOUCH) 100 unit/mL (3 mL) SubQ InPn pen Inject 18 Units into the skin once daily.    lancets Misc Use three times daily for finger stick glucose monitoring.    levetiracetam (KEPPRA) 1000 MG tablet Take 1 tablet (1,000 mg total) by mouth 2 (two) times daily.    nifedipine (PROCARDIA-XL) 90 MG (OSM) 24 hr tablet Take 1 tablet (90 mg total) by mouth once daily.    nystatin (MYCOSTATIN) cream Apply topically 2 (two) times daily.    paroxetine (PAXIL) 10 MG tablet Take 1 tablet (10 mg total) by mouth every morning.    pen needle, diabetic (BD INSULIN PEN NEEDLE UF SHORT) 31 gauge x 5/16" Ndle Inject 1 Device into the skin 4 (four) times daily.     Family History     Problem Relation (Age of Onset)    Cataracts Mother    Diabetes Mother, Sister, Sister, Brother, Sister, Brother    No Known Problems Father, Son, Daughter, Maternal Grandmother, Maternal Grandfather, Paternal Grandmother, Paternal Grandfather, Maternal Aunt, Maternal Uncle, Paternal Aunt, Paternal Uncle        Social History Main Topics    Smoking status: Former Smoker     Packs/day: 1.00     Types: Cigarettes     Quit date: 12/31/1994    Smokeless tobacco: Former User     Quit date: 4/3/2010    Alcohol use No    Drug use: No    Sexual activity: Not on file     Review of Systems   Unable to perform ROS: Dementia     Objective:     Vital Signs (Most Recent):  Temp: 97.4 °F (36.3 °C) (08/13/17 0722)  Pulse: 75 (08/13/17 0817)  Resp: 20 (08/13/17 0722)  BP: (!) 168/76 (08/13/17 " 0722)  SpO2: 100 % (08/13/17 0722) Vital Signs (24h Range):  Temp:  [97.4 °F (36.3 °C)-98.7 °F (37.1 °C)] 97.4 °F (36.3 °C)  Pulse:  [60-81] 75  Resp:  [15-20] 20  SpO2:  [95 %-100 %] 100 %  BP: (120-168)/(61-81) 168/76     Weight: 70.1 kg (154 lb 8 oz)  Body mass index is 20.38 kg/m².    Physical Exam   Constitutional:   Patient is calm, cachectic. Hair and facial hair poorly groomed.    HENT:   He has a linear horizantal surgical scar in his forehead.   He has no teeth.   Eyes: Pupils are equal, round, and reactive to light. No scleral icterus.   Bilateral arcus senalis   Neck: Neck supple. No JVD present.   Cardiovascular: Normal rate, regular rhythm and intact distal pulses.    Murmur (faint diastolic murmer in aortic area) heard.  Pulmonary/Chest: Effort normal. No respiratory distress.   Abdominal: Bowel sounds are normal. He exhibits no distension and no mass. There is no tenderness. There is guarding.   Musculoskeletal: He exhibits no edema.   Left hand swelling.   Bilateral arm rigidity more in the left.    Neurological: He is alert.   He knows what day it is but not the time.   He think Jeffry is the president.   He knows he is in a hospital but doesn't know the name.    Skin: Skin is dry. No rash noted. There is pallor.   Cold extremities.   Multiple small wounds in LE.    Psychiatric: He has a normal mood and affect.   Vitals reviewed.       Significant Labs:   BMP:   Recent Labs  Lab 08/12/17  0138  08/13/17  0758   *  193*  < > 62*   *  147*  < > 146*   K 3.0*  3.0*  3.0*  < > 3.6   *  116*  < > 117*   CO2 20*  20*  < > 20*   BUN 40*  40*  < > 37*   CREATININE 3.3*  3.3*  < > 2.8*   CALCIUM 8.2*  8.2*  < > 8.5*   MG 1.6  --   --    < > = values in this interval not displayed.  CBC:     Recent Labs  Lab 08/11/17  1836 08/12/17  0138   WBC 6.37 6.96   HGB 11.5* 11.7*   HCT 33.3* 34.1*    122*       Significant Imaging: CXR: I have reviewed all pertinent results/findings  within the past 24 hours and my personal findings are:  no acute changes

## 2017-08-13 NOTE — ASSESSMENT & PLAN NOTE
-pt required straight cath multiple times overnight with large volume UO  -lindsay placed  -UA ordered  -urology consulted for further evaluation

## 2017-08-13 NOTE — PLAN OF CARE
Problem: Physical Therapy Goal  Goal: Physical Therapy Goal  Goals to be met by: 2017    Patient will increase functional independence with mobility by performin. Supine to sit with MInimal Assistance  2. Rolling to Left and Right with Minimal Assistance.  3. Sit to stand transfer with Minimal Assistance  4. Bed to chair transfer with Moderate Assistance using squat pivot  5. Wheelchair propulsion x50 feet with Minimal Assistance using bilateral upper and lower extremities  6. Gait 10 with mod A using appropriate AD if able    Outcome: Ongoing (interventions implemented as appropriate)  Pt's goals are set and appropriate. Pt will continue to benefit from skilled PT services to improve functional mobility.    Molly Mathis, SPT

## 2017-08-13 NOTE — PROGRESS NOTES
Lab called, stated pt's BG 41 on last BMP drawn. Spot checked pt c glucometer also, BG 34. Pt has prn order for glucose tabs 24g for BG <50. Will give that. Pretty DIAZ on call for [facility] Med 3, Spoke with on call Lamar NIX MD and notifed of situation. No new orders given. Will give pt apple sauce and peanut butter as well and will check after 15 mins of implementing intervention.

## 2017-08-13 NOTE — PROGRESS NOTES
Checked pts BG 15mins post PO glucose tab administration and pt's BG 47. Notified on call for hospital med 3 Lamar NIX MD, explained that the glucose tabs were given per orders but the pt's BG is still low. This RN explained that I have been taking care of the pt since he came up to [facility] from the ED and the pt's LOC has not changed since admit. This RN also explained that the pt has glucagon ordered prn, but does not have dextrose ordered prn. Lamar NIX MD stated she'll put in an order for dextrose. Will continue to monitor.

## 2017-08-14 LAB
ANION GAP SERPL CALC-SCNC: 11 MMOL/L
ANION GAP SERPL CALC-SCNC: 11 MMOL/L
ANION GAP SERPL CALC-SCNC: 13 MMOL/L
ANION GAP SERPL CALC-SCNC: 14 MMOL/L
BASOPHILS # BLD AUTO: 0.03 K/UL
BASOPHILS NFR BLD: 0.4 %
BUN SERPL-MCNC: 36 MG/DL
BUN SERPL-MCNC: 38 MG/DL
CALCIUM SERPL-MCNC: 8.2 MG/DL
CALCIUM SERPL-MCNC: 8.2 MG/DL
CALCIUM SERPL-MCNC: 8.4 MG/DL
CALCIUM SERPL-MCNC: 8.6 MG/DL
CHLORIDE SERPL-SCNC: 114 MMOL/L
CHLORIDE SERPL-SCNC: 115 MMOL/L
CO2 SERPL-SCNC: 13 MMOL/L
CO2 SERPL-SCNC: 17 MMOL/L
CO2 SERPL-SCNC: 17 MMOL/L
CO2 SERPL-SCNC: 18 MMOL/L
CREAT SERPL-MCNC: 2.9 MG/DL
CREAT SERPL-MCNC: 3.1 MG/DL
DIFFERENTIAL METHOD: ABNORMAL
EOSINOPHIL # BLD AUTO: 0.2 K/UL
EOSINOPHIL NFR BLD: 3.4 %
ERYTHROCYTE [DISTWIDTH] IN BLOOD BY AUTOMATED COUNT: 13.7 %
EST. GFR  (AFRICAN AMERICAN): 20.3 ML/MIN/1.73 M^2
EST. GFR  (AFRICAN AMERICAN): 22 ML/MIN/1.73 M^2
EST. GFR  (NON AFRICAN AMERICAN): 17.5 ML/MIN/1.73 M^2
EST. GFR  (NON AFRICAN AMERICAN): 19 ML/MIN/1.73 M^2
GLUCOSE SERPL-MCNC: 198 MG/DL
GLUCOSE SERPL-MCNC: 201 MG/DL
GLUCOSE SERPL-MCNC: 238 MG/DL
GLUCOSE SERPL-MCNC: 241 MG/DL
HCT VFR BLD AUTO: 32.6 %
HGB BLD-MCNC: 11.3 G/DL
LYMPHOCYTES # BLD AUTO: 1.7 K/UL
LYMPHOCYTES NFR BLD: 24.7 %
MCH RBC QN AUTO: 29 PG
MCHC RBC AUTO-ENTMCNC: 34.7 G/DL
MCV RBC AUTO: 84 FL
MONOCYTES # BLD AUTO: 0.6 K/UL
MONOCYTES NFR BLD: 8.4 %
NEUTROPHILS # BLD AUTO: 4.2 K/UL
NEUTROPHILS NFR BLD: 62.7 %
OSMOLALITY SERPL: 339 MOSM/KG
PLATELET # BLD AUTO: 156 K/UL
PMV BLD AUTO: 12.6 FL
POCT GLUCOSE: 123 MG/DL (ref 70–110)
POCT GLUCOSE: 141 MG/DL (ref 70–110)
POCT GLUCOSE: 218 MG/DL (ref 70–110)
POCT GLUCOSE: 55 MG/DL (ref 70–110)
POCT GLUCOSE: 63 MG/DL (ref 70–110)
POCT GLUCOSE: 71 MG/DL (ref 70–110)
POCT GLUCOSE: >500 MG/DL (ref 70–110)
POTASSIUM SERPL-SCNC: 3.7 MMOL/L
POTASSIUM SERPL-SCNC: 4 MMOL/L
POTASSIUM SERPL-SCNC: 5.1 MMOL/L
POTASSIUM SERPL-SCNC: 5.4 MMOL/L
RBC # BLD AUTO: 3.9 M/UL
SODIUM SERPL-SCNC: 141 MMOL/L
SODIUM SERPL-SCNC: 143 MMOL/L
SODIUM SERPL-SCNC: 143 MMOL/L
SODIUM SERPL-SCNC: 144 MMOL/L
WBC # BLD AUTO: 6.76 K/UL

## 2017-08-14 PROCEDURE — 99232 SBSQ HOSP IP/OBS MODERATE 35: CPT | Mod: GC,,, | Performed by: HOSPITALIST

## 2017-08-14 PROCEDURE — 94760 N-INVAS EAR/PLS OXIMETRY 1: CPT

## 2017-08-14 PROCEDURE — 63600175 PHARM REV CODE 636 W HCPCS: Performed by: STUDENT IN AN ORGANIZED HEALTH CARE EDUCATION/TRAINING PROGRAM

## 2017-08-14 PROCEDURE — 25000242 PHARM REV CODE 250 ALT 637 W/ HCPCS: Performed by: STUDENT IN AN ORGANIZED HEALTH CARE EDUCATION/TRAINING PROGRAM

## 2017-08-14 PROCEDURE — 85025 COMPLETE CBC W/AUTO DIFF WBC: CPT

## 2017-08-14 PROCEDURE — 36415 COLL VENOUS BLD VENIPUNCTURE: CPT

## 2017-08-14 PROCEDURE — 25000003 PHARM REV CODE 250: Performed by: STUDENT IN AN ORGANIZED HEALTH CARE EDUCATION/TRAINING PROGRAM

## 2017-08-14 PROCEDURE — 97165 OT EVAL LOW COMPLEX 30 MIN: CPT

## 2017-08-14 PROCEDURE — 20600001 HC STEP DOWN PRIVATE ROOM

## 2017-08-14 PROCEDURE — 80048 BASIC METABOLIC PNL TOTAL CA: CPT | Mod: 91

## 2017-08-14 PROCEDURE — 97530 THERAPEUTIC ACTIVITIES: CPT

## 2017-08-14 PROCEDURE — 94761 N-INVAS EAR/PLS OXIMETRY MLT: CPT

## 2017-08-14 PROCEDURE — 97535 SELF CARE MNGMENT TRAINING: CPT

## 2017-08-14 PROCEDURE — 94640 AIRWAY INHALATION TREATMENT: CPT

## 2017-08-14 RX ORDER — INSULIN ASPART 100 [IU]/ML
5 INJECTION, SOLUTION INTRAVENOUS; SUBCUTANEOUS
Status: DISCONTINUED | OUTPATIENT
Start: 2017-08-14 | End: 2017-08-15

## 2017-08-14 RX ORDER — LISINOPRIL 2.5 MG/1
2.5 TABLET ORAL DAILY
Status: DISCONTINUED | OUTPATIENT
Start: 2017-08-14 | End: 2017-08-16

## 2017-08-14 RX ORDER — LISINOPRIL 2.5 MG/1
2.5 TABLET ORAL DAILY
Qty: 30 TABLET | Refills: 3 | Status: SHIPPED | OUTPATIENT
Start: 2017-08-14 | End: 2017-08-16

## 2017-08-14 RX ORDER — DOXYLAMINE SUCCINATE 25 MG
TABLET ORAL 2 TIMES DAILY
Status: DISCONTINUED | OUTPATIENT
Start: 2017-08-14 | End: 2017-08-16 | Stop reason: HOSPADM

## 2017-08-14 RX ORDER — IPRATROPIUM BROMIDE AND ALBUTEROL SULFATE 2.5; .5 MG/3ML; MG/3ML
3 SOLUTION RESPIRATORY (INHALATION) EVERY 4 HOURS PRN
Qty: 1 VIAL | Refills: 6 | Status: SHIPPED | OUTPATIENT
Start: 2017-08-14 | End: 2018-08-14

## 2017-08-14 RX ORDER — CARVEDILOL 12.5 MG/1
12.5 TABLET ORAL 2 TIMES DAILY
Qty: 30 TABLET | Refills: 3 | Status: ON HOLD | OUTPATIENT
Start: 2017-08-14 | End: 2017-09-21 | Stop reason: HOSPADM

## 2017-08-14 RX ORDER — CARVEDILOL 3.12 MG/1
12.5 TABLET ORAL 2 TIMES DAILY
Status: DISCONTINUED | OUTPATIENT
Start: 2017-08-14 | End: 2017-08-16 | Stop reason: HOSPADM

## 2017-08-14 RX ADMIN — NIFEDIPINE 90 MG: 30 TABLET, FILM COATED, EXTENDED RELEASE ORAL at 09:08

## 2017-08-14 RX ADMIN — INSULIN ASPART 3 UNITS: 100 INJECTION, SOLUTION INTRAVENOUS; SUBCUTANEOUS at 09:08

## 2017-08-14 RX ADMIN — HEPARIN SODIUM 5000 UNITS: 5000 INJECTION, SOLUTION INTRAVENOUS; SUBCUTANEOUS at 10:08

## 2017-08-14 RX ADMIN — LEVETIRACETAM 1000 MG: 500 TABLET, FILM COATED ORAL at 10:08

## 2017-08-14 RX ADMIN — ASPIRIN 81 MG: 81 TABLET, COATED ORAL at 09:08

## 2017-08-14 RX ADMIN — CARVEDILOL 6.25 MG: 6.25 TABLET, FILM COATED ORAL at 09:08

## 2017-08-14 RX ADMIN — INSULIN DETEMIR 5 UNITS: 100 INJECTION, SOLUTION SUBCUTANEOUS at 09:08

## 2017-08-14 RX ADMIN — INSULIN ASPART 2 UNITS: 100 INJECTION, SOLUTION INTRAVENOUS; SUBCUTANEOUS at 01:08

## 2017-08-14 RX ADMIN — HEPARIN SODIUM 5000 UNITS: 5000 INJECTION, SOLUTION INTRAVENOUS; SUBCUTANEOUS at 05:08

## 2017-08-14 RX ADMIN — LISINOPRIL 2.5 MG: 2.5 TABLET ORAL at 02:08

## 2017-08-14 RX ADMIN — PAROXETINE HYDROCHLORIDE 10 MG: 10 TABLET, FILM COATED ORAL at 09:08

## 2017-08-14 RX ADMIN — IPRATROPIUM BROMIDE AND ALBUTEROL SULFATE 3 ML: .5; 3 SOLUTION RESPIRATORY (INHALATION) at 07:08

## 2017-08-14 RX ADMIN — CARVEDILOL 12.5 MG: 6.25 TABLET, FILM COATED ORAL at 10:08

## 2017-08-14 RX ADMIN — IPRATROPIUM BROMIDE AND ALBUTEROL SULFATE 3 ML: .5; 3 SOLUTION RESPIRATORY (INHALATION) at 02:08

## 2017-08-14 RX ADMIN — HEPARIN SODIUM 5000 UNITS: 5000 INJECTION, SOLUTION INTRAVENOUS; SUBCUTANEOUS at 02:08

## 2017-08-14 RX ADMIN — INSULIN ASPART 3 UNITS: 100 INJECTION, SOLUTION INTRAVENOUS; SUBCUTANEOUS at 01:08

## 2017-08-14 RX ADMIN — LEVETIRACETAM 1000 MG: 500 TABLET, FILM COATED ORAL at 09:08

## 2017-08-14 NOTE — ASSESSMENT & PLAN NOTE
- He had meningioma-related seizure disorder.   - Meningioma was resected.  - continue home dose keppra

## 2017-08-14 NOTE — PROGRESS NOTES
"[facility] Hospital Medicine  Progress Note    Patient Name: Julius Cardenas  MRN: [id number]  Patient Class: IP- Inpatient   Admission Date: 8/11/2017  Length of Stay: 3 days  Attending Physician: Sheldon Bradley MD  Primary Care Provider: Mady Carson MD    [facility] Medicine Team: [facility] HOSP MED 3 Teodora George MD    Subjective:     Principal Problem:Hyperglycemia    HPI:   84 year old with HTN, type 1 DM, CKD-4, seizure disorder on Keppra. CVA. He [facility] with [facility]. He is very poor historian. I didn't see any documents with him.   He had change in mental status with confusion and agitation today. His BS was measured and it was so high it couldn't be measured in  and in ED. His BP was very high up to 235/107 in ED. He was agitated and refusing treatment. He has frequent admissions to the ED with uncontrolled DM, with both hyper and hypoglycemia.  He admits to be confused and feeling weak earlier today. He states " I lost strength and that doesn't come back quickly", " I am sick about the job", " I am in a bad shape", I am scared to be locked up", " I see demons in the corners, I don't talk to them, the don't know me".   He is feeling cold and SOB. He can't walk? Didn't try to walk. He denies any seizures.   He says he is taking his medications every day including his insulin. He denies having HTN or seizure disorder.  He says he has 7 or 8 children and he is not . He used to smoke for 5-6 years. Drinks alcohol occasionally. Denies drug abuse.     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    Hospital Course:  8/12- Pt " admitted to internal medicine.      Interval History:   Patient seen and examined this Am. He had no episodes of hypoglycemia overnight but ranges in BG from 198 to 252 - given an extra dose of long-acting detemir 5unit. Patient BP has remained elevated, so increased home dose of coreg to 12.5 and added lisinopril 2.5 in addition as patient is also CKD Stage 5. Patient remains without complaints - no nausea, vomiting, abdominal pain, lightheadedness, SOB, or chest pain. Patient awaiting placement at outpatient SNF/NH.    Review of Systems   Constitutional: Negative for activity change, chills, fatigue and fever.   HENT: Negative for congestion and sore throat.    Respiratory: Negative for apnea, cough and shortness of breath.    Cardiovascular: Negative for chest pain and leg swelling.   Gastrointestinal: Negative for abdominal distention, abdominal pain, diarrhea, nausea and vomiting.     Objective:     Vital Signs (Most Recent):  Temp: 97.5 °F (36.4 °C) (08/14/17 0840)  Pulse: 76 (08/14/17 0840)  Resp: (!) 24 (08/14/17 0840)  BP: (!) 188/84 (08/14/17 0840)  SpO2: 99 % (08/14/17 0840) Vital Signs (24h Range):  Temp:  [97.5 °F (36.4 °C)-98 °F (36.7 °C)] 97.5 °F (36.4 °C)  Pulse:  [70-81] 76  Resp:  [19-24] 24  SpO2:  [99 %-100 %] 99 %  BP: (136-188)/(71-86) 188/84     Weight: 69.4 kg (152 lb 14.4 oz)  Body mass index is 20.17 kg/m².    Intake/Output Summary (Last 24 hours) at 08/14/17 1104  Last data filed at 08/14/17 0840   Gross per 24 hour   Intake             1000 ml   Output             1850 ml   Net             -850 ml      Physical Exam   Constitutional: He appears well-developed.   -poorly groomed    HENT:   Head: Normocephalic and atraumatic.   Eyes: EOM are normal. Pupils are equal, round, and reactive to light.   Neck: Normal range of motion. Neck supple.   Cardiovascular: Normal rate, regular rhythm and normal heart sounds.    Pulmonary/Chest: Effort normal and breath sounds normal. No respiratory  distress.   Abdominal: Soft. Bowel sounds are normal. He exhibits no distension.   Musculoskeletal: Normal range of motion. He exhibits no edema.   Neurological: He is alert.   - oriented to place; confused about time   Skin: Skin is warm and dry. He is not diaphoretic. No erythema.   Psychiatric: He has a normal mood and affect.     Significant Labs:   A1C:   Recent Labs  Lab 04/04/17  0816 06/13/17  0539 07/21/17  1155   HGBA1C 8.9* 9.5* 8.4*     BMP:   Recent Labs  Lab 08/14/17  0904   *      K 5.1   *   CO2 17*   BUN 36*   CREATININE 2.9*   CALCIUM 8.6*     CBC:   Recent Labs  Lab 08/13/17  1212 08/14/17  0904   WBC 5.12 6.76   HGB 11.5* 11.3*   HCT 34.2* 32.6*    156     CMP:   Recent Labs  Lab 08/13/17  2355 08/14/17  0450 08/14/17  0904    143 144   K 3.7 4.0 5.1   * 115* 114*   CO2 18* 17* 17*   * 198* 238*   BUN 38* 36* 36*   CREATININE 3.1* 2.9* 2.9*   CALCIUM 8.2* 8.2* 8.6*   ANIONGAP 11 11 13   EGFRNONAA 17.5* 19.0* 19.0*     TSH:   Recent Labs  Lab 07/21/17  1155   TSH 1.617     Urine Studies:   Recent Labs  Lab 08/13/17  1104   COLORU Yellow   APPEARANCEUA Clear   PHUR 5.0   SPECGRAV 1.010   PROTEINUA 1+*   GLUCUA 1+*   KETONESU Negative   BILIRUBINUA Negative   OCCULTUA 3+*   NITRITE Negative   UROBILINOGEN Negative   LEUKOCYTESUR Negative   RBCUA 83*   WBCUA 3   BACTERIA Rare   SQUAMEPITHEL 0   HYALINECASTS 1         Assessment/Plan:      Urinary retention    -pt required straight cath multiple times overnight with large volume UO  -lindsay placed  -UA ordered  -urology consulted for further evaluation and recommended intermittent cath 4-5x/day as outpatient to maintain bladder volume <400-500mL or d/c with indwelling lindsay  - will d/c with indwelling lidnsay       Renovascular hypertension    - Poorly controlled BP  - BP in ED was 235/107   - he was started on nifedipine and carvedilol in ED.   - BP remained elevated today at 174/77mmHg  - increase coreg dose  from 6.25BID to 12.5mg BID   - add lisinopril 2.5mg as patient CKD Stage 4      Secondary seizure disorder    - He had meningioma-related seizure disorder.   - Meningioma was resected.  - continue home dose keppra      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    - IDDM since age 18  - Not compliant to insulin use even though he says he is taking it daily - has had repeated admissions related to poorly controlled DM.   -a1c: 8.4 - last done: 7/2017  -Please see hyperglycemia section for insulin regimen      * Hyperglycemia    - hyperosmolar hyperglycemia  - When he came into ED, had AMS and his BS was so high that it was unmeasurable   - Received 10 U regular insulin in Ed, and BS was measured ~450.   - home insulin regimen: short acting 5/5/5, Long acting 18 daily  - He is not compliant to medications at home  - patient with BG ranging from 198 to 252 overnight   - added 5U detemir to his 10U for better coverage   - changed aspart to home regimen of 5/5/5 with meals and increase nightly detemir tonight for 13units   - will continue to monitor BG and adjust regimen as needed         VTE Risk Mitigation         Ordered     heparin (porcine) injection 5,000 Units  Every 8 hours     Route:  Subcutaneous        08/11/17 2259     Place SRINIVAS hose  Until discontinued      08/11/17 2259     Place sequential compression device  Until discontinued      08/11/17 2259     Medium Risk of VTE  Once      08/11/17 2259        Teodora George MD  Department of Hospital Medicine   [facility]

## 2017-08-14 NOTE — PLAN OF CARE
Problem: Patient Care Overview  Goal: Plan of Care Review  Outcome: Ongoing (interventions implemented as appropriate)  Pt is oriented to self and place, but not time or situation. Pt bed placed in lowest locked postion, non skid socks on, bed alarm set, fall risk band on, call light next to pt. Pt assisted with repositioning during this shift, no S/S of skin breakdown. Pt VS stable, no c/o pain at this time. Hand hygiene performed during pt care. Pt had lindsay placed yd on day shift, draining clear yellow urine, see flow sheet for UOP. Bg monitored as ordered, SSI needed this shift. Pt will need SNF or NH placement upon d/c. Pt reminded to call for assistance, verbalized understanding. Will continue to monitor.

## 2017-08-14 NOTE — PLAN OF CARE
Problem: Patient Care Overview  Goal: Plan of Care Review  Outcome: Ongoing (interventions implemented as appropriate)  Glucoses monitored. Glu decreased pre-supper. Pt ate about 50% tray and applesauce given. Plan to recheck. Meal Novolog held. Pt worked with pt. Pt turning with assist. Pt sat in chair this am. Tolerated well. Does not move L side upon request.  Skin intact. Afebrile. Remains confused. Becomes agitated at times. Continuing to monitor.

## 2017-08-14 NOTE — PHARMACY MED REC
"MedMined Medication Reconciliation  Template    Patient was admitted on 8/11/2017 for Hyperglycemia.    Patient's prior to admission medication regimen was as follows:  Prescriptions Prior to Admission   Medication Sig Dispense Refill Last Dose    aspirin (ECOTRIN) 81 MG EC tablet Take 1 tablet (81 mg total) by mouth once daily. 30 tablet 11     blood-glucose meter (CONTOUR METER) kit Use as instructed.  Please substitute appropriate meter to match his strips. 1 each 0 Taking    carvedilol (COREG) 6.25 MG tablet Take 1 tablet (6.25 mg total) by mouth 2 (two) times daily. 60 tablet 6     CONTOUR TEST STRIPS Strp USE AS DIRECTED THREE TIMES DAILY 300 strip 12 Taking    insulin aspart (NOVOLOG) 100 unit/mL InPn pen Inject 5 Units into the skin 3 (three) times daily with meals. 15 mL 11     insulin detemir (LEVEMIR FLEXTOUCH) 100 unit/mL (3 mL) SubQ InPn pen Inject 18 Units into the skin once daily. 15 mL 11     lancets Misc Use three times daily for finger stick glucose monitoring. 100 each 3 Taking    levetiracetam (KEPPRA) 1000 MG tablet Take 1 tablet (1,000 mg total) by mouth 2 (two) times daily. 180 tablet 6     nifedipine (PROCARDIA-XL) 90 MG (OSM) 24 hr tablet Take 1 tablet (90 mg total) by mouth once daily. 30 tablet 11     nystatin (MYCOSTATIN) cream Apply topically 2 (two) times daily. 120 g 3     paroxetine (PAXIL) 10 MG tablet Take 1 tablet (10 mg total) by mouth every morning. 90 tablet 11     pen needle, diabetic (BD INSULIN PEN NEEDLE UF SHORT) 31 gauge x 5/16" Ndle Inject 1 Device into the skin 4 (four) times daily. 120 each 11      Please add appropriate    SmartPhrase below:  Admission Medication Reconciliation - Pharmacy Consult Note    The home medication history was taken by Khushboo Lora, Pharmacy Technician. Based on information gathered and subsequent review by the clinical pharmacist, the items below may need attention.     You may go to "Admission" then "Reconcile Home " "Medications" tabs to review and/or act upon these items. Based on information gathered and subsequent review by the clinical pharmacist, the items below may need attention.    Potentially problematic discrepancies with current MAR  o Patient IS taking the following which was not ordered upon admit  o Nystatin cream applied topically BID   o Patient is taking a drug DIFFERENTLY than how ordered upon admit  o Insulin Detemir 18 units subQ once daily     Please address this information as you see fit.  Feel free to contact us if you have any questions or require assistance.    Fernandez Madden   [id number]           "

## 2017-08-14 NOTE — NURSING
Repeat glu after eating supper=55. Dr. Araiza notified.  Applesauce given. Will recheck glucose in 1 hour.

## 2017-08-14 NOTE — PLAN OF CARE
Problem: Occupational Therapy Goal  Goal: Occupational Therapy Goal  Goals to be met by: 8/28/17     Patient will increase functional independence with ADLs by performing:    Feeding with Set-up Assistance.  UE Dressing with Minimal Assistance.  Grooming while EOB with Set-up Assistance.  Toileting from bedside commode with Stand-by Assistance and Minimal Assistance for hygiene and clothing management.   Supine to sit with Contact Guard Assistance.  Stand pivot transfers with Minimal Assistance.  Toilet transfer to bedside commode with Minimal Assistance.  Upper extremity exercise program x12 reps per handout and visual demonstration, with assistance as needed.    Outcome: Ongoing (interventions implemented as appropriate)  Initial eval completed.   POC implemented, goals set     Tameka De La Paz  8/14/2017

## 2017-08-14 NOTE — SUBJECTIVE & OBJECTIVE
Interval History:   Patient seen and examined this Am. He had no episodes of hypoglycemia overnight but ranges in BG from 198 to 252 - given an extra dose of long-acting detemir 5unit. Patient BP has remained elevated, so increased home dose of coreg to 12.5 and added lisinopril 2.5 in addition as patient is also CKD Stage 5. Patient remains without complaints - no nausea, vomiting, abdominal pain, lightheadedness, SOB, or chest pain. Patient awaiting placement at outpatient SNF/NH.    Review of Systems   Constitutional: Negative for activity change, chills, fatigue and fever.   HENT: Negative for congestion and sore throat.    Respiratory: Negative for apnea, cough and shortness of breath.    Cardiovascular: Negative for chest pain and leg swelling.   Gastrointestinal: Negative for abdominal distention, abdominal pain, diarrhea, nausea and vomiting.     Objective:     Vital Signs (Most Recent):  Temp: 97.5 °F (36.4 °C) (08/14/17 0840)  Pulse: 76 (08/14/17 0840)  Resp: (!) 24 (08/14/17 0840)  BP: (!) 188/84 (08/14/17 0840)  SpO2: 99 % (08/14/17 0840) Vital Signs (24h Range):  Temp:  [97.5 °F (36.4 °C)-98 °F (36.7 °C)] 97.5 °F (36.4 °C)  Pulse:  [70-81] 76  Resp:  [19-24] 24  SpO2:  [99 %-100 %] 99 %  BP: (136-188)/(71-86) 188/84     Weight: 69.4 kg (152 lb 14.4 oz)  Body mass index is 20.17 kg/m².    Intake/Output Summary (Last 24 hours) at 08/14/17 1104  Last data filed at 08/14/17 0840   Gross per 24 hour   Intake             1000 ml   Output             1850 ml   Net             -850 ml      Physical Exam   Constitutional: He appears well-developed.   -poorly groomed    HENT:   Head: Normocephalic and atraumatic.   Eyes: EOM are normal. Pupils are equal, round, and reactive to light.   Neck: Normal range of motion. Neck supple.   Cardiovascular: Normal rate, regular rhythm and normal heart sounds.    Pulmonary/Chest: Effort normal and breath sounds normal. No respiratory distress.   Abdominal: Soft. Bowel sounds  are normal. He exhibits no distension.   Musculoskeletal: Normal range of motion. He exhibits no edema.   Neurological: He is alert.   - oriented to place; confused about time   Skin: Skin is warm and dry. He is not diaphoretic. No erythema.   Psychiatric: He has a normal mood and affect.     Significant Labs:   A1C:   Recent Labs  Lab 04/04/17  0816 06/13/17  0539 07/21/17  1155   HGBA1C 8.9* 9.5* 8.4*     BMP:   Recent Labs  Lab 08/14/17  0904   *      K 5.1   *   CO2 17*   BUN 36*   CREATININE 2.9*   CALCIUM 8.6*     CBC:   Recent Labs  Lab 08/13/17  1212 08/14/17  0904   WBC 5.12 6.76   HGB 11.5* 11.3*   HCT 34.2* 32.6*    156     CMP:   Recent Labs  Lab 08/13/17  2355 08/14/17  0450 08/14/17  0904    143 144   K 3.7 4.0 5.1   * 115* 114*   CO2 18* 17* 17*   * 198* 238*   BUN 38* 36* 36*   CREATININE 3.1* 2.9* 2.9*   CALCIUM 8.2* 8.2* 8.6*   ANIONGAP 11 11 13   EGFRNONAA 17.5* 19.0* 19.0*     TSH:   Recent Labs  Lab 07/21/17  1155   TSH 1.617     Urine Studies:   Recent Labs  Lab 08/13/17  1104   COLORU Yellow   APPEARANCEUA Clear   PHUR 5.0   SPECGRAV 1.010   PROTEINUA 1+*   GLUCUA 1+*   KETONESU Negative   BILIRUBINUA Negative   OCCULTUA 3+*   NITRITE Negative   UROBILINOGEN Negative   LEUKOCYTESUR Negative   RBCUA 83*   WBCUA 3   BACTERIA Rare   SQUAMEPITHEL 0   HYALINECASTS 1

## 2017-08-14 NOTE — NURSING
Dr. George notified of glu=63 and pt is eating supper. Plan to recheck glu after pt eats supper and hold scheduled supper Insulin.

## 2017-08-14 NOTE — PT/OT/SLP EVAL
Occupational Therapy  Evaluation     Julius Cardenas   MRN: [id number]   Admitting Diagnosis: Hyperglycemia    OT Date of Treatment: 08/14/17   OT Start Time: 1019  OT Stop Time: 1048  OT Total Time (min): 29 min    Billable Minutes:  Evaluation 20  Self Care/Home Management 9    Diagnosis: Hyperglycemia       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      Past Surgical History:   Procedure Laterality Date    CATARACT EXTRACTION W/  INTRAOCULAR LENS IMPLANT  8/26/2009, 10/14/2009    CRANIOTOMY  1995    CRANIOTOMY  12/21/2010    EYE SURGERY         Referring physician: Mariposa Boggs MD  Date referred to OT: 8/12/17    General Precautions: Standard, fall, seizure  Orthopedic Precautions: N/A  Braces: N/A    Do you have any cultural, spiritual, Jain conflicts, given your current situation?: None stated      Patient History:  Living Environment  Lives With: sibling(s) (Pt reports he  "lives with his brother and sister-in-law)  Living Arrangements: house  Home Accessibility: stairs to enter home  Home Layout: Able to live on 1st floor  Number of Stairs to Enter Home: 4  Transportation Available: family or friend will provide  Living Environment Comment: Pt reports he lives with his sister-in-law and brother in a [facility] with 4 MONTY. Pt reports he uses a w/c for mobility and is able to self-propel. Pt reports he requires assistance for UB/LB dressing, and bathing. Pt has a tub/shower combo and brother assist pt with getting into shower (has shower chair) and assist with bathing. pt has a hospital bed. Pt attends and adult day care during the day (M-F) and will have assistance from family members upon d/c.   Equipment Currently Used at Home: hospital bed, wheelchair, shower chair    Prior level of function:   Bed Mobility/Transfers: needs assist  Grooming: needs assist  Bathing: needs device and assist  Upper Body Dressing: needs assist  Lower Body Dressing: needs assist  Toileting: needs assist  Mode of Transportation: Car, Family       Subjective:  Communicated with RN prior to session.  " I wanna get back in the bed, I'm tried of sitting here"   Pt used profanity throughout session.   Chief Complaint: Agitated at times with therapist when instructed to perform specific tasks.  Patient/Family stated goals: to go back home     Pain/Comfort  Pain Rating 1: 0/10 (Pt did not state numerical value )  Location - Side 1: Left  Location - Orientation 1: upper  Location 1: arm  Pain Addressed 1: Pre-medicate for activity, Cessation of Activity, Nurse notified  Pain Rating Post-Intervention 1:  (Pt did not state numerical )    Objective:  Patient found with: telemetry, lindsay catheter    Cognitive Exam:  Oriented to: Person and Place  Follows Commands/attention: Follows one-step commands  Communication: dysarthria  Memory: Poor immediate recall  Safety awareness/insight to disability: impaired  Coping " skills/emotional control: Anger, Lashes out and Pt began to become agitated when instructed to complete desired tasks throughout session. Pt required redirection throughout session    Visual/perceptual:  Not formally assessed     Physical Exam:  Postural examination/scapula alignment: Rounded shoulder, Head forward and Abnormal trunk flexion  Skin integrity: Visible skin intact, Thin and Dry  Edema: Mild L UE    Sensation:   Intact  light/touch B UEs, however pt did report numbness and tinglinging of B UEs    Upper Extremity Range of Motion:  Right Upper Extremity: WFL  Left Upper Extremity: Deficits:Gross deficit of ROM in all joints with hypertonicity throughout L UE. Shld abd PROM:90*, Shld flex PROM : 80*, Limited wrist flex/ext and.     Upper Extremity Strength:  Right Upper Extremity: grossly 3+/5   Left Upper Extremity: Deficits: grossly 2-/5   Strength: R  strength: 3-/5, L  strength: 2-/5    Fine motor coordination:   Intact  Right hand thumb/finger opposition skills and Impaired  Left hand thumb/finger opposition skills limited ROM due to edema and limted functional FM skills.     Gross motor coordination: impaired L UE and L LE    Functional Mobility:  Bed Mobility:  Scooting/Bridging: Total Assistance (X 2 drawsheet towards HOB )  Supine to Sit: Other (see comments) (Pt found sitting UIC )  Sit to Supine: Moderate Assistance, With leg lift (pt required Mod A for B LE )    Transfers:  Sit <> Stand Assistance: Maximum Assistance (x 2 person assistance, L lateral lean. Pt required v/c's and physical assistance for RW management )  Sit <> Stand Assistive Device: Rolling Walker  Bed <> Chair Technique: Stand Pivot  Bed <> Chair Transfer Assistance: Maximum Assistance (x 2 person assist)  Bed <> Chair Assistive Device: Rolling Walker  Toilet Transfer Assistance: Activity Did not Occur      Activities of Daily Living:  Feeding Level of Assistance: Activity did not occur    UE Dressing Level of  Assistance: Moderate assistance (Pt required assist to thread L UE. Pt able to bring R UE through hole. )    LE Dressing Level of Assistance: Total assistance (Pt unable to perform. Pt became aggitated when instructed to don/doff B  socks )    Grooming Position: EOB  Grooming Level of Assistance: Minimum assistance (Pt performed face washing sitting EOB using R UE to bring washcloth to face.)     Toileting Where Assessed: Bed level  Toileting Level of Assistance: Total assistance (Pt incontinent of bowel. Pt had bowel movement when sitting in bedside chair. Pt required total A to be cleaned. Pt stood wiht RW and x 2 person assist for standing ballance as therapist assisted to clean buttock. )      Balance:   Static Sit: FAIR-: Maintains without assist but inconsistent   Dynamic Sit: FAIR: Cannot move trunk without losing balance  Static Stand: POOR: Needs MODERATE assist to maintain  Dynamic stand: POOR: N/A    Therapeutic Activities and Exercises:  Pt educated by therapist on:   - Pt educated on role of OT, POC, and goals for therapy.     - Importance of OOB ax's with staff member assistance   - Pt completed ADLs and functional mobility for treatment session as noted above   - Pt educated on safety throughout functional transfer training and hand placement when completing functional transfers.   -Pt verbalized understanding. Pt expressed no further concerns/questions.    Pt sat EOB for x10 min with min A progressed to CGA <> SBA with no LOB. Pt stood for ~x1 min when therapist assisted when cleaning pt's buttock after bowel movement. Pt was noted to demo L lateral lean requiring therapist intervention to maintain upright position. Pt required physical assistance for L LE management and wt shifting during stand pivot transfer from  Bedside chair > EOB with RW and max A x 2 person.     AM-PAC 6 CLICK ADL  How much help from another person does this patient currently need?  1 = Unable, Total/Dependent  "Assistance  2 = A lot, Maximum/Moderate Assistance  3 = A little, Minimum/Contact Guard/Supervision  4 = None, Modified [place]/Independent    Putting on and taking off regular lower body clothing? : 1  Bathing (including washing, rinsing, drying)?: 2  Toileting, which includes using toilet, bedpan, or urinal? : 2  Putting on and taking off regular upper body clothing?: 2  Taking care of personal grooming such as brushing teeth?: 2  Eating meals?: 2  Total Score: 11    AM-PAC Raw Score CMS "G-Code Modifier Level of Impairment Assistance   6 % Total / Unable   7 - 9 CM 80 - 100% Maximal Assist   10-14 CL 60 - 80% Moderate Assist   15 - 19 CK 40 - 60% Moderate Assist   20 - 22 CJ 20 - 40% Minimal Assist   23 CI 1-20% SBA / CGA   24 CH 0% Independent/ Mod I       Patient left HOB elevated with all lines intact, call button in reach and RN notified    Assessment:  Julius Cardenas is a 84 y.o. male with a medical diagnosis of Hyperglycemia. Pt tolerated session well though became agitated when instructed to perform specific tasks. Pt used profanity throughout session towards therapist and required redirection throughout session. Pt presents with impairments of the L UE and decreased functional use. Pt stated that he was able to self propel in w/c PTA. Pt presents with the below stated impairments and will benefit from skilled OT in order to maximize pt's safety and (I) with functional mobility and self-care skills and return to PLOF.     Rehab identified problem list/impairments: Rehab identified problem list/impairments: weakness, impaired endurance, impaired self care skills, impaired sensation, impaired functional mobilty, gait instability, impaired balance, impaired cognition, decreased upper extremity function, decreased lower extremity function, decreased safety awareness, decreased ROM, impaired fine motor, edema    Rehab potential is all lines intact, call button in reach and RN notified.    Activity " tolerance: Fair    Discharge recommendations: Discharge Facility/Level Of Care Needs: nursing facility, skilled     Barriers to discharge: Barriers to Discharge: Inaccessible home environment, Decreased caregiver support    Equipment recommendations: bedside commode     GOALS:    Occupational Therapy Goals        Problem: Occupational Therapy Goal    Goal Priority Disciplines Outcome Interventions   Occupational Therapy Goal     OT, PT/OT Ongoing (interventions implemented as appropriate)    Description:  Goals to be met by: 8/28/17     Patient will increase functional independence with ADLs by performing:    Feeding with Set-up Assistance.  UE Dressing with Minimal Assistance.  Grooming while EOB with Set-up Assistance.  Toileting from bedside commode with Stand-by Assistance and Minimal Assistance for hygiene and clothing management.   Supine to sit with Contact Guard Assistance.  Stand pivot transfers with Minimal Assistance.  Toilet transfer to bedside commode with Minimal Assistance.  Upper extremity exercise program x12 reps per handout and visual demonstration, with assistance as needed.                      PLAN:  Patient to be seen 4 x/week to address the above listed problems via self-care/home management, therapeutic activities, therapeutic exercises, neuromuscular re-education  Plan of Care expires: 09/13/17  Plan of Care reviewed with: patient         Tameka MURRAY Brain, OT  08/14/2017

## 2017-08-14 NOTE — ASSESSMENT & PLAN NOTE
-pt required straight cath multiple times overnight with large volume UO  -lindsay placed  -UA ordered  -urology consulted for further evaluation and recommended intermittent cath 4-5x/day as outpatient to maintain bladder volume <400-500mL or d/c with indwelling lindsay  - will d/c with indwelling lindsay

## 2017-08-14 NOTE — ASSESSMENT & PLAN NOTE
- Poorly controlled BP  - BP in ED was 235/107   - he was started on nifedipine and carvedilol in ED.   - BP remained elevated today at 174/77mmHg  - increase coreg dose from 6.25BID to 12.5mg BID   - add lisinopril 2.5mg as patient CKD Stage 4

## 2017-08-14 NOTE — PT/OT/SLP PROGRESS
Physical Therapy  Treatment    Julius Cardenas   MRN: [id number]   Admitting Diagnosis: Hyperglycemia    PT Received On: 08/14/17  PT Start Time: 1018     PT Stop Time: 1046    PT Total Time (min): 28 min       Billable Minutes:  Therapeutic Activity 28    Treatment Type: Treatment  PT/PTA: PT             General Precautions: Standard, fall, seizure  Orthopedic Precautions: N/A   Braces: N/A         Subjective:  Communicated with pt and nurse prior to session.  Pt agreeable to therapy    Pain/Comfort  Pain Rating 1:  (did not rate)  Location - Side 1: Left  Location - Orientation 1: upper  Location 1: arm  Pain Addressed 1: Pre-medicate for activity, Reposition    Objective:   Patient found with: telemetry, lindsay catheter   Pt found sitting in chair at bedside    Functional Mobility:  Bed Mobility:   Scooting/Bridging: Total Assistance, With assist of 2  Sit to Supine: Moderate Assistance, With leg lift   Skilled verbal and tactile instruction provided for proper technique performed with HOB flat    Transfers:  Sit <> Stand Assistance: Moderate Assistance (x2)  Sit <> Stand Assistive Device: Rolling Walker  Bed <> Chair Technique: Stand Pivot  Bed <> Chair Assistance: Maximum Assistance (x2)  Bed <> Chair Assistive Device: Rolling Walker   Skilled verbal and tactile instruction provided for proper technique, maintaining balance during transfer to prevent posterior and L lateral lean and for foot placement to turn, with assist of 2    Gait:   Gait Distance: pt able to take 2-3 steps to turn during transfer  Assistance 1: Maximum assistance, With assist of two  Gait Assistive Device: Rolling walker  Gait Deviation(s): decreased connie, increased time in double stance   Skilled verbal and tactile instruction provided for foot placement and to advance BLE to turn during pivot transfer    Balance:   Static Sit: FAIR+: Able to take MINIMAL challenges from all directions  Dynamic Sit: FAIR+: Maintains balance through MINIMAL  excursions of active trunk motion  Static Stand: 0: Needs MAXIMAL assist to maintain   Dynamic stand: POOR: N/A     Therapeutic Activities and Exercises:  Pt was able to tolerate sitting EOB for approx 10 min with SBA for safety with and without use of BUE for support with ability to accept challenges of reaching min excursions in various directions and performing LE exercise of bilateral hip flexion, LAQ, and ankle pumps x10 each with assist.      Pt was able to tolerate standing with use of RW and max assist approx 2-3 min during toileting hygiene with significant posterior and L lateral lean with skilled verbal and tactile instruction to correct.     AM-PAC 6 CLICK MOBILITY  How much help from another person does this patient currently need?   1 = Unable, Total/Dependent Assistance  2 = A lot, Maximum/Moderate Assistance  3 = A little, Minimum/Contact Guard/Supervision  4 = None, Modified [place]/Independent    Turning over in bed (including adjusting bedclothes, sheets and blankets)?: 3  Sitting down on and standing up from a chair with arms (e.g., wheelchair, bedside commode, etc.): 2  Moving from lying on back to sitting on the side of the bed?: 2  Moving to and from a bed to a chair (including a wheelchair)?: 2  Need to walk in hospital room?: 2  Climbing 3-5 steps with a railing?: 1  Total Score: 12    AM-PAC Raw Score CMS G-Code Modifier Level of Impairment Assistance   6 % Total / Unable   7 - 9 CM 80 - 100% Maximal Assist   10 - 14 CL 60 - 80% Moderate Assist   15 - 19 CK 40 - 60% Moderate Assist   20 - 22 CJ 20 - 40% Minimal Assist   23 CI 1-20% SBA / CGA   24 CH 0% Independent/ Mod I     Patient left supine with call button in reach and nurse notified.    Assessment:  Julius Cardenas is a 84 y.o. male with a medical diagnosis of Hyperglycemia and presents with generalized weakness L side >R side due to hx CVA with impaired gait, balance, and endurance, decreased safety awareness and  complaints of pain in LUE with movement requiring mod to max assist for bed mobility, transfers, and taking a few steps with Rw. Pt demonstrates a significant posterior and L lateral lean while standing but is able to maintain sitting balance for prolonged period of time. Pt did demonstrate episodes of agitation with vulgar language during session but completed or attempted all tasks that was asked of him. Pt will benefit from continued PT treatment to address remaining impairments and to improve functional mobility and independence.     Rehab identified problem list/impairments: Rehab identified problem list/impairments: weakness, impaired endurance, impaired functional mobilty, gait instability, impaired self care skills, impaired balance, impaired cognition, decreased lower extremity function, decreased upper extremity function, pain, decreased safety awareness, edema, impaired fine motor    Rehab potential is fair.    Activity tolerance: Fair    Discharge recommendations: Discharge Facility/Level Of Care Needs: nursing facility, skilled     Barriers to discharge: Barriers to Discharge: Inaccessible home environment, Decreased caregiver support    Equipment recommendations: Equipment Needed After Discharge: bedside commode     GOALS:    Physical Therapy Goals        Problem: Physical Therapy Goal    Goal Priority Disciplines Outcome Goal Variances Interventions   Physical Therapy Goal     PT/OT, PT Ongoing (interventions implemented as appropriate)     Description:  Goals to be met by: 2017    Patient will increase functional independence with mobility by performin. Supine to sit with MInimal Assistance   2. Rolling to Left and Right with Minimal Assistance.  3. Sit to stand transfer with Minimal Assistance  4. Bed to chair transfer with Moderate Assistance using squat pivot  5. Wheelchair propulsion x50 feet with Minimal Assistance using bilateral upper and lower extremities  6. Gait 10' with mod A  using appropriate AD if able                        PLAN:    Patient to be seen 3 x/week  to address the above listed problems via gait training, therapeutic activities, therapeutic exercises, neuromuscular re-education  Plan of Care expires:    Plan of Care reviewed with: patient         Margaux D Arsh, PT  08/14/2017

## 2017-08-14 NOTE — ASSESSMENT & PLAN NOTE
- IDDM since age 18  - Not compliant to insulin use even though he says he is taking it daily - has had repeated admissions related to poorly controlled DM.   -a1c: 8.4 - last done: 7/2017  -Please see hyperglycemia section for insulin regimen     Amy Reyes @ [facility] and [facility]  [phone]    Amy Reyes at [facility] and [facility] wants to verify that Marychuy's metformin is supposed to be decreased to 1 x per day.   She has taken 2 x per day in the past.

## 2017-08-14 NOTE — PLAN OF CARE
"CM spoke with Kamini Cardenas, patient's sister in law. Patient is living with his brother and Kamini BRAMBILA, but she is having a hard time taking care of him, primarily with his incontinence. Patient [facility] recently in [facility] for [facility] care, unsure if he has any days left. KOST[facility] would like to try [facility] if patient has any days and if no days, may need long-term care. Patient previously lived in a group home on [facility] ([phone]) called [facility]. MJ spoke with Astrid, a worker there, about patient's DME. She said his hospital bed is still there (she doesn't know where it came from) but not his wheelchair. She "thinks one of the other old men probably sold it". Will follow.     08/14/17 1146   Discharge Assessment   Assessment Type Discharge Planning Assessment   Confirmed/corrected address and phone number on facesheet? Yes   Assessment information obtained from? Caregiver;Medical Record  (Kamini BRAMBILA)   Communicated expected length of stay with patient/caregiver yes   Prior to hospitilization cognitive status: Not Oriented to Time   Prior to hospitalization functional status: Needs Assistance;Partially Dependent;Assistive Equipment   Current cognitive status: Not Oriented to Time   Current Functional Status: Assistive Equipment;Needs Assistance;Partially Dependent   Arrived From home or self-care   Is patient able to care for self after discharge? No   How many people do you have in your home that can help with your care after discharge? 2   Who are your caregiver(s) and their phone number(s)? Kamini Cardenas, sister in law [phone]   Patient's perception of discharge disposition skilled nursing facility;long-term care facility   Readmission Within The Last 30 Days no previous admission in last 30 days   Patient currently being followed by outpatient case management? No   Patient currently receives home health services? No   Does the patient currently use HME? Yes  (but they are not at " his house, they're at a group home that patient previously lived in for a short time)   Patient currently receives private duty nursing? No   Patient currently receives any other outside agency services? No   Equipment Currently Used at Home bath bench;walker, rolling;hospital bed;wheelchair  (but not at his house)   Do you have any problems affording any of your prescribed medications? No   Is the patient taking medications as prescribed? no   If no, which medications is patient not taking? insulin   Do you have any financial concerns preventing you from receiving the healthcare you need? Yes   Does the patient have transportation to healthcare appointments? Yes   Transportation Available Medicaid transportation;family or friend will provide   On Dialysis? No   Does the patient receive services at the [facility]? No   Are there any open cases? No   Discharge Plan A Skilled Nursing Facility   Discharge Plan B New Nursing Home placement - [facility] care facility   Patient/Family In Agreement With Plan yes

## 2017-08-14 NOTE — ASSESSMENT & PLAN NOTE
- hyperosmolar hyperglycemia  - When he came into ED, had AMS and his BS was so high that it was unmeasurable   - Received 10 U regular insulin in Ed, and BS was measured ~450.   - home insulin regimen: short acting 5/5/5, Long acting 18 daily  - He is not compliant to medications at home  - patient with BG ranging from 198 to 252 overnight   - added 5U detemir to his 10U for better coverage   - changed aspart to home regimen of 5/5/5 with meals and increase nightly detemir tonight for 13units   - will continue to monitor BG and adjust regimen as needed

## 2017-08-14 NOTE — PLAN OF CARE
Problem: Physical Therapy Goal  Goal: Physical Therapy Goal  Goals to be met by: 2017    Patient will increase functional independence with mobility by performin. Supine to sit with MInimal Assistance   2. Rolling to Left and Right with Minimal Assistance.  3. Sit to stand transfer with Minimal Assistance  4. Bed to chair transfer with Moderate Assistance using squat pivot  5. Wheelchair propulsion x50 feet with Minimal Assistance using bilateral upper and lower extremities  6. Gait 10' with mod A using appropriate AD if able      Outcome: Ongoing (interventions implemented as appropriate)  Goals remain appropriate

## 2017-08-15 LAB
ANION GAP SERPL CALC-SCNC: 11 MMOL/L
BASOPHILS # BLD AUTO: 0.01 K/UL
BASOPHILS NFR BLD: 0.2 %
BUN SERPL-MCNC: 36 MG/DL
CALCIUM SERPL-MCNC: 8.2 MG/DL
CHLORIDE SERPL-SCNC: 113 MMOL/L
CO2 SERPL-SCNC: 17 MMOL/L
CREAT SERPL-MCNC: 2.6 MG/DL
DIFFERENTIAL METHOD: ABNORMAL
EOSINOPHIL # BLD AUTO: 0.2 K/UL
EOSINOPHIL NFR BLD: 3.3 %
ERYTHROCYTE [DISTWIDTH] IN BLOOD BY AUTOMATED COUNT: 13.5 %
EST. GFR  (AFRICAN AMERICAN): 25.1 ML/MIN/1.73 M^2
EST. GFR  (NON AFRICAN AMERICAN): 21.7 ML/MIN/1.73 M^2
GLUCOSE SERPL-MCNC: 192 MG/DL
HCT VFR BLD AUTO: 28.6 %
HGB BLD-MCNC: 9.7 G/DL
LYMPHOCYTES # BLD AUTO: 1.8 K/UL
LYMPHOCYTES NFR BLD: 30.5 %
MCH RBC QN AUTO: 29.4 PG
MCHC RBC AUTO-ENTMCNC: 33.9 G/DL
MCV RBC AUTO: 87 FL
MONOCYTES # BLD AUTO: 0.3 K/UL
MONOCYTES NFR BLD: 5.3 %
NEUTROPHILS # BLD AUTO: 3.5 K/UL
NEUTROPHILS NFR BLD: 60.5 %
PLATELET # BLD AUTO: 144 K/UL
PMV BLD AUTO: 11.8 FL
POCT GLUCOSE: 110 MG/DL (ref 70–110)
POCT GLUCOSE: 135 MG/DL (ref 70–110)
POCT GLUCOSE: 224 MG/DL (ref 70–110)
POCT GLUCOSE: 246 MG/DL (ref 70–110)
POCT GLUCOSE: 68 MG/DL (ref 70–110)
POTASSIUM SERPL-SCNC: 4.2 MMOL/L
RBC # BLD AUTO: 3.3 M/UL
SODIUM SERPL-SCNC: 141 MMOL/L
WBC # BLD AUTO: 5.84 K/UL

## 2017-08-15 PROCEDURE — 80048 BASIC METABOLIC PNL TOTAL CA: CPT

## 2017-08-15 PROCEDURE — 25000003 PHARM REV CODE 250: Performed by: STUDENT IN AN ORGANIZED HEALTH CARE EDUCATION/TRAINING PROGRAM

## 2017-08-15 PROCEDURE — 99232 SBSQ HOSP IP/OBS MODERATE 35: CPT | Mod: GC,,, | Performed by: HOSPITALIST

## 2017-08-15 PROCEDURE — 36415 COLL VENOUS BLD VENIPUNCTURE: CPT

## 2017-08-15 PROCEDURE — 63600175 PHARM REV CODE 636 W HCPCS: Performed by: STUDENT IN AN ORGANIZED HEALTH CARE EDUCATION/TRAINING PROGRAM

## 2017-08-15 PROCEDURE — 85025 COMPLETE CBC W/AUTO DIFF WBC: CPT

## 2017-08-15 PROCEDURE — 20600001 HC STEP DOWN PRIVATE ROOM

## 2017-08-15 RX ORDER — INSULIN ASPART 100 [IU]/ML
3 INJECTION, SOLUTION INTRAVENOUS; SUBCUTANEOUS
Status: DISCONTINUED | OUTPATIENT
Start: 2017-08-16 | End: 2017-08-16 | Stop reason: HOSPADM

## 2017-08-15 RX ORDER — ACETAMINOPHEN 325 MG/1
650 TABLET ORAL EVERY 6 HOURS PRN
Status: DISCONTINUED | OUTPATIENT
Start: 2017-08-15 | End: 2017-08-16 | Stop reason: HOSPADM

## 2017-08-15 RX ORDER — ACETAMINOPHEN 325 MG/1
650 TABLET ORAL EVERY 8 HOURS PRN
Status: DISCONTINUED | OUTPATIENT
Start: 2017-08-15 | End: 2017-08-15

## 2017-08-15 RX ORDER — INSULIN ASPART 100 [IU]/ML
5 INJECTION, SOLUTION INTRAVENOUS; SUBCUTANEOUS
Status: DISCONTINUED | OUTPATIENT
Start: 2017-08-15 | End: 2017-08-15

## 2017-08-15 RX ADMIN — INSULIN ASPART 5 UNITS: 100 INJECTION, SOLUTION INTRAVENOUS; SUBCUTANEOUS at 01:08

## 2017-08-15 RX ADMIN — CARVEDILOL 12.5 MG: 6.25 TABLET, FILM COATED ORAL at 09:08

## 2017-08-15 RX ADMIN — HEPARIN SODIUM 5000 UNITS: 5000 INJECTION, SOLUTION INTRAVENOUS; SUBCUTANEOUS at 02:08

## 2017-08-15 RX ADMIN — NIFEDIPINE 90 MG: 30 TABLET, FILM COATED, EXTENDED RELEASE ORAL at 09:08

## 2017-08-15 RX ADMIN — HEPARIN SODIUM 5000 UNITS: 5000 INJECTION, SOLUTION INTRAVENOUS; SUBCUTANEOUS at 09:08

## 2017-08-15 RX ADMIN — ASPIRIN 81 MG: 81 TABLET, COATED ORAL at 09:08

## 2017-08-15 RX ADMIN — LEVETIRACETAM 1000 MG: 500 TABLET, FILM COATED ORAL at 09:08

## 2017-08-15 RX ADMIN — LISINOPRIL 2.5 MG: 2.5 TABLET ORAL at 09:08

## 2017-08-15 RX ADMIN — PAROXETINE HYDROCHLORIDE 10 MG: 10 TABLET, FILM COATED ORAL at 09:08

## 2017-08-15 RX ADMIN — HEPARIN SODIUM 5000 UNITS: 5000 INJECTION, SOLUTION INTRAVENOUS; SUBCUTANEOUS at 07:08

## 2017-08-15 RX ADMIN — MICONAZOLE NITRATE: 20 CREAM TOPICAL at 09:08

## 2017-08-15 RX ADMIN — INSULIN DETEMIR 10 UNITS: 100 INJECTION, SOLUTION SUBCUTANEOUS at 09:08

## 2017-08-15 RX ADMIN — INSULIN ASPART 1 UNITS: 100 INJECTION, SOLUTION INTRAVENOUS; SUBCUTANEOUS at 11:08

## 2017-08-15 RX ADMIN — INSULIN ASPART 5 UNITS: 100 INJECTION, SOLUTION INTRAVENOUS; SUBCUTANEOUS at 08:08

## 2017-08-15 RX ADMIN — INSULIN ASPART 2 UNITS: 100 INJECTION, SOLUTION INTRAVENOUS; SUBCUTANEOUS at 08:08

## 2017-08-15 RX ADMIN — MICONAZOLE NITRATE: 20 CREAM TOPICAL at 01:08

## 2017-08-15 NOTE — PLAN OF CARE
DAVID followed up with Akira at [facility].  She stated that she is awtg an answer from her DON and will call DAVID when she gets it.    Macrina Quevedo LCSW     [phone]  Critical Care (MICU)

## 2017-08-15 NOTE — ASSESSMENT & PLAN NOTE
- Poorly controlled BP  - BP in ED was 235/107   - he was started on nifedipine and carvedilol in ED.   - BP remained elevated today at 178/81mmHg  - US for WILLY showed no evidence of stenosis   - increased coreg dose from 6.25BID to 12.5mg BID   - added lisinopril 2.5mg as patient CKD Stage 4  - continue to closely monitor BP and assess for further need for changes

## 2017-08-15 NOTE — ASSESSMENT & PLAN NOTE
-pt required straight cath multiple times overnight with large volume UO  -lindsay placed  -UA ordered  -urology consulted for further evaluation and recommended intermittent cath 4-5x/day as outpatient to maintain bladder volume <400-500mL or d/c with indwelling lindsay  - will d/c to nursing home with indwelling lindsay

## 2017-08-15 NOTE — PLAN OF CARE
SW spoke to Akira with [facility] ([phone]).  Akira stated that she needs to discuss this case with DON and call the family, as pt only has 17 SNF days.  SW explained that family is aware that pt may be running out of days.  Pt would benefit from jail placement, as pt does not have appropriate resources for care at home.    [facility] will call SW with a decision.    Macrina Quevedo LCSW     [phone]  Critical Care (MICU)

## 2017-08-15 NOTE — PROGRESS NOTES
"[facility] Hospital Medicine  Progress Note    Patient Name: Julius Cardenas  MRN: [id number]  Patient Class: IP- Inpatient   Admission Date: 8/11/2017  Length of Stay: 4 days  Attending Physician: Kristine Amin*  Primary Care Provider: Mady Carson MD    [place] [facility] [place] 3 Teodora George MD    Subjective:     Principal Problem:Hyperglycemia    HPI:   84 year old with HTN, type 1 DM, CKD-4, seizure disorder on Keppra. CVA. [facility] with [facility]. He is very poor historian. I didn't see any documents with him.   He had change in mental status with confusion and agitation today. His BS was measured and it was so high it couldn't be measured in  and in ED. His BP was very high up to 235/107 in ED. He was agitated and refusing treatment. He has frequent admissions to the ED with uncontrolled DM, with both hyper and hypoglycemia.  He admits to be confused and feeling weak earlier today. He states " I lost strength and that doesn't come back quickly", " I am sick about the job", " I am in a bad shape", I am scared to be locked up", " I see demons in the corners, I don't talk to them, the don't know me".   He is feeling cold and SOB. He can't walk? Didn't try to walk. He denies any seizures.   He says he is taking his medications every day including his insulin. He denies having HTN or seizure disorder.  He says he has 7 or 8 children and he is not . He used to smoke for 5-6 years. Drinks alcohol occasionally. Denies drug abuse.     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    Hospital Course:  8/12- " Pt admitted to internal medicine.  8/13 - Patient hypoglycemic overnight with BG in 30s. Improved to 118 with dextrose and glucose tabs. Pt asymptomatic throughout.   8/14 -  He had no episodes of hypoglycemia overnight but ranges in BG from 198 to 252 - given an extra dose of long-acting detemir 5unit for this episode. Patient BP has remained elevated, so increased home dose of coreg to 12.5 and added lisinopril 2.5 in addition as patient is also CKD Stage 4.     Interval History:   Patient seen and examined this morning. He is oriented to person and place but not to time. He has no new complaints and states he will try to eat more of his meal today.   Patient had episode of hypogylcemia yesterday evening to 55 and evening dose of aspart 5U was held. Patient was asymptomatic. Patient ate about 50% of dinner.   Nightly detemir was not given by nurse for unknown reason and morning glucose at 224. Giving 10U detemir this Am.   Patient also continues to have elevated Bps and yesterday carvedilol was increased to 12.5mg and lisinopril 2.5mg was added as patient is also CKD4.     Review of Systems   Constitutional: Negative for activity change, appetite change, chills, fatigue and fever.   HENT: Negative for congestion and sore throat.    Respiratory: Negative for cough, chest tightness and shortness of breath.    Cardiovascular: Negative for chest pain and leg swelling.   Gastrointestinal: Negative for abdominal distention, abdominal pain, constipation, diarrhea, nausea and vomiting.   Endocrine: Negative for polydipsia, polyphagia and polyuria.   Genitourinary: Negative for dysuria and urgency.   Musculoskeletal: Negative for arthralgias and myalgias.   Skin: Negative for color change and pallor.   Neurological: Negative for dizziness and weakness.     Objective:     Vital Signs (Most Recent):  Temp: 97.6 °F (36.4 °C) (08/15/17 0850)  Pulse: 67 (08/15/17 0850)  Resp: 16 (08/15/17 0850)  BP: (!) 178/81 (08/15/17  0850)  SpO2: 99 % (08/15/17 0850) Vital Signs (24h Range):  Temp:  [97.6 °F (36.4 °C)-98.4 °F (36.9 °C)] 97.6 °F (36.4 °C)  Pulse:  [67-85] 67  Resp:  [16-22] 16  SpO2:  [99 %-100 %] 99 %  BP: (156-179)/(68-86) 178/81     Weight: 69.4 kg (152 lb 14.4 oz)  Body mass index is 20.17 kg/m².    Intake/Output Summary (Last 24 hours) at 08/15/17 0935  Last data filed at 08/15/17 0500   Gross per 24 hour   Intake                0 ml   Output             2000 ml   Net            -2000 ml      Physical Exam   Constitutional: He appears well-developed and well-nourished. No distress.   Cachetic, but not distressed   HENT:   Head: Normocephalic and atraumatic.   Eyes: EOM are normal. Pupils are equal, round, and reactive to light.   Neck: Normal range of motion. Neck supple.   Cardiovascular: Normal rate and regular rhythm.    Pulmonary/Chest: Effort normal and breath sounds normal. No respiratory distress. He has no wheezes.   Abdominal: Soft. Bowel sounds are normal. He exhibits no distension. There is no tenderness.   Musculoskeletal: Normal range of motion. He exhibits no edema.   Neurological: He is alert.   Oriented to person and place but not time   Skin: Skin is warm and dry. He is not diaphoretic.     Significant Labs:   A1C:   Recent Labs  Lab 04/04/17  0816 06/13/17  0539 07/21/17  1155   HGBA1C 8.9* 9.5* 8.4*     Bilirubin:   Recent Labs  Lab 07/21/17  1155 08/11/17  1836   BILITOT 0.4 0.3     BMP:   Recent Labs  Lab 08/15/17  0512   *      K 4.2   *   CO2 17*   BUN 36*   CREATININE 2.6*   CALCIUM 8.2*     CBC:   Recent Labs  Lab 08/13/17  1212 08/14/17  0904 08/15/17  0512   WBC 5.12 6.76 5.84   HGB 11.5* 11.3* 9.7*   HCT 34.2* 32.6* 28.6*    156 144*     CMP:   Recent Labs  Lab 08/14/17  0904 08/14/17  1254 08/15/17  0512    141 141   K 5.1 5.4* 4.2   * 114* 113*   CO2 17* 13* 17*   * 201* 192*   BUN 36* 36* 36*   CREATININE 2.9* 2.9* 2.6*   CALCIUM 8.6* 8.4* 8.2*    ANIONGAP 13 14 11   EGFRNONAA 19.0* 19.0* 21.7*     POCT Glucose:   Recent Labs  Lab 08/14/17  1832 08/14/17  2202 08/15/17  0738   POCTGLUCOSE 71 123* 224*   TSH:   Recent Labs  Lab 07/21/17  1155   TSH 1.617     Urine Studies:   Recent Labs  Lab 08/13/17  1104   COLORU Yellow   APPEARANCEUA Clear   PHUR 5.0   SPECGRAV 1.010   PROTEINUA 1+*   GLUCUA 1+*   KETONESU Negative   BILIRUBINUA Negative   OCCULTUA 3+*   NITRITE Negative   UROBILINOGEN Negative   LEUKOCYTESUR Negative   RBCUA 83*   WBCUA 3   BACTERIA Rare   SQUAMEPITHEL 0   HYALINECASTS 1         Assessment/Plan:      Urinary retention    -pt required straight cath multiple times overnight with large volume UO  -lindsay placed  -UA ordered  -urology consulted for further evaluation and recommended intermittent cath 4-5x/day as outpatient to maintain bladder volume <400-500mL or d/c with indwelling lindsay  - will d/c to nursing home with indwelling lindsay         Malnutrition of moderate degree    - encourage increased PO intake          Renovascular hypertension    - Poorly controlled BP  - BP in ED was 235/107   - he was started on nifedipine and carvedilol in ED.   - BP remained elevated today at 178/81mmHg  - US for WILLY showed no evidence of stenosis   - increased coreg dose from 6.25BID to 12.5mg BID   - added lisinopril 2.5mg as patient CKD Stage 4  - continue to closely monitor BP and assess for further need for changes         Secondary seizure disorder    - patient had meningioma-related seizure disorder.   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   -A1c: 8.4 - last done: 7/2017  -Please see hyperglycemia section for insulin regimen        * Hyperglycemia    - hyperosmolar hyperglycemia  - When he came into ED,  had AMS and his BS was so high that it was unmeasurable   - Received 10 U regular insulin in Ed, and BS was measured ~450.   - home insulin regimen: short acting 5/5/5, Long acting 18 daily  - He is not compliant to medications at home  - patient with BG ranging from 63 to 224 overnight   - patient with episode of hypoglycemia last afternoon - held dinner aspart dose   -  this morning  - changed aspart to home regimen of 5/5/5 with meals with instructions to hold dose if Bg< 70 as patient has variable oral intake   - patient also has sliding scale aspart to give if needed   - will continue to monitor BG and adjust regimen as needed           VTE Risk Mitigation         Ordered     heparin (porcine) injection 5,000 Units  Every 8 hours     Route:  Subcutaneous        08/11/17 2259     Place SRINIVAS hose  Until discontinued      08/11/17 2259     Place sequential compression device  Until discontinued      08/11/17 2259     Medium Risk of VTE  Once      08/11/17 2259        Dispo: awaiting placement at nursing home     Teodora George MD  Department of Hospital Medicine   [facility]

## 2017-08-15 NOTE — MEDICAL/APP STUDENT
"[facility] Hospital Medicine  Progress Note    Patient Name: Julius Cardenas  MRN: [id number]  Patient Class: IP- Inpatient   Admission Date: 8/11/2017  Length of Stay: 4 days  Attending Physician: Kristine Amin*  Primary Care Provider: Mady Carson MD    [facility] Medicine Team: [facility] [place] [facility] 3 Pretty Michael    Subjective:     Principal Problem:Hyperglycemia    HPI: 84 year old with HTN, type 1 DM, CKD-4, seizure disorder on Keppra. CVA. [facility] with [facility]MS. He is very poor historian. I didn't see any documents with him.   He had change in mental status with confusion and agitation today. His BS was measured and it was so high it couldn't be measured in  and in ED. His BP was very high up to 235/107 in ED. He was agitated and refusing treatment. He has frequent admissions to the ED with uncontrolled DM, with both hyper and hypoglycemia.  He admits to be confused and feeling weak earlier today. He states " I lost strength and that doesn't come back quickly", " I am sick about the job", " I am in a bad shape", I am scared to be locked up", " I see demons in the corners, I don't talk to them, the don't know me".   He is feeling cold and SOB. He can't walk? Didn't try to walk. He denies any seizures.   He says he is taking his medications every day including his insulin. He denies having HTN or seizure disorder.  He says he has 7 or 8 children and he is not . He used to smoke for 5-6 years. Drinks alcohol occasionally. Denies drug abuse.      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    Hospital Course: " ***    Interval History: ***    Review of Systems  Objective:     Vital Signs (Most Recent):  Temp: 97.6 °F (36.4 °C) (08/15/17 0850)  Pulse: 67 (08/15/17 0850)  Resp: 16 (08/15/17 0850)  BP: (!) 178/81 (08/15/17 0850)  SpO2: 99 % (08/15/17 0850) Vital Signs (24h Range):  Temp:  [97.6 °F (36.4 °C)-98.4 °F (36.9 °C)] 97.6 °F (36.4 °C)  Pulse:  [67-85] 67  Resp:  [16-22] 16  SpO2:  [99 %-100 %] 99 %  BP: (156-179)/(68-86) 178/81     Weight: 69.4 kg (152 lb 14.4 oz)  Body mass index is 20.17 kg/m².    Intake/Output Summary (Last 24 hours) at 08/15/17 0918  Last data filed at 08/15/17 0500   Gross per 24 hour   Intake                0 ml   Output             2000 ml   Net            -2000 ml      Physical Exam    Significant Labs:   A1C:   Recent Labs  Lab 04/04/17  0816 06/13/17  0539 07/21/17  1155   HGBA1C 8.9* 9.5* 8.4*       Recent Labs  Lab 08/15/17  0512   *      K 4.2   *   CO2 17*   BUN 36*   CREATININE 2.6*   CALCIUM 8.2*     CBC:   Recent Labs  Lab 08/13/17  1212 08/14/17  0904 08/15/17  0512   WBC 5.12 6.76 5.84   HGB 11.5* 11.3* 9.7*   HCT 34.2* 32.6* 28.6*    156 144*     CMP:   Recent Labs  Lab 08/14/17  0904 08/14/17  1254 08/15/17  0512    141 141   K 5.1 5.4* 4.2   * 114* 113*   CO2 17* 13* 17*   * 201* 192*   BUN 36* 36* 36*   CREATININE 2.9* 2.9* 2.6*   CALCIUM 8.6* 8.4* 8.2*   ANIONGAP 13 14 11   EGFRNONAA 19.0* 19.0* 21.7*     Significant Imaging: I have reviewed all pertinent imaging results/findings within the past 24 hours.  I have reviewed and interpreted all pertinent imaging results/findings within the past 24 hours.    Assessment/Plan:      Urinary retention     -pt required straight cath multiple times overnight with large volume UO  -lindsay placed  -UA ordered  -urology consulted for further evaluation and recommended intermittent cath 4-5x/day as outpatient to maintain bladder volume <400-500mL or d/c with indwelling lindsay  - will d/c with  indwelling lindsay        Renovascular hypertension     - Poorly controlled BP  - BP in ED was 235/107   - he was started on nifedipine and carvedilol in ED.   - BP remained elevated today at 174/77mmHg  - increase coreg dose from 6.25BID to 12.5mg BID   - add lisinopril 2.5mg as patient CKD Stage 4       Secondary seizure disorder     - He had meningioma-related seizure disorder.   - Meningioma was resected.  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   -a1c: 8.4 - last done: 7/2017  -Please see hyperglycemia section for insulin regimen       * Hyperglycemia     - hyperosmolar hyperglycemia  - When he came into ED, had AMS and his BS was so high that it was unmeasurable   - Received 10 U regular insulin in Ed, and BS was measured ~450.   - home insulin regimen: short acting 5/5/5, Long acting 18 daily  - He is not compliant to medications at home  - patient with BG ranging from 198 to 252 overnight   - added 5U detemir to his 10U for better coverage   - changed aspart to home regimen of 5/5/5 with meals and increase nightly detemir tonight for 13units   - will continue to monitor BG and adjust regimen as needed            Active Diagnoses:    Diagnosis Date Noted POA    PRINCIPAL PROBLEM:  Hyperglycemia [R73.9] 06/13/2017 Yes    Urinary retention [R33.9] 08/13/2017 Yes    Malnutrition of moderate degree [E44.0] 02/16/2017 Yes    Renovascular hypertension [I15.0] 08/31/2013 Yes    Secondary seizure disorder [G40.909] 02/01/2013 Yes     Chronic    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

## 2017-08-15 NOTE — ASSESSMENT & PLAN NOTE
- patient had meningioma-related seizure disorder.   - s/p Meningioma resection  - continue home dose keppra

## 2017-08-15 NOTE — PLAN OF CARE
Problem: Patient Care Overview  Goal: Plan of Care Review  Outcome: Ongoing (interventions implemented as appropriate)  Pt eating well. Glucoses monitored. Insulin given as ordered. Supper insulin held due to glu=68 pre-supper. Pt wearing non-skid socks. Sat in chair all AM. Tolerated well.  Skin intact. Afebrile. Awaiting placement for discharge.

## 2017-08-15 NOTE — PLAN OF CARE
Problem: Patient Care Overview  Goal: Plan of Care Review  Outcome: Ongoing (interventions implemented as appropriate)  Pt continues to be confused. Oriented only to self.  Plan of care reviewed with care taker Kamini. She verbalized understanding. Pt alert and conversing. Pt free from falls, injuries and trauma.  Pt free from skin breakdown.  Pt VSS and in NAD.  Pt afebrile.  Pt had no complaints of SOB, N/V or CP.  Pt had no complaints of pain. Monzon intact and jaspal care done. Adequate UOP this shift. 2 BM this shift. BG WDL no sliding scale coverage needed. Nighttime dose of 13 u detemir given. Pt tolerated apple sauce and juice. Pt had uneventful evening. Pt in low locked bed with personal items and call light within reach. Fall precautions maintained.

## 2017-08-15 NOTE — SUBJECTIVE & OBJECTIVE
Interval History:   Patient seen and examined this morning. He is oriented to person and place but not to time. He has no new complaints and states he will try to eat more of his meal today.   Patient had episode of hypogylcemia yesterday evening to 55 and evening dose of aspart 5U was held. Patient was asymptomatic. Patient ate about 50% of dinner.   Nightly detemir was not given by nurse for unknown reason and morning glucose at 224. Giving 10U detemir this Am.   Patient also continues to have elevated Bps and yesterday carvedilol was increased to 12.5mg and lisinopril 2.5mg was added as patient is also CKD4.     Review of Systems   Constitutional: Negative for activity change, appetite change, chills, fatigue and fever.   HENT: Negative for congestion and sore throat.    Respiratory: Negative for cough, chest tightness and shortness of breath.    Cardiovascular: Negative for chest pain and leg swelling.   Gastrointestinal: Negative for abdominal distention, abdominal pain, constipation, diarrhea, nausea and vomiting.   Endocrine: Negative for polydipsia, polyphagia and polyuria.   Genitourinary: Negative for dysuria and urgency.   Musculoskeletal: Negative for arthralgias and myalgias.   Skin: Negative for color change and pallor.   Neurological: Negative for dizziness and weakness.     Objective:     Vital Signs (Most Recent):  Temp: 97.6 °F (36.4 °C) (08/15/17 0850)  Pulse: 67 (08/15/17 0850)  Resp: 16 (08/15/17 0850)  BP: (!) 178/81 (08/15/17 0850)  SpO2: 99 % (08/15/17 0850) Vital Signs (24h Range):  Temp:  [97.6 °F (36.4 °C)-98.4 °F (36.9 °C)] 97.6 °F (36.4 °C)  Pulse:  [67-85] 67  Resp:  [16-22] 16  SpO2:  [99 %-100 %] 99 %  BP: (156-179)/(68-86) 178/81     Weight: 69.4 kg (152 lb 14.4 oz)  Body mass index is 20.17 kg/m².    Intake/Output Summary (Last 24 hours) at 08/15/17 0936  Last data filed at 08/15/17 0500   Gross per 24 hour   Intake                0 ml   Output             2000 ml   Net             -2000 ml      Physical Exam   Constitutional: He appears well-developed and well-nourished. No distress.   Cachetic, but not distressed   HENT:   Head: Normocephalic and atraumatic.   Eyes: EOM are normal. Pupils are equal, round, and reactive to light.   Neck: Normal range of motion. Neck supple.   Cardiovascular: Normal rate and regular rhythm.    Pulmonary/Chest: Effort normal and breath sounds normal. No respiratory distress. He has no wheezes.   Abdominal: Soft. Bowel sounds are normal. He exhibits no distension. There is no tenderness.   Musculoskeletal: Normal range of motion. He exhibits no edema.   Neurological: He is alert.   Oriented to person and place but not time   Skin: Skin is warm and dry. He is not diaphoretic.     Significant Labs:   A1C:   Recent Labs  Lab 04/04/17  0816 06/13/17  0539 07/21/17  1155   HGBA1C 8.9* 9.5* 8.4*     Bilirubin:   Recent Labs  Lab 07/21/17  1155 08/11/17  1836   BILITOT 0.4 0.3     BMP:   Recent Labs  Lab 08/15/17  0512   *      K 4.2   *   CO2 17*   BUN 36*   CREATININE 2.6*   CALCIUM 8.2*     CBC:   Recent Labs  Lab 08/13/17  1212 08/14/17  0904 08/15/17  0512   WBC 5.12 6.76 5.84   HGB 11.5* 11.3* 9.7*   HCT 34.2* 32.6* 28.6*    156 144*     CMP:   Recent Labs  Lab 08/14/17  0904 08/14/17  1254 08/15/17  0512    141 141   K 5.1 5.4* 4.2   * 114* 113*   CO2 17* 13* 17*   * 201* 192*   BUN 36* 36* 36*   CREATININE 2.9* 2.9* 2.6*   CALCIUM 8.6* 8.4* 8.2*   ANIONGAP 13 14 11   EGFRNONAA 19.0* 19.0* 21.7*     POCT Glucose:   Recent Labs  Lab 08/14/17  1832 08/14/17  2202 08/15/17  0738   POCTGLUCOSE 71 123* 224*   TSH:   Recent Labs  Lab 07/21/17  1155   TSH 1.617     Urine Studies:   Recent Labs  Lab 08/13/17  1104   COLORU Yellow   APPEARANCEUA Clear   PHUR 5.0   SPECGRAV 1.010   PROTEINUA 1+*   GLUCUA 1+*   KETONESU Negative   BILIRUBINUA Negative   OCCULTUA 3+*   NITRITE Negative   UROBILINOGEN Negative   LEUKOCYTESUR  Negative   RBCUA 83*   WBCUA 3   BACTERIA Rare   SQUAMEPITHEL 0   HYALINECASTS 1

## 2017-08-15 NOTE — ASSESSMENT & PLAN NOTE
- hyperosmolar hyperglycemia  - When he came into ED, had AMS and his BS was so high that it was unmeasurable   - Received 10 U regular insulin in Ed, and BS was measured ~450.   - home insulin regimen: short acting 5/5/5, Long acting 18 daily  - He is not compliant to medications at home  - patient with BG ranging from 198 to 252 overnight   - patient with episode of hypoglycemia last afternoon - held dinner aspart dose and nurse held nightly detemir without notice of reason  -  this morning, so gave 10U detemir this morning   - changed aspart to home regimen of 5/5/5 with meals with instructions to hold dose if Bg< 70 as patient has variable oral intake   - patient also has sliding scale aspart   - will continue to monitor BG and adjust regimen as needed

## 2017-08-15 NOTE — PT/OT/SLP PROGRESS
"Occupational Therapy  Treatment    Julius Cardenas   MRN: [id number]   Admitting Diagnosis: Hyperglycemia    OT Date of Treatment: 08/15/17   OT Start Time: 1347  OT Stop Time: 1414  OT Total Time (min): 27 min    Billable Minutes:  Self Care/Home Management 13 and Therapeutic Activity 14    General Precautions: Standard, fall, seizure  Orthopedic Precautions: N/A  Braces: N/A    Do you have any cultural, spiritual, Jew conflicts, given your current situation?: None stated     Subjective:  Communicated with RN prior to session.  " Why are you trying to make me do this, you get paid to do this for me" Pt using profanity when agitated with therapist instructing pt to perform task specific ax's.   Pain/Comfort  Pain Rating 1:  (pt did not state)  Pain Rating Post-Intervention 1:  (pt did not state numerical value )    Objective:  Patient found with: telemetry, lindsay catheter   Pt found sitting UIC leaning towards L side of chair. Pt agreed to participate in therapy session.      Functional Mobility:  Bed Mobility:  Scooting/Bridging: Maximum Assistance, With assist of 2 (drawsheet x 2 towards HOB. Max A to scoot towards EOB )  Supine to Sit:  (Pt found sitting UIC )  Sit to Supine: With leg lift, Moderate Assistance    Transfers:   Sit <> Stand Assistance: Maximum Assistance (x 2 person assist, L lateral lean with RW. Poor safety awareness. Pt required physical assist to wt shift to R LE)  Sit <> Stand Assistive Device: Rolling Walker  Bed <> Chair Technique: Stand Pivot  Bed <> Chair Transfer Assistance: Maximum Assistance (X 2 person assist due to left sided weakness and decreased safety awareness )  Bed <> Chair Assistive Device: Rolling Walker  Toilet Transfer Assistance: Activity Did not Occur      Activities of Daily Living:  Feeding Level of Assistance: Activity did not occur    UE Dressing Level of Assistance: Maximum assistance (Pt required max A to thread B UE through holes. When pt was instructed to use R " "UE to pull up left side of gown, pt began angry with therapist and began cursing at therapist stating "You get paid to do this for me". )    LE Dressing Level of Assistance: Total assistance (to don/doff B  socks. Pt became agitated and anger when instructed to assist with LB dressing )    Grooming Position: bedside chair  Grooming Level of Assistance: Minimum assistance (washed face, min A for task follow through and redirection to task. )     Toileting Where Assessed: Other (Comment) (Pt had bowel movement when sitting in bedside chair. Therapist and tech asssited pt to standing position as pt was cleanned. )  Toileting Level of Assistance:  (Dependent)        Balance:   Static Sit: POOR+: Needs MINIMAL assist to maintain - lateral lean to L, able to maintain static sitting balance at times inconsistently  Dynamic Sit: POOR: N/A  Static Stand: 0: Needs MAXIMAL assist to maintain     Therapeutic Activities and Exercises:  Pt educated by therapist on:   - Pt educated on role of OT, POC, and goals for therapy.     - Importance of OOB ax's with staff member assistance   - Pt completed ADLs and functional mobility for treatment session as noted above   - Pt educated on safety throughout functional transfer training and hand placement when completing functional transfers  - Pt performed sit <> Stand transfer with max A using RW. Pt required physical assist to maintain static standing balance for x 2 min with noted lateral lean to L. Therapist provided verbal cues to wt shift to R LE and return to midline. Pt demo'd poor safety awareness and began using profanity towards therapist when standing. Pt then transferred from chair to bed with max A, requiring physical assist to maneuver RW and physical assist to guide L LE towards EOB. Pt required verbal cues for hand positioning from stand > sit position.   - Sitting EOB, therapist provided PROM of the L UE in all available planes in order to promote improved functional " "ROM and flexibility of the L UE.        AM-PAC 6 CLICK ADL   How much help from another person does this patient currently need?   1 = Unable, Total/Dependent Assistance  2 = A lot, Maximum/Moderate Assistance  3 = A little, Minimum/Contact Guard/Supervision  4 = None, Modified [place]/Independent    Putting on and taking off regular lower body clothing? : 1  Bathing (including washing, rinsing, drying)?: 2  Toileting, which includes using toilet, bedpan, or urinal? : 2  Putting on and taking off regular upper body clothing?: 2  Taking care of personal grooming such as brushing teeth?: 2  Eating meals?: 2  Total Score: 11     AM-PAC Raw Score CMS "G-Code Modifier Level of Impairment Assistance   6 % Total / Unable   7 - 8 CM 80 - 100% Maximal Assist   9-13 CL 60 - 80% Moderate Assist   14 - 19 CK 40 - 60% Moderate Assist   20 - 22 CJ 20 - 40% Minimal Assist   23 CI 1-20% SBA / CGA   24 CH 0% Independent/ Mod I       Patient left HOB elevated with L UE elevated and supported in functional position and hand towel placed in L hand in order to prevent contractures with call button in reach and RN notified    ASSESSMENT:  Julius Cardenas is a 84 y.o. male with a medical diagnosis of Hyperglycemia. Pt tolerated session fair with moderate redirection to desired tasks throughout session. Pt presents with decreased functional mobility, decreased self-care skills, decreased functional ROM/strength of the L UE/LE, and decreased safety awareness. Pt required max A x 2 person assist for stand pivot transfers with RW with noted L lateral lean due to L sided weakness an decreased functional strength. Therapist provided PROM of the L UE in order to facilitate improved ROM and flexibility and contracture prevention. Pt will continue to benefit from skilled OT in order to maximize pt's safety and (I) with functional mobility, self-care skills and functional task in dynamic planes.     Rehab identified problem " list/impairments: Rehab identified problem list/impairments: weakness, impaired endurance, impaired self care skills, impaired functional mobilty, gait instability, impaired balance, impaired cognition, decreased coordination, decreased upper extremity function, decreased lower extremity function, decreased safety awareness, pain, decreased ROM, impaired joint extensibility    Rehab potential is good.    Activity tolerance: Fair    Discharge recommendations: Discharge Facility/Level Of Care Needs: nursing facility, skilled     Barriers to discharge: Barriers to Discharge: Inaccessible home environment, Decreased caregiver support    Equipment recommendations: bedside commode     GOALS:    Occupational Therapy Goals        Problem: Occupational Therapy Goal    Goal Priority Disciplines Outcome Interventions   Occupational Therapy Goal     OT, PT/OT Ongoing (interventions implemented as appropriate)    Description:  Goals to be met by: 8/28/17     Patient will increase functional independence with ADLs by performing:    Feeding with Set-up Assistance.  UE Dressing with Minimal Assistance.  Grooming while EOB with Set-up Assistance.  Toileting from bedside commode with Stand-by Assistance and Minimal Assistance for hygiene and clothing management.   Supine to sit with Contact Guard Assistance.  Stand pivot transfers with Minimal Assistance.  Toilet transfer to bedside commode with Minimal Assistance.  Upper extremity exercise program x12 reps per handout and visual demonstration, with assistance as needed.                      Plan:  Patient to be seen 4 x/week to address the above listed problems via self-care/home management, therapeutic activities, therapeutic exercises, neuromuscular re-education  Plan of Care expires: 09/13/17  Plan of Care reviewed with: patient         Tameka Albertelvin, OT  08/15/2017

## 2017-08-15 NOTE — ASSESSMENT & PLAN NOTE
- IDDM since age 18  - Not compliant with insulin use even though he says he is taking it daily - has had repeated admissions related to poorly controlled DM.   -a1c: 8.4 - last done: 7/2017  -Please see hyperglycemia section for insulin regimen

## 2017-08-15 NOTE — PLAN OF CARE
Problem: Occupational Therapy Goal  Goal: Occupational Therapy Goal  Goals to be met by: 8/28/17     Patient will increase functional independence with ADLs by performing:    Feeding with Set-up Assistance.  UE Dressing with Minimal Assistance.  Grooming while EOB with Set-up Assistance.  Toileting from bedside commode with Stand-by Assistance and Minimal Assistance for hygiene and clothing management.   Supine to sit with Contact Guard Assistance.  Stand pivot transfers with Minimal Assistance.  Toilet transfer to bedside commode with Minimal Assistance.  Upper extremity exercise program x12 reps per handout and visual demonstration, with assistance as needed.     Outcome: Ongoing (interventions implemented as appropriate)  All goals remain appropriate   Continue POC     Tameka De La Paz OT  8/15/2017

## 2017-08-16 VITALS
HEART RATE: 66 BPM | DIASTOLIC BLOOD PRESSURE: 69 MMHG | HEIGHT: 73 IN | RESPIRATION RATE: 16 BRPM | OXYGEN SATURATION: 98 % | TEMPERATURE: 98 F | BODY MASS INDEX: 20.26 KG/M2 | SYSTOLIC BLOOD PRESSURE: 149 MMHG | WEIGHT: 152.88 LBS

## 2017-08-16 LAB
ANION GAP SERPL CALC-SCNC: 8 MMOL/L
BASOPHILS # BLD AUTO: 0.01 K/UL
BASOPHILS NFR BLD: 0.2 %
BUN SERPL-MCNC: 38 MG/DL
CALCIUM SERPL-MCNC: 8.1 MG/DL
CHLORIDE SERPL-SCNC: 111 MMOL/L
CO2 SERPL-SCNC: 20 MMOL/L
CREAT SERPL-MCNC: 2.6 MG/DL
DIFFERENTIAL METHOD: ABNORMAL
EOSINOPHIL # BLD AUTO: 0.2 K/UL
EOSINOPHIL NFR BLD: 3.7 %
ERYTHROCYTE [DISTWIDTH] IN BLOOD BY AUTOMATED COUNT: 13.3 %
EST. GFR  (AFRICAN AMERICAN): 25.1 ML/MIN/1.73 M^2
EST. GFR  (NON AFRICAN AMERICAN): 21.7 ML/MIN/1.73 M^2
GLUCOSE SERPL-MCNC: 173 MG/DL
HCT VFR BLD AUTO: 27.4 %
HGB BLD-MCNC: 9.1 G/DL
LYMPHOCYTES # BLD AUTO: 2.1 K/UL
LYMPHOCYTES NFR BLD: 42.8 %
MCH RBC QN AUTO: 29 PG
MCHC RBC AUTO-ENTMCNC: 33.2 G/DL
MCV RBC AUTO: 87 FL
MONOCYTES # BLD AUTO: 0.4 K/UL
MONOCYTES NFR BLD: 7.2 %
NEUTROPHILS # BLD AUTO: 2.2 K/UL
NEUTROPHILS NFR BLD: 46.1 %
PLATELET # BLD AUTO: 148 K/UL
PMV BLD AUTO: 12.3 FL
POCT GLUCOSE: 149 MG/DL (ref 70–110)
POTASSIUM SERPL-SCNC: 3.6 MMOL/L
RBC # BLD AUTO: 3.14 M/UL
SODIUM SERPL-SCNC: 139 MMOL/L
WBC # BLD AUTO: 4.86 K/UL

## 2017-08-16 PROCEDURE — 25000003 PHARM REV CODE 250: Performed by: STUDENT IN AN ORGANIZED HEALTH CARE EDUCATION/TRAINING PROGRAM

## 2017-08-16 PROCEDURE — 85025 COMPLETE CBC W/AUTO DIFF WBC: CPT

## 2017-08-16 PROCEDURE — 97530 THERAPEUTIC ACTIVITIES: CPT

## 2017-08-16 PROCEDURE — 99232 SBSQ HOSP IP/OBS MODERATE 35: CPT | Mod: GC,,, | Performed by: HOSPITALIST

## 2017-08-16 PROCEDURE — 80048 BASIC METABOLIC PNL TOTAL CA: CPT

## 2017-08-16 PROCEDURE — 97112 NEUROMUSCULAR REEDUCATION: CPT

## 2017-08-16 PROCEDURE — 36415 COLL VENOUS BLD VENIPUNCTURE: CPT

## 2017-08-16 PROCEDURE — 63600175 PHARM REV CODE 636 W HCPCS: Performed by: STUDENT IN AN ORGANIZED HEALTH CARE EDUCATION/TRAINING PROGRAM

## 2017-08-16 RX ORDER — LISINOPRIL 5 MG/1
10 TABLET ORAL DAILY
Qty: 30 TABLET | Refills: 6 | Status: SHIPPED | OUTPATIENT
Start: 2017-08-16 | End: 2017-09-25

## 2017-08-16 RX ORDER — LISINOPRIL 2.5 MG/1
10 TABLET ORAL DAILY
Qty: 30 TABLET | Refills: 6 | Status: SHIPPED | OUTPATIENT
Start: 2017-08-16 | End: 2017-08-16

## 2017-08-16 RX ORDER — LISINOPRIL 2.5 MG/1
10 TABLET ORAL DAILY
Status: DISCONTINUED | OUTPATIENT
Start: 2017-08-17 | End: 2017-08-16 | Stop reason: HOSPADM

## 2017-08-16 RX ADMIN — INSULIN ASPART 3 UNITS: 100 INJECTION, SOLUTION INTRAVENOUS; SUBCUTANEOUS at 12:08

## 2017-08-16 RX ADMIN — NIFEDIPINE 90 MG: 30 TABLET, FILM COATED, EXTENDED RELEASE ORAL at 08:08

## 2017-08-16 RX ADMIN — PAROXETINE HYDROCHLORIDE 10 MG: 10 TABLET, FILM COATED ORAL at 09:08

## 2017-08-16 RX ADMIN — LEVETIRACETAM 1000 MG: 500 TABLET, FILM COATED ORAL at 08:08

## 2017-08-16 RX ADMIN — LISINOPRIL 2.5 MG: 2.5 TABLET ORAL at 08:08

## 2017-08-16 RX ADMIN — HEPARIN SODIUM 5000 UNITS: 5000 INJECTION, SOLUTION INTRAVENOUS; SUBCUTANEOUS at 06:08

## 2017-08-16 RX ADMIN — CARVEDILOL 12.5 MG: 6.25 TABLET, FILM COATED ORAL at 08:08

## 2017-08-16 RX ADMIN — INSULIN ASPART 3 UNITS: 100 INJECTION, SOLUTION INTRAVENOUS; SUBCUTANEOUS at 08:08

## 2017-08-16 RX ADMIN — ASPIRIN 81 MG: 81 TABLET, COATED ORAL at 08:08

## 2017-08-16 NOTE — PROGRESS NOTES
Pt daughter, Christine, called @ 05:19 requesting MDs to call her today  @ [phone] to discuss pt care as she is unsure if she will be able to make it in to hospital today. Christine reports having a lot of trouble with ambulating the hallways to get to pt.     Will make note in sticky note to physician and relay message to pt day nurse.

## 2017-08-16 NOTE — PLAN OF CARE
DAVID spoke to Akira with [facility] ([phone]).  Akira stated that pt family is coming to sign paperwork, this afternoon.  Once paperwork is completed and DON approves orders, pt can transfer to [facility].    DAVID sent PASRR and 142 through .  DAVID awtg NH orders and scripts.      1449: DAVID sent NH Orders through .    1505:  Paperwork is signed and Akira is reviewing the orders.  DAVID will arrange transport when pt can transfer.    1517:  DAVID arranged for [facility] to transport pt to [facility], at 5pm.  [facility] will provide WC.  [facility] stated that they have a high call volume and may be later than 5pm.    Macrina Quevedo LCSW     [phone]  Critical Care (MICU)

## 2017-08-16 NOTE — ASSESSMENT & PLAN NOTE
- hyperosmolar hyperglycemia  - When he came into ED, had AMS and his BS was so high that it was unmeasurable   - Received 10 U regular insulin in Ed, and BS was measured ~450.   - home insulin regimen: short acting 5/5/5, Long acting 18 daily  - He is not compliant to medications at home  - patient with episode of hypoglycemia last afternoon - held dinner aspart dose   - nightly detemir of 10U given  - changed aspart from 5/5/5 to 3/3/3 with meals with instructions to hold dose if Bg< 70 as patient has variable oral intake   - patient also has sliding scale aspart for additional coverage   - will continue to monitor BG and adjust regimen as needed

## 2017-08-16 NOTE — PLAN OF CARE
SW left a message for Akira with [facility], to f/u.  Plan for pt to transfer once family agrees to plan.    [facility]o [facility]     [phone]  Critical Care (MICU)

## 2017-08-16 NOTE — PROGRESS NOTES
Pt transferred from [facility] Rm 8078 to [facility] Rm 1051.  Hand off report received from Forrest BANSAL RN.  Pt transferred via hospital bed. Pt transported on telemetry with personal belongings, chart, and medications.  NAD noted. SEE DOC flowsheet for further assessment data.   Pt upset about having been moved in the middle of the night.  Will request pt's day RN to contact pt family after 7 AM to notify of pt transfer.

## 2017-08-16 NOTE — PLAN OF CARE
DAVID spoke to Akira at [facility] ([phone]).  Akira stated that they will accept pt if family agrees to pt receiving a restorative level of therapy, as opposed to a skilled level.  Akira has not been able to reach the family to discuss this.  Akira will continue to try and reach family.    DAVID called Kamini (sister) ([phone]) and confirmed that [facility] should call her number.  DAVID called Akira and gave her Kamini's number.  DAVID informed Akira that Kamini is expecting her call.    DAVID will follow.    Macrina Quevedo LCSW     [phone]  Critical Care (MICU)

## 2017-08-16 NOTE — PLAN OF CARE
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       Patient is homebound due to:  Hyperglycemia    Allergies:Review of patient's allergies indicates:  No Known Allergies    Vitals:       Every shift (Skilled Nursing patients)    Diet: dental soft    Acitivities:     - Up in a chair each morning as tolerated     LABS:  Per facility protocol    Nursing Precautions:    - Aspiration precautions:             -  Upright 90 degrees before during and after meals        - Fall precautions per nursing home protocol   - Decubitus precautions:        -  for positioning   - Pressure reducing foam mattress   - Turn patient every two hours. Use wedge pillows to anchor patient    CONSULTS:      Physical Therapy to evaluate and treat     Occupational Therapy to evaluate and treat       MISCELLANEOUS CARE:               Monzon Care: Empty Monzon bag every shift.  Change Monzon every month     Routine Skin for Bedridden Patients:  Apply moisture barrier cream to all    skin folds and wet areas in perineal area daily and after baths and                           all bowel movements.                  DIABETES CARE:      Check blood  sugar:       Fingerstick blood sugar AC and HS      Report CBG < 60 or > 400 to physician.                                          Insulin Sliding Scale          Glucose  Novolog Insulin Subcutaneous        0 - 60   Orange juice or glucose tablet, hold insulin      No insulin   201-250  2 units   251-300  4 units   301-350  6 units   351-400  8 units   >400   10 units then call physician      Medications: Discontinue all previous medication orders, if any. See new list below.   Julius Cardenas   Home Medication Instructions CAM:[id number]    Printed on:08/16/17 5390   Medication Information                      albuterol-ipratropium 2.5mg-0.5mg/3mL (DUO-NEB) 0.5 mg-3 mg(2.5 mg base)/3 mL nebulizer solution  Take 3 mLs by nebulization every 4 (four) hours as needed for Wheezing or Shortness of Breath. Rescue             aspirin (ECOTRIN) 81 MG EC tablet  Take 1 tablet (81 mg total) by mouth once daily.             blood-glucose meter (CONTOUR METER) kit  Use as instructed.  Please substitute appropriate meter to match his strips.             carvedilol (COREG) 12.5 MG tablet  Take 1 tablet (12.5 mg total) by mouth 2 (two) times daily.             CONTOUR TEST STRIPS Strp  USE AS DIRECTED THREE TIMES DAILY             insulin aspart (NOVOLOG) 100 unit/mL InPn pen  Inject 5 Units into the skin 3 (three) times daily with meals.             insulin detemir (LEVEMIR FLEXTOUCH) 100 unit/mL (3 mL) SubQ InPn pen  Inject 18 Units into the skin once daily.             lancets Misc  Use three times daily for finger stick glucose monitoring.             levetiracetam (KEPPRA) 1000 MG tablet  Take 1 tablet (1,000 mg total) by mouth 2 (two) times daily.             lisinopril (PRINIVIL,ZESTRIL) 5 MG tablet  Take 2 tablets (10 mg total) by mouth once daily.             nifedipine (PROCARDIA-XL) 90 MG (OSM) 24 hr tablet  Take 1 tablet (90 mg total) by mouth once daily.             nystatin (MYCOSTATIN) cream  Apply  "topically 2 (two) times daily.             paroxetine (PAXIL) 10 MG tablet  Take 1 tablet (10 mg total) by mouth every morning.             pen needle, diabetic (BD INSULIN PEN NEEDLE UF SHORT) 31 gauge x 5/16" Ndle  Inject 1 Device into the skin 4 (four) times daily.                   _________________________________  Mariposa Boggs MD  08/16/2017  "

## 2017-08-16 NOTE — PLAN OF CARE
Problem: Patient Care Overview  Goal: Plan of Care Review  Outcome: Ongoing (interventions implemented as appropriate)  Pt oriented to only self and place.    D/C awaiting nursing home placement     VSS  Pt on tele running sinus paris to SR with hr  50's-70's  Accuchecks being monitored AC/HS     Monzon for urinary retention. Monzon has put out 900 mL clear yellow urine this shift.  No BM.    R FA 22g IV due to be changed 8/15. Nursing communication in place for OK to extend IV use.     Bed alarm on.  Pt remained free from falls or injury thus far.   Bed is in low/ locked position, side rails are up x 3, call light is in reach.   Will continue to monitor.

## 2017-08-16 NOTE — DISCHARGE SUMMARY
[facility] Hospital Medicine  Discharge Summary  Patient Name: Julius Cardenas  MRN: [id number]  Admission Date: 8/11/2017  Hospital Length of Stay: 5 days  Discharge Date and Time:  08/16/2017 4:15 PM  Attending Physician: Sheldon Bradley MD   Discharging Provider: Teodora George MD  Primary Care Provider: Mady Carson MD  Hospital Medicine Team: [facility] 3 Teodora George MD    HPI:    Mr. 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 He is a very poor historian and had no documentation with him.   He had changes in mental status with confusion and agitation today at the [facility] - they attempted to measure his blood sugar and it was too high to be able to measure accurately.   In the Ed, patient had BG unable to measure too due to how high it was and his BP was extremely elevated also at 235/107mmHg.  He was agitated and refusing treatment. He admitted to being confused and feeling weak and admitted to chills and SOB. He denied any seizures and states that he is compliant with all medications, including his insulin.     * No surgery found *      Indwelling Lines/Drains at time of discharge:   Lines/Drains/Airways     Drain                 Urethral Catheter 08/13/17 0915 Double-lumen;Non-latex 16 Fr. 3 days              Hospital Course:   8/12- Pt admitted to internal medicine with acute encephalopathy thought to be due to HHNK.  8/13 - Patient hypoglycemic overnight with BG in 30s. Improved to 118 with dextrose and glucose tabs. Pt asymptomatic throughout.   8/14 -  He had no episodes of hypoglycemia overnight but ranges in BG from 198 to 252 - given an extra dose of long-acting detemir 5unit for this episode. Patient BP has remained elevated, so increased home dose of coreg to 12.5 and added lisinopril 2.5 in addition as  patient is also CKD Stage 4.   8/15 - Patient had episode of hypogylcemia yesterday evening to 55 and evening dose of aspart 5U was held. Patient was asymptomatic. Patient ate about 50% of dinner. Patient also continues to have elevated Bps and yesterday carvedilol was increased to 12.5mg and lisinopril 2.5mg was added as patient is also CKD4  8/16 - Patient was not given his dinner aspart because his glucose was 68. Increased patient's coreg to 12.5mg 2 days prior and patient's BP is better this morning at 158/75. Increased lisinopril to 10mg for better blood pressure control. Patient seems to tolerate detemir 10U nightly and better suited to 3U aspart due to lower nutritional intake in hospital. Patient awaiting nursing home placement and otherwise medically stable. Suggest follow-up with nephrology soon for CKD-4.      Significant Diagnostic Studies: Labs:   BMP:     Recent Labs  Lab 08/15/17  0512 08/16/17  0359   * 173*    139   K 4.2 3.6   * 111*   CO2 17* 20*   BUN 36* 38*   CREATININE 2.6* 2.6*   CALCIUM 8.2* 8.1*   , CMP     Recent Labs  Lab 08/15/17  0512 08/16/17  0359    139   K 4.2 3.6   * 111*   CO2 17* 20*   * 173*   BUN 36* 38*   CREATININE 2.6* 2.6*   CALCIUM 8.2* 8.1*   ANIONGAP 11 8   ESTGFRAFRICA 25.1* 25.1*   EGFRNONAA 21.7* 21.7*   , CBC     Recent Labs  Lab 08/15/17  0512 08/16/17  0359   WBC 5.84 4.86   HGB 9.7* 9.1*   HCT 28.6* 27.4*   * 148*   A1C:     Recent Labs  Lab 04/04/17  0816 06/13/17  0539 07/21/17  1155   HGBA1C 8.9* 9.5* 8.4*     Final Active Diagnoses:    Diagnosis Date Noted POA    PRINCIPAL PROBLEM:  Hyperglycemia [R73.9] 06/13/2017 Yes    Urinary retention [R33.9] 08/13/2017 Yes    Malnutrition of moderate degree [E44.0] 02/16/2017 Yes    Renovascular hypertension [I15.0] 08/31/2013 Yes    Secondary seizure disorder [G40.909] 02/01/2013 Yes     Chronic    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 [E10.22,  N18.4]  Yes     Chronic      Problems Resolved During this Admission:    Diagnosis Date Noted Date Resolved POA      No new Assessment & Plan notes have been filed under this hospital service since the last note was generated.  Service: Hospital Medicine    Discharged Condition: good    Disposition: Nursing Facility    Follow Up:  Follow-up Information     Mathieu Luz  Urology 4th Floor In 1 month.    Specialty:  Urology  Contact information:  [address]  [facility] [address] [phone]  Additional information:  [facility] - 4th Floor           Otis Salgado MD In 1 month.    Specialty:  Nephrology  Contact information:  [facility] [facility]  [facility] [address]  [phone]                 Patient Instructions:   No discharge procedures on file.  Medications:  Reconciled Home Medications:   Current Discharge Medication List      START taking these medications    Details   albuterol-ipratropium 2.5mg-0.5mg/3mL (DUO-NEB) 0.5 mg-3 mg(2.5 mg base)/3 mL nebulizer solution Take 3 mLs by nebulization every 4 (four) hours as needed for Wheezing or Shortness of Breath. Rescue  Qty: 1 vial, Refills: 6      lisinopril (PRINIVIL,ZESTRIL) 5 MG tablet Take 2 tablets (10 mg total) by mouth once daily.  Qty: 30 tablet, Refills: 6         CONTINUE these medications which have CHANGED    Details   carvedilol (COREG) 12.5 MG tablet Take 1 tablet (12.5 mg total) by mouth 2 (two) times daily.  Qty: 30 tablet, Refills: 3         CONTINUE these medications which have NOT CHANGED    Details   aspirin (ECOTRIN) 81 MG EC tablet Take 1 tablet (81 mg total) by mouth once daily.  Qty: 30 tablet, Refills: 11      blood-glucose meter (CONTOUR METER) kit Use as instructed.  Please substitute appropriate meter to match his strips.  Qty: 1 each, Refills: 0    Associated Diagnoses: DM2 (diabetes mellitus, type 2); Type II or unspecified type diabetes mellitus with renal manifestations, uncontrolled      CONTOUR TEST STRIPS Strp  "USE AS DIRECTED THREE TIMES DAILY  Qty: 300 strip, Refills: 12    Comments: **Patient requests 90 days supply**  Associated Diagnoses: Diabetes mellitus due to abnormal insulin      insulin aspart (NOVOLOG) 100 unit/mL InPn pen Inject 5 Units into the skin 3 (three) times daily with meals.  Qty: 15 mL, Refills: 11    Associated Diagnoses: 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      insulin detemir (LEVEMIR FLEXTOUCH) 100 unit/mL (3 mL) SubQ InPn pen Inject 18 Units into the skin once daily.  Qty: 15 mL, Refills: 11      lancets Misc Use three times daily for finger stick glucose monitoring.  Qty: 100 each, Refills: 3      levetiracetam (KEPPRA) 1000 MG tablet Take 1 tablet (1,000 mg total) by mouth 2 (two) times daily.  Qty: 180 tablet, Refills: 6    Associated Diagnoses: Partial symptomatic epilepsy with simple partial seizures, intractable, without status epilepticus      nifedipine (PROCARDIA-XL) 90 MG (OSM) 24 hr tablet Take 1 tablet (90 mg total) by mouth once daily.  Qty: 30 tablet, Refills: 11      nystatin (MYCOSTATIN) cream Apply topically 2 (two) times daily.  Qty: 120 g, Refills: 3      paroxetine (PAXIL) 10 MG tablet Take 1 tablet (10 mg total) by mouth every morning.  Qty: 90 tablet, Refills: 11      pen needle, diabetic (BD INSULIN PEN NEEDLE UF SHORT) 31 gauge x 5/16" Ndle Inject 1 Device into the skin 4 (four) times daily.  Qty: 120 each, Refills: 11    Associated Diagnoses: 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           Time spent on the discharge of patient: 30 minutes    HOS POC IP DISCHARGE SUMMARY    Teodora George MD  Department of Hospital Medicine  [facility]  "

## 2017-08-16 NOTE — PROGRESS NOTES
"[facility] Hospital Medicine  Progress Note    Patient Name: Julius Cardenas  MRN: [id number]  Patient Class: IP- Inpatient   Admission Date: 8/11/2017  Length of Stay: 5 days  Attending Physician: Sheldon Bradley MD  Primary Care Provider: Mady Carson MD    [facility] Medicine Team: [facility] MED 3 Teodora George MD    Subjective:     Principal Problem:Hyperglycemia    HPI:   84 year old with HTN, type 1 DM, CKD-4, seizure disorder on Keppra. CVA. [facility]. He is very poor historian. I didn't see any documents with him.   He had change in mental status with confusion and agitation today. His BS was measured and it was so high it couldn't be measured in  and in ED. His BP was very high up to 235/107 in ED. He was agitated and refusing treatment. He has frequent admissions to the ED with uncontrolled DM, with both hyper and hypoglycemia.  He admits to be confused and feeling weak earlier today. He states " I lost strength and that doesn't come back quickly", " I am sick about the job", " I am in a bad shape", I am scared to be locked up", " I see demons in the corners, I don't talk to them, the don't know me".   He is feeling cold and SOB. He can't walk? Didn't try to walk. He denies any seizures.   He says he is taking his medications every day including his insulin. He denies having HTN or seizure disorder.  He says he has 7 or 8 children and he is not . He used to smoke for 5-6 years. Drinks alcohol occasionally. Denies drug abuse.     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    Hospital Course:  8/12- Pt " admitted to internal medicine.  8/13 - Patient hypoglycemic overnight with BG in 30s. Improved to 118 with dextrose and glucose tabs. Pt asymptomatic throughout.   8/14 -  He had no episodes of hypoglycemia overnight but ranges in BG from 198 to 252 - given an extra dose of long-acting detemir 5unit for this episode. Patient BP has remained elevated, so increased home dose of coreg to 12.5 and added lisinopril 2.5 in addition as patient is also CKD Stage 4.   8/15 - Patient had episode of hypogylcemia yesterday evening to 55 and evening dose of aspart 5U was held. Patient was asymptomatic. Patient ate about 50% of dinner. Patient also continues to have elevated Bps and yesterday carvedilol was increased to 12.5mg and lisinopril 2.5mg was added as patient is also CKD4    Interval History:   Patient seen and examined this morning. Has no new complaints - oriented to person and place but not time. He says he did not eat his dinner because he did not feel like it but is hungry for breakfast this morning. Patient was not given his dinner aspart because his glucose was 68.  Increased patient's coreg to 12.5mg 2 days prior and patient's BP is better this morning at 158/75.  Continue with detemir 10U nightly and aspart 3U with meals.     Review of Systems   Constitutional: Negative for activity change, appetite change, chills, fatigue and fever.   HENT: Negative for congestion and sinus pressure.    Respiratory: Negative for shortness of breath and wheezing.    Cardiovascular: Negative for chest pain and leg swelling.   Gastrointestinal: Negative for abdominal distention, abdominal pain, constipation, diarrhea, nausea and vomiting.   Genitourinary: Negative for decreased urine volume, difficulty urinating, dysuria and urgency.   Musculoskeletal: Negative for arthralgias and myalgias.   Skin: Negative for color change and pallor.   Neurological: Negative for dizziness, weakness and numbness.   Psychiatric/Behavioral:  Positive for agitation.     Objective:     Vital Signs (Most Recent):  Temp: 98.4 °F (36.9 °C) (08/16/17 0318)  Pulse: (!) 59 (08/16/17 0400)  Resp: 18 (08/16/17 0318)  BP: (!) 158/75 (08/16/17 0318)  SpO2: 100 % (08/16/17 0318) Vital Signs (24h Range):  Temp:  [97.5 °F (36.4 °C)-98.4 °F (36.9 °C)] 98.4 °F (36.9 °C)  Pulse:  [59-77] 59  Resp:  [16-18] 18  SpO2:  [98 %-100 %] 100 %  BP: (149-191)/(61-88) 158/75     Weight: 69.4 kg (152 lb 14.4 oz)  Body mass index is 20.17 kg/m².    Intake/Output Summary (Last 24 hours) at 08/16/17 0757  Last data filed at 08/16/17 0453   Gross per 24 hour   Intake              330 ml   Output             1975 ml   Net            -1645 ml      Physical Exam   Constitutional: He appears well-developed. No distress.   Cachetic, elderly    HENT:   Head: Normocephalic and atraumatic.   Eyes: EOM are normal. Pupils are equal, round, and reactive to light.   Neck: Normal range of motion. Neck supple.   Cardiovascular: Normal rate, regular rhythm and normal heart sounds.    Pulmonary/Chest: Effort normal and breath sounds normal.   Abdominal: Soft. Bowel sounds are normal.   Genitourinary:   Genitourinary Comments: Monzon in place   Musculoskeletal: He exhibits no edema.   [place] neck and boutonnier deformity of fingers bilaterally   Neurological: He is alert.   Oriented to person and place but not time   Skin: Skin is warm and dry. He is not diaphoretic.     Significant Labs:   A1C:   Recent Labs  Lab 04/04/17  0816 06/13/17  0539 07/21/17  1155   HGBA1C 8.9* 9.5* 8.4*     Bilirubin:   Recent Labs  Lab 07/21/17  1155 08/11/17  1836   BILITOT 0.4 0.3     BMP:   Recent Labs  Lab 08/16/17  0359   *      K 3.6   *   CO2 20*   BUN 38*   CREATININE 2.6*   CALCIUM 8.1*     CBC:   Recent Labs  Lab 08/14/17  0904 08/15/17  0512 08/16/17  0359   WBC 6.76 5.84 4.86   HGB 11.3* 9.7* 9.1*   HCT 32.6* 28.6* 27.4*    144* 148*     CMP:   Recent Labs  Lab 08/14/17  1255  08/15/17  0512 08/16/17  0359    141 139   K 5.4* 4.2 3.6   * 113* 111*   CO2 13* 17* 20*   * 192* 173*   BUN 36* 36* 38*   CREATININE 2.9* 2.6* 2.6*   CALCIUM 8.4* 8.2* 8.1*   ANIONGAP 14 11 8   EGFRNONAA 19.0* 21.7* 21.7*       Recent Labs  Lab 08/15/17  1717 08/15/17  1818 08/15/17  2335   POCTGLUCOSE 68* 135* 246*     TSH:   Recent Labs  Lab 07/21/17  1155   TSH 1.617           Assessment/Plan:      Urinary retention    -pt required straight cath multiple times overnight with large volume UO  -lindsay placed  -UA ordered  -urology consulted for further evaluation and recommended intermittent cath 4-5x/day as outpatient to maintain bladder volume <400-500mL or d/c with indwelling lindsay  - will d/c to nursing home with indwelling lindsay           Malnutrition of moderate degree    - encourage increased PO intake            Renovascular hypertension    - Poorly controlled BP  - BP in ED was 235/107   - he was started on nifedipine and carvedilol in ED.   - BP remained elevated today at 178/81mmHg  - US for WILLY showed no evidence of stenosis   - increased coreg dose from 6.25BID to 12.5mg BID for more optimal BP control  - added lisinopril 2.5mg as patient CKD Stage 4 - increase to lisinopril 10 as BP remains elevated   - continue to closely monitor BP and assess for further need for changes         Secondary seizure disorder    - patient had meningioma-related seizure disorder.   - s/p Meningioma resection  - continue home dose keppra            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    - IDDM since age 18  - Not compliant with insulin use even though he says he is taking it daily - has had repeated admissions related to poorly controlled DM.   -a1c: 8.4 - last done: 7/2017  -Please see hyperglycemia section for insulin regimen          * Hyperglycemia    - hyperosmolar hyperglycemia  - When he came into ED, had AMS and his BS was so high that it was unmeasurable   - Received  10 U regular insulin in Ed, and BS was measured ~450.   - home insulin regimen: short acting 5/5/5, Long acting 18 daily  - He is not compliant to medications at home  - patient with episode of hypoglycemia last afternoon - held dinner aspart dose   - nightly detemir of 10U given  - changed aspart from 5/5/5 to 3/3/3 with meals with instructions to hold dose if Bg< 70 as patient has variable oral intake   - patient also has sliding scale aspart for additional coverage   - will continue to monitor BG and adjust regimen as needed               VTE Risk Mitigation         Ordered     heparin (porcine) injection 5,000 Units  Every 8 hours     Route:  Subcutaneous        08/11/17 2259     Place SRINIVAS hose  Until discontinued      08/11/17 2259     Place sequential compression device  Until discontinued      08/11/17 2259     Medium Risk of VTE  Once      08/11/17 2259              Teodora George MD  Department of Hospital Medicine   [facility]

## 2017-08-16 NOTE — ASSESSMENT & PLAN NOTE
- Poorly controlled BP  - BP in ED was 235/107   - he was started on nifedipine and carvedilol in ED.   - BP remained elevated today at 178/81mmHg  - US for WILLY showed no evidence of stenosis   - increased coreg dose from 6.25BID to 12.5mg BID for more optimal BP control  - added lisinopril 2.5mg as patient CKD Stage 4 - increase to lisinopril 10 as BP remains elevated   - continue to closely monitor BP and assess for further need for changes

## 2017-08-16 NOTE — PLAN OF CARE
Problem: Physical Therapy Goal  Goal: Physical Therapy Goal  Goals to be met by: 2017    Patient will increase functional independence with mobility by performin. Supine to sit with MInimal Assistance   2. Rolling to Left and Right with Minimal Assistance.  3. Sit to stand transfer with Minimal Assistance  4. Bed to chair transfer with Moderate Assistance using squat pivot  5. Wheelchair propulsion x50 feet with Minimal Assistance using bilateral upper and lower extremities  6. Gait 10' with mod A using appropriate AD if able       Goals remain appropriate. Continue with Physical therapy Plan of Care. Devorah Lazaro, PT 2017

## 2017-08-16 NOTE — SUBJECTIVE & OBJECTIVE
Interval History:   Patient seen and examined this morning. Has no new complaints - oriented to person and place but not time. He says he did not eat his dinner because he did not feel like it but is hungry for breakfast this morning. Patient was not given his dinner aspart because his glucose was 68.  Increased patient's coreg to 12.5mg 2 days prior and patient's BP is better this morning at 158/75.  Continue with detemir 10U nightly and aspart 3U with meals.     Review of Systems   Constitutional: Negative for activity change, appetite change, chills, fatigue and fever.   HENT: Negative for congestion and sinus pressure.    Respiratory: Negative for shortness of breath and wheezing.    Cardiovascular: Negative for chest pain and leg swelling.   Gastrointestinal: Negative for abdominal distention, abdominal pain, constipation, diarrhea, nausea and vomiting.   Genitourinary: Negative for decreased urine volume, difficulty urinating, dysuria and urgency.   Musculoskeletal: Negative for arthralgias and myalgias.   Skin: Negative for color change and pallor.   Neurological: Negative for dizziness, weakness and numbness.   Psychiatric/Behavioral: Positive for agitation.     Objective:     Vital Signs (Most Recent):  Temp: 98.4 °F (36.9 °C) (08/16/17 0318)  Pulse: (!) 59 (08/16/17 0400)  Resp: 18 (08/16/17 0318)  BP: (!) 158/75 (08/16/17 0318)  SpO2: 100 % (08/16/17 0318) Vital Signs (24h Range):  Temp:  [97.5 °F (36.4 °C)-98.4 °F (36.9 °C)] 98.4 °F (36.9 °C)  Pulse:  [59-77] 59  Resp:  [16-18] 18  SpO2:  [98 %-100 %] 100 %  BP: (149-191)/(61-88) 158/75     Weight: 69.4 kg (152 lb 14.4 oz)  Body mass index is 20.17 kg/m².    Intake/Output Summary (Last 24 hours) at 08/16/17 5662  Last data filed at 08/16/17 7567   Gross per 24 hour   Intake              330 ml   Output             1975 ml   Net            -1645 ml      Physical Exam   Constitutional: He appears well-developed. No distress.   Cachetic, elderly    HENT:    Head: Normocephalic and atraumatic.   Eyes: EOM are normal. Pupils are equal, round, and reactive to light.   Neck: Normal range of motion. Neck supple.   Cardiovascular: Normal rate, regular rhythm and normal heart sounds.    Pulmonary/Chest: Effort normal and breath sounds normal.   Abdominal: Soft. Bowel sounds are normal.   Genitourinary:   Genitourinary Comments: Monzon in place   Musculoskeletal: He exhibits no edema.   [place] neck and boutonnier deformity of fingers bilaterally   Neurological: He is alert.   Oriented to person and place but not time   Skin: Skin is warm and dry. He is not diaphoretic.     Significant Labs:   A1C:   Recent Labs  Lab 04/04/17  0816 06/13/17  0539 07/21/17  1155   HGBA1C 8.9* 9.5* 8.4*     Bilirubin:   Recent Labs  Lab 07/21/17  1155 08/11/17  1836   BILITOT 0.4 0.3     BMP:   Recent Labs  Lab 08/16/17  0359   *      K 3.6   *   CO2 20*   BUN 38*   CREATININE 2.6*   CALCIUM 8.1*     CBC:   Recent Labs  Lab 08/14/17  0904 08/15/17  0512 08/16/17  0359   WBC 6.76 5.84 4.86   HGB 11.3* 9.7* 9.1*   HCT 32.6* 28.6* 27.4*    144* 148*     CMP:   Recent Labs  Lab 08/14/17  1254 08/15/17  0512 08/16/17  0359    141 139   K 5.4* 4.2 3.6   * 113* 111*   CO2 13* 17* 20*   * 192* 173*   BUN 36* 36* 38*   CREATININE 2.9* 2.6* 2.6*   CALCIUM 8.4* 8.2* 8.1*   ANIONGAP 14 11 8   EGFRNONAA 19.0* 21.7* 21.7*       Recent Labs  Lab 08/15/17  1717 08/15/17  1818 08/15/17  2335   POCTGLUCOSE 68* 135* 246*     TSH:   Recent Labs  Lab 07/21/17  1155   TSH 1.617

## 2017-08-17 LAB — POCT GLUCOSE: 145 MG/DL (ref 70–110)

## 2017-08-18 NOTE — PT/OT/SLP DISCHARGE
Occupational Therapy Discharge Summary    Julius Cardenas  MRN: [id number]   Hyperglycemia   Patient Discharged from acute Occupational Therapy on 8/16/17.  Please refer to prior OT note dated on 8/15/17 for functional status.     Assessment:   Patient appropriate for care in another setting.  GOALS:    Occupational Therapy Goals        Problem: Occupational Therapy Goal    Goal Priority Disciplines Outcome Interventions   Occupational Therapy Goal     OT, PT/OT Ongoing (interventions implemented as appropriate)    Description:  Goals to be met by: 8/28/17     Patient will increase functional independence with ADLs by performing:    Feeding with Set-up Assistance.  UE Dressing with Minimal Assistance.  Grooming while EOB with Set-up Assistance.  Toileting from bedside commode with Stand-by Assistance and Minimal Assistance for hygiene and clothing management.   Supine to sit with Contact Guard Assistance.  Stand pivot transfers with Minimal Assistance.  Toilet transfer to bedside commode with Minimal Assistance.  Upper extremity exercise program x12 reps per handout and visual demonstration, with assistance as needed.                    Reasons for Discontinuation of Therapy Services  Transfer to alternate level of care.      Plan:  Patient Discharged to: [facility] .

## 2017-09-17 ENCOUNTER — HOSPITAL ENCOUNTER (INPATIENT)
Facility: HOSPITAL | Age: 82
LOS: 4 days | DRG: 637 | End: 2017-09-21
Attending: EMERGENCY MEDICINE | Admitting: EMERGENCY MEDICINE
Payer: MEDICARE

## 2017-09-17 DIAGNOSIS — N18.4 TYPE 1 DIABETES MELLITUS WITH STAGE 4 CHRONIC KIDNEY DISEASE: Chronic | ICD-10-CM

## 2017-09-17 DIAGNOSIS — R11.0 NAUSEA: ICD-10-CM

## 2017-09-17 DIAGNOSIS — E10.22 TYPE 1 DIABETES MELLITUS WITH STAGE 4 CHRONIC KIDNEY DISEASE: Chronic | ICD-10-CM

## 2017-09-17 DIAGNOSIS — E10.10 DIABETIC KETOACIDOSIS WITHOUT COMA ASSOCIATED WITH TYPE 1 DIABETES MELLITUS: Primary | ICD-10-CM

## 2017-09-17 PROBLEM — E87.29 METABOLIC ACIDOSIS, INCREASED ANION GAP: Status: ACTIVE | Noted: 2017-09-17

## 2017-09-17 PROBLEM — G93.40 ACUTE ENCEPHALOPATHY: Status: ACTIVE | Noted: 2017-09-17

## 2017-09-17 PROBLEM — N30.01 ACUTE CYSTITIS WITH HEMATURIA: Status: ACTIVE | Noted: 2017-09-17

## 2017-09-17 PROBLEM — D72.829 LEUKOCYTOSIS: Status: ACTIVE | Noted: 2017-09-17

## 2017-09-17 LAB
ALBUMIN SERPL BCP-MCNC: 2.9 G/DL
ALLENS TEST: ABNORMAL
ALP SERPL-CCNC: 122 U/L
ALT SERPL W/O P-5'-P-CCNC: 18 U/L
ANION GAP SERPL CALC-SCNC: 14 MMOL/L
ANION GAP SERPL CALC-SCNC: 17 MMOL/L
AST SERPL-CCNC: 16 U/L
B-OH-BUTYR BLD STRIP-SCNC: 0 MMOL/L
BACTERIA #/AREA URNS AUTO: ABNORMAL /HPF
BASOPHILS # BLD AUTO: 0.04 K/UL
BASOPHILS NFR BLD: 0.3 %
BILIRUB SERPL-MCNC: 0.3 MG/DL
BILIRUB UR QL STRIP: NEGATIVE
BUN SERPL-MCNC: 58 MG/DL
BUN SERPL-MCNC: 64 MG/DL
CALCIUM SERPL-MCNC: 10.1 MG/DL
CALCIUM SERPL-MCNC: 9.5 MG/DL
CHLORIDE SERPL-SCNC: 115 MMOL/L
CHLORIDE SERPL-SCNC: 117 MMOL/L
CLARITY UR REFRACT.AUTO: ABNORMAL
CO2 SERPL-SCNC: 13 MMOL/L
CO2 SERPL-SCNC: 7 MMOL/L
COLOR UR AUTO: YELLOW
CREAT SERPL-MCNC: 3.3 MG/DL
CREAT SERPL-MCNC: 3.7 MG/DL
CREAT UR-MCNC: 66 MG/DL
DELSYS: ABNORMAL
DIFFERENTIAL METHOD: ABNORMAL
EOSINOPHIL # BLD AUTO: 0.2 K/UL
EOSINOPHIL NFR BLD: 1.5 %
ERYTHROCYTE [DISTWIDTH] IN BLOOD BY AUTOMATED COUNT: 13.5 %
EST. GFR  (AFRICAN AMERICAN): 16.4 ML/MIN/1.73 M^2
EST. GFR  (AFRICAN AMERICAN): 18.8 ML/MIN/1.73 M^2
EST. GFR  (NON AFRICAN AMERICAN): 14.1 ML/MIN/1.73 M^2
EST. GFR  (NON AFRICAN AMERICAN): 16.2 ML/MIN/1.73 M^2
GLUCOSE SERPL-MCNC: 139 MG/DL
GLUCOSE SERPL-MCNC: 497 MG/DL
GLUCOSE UR QL STRIP: ABNORMAL
HCO3 UR-SCNC: 15.2 MMOL/L (ref 24–28)
HCT VFR BLD AUTO: 36.7 %
HGB BLD-MCNC: 12.5 G/DL
HGB UR QL STRIP: ABNORMAL
HYALINE CASTS UR QL AUTO: 0 /LPF
KETONES UR QL STRIP: NEGATIVE
LACTATE SERPL-SCNC: 2.2 MMOL/L
LEUKOCYTE ESTERASE UR QL STRIP: ABNORMAL
LYMPHOCYTES # BLD AUTO: 1.6 K/UL
LYMPHOCYTES NFR BLD: 11.6 %
MCH RBC QN AUTO: 29.2 PG
MCHC RBC AUTO-ENTMCNC: 34.1 G/DL
MCV RBC AUTO: 86 FL
MICROSCOPIC COMMENT: ABNORMAL
MONOCYTES # BLD AUTO: 0.7 K/UL
MONOCYTES NFR BLD: 5.2 %
NEUTROPHILS # BLD AUTO: 11.2 K/UL
NEUTROPHILS NFR BLD: 80.3 %
NITRITE UR QL STRIP: NEGATIVE
OSMOLALITY SERPL: 352 MOSM/KG
PCO2 BLDA: 33 MMHG (ref 35–45)
PH SMN: 7.27 [PH] (ref 7.35–7.45)
PH UR STRIP: 8 [PH] (ref 5–8)
PHOSPHATE SERPL-MCNC: 4.6 MG/DL
PLATELET # BLD AUTO: 389 K/UL
PMV BLD AUTO: 10.6 FL
PO2 BLDA: 89 MMHG (ref 80–100)
POC BE: -12 MMOL/L
POC SATURATED O2: 96 % (ref 95–100)
POC TCO2: 16 MMOL/L (ref 23–27)
POTASSIUM SERPL-SCNC: 4.5 MMOL/L
POTASSIUM SERPL-SCNC: 4.9 MMOL/L
PROCALCITONIN SERPL IA-MCNC: 4.43 NG/ML
PROT SERPL-MCNC: 8.7 G/DL
PROT UR QL STRIP: ABNORMAL
RBC # BLD AUTO: 4.28 M/UL
RBC #/AREA URNS AUTO: 10 /HPF (ref 0–4)
SALICYLATES SERPL-MCNC: <5 MG/DL
SAMPLE: ABNORMAL
SITE: ABNORMAL
SODIUM SERPL-SCNC: 139 MMOL/L
SODIUM SERPL-SCNC: 144 MMOL/L
SODIUM UR-SCNC: 52 MMOL/L
SP GR UR STRIP: 1.01 (ref 1–1.03)
TROPONIN I SERPL DL<=0.01 NG/ML-MCNC: 0.01 NG/ML
URN SPEC COLLECT METH UR: ABNORMAL
UROBILINOGEN UR STRIP-ACNC: NEGATIVE EU/DL
UUN UR-MCNC: 503 MG/DL
WBC # BLD AUTO: 13.95 K/UL
WBC #/AREA URNS AUTO: >100 /HPF (ref 0–5)
WBC CLUMPS UR QL AUTO: ABNORMAL
YEAST UR QL AUTO: ABNORMAL

## 2017-09-17 PROCEDURE — 81001 URINALYSIS AUTO W/SCOPE: CPT

## 2017-09-17 PROCEDURE — 82962 GLUCOSE BLOOD TEST: CPT

## 2017-09-17 PROCEDURE — 63600175 PHARM REV CODE 636 W HCPCS: Performed by: STUDENT IN AN ORGANIZED HEALTH CARE EDUCATION/TRAINING PROGRAM

## 2017-09-17 PROCEDURE — 25000003 PHARM REV CODE 250: Performed by: EMERGENCY MEDICINE

## 2017-09-17 PROCEDURE — 25000003 PHARM REV CODE 250: Performed by: STUDENT IN AN ORGANIZED HEALTH CARE EDUCATION/TRAINING PROGRAM

## 2017-09-17 PROCEDURE — 84300 ASSAY OF URINE SODIUM: CPT

## 2017-09-17 PROCEDURE — 99285 EMERGENCY DEPT VISIT HI MDM: CPT | Mod: 25

## 2017-09-17 PROCEDURE — 99222 1ST HOSP IP/OBS MODERATE 55: CPT | Mod: AI,GC,, | Performed by: HOSPITALIST

## 2017-09-17 PROCEDURE — 96366 THER/PROPH/DIAG IV INF ADDON: CPT

## 2017-09-17 PROCEDURE — 93005 ELECTROCARDIOGRAM TRACING: CPT

## 2017-09-17 PROCEDURE — 84100 ASSAY OF PHOSPHORUS: CPT

## 2017-09-17 PROCEDURE — 83605 ASSAY OF LACTIC ACID: CPT

## 2017-09-17 PROCEDURE — 82010 KETONE BODYS QUAN: CPT

## 2017-09-17 PROCEDURE — 96375 TX/PRO/DX INJ NEW DRUG ADDON: CPT

## 2017-09-17 PROCEDURE — 80307 DRUG TEST PRSMV CHEM ANLYZR: CPT

## 2017-09-17 PROCEDURE — 84145 PROCALCITONIN (PCT): CPT

## 2017-09-17 PROCEDURE — S5010 5% DEXTROSE AND 0.45% SALINE: HCPCS | Performed by: STUDENT IN AN ORGANIZED HEALTH CARE EDUCATION/TRAINING PROGRAM

## 2017-09-17 PROCEDURE — 83930 ASSAY OF BLOOD OSMOLALITY: CPT

## 2017-09-17 PROCEDURE — 96365 THER/PROPH/DIAG IV INF INIT: CPT

## 2017-09-17 PROCEDURE — P9612 CATHETERIZE FOR URINE SPEC: HCPCS

## 2017-09-17 PROCEDURE — 80053 COMPREHEN METABOLIC PANEL: CPT

## 2017-09-17 PROCEDURE — 80048 BASIC METABOLIC PNL TOTAL CA: CPT

## 2017-09-17 PROCEDURE — 84484 ASSAY OF TROPONIN QUANT: CPT

## 2017-09-17 PROCEDURE — 87086 URINE CULTURE/COLONY COUNT: CPT

## 2017-09-17 PROCEDURE — 63600175 PHARM REV CODE 636 W HCPCS: Performed by: EMERGENCY MEDICINE

## 2017-09-17 PROCEDURE — 82570 ASSAY OF URINE CREATININE: CPT

## 2017-09-17 PROCEDURE — 25000003 PHARM REV CODE 250

## 2017-09-17 PROCEDURE — 99285 EMERGENCY DEPT VISIT HI MDM: CPT | Mod: ,,,

## 2017-09-17 PROCEDURE — 84540 ASSAY OF URINE/UREA-N: CPT

## 2017-09-17 PROCEDURE — 20600001 HC STEP DOWN PRIVATE ROOM

## 2017-09-17 PROCEDURE — 85025 COMPLETE CBC W/AUTO DIFF WBC: CPT

## 2017-09-17 PROCEDURE — 93010 ELECTROCARDIOGRAM REPORT: CPT | Mod: ,,, | Performed by: INTERNAL MEDICINE

## 2017-09-17 PROCEDURE — 63600175 PHARM REV CODE 636 W HCPCS

## 2017-09-17 RX ORDER — LEVETIRACETAM 10 MG/ML
1000 INJECTION INTRAVASCULAR ONCE
Status: COMPLETED | OUTPATIENT
Start: 2017-09-17 | End: 2017-09-17

## 2017-09-17 RX ORDER — MORPHINE SULFATE 4 MG/ML
4 INJECTION, SOLUTION INTRAMUSCULAR; INTRAVENOUS EVERY 4 HOURS PRN
Status: DISCONTINUED | OUTPATIENT
Start: 2017-09-17 | End: 2017-09-17

## 2017-09-17 RX ORDER — OLANZAPINE 10 MG/1
10 TABLET ORAL ONCE
Status: COMPLETED | OUTPATIENT
Start: 2017-09-17 | End: 2017-09-17

## 2017-09-17 RX ORDER — ONDANSETRON 2 MG/ML
4 INJECTION INTRAMUSCULAR; INTRAVENOUS ONCE
Status: COMPLETED | OUTPATIENT
Start: 2017-09-17 | End: 2017-09-17

## 2017-09-17 RX ORDER — DEXTROSE MONOHYDRATE 100 MG/ML
1000 INJECTION, SOLUTION INTRAVENOUS
Status: DISCONTINUED | OUTPATIENT
Start: 2017-09-17 | End: 2017-09-21 | Stop reason: HOSPADM

## 2017-09-17 RX ORDER — HEPARIN SODIUM 5000 [USP'U]/ML
5000 INJECTION, SOLUTION INTRAVENOUS; SUBCUTANEOUS EVERY 8 HOURS
Status: DISCONTINUED | OUTPATIENT
Start: 2017-09-17 | End: 2017-09-21 | Stop reason: HOSPADM

## 2017-09-17 RX ORDER — HYDROCODONE BITARTRATE AND ACETAMINOPHEN 5; 325 MG/1; MG/1
1 TABLET ORAL EVERY 4 HOURS PRN
Status: DISCONTINUED | OUTPATIENT
Start: 2017-09-17 | End: 2017-09-21 | Stop reason: HOSPADM

## 2017-09-17 RX ORDER — SODIUM CHLORIDE 450 MG/100ML
INJECTION, SOLUTION INTRAVENOUS CONTINUOUS
Status: DISCONTINUED | OUTPATIENT
Start: 2017-09-17 | End: 2017-09-17

## 2017-09-17 RX ORDER — SODIUM CHLORIDE 9 MG/ML
100 INJECTION, SOLUTION INTRAVENOUS CONTINUOUS
Status: DISCONTINUED | OUTPATIENT
Start: 2017-09-17 | End: 2017-09-17

## 2017-09-17 RX ORDER — IPRATROPIUM BROMIDE AND ALBUTEROL SULFATE 2.5; .5 MG/3ML; MG/3ML
3 SOLUTION RESPIRATORY (INHALATION) EVERY 4 HOURS PRN
Status: DISCONTINUED | OUTPATIENT
Start: 2017-09-17 | End: 2017-09-21 | Stop reason: HOSPADM

## 2017-09-17 RX ORDER — SODIUM CHLORIDE AND POTASSIUM CHLORIDE 150; 450 MG/100ML; MG/100ML
INJECTION, SOLUTION INTRAVENOUS CONTINUOUS
Status: DISCONTINUED | OUTPATIENT
Start: 2017-09-17 | End: 2017-09-17

## 2017-09-17 RX ORDER — DEXTROSE MONOHYDRATE AND SODIUM CHLORIDE 5; .45 G/100ML; G/100ML
INJECTION, SOLUTION INTRAVENOUS CONTINUOUS
Status: DISCONTINUED | OUTPATIENT
Start: 2017-09-17 | End: 2017-09-19

## 2017-09-17 RX ORDER — ONDANSETRON 2 MG/ML
4 INJECTION INTRAMUSCULAR; INTRAVENOUS EVERY 12 HOURS PRN
Status: DISCONTINUED | OUTPATIENT
Start: 2017-09-17 | End: 2017-09-18

## 2017-09-17 RX ADMIN — SODIUM CHLORIDE, PRESERVATIVE FREE 1000 ML: 5 INJECTION INTRAVENOUS at 05:09

## 2017-09-17 RX ADMIN — MORPHINE SULFATE 4 MG: 4 INJECTION INTRAVENOUS at 08:09

## 2017-09-17 RX ADMIN — ONDANSETRON 4 MG: 2 INJECTION INTRAMUSCULAR; INTRAVENOUS at 05:09

## 2017-09-17 RX ADMIN — SODIUM CHLORIDE 7 UNITS/HR: 9 INJECTION, SOLUTION INTRAVENOUS at 06:09

## 2017-09-17 RX ADMIN — HEPARIN SODIUM 5000 UNITS: 5000 INJECTION, SOLUTION INTRAVENOUS; SUBCUTANEOUS at 11:09

## 2017-09-17 RX ADMIN — OLANZAPINE 10 MG: 10 TABLET, FILM COATED ORAL at 05:09

## 2017-09-17 RX ADMIN — LEVETIRACETAM 1000 MG: 10 INJECTION INTRAVENOUS at 10:09

## 2017-09-17 RX ADMIN — SODIUM CHLORIDE 1000 ML: 0.9 INJECTION, SOLUTION INTRAVENOUS at 09:09

## 2017-09-17 RX ADMIN — DEXTROSE AND SODIUM CHLORIDE: 5; .45 INJECTION, SOLUTION INTRAVENOUS at 09:09

## 2017-09-17 NOTE — ED TRIAGE NOTES
"Pt arrived via EMS after a "hi" reading on glucometer at nursing home. Per EMS, pt received 20 units of insulin with a repeat "hi" reading. Pt then received a second 20 units of insulin. Pt reports mild nausea at this time. Pt denies eating breakfast or lunch. Pt arrived with lindsay in place.   "

## 2017-09-17 NOTE — ED PROVIDER NOTES
Encounter Date: 9/17/2017    SCRIBE #1 NOTE: I, Leodan Way, am scribing for, and in the presence of,  Dr. Reynaga. I have scribed the following portions of the note - the APC attestation.       History     Chief Complaint   Patient presents with    Hyperglycemia     Pt presented to the ED via [facility]. Pt was sent to the ED for further evaluation of elevated blood sugar. Glucometer reading HI.      84 y.o. Male with medical history of DM type 1, left sided hemiparesis after TIA, CKD stage 4 presents to ED with hyperglycemia. Patient arrived via EMS for high glucose readings. Nursing home gave 20U of insulin, glucose reading said HI so gave another 20U of insulin and called EMS. Glucose reading at 1500 said 517, 1530 reading was 469. EMS gave IVF. Patient only complaint is nausea. Denies shortness of breath, chest pain, syncope, changes in urination, changes in bowel.           Review of patient's allergies indicates:  No Known Allergies  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     TIA (transient ischemic attack) 2016    Type 1 diabetes     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 3/25/2013    Type 1 diabetes mellitus with hyperglycemia 4/8/2014    Type 1 diabetes mellitus with hypoglycemia and without coma 4/8/2014    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     Poor control due to cognitive  impairment, with history of hypoglycemia and DKA     Vitamin D deficiency disease 7/31/2013     Past Surgical History:   Procedure Laterality Date    CATARACT EXTRACTION W/  INTRAOCULAR LENS IMPLANT  8/26/2009, 10/14/2009    CRANIOTOMY  1995    CRANIOTOMY  12/21/2010    EYE SURGERY       Family History   Problem Relation Age of Onset    Diabetes Mother     Cataracts Mother     No Known Problems Father     Diabetes Sister     No Known Problems Son     No Known Problems Daughter     No Known Problems Maternal Grandmother     No Known Problems Maternal Grandfather     No Known Problems Paternal Grandmother     No Known Problems Paternal Grandfather     Diabetes Sister     Diabetes Brother     No Known Problems Maternal Aunt     No Known Problems Maternal Uncle     No Known Problems Paternal Aunt     No Known Problems Paternal Uncle     Diabetes Sister     Diabetes Brother     Amblyopia Neg Hx     Blindness Neg Hx     Cancer Neg Hx     Glaucoma Neg Hx     Hypertension Neg Hx     Macular degeneration Neg Hx     Retinal detachment Neg Hx     Strabismus Neg Hx     Stroke Neg Hx     Thyroid disease Neg Hx      Social History   Substance Use Topics    Smoking status: Former Smoker     Packs/day: 1.00     Types: Cigarettes     Quit date: 12/31/1994    Smokeless tobacco: Former User     Quit date: 4/3/2010    Alcohol use No     Review of Systems   Constitutional: Negative for diaphoresis, fatigue and fever.        Elevated glucose reading   HENT: Negative for congestion and nosebleeds.    Eyes: Negative for visual disturbance.   Respiratory: Negative for cough and shortness of breath.    Cardiovascular: Negative for chest pain and leg swelling.   Gastrointestinal: Positive for nausea. Negative for abdominal distention and vomiting.   Genitourinary: Negative for dysuria and hematuria.   Musculoskeletal: Negative for back pain, gait problem and neck pain.   Skin: Negative for rash.    Neurological: Negative for syncope, weakness and headaches.   Psychiatric/Behavioral: The patient is not nervous/anxious.        Physical Exam     Initial Vitals [09/17/17 1534]   BP Pulse Resp Temp SpO2   (!) 180/90 74 17 98.2 °F (36.8 °C) 100 %      MAP       120         Physical Exam    Vitals reviewed.  Constitutional: Vital signs are normal. He appears well-developed and well-nourished. He is not diaphoretic. No distress.   HENT:   Head: Normocephalic and atraumatic.   Nose: Nose normal.   Mouth/Throat: Oropharynx is clear and moist.   Eyes: Conjunctivae, EOM and lids are normal. Pupils are equal, round, and reactive to light. Lids are everted and swept, no foreign bodies found. Right eye exhibits no discharge. Left eye exhibits no discharge.   Neck: Trachea normal and normal range of motion. Neck supple.   Cardiovascular: Normal rate, regular rhythm, intact distal pulses and normal pulses.   Pulmonary/Chest: Breath sounds normal. He has no wheezes. He has no rhonchi. He has no rales.   Abdominal: Soft. Normal appearance and bowel sounds are normal. He exhibits no distension. There is no tenderness. There is no rebound and no guarding.   Musculoskeletal: Normal range of motion. He exhibits no edema or tenderness.   Neurological: He is alert and oriented to person, place, and time.   Residual left sided weakness secondary to TIA   Skin: Skin is warm and dry. Capillary refill takes less than 2 seconds. No rash noted. No cyanosis.   Psychiatric: He has a normal mood and affect.         ED Course   Procedures  Labs Reviewed   CBC W/ AUTO DIFFERENTIAL - Abnormal; Notable for the following:        Result Value    WBC 13.95 (*)     RBC 4.28 (*)     Hemoglobin 12.5 (*)     Hematocrit 36.7 (*)     Platelets 389 (*)     Gran # 11.2 (*)     Gran% 80.3 (*)     Lymph% 11.6 (*)     All other components within normal limits   COMPREHENSIVE METABOLIC PANEL - Abnormal; Notable for the following:     Chloride 115 (*)     CO2 7  (*)     Glucose 497 (*)     BUN, Bld 64 (*)     Creatinine 3.7 (*)     Total Protein 8.7 (*)     Albumin 2.9 (*)     Anion Gap 17 (*)     eGFR if  16.4 (*)     eGFR if non  14.1 (*)     All other components within normal limits   URINALYSIS, REFLEX TO URINE CULTURE - Abnormal; Notable for the following:     Appearance, UA Cloudy (*)     Protein, UA 2+ (*)     Glucose, UA 3+ (*)     Occult Blood UA 1+ (*)     Leukocytes, UA 3+ (*)     All other components within normal limits    Narrative:     Preferred Collection Type->Urine, Clean Catch   URINALYSIS MICROSCOPIC - Abnormal; Notable for the following:     RBC, UA 10 (*)     WBC, UA >100 (*)     WBC Clumps, UA Occasional (*)     Bacteria, UA Many (*)     All other components within normal limits    Narrative:     Preferred Collection Type->Urine, Clean Catch   OSMOLALITY - Abnormal; Notable for the following:     Osmolality 352 (*)     All other components within normal limits    Narrative:     ADDING ON OSMOLALITY AND TROPONIN I PER RICO STONE MD 19:55    09/17/2017     OSMO critical result(s) called and verbal readback obtained from   LINA DUMONT RN, 09/17/2017 20:05   ISTAT PROCEDURE - Abnormal; Notable for the following:     POC PH 7.272 (*)     POC PCO2 33.0 (*)     POC HCO3 15.2 (*)     POC TCO2 16 (*)     All other components within normal limits   CULTURE, URINE   CULTURE, URINE   BETA - HYDROXYBUTYRATE, SERUM   CREATININE, URINE, RANDOM   SODIUM, URINE, RANDOM   UREA NITROGEN, URINE, RANDOM   TROPONIN I   OSMOLALITY   PROCALCITONIN   SALICYLATE LEVEL   POCT GLUCOSE MONITORING CONTINUOUS   POCT GLUCOSE MONITORING CONTINUOUS        Imaging Results          X-Ray Chest PA And Lateral (Final result)  Result time 09/17/17 17:31:22    Final result by Yoko Hines MD (09/17/17 17:31:22)                 Impression:      Poor inspiratory effort.  Chronic lung changes with no definite superimposed acute process  seen.      Electronically signed by: JIAN HANSEN MD  Date:     09/17/17  Time:    17:31              Narrative:    Chest PA and lateral.  Comparison: 8/11/17.    Cardiac silhouette is stable in size.  There is stable tortuosity of the aorta.  Lungs are hypoinflated which accentuates pulmonary vascular markings.  Difficult to exclude potential mild pulmonary edema.  Chronic lung changes are seen with no new focal consolidative process, pneumothorax, or significant effusion.  Bones show DJD.                                 Medical Decision Making:   History:   Old Medical Records: I decided to obtain old medical records.  Clinical Tests:   Lab Tests: Ordered and Reviewed  Radiological Study: Ordered and Reviewed  Medical Tests: Ordered and Reviewed       APC / Resident Notes:   84 y.o. Male with medical history of DM type 1, left sided hemiparesis after TIA, CKD stage 4 presents to ED with hyperglycemia.    DDX includes but is not limited to DKA, hyperglycemia, electrolyte abnormality, UTI. Will get hyperglycemia labs, CXR and give fluids. POCT 499. Will start INsulin drip at 7U/hr. Beta 0.0. WBC mildly elevated at 13.95. BUn 64 Cr 3.7. CXR shows no acute process. UA pending. Will admit to inpatient medicine team.    I have discussed and reviewed with my supervising physician.       Scribe Attestation:   Scribe #1: I performed the above scribed service and the documentation accurately describes the services I performed. I attest to the accuracy of the note.    Attending Attestation:     Physician Attestation Statement for NP/PA:   I discussed this assessment and plan of this patient with the NP/PA, but I did not personally examine the patient. The face to face encounter was performed by the NP/PA.        Physician Attestation for Scribe:  Physician Attestation Statement for Scribe #1: I, Dr. Reynaga, reviewed documentation, as scribed by Leodan Way in my presence, and it is both accurate and complete.                  ED Course      Clinical Impression:   The primary encounter diagnosis was Diabetic ketoacidosis without coma associated with type 1 diabetes mellitus. A diagnosis of Nausea was also pertinent to this visit.    Disposition:   Disposition: Admitted  Condition: Lauren Jacome PA-C  09/17/17 1900       Devan Reynaga MD  09/17/17 [id number]

## 2017-09-18 LAB
ANION GAP SERPL CALC-SCNC: 10 MMOL/L
ANION GAP SERPL CALC-SCNC: 11 MMOL/L
ANION GAP SERPL CALC-SCNC: 9 MMOL/L
BACTERIA UR CULT: NORMAL
BACTERIA UR CULT: NORMAL
BASOPHILS # BLD AUTO: 0.01 K/UL
BASOPHILS NFR BLD: 0 %
BUN SERPL-MCNC: 55 MG/DL
BUN SERPL-MCNC: 57 MG/DL
BUN SERPL-MCNC: 59 MG/DL
CALCIUM SERPL-MCNC: 8.4 MG/DL
CALCIUM SERPL-MCNC: 8.8 MG/DL
CALCIUM SERPL-MCNC: 8.8 MG/DL
CHLORIDE SERPL-SCNC: 112 MMOL/L
CHLORIDE SERPL-SCNC: 113 MMOL/L
CHLORIDE SERPL-SCNC: 116 MMOL/L
CO2 SERPL-SCNC: 15 MMOL/L
CO2 SERPL-SCNC: 16 MMOL/L
CO2 SERPL-SCNC: 17 MMOL/L
CREAT SERPL-MCNC: 3.3 MG/DL
CREAT SERPL-MCNC: 3.4 MG/DL
CREAT SERPL-MCNC: 3.5 MG/DL
DIFFERENTIAL METHOD: ABNORMAL
EOSINOPHIL # BLD AUTO: 0.1 K/UL
EOSINOPHIL NFR BLD: 0.3 %
ERYTHROCYTE [DISTWIDTH] IN BLOOD BY AUTOMATED COUNT: 14 %
EST. GFR  (AFRICAN AMERICAN): 17.5 ML/MIN/1.73 M^2
EST. GFR  (AFRICAN AMERICAN): 18.1 ML/MIN/1.73 M^2
EST. GFR  (AFRICAN AMERICAN): 18.8 ML/MIN/1.73 M^2
EST. GFR  (NON AFRICAN AMERICAN): 15.1 ML/MIN/1.73 M^2
EST. GFR  (NON AFRICAN AMERICAN): 15.7 ML/MIN/1.73 M^2
EST. GFR  (NON AFRICAN AMERICAN): 16.2 ML/MIN/1.73 M^2
GLUCOSE SERPL-MCNC: 258 MG/DL
GLUCOSE SERPL-MCNC: 262 MG/DL
GLUCOSE SERPL-MCNC: 279 MG/DL
GRAM STN SPEC: NORMAL
GRAM STN SPEC: NORMAL
HCT VFR BLD AUTO: 30.3 %
HGB BLD-MCNC: 10 G/DL
LYMPHOCYTES # BLD AUTO: 1.8 K/UL
LYMPHOCYTES NFR BLD: 8.5 %
MCH RBC QN AUTO: 28.4 PG
MCHC RBC AUTO-ENTMCNC: 33 G/DL
MCV RBC AUTO: 86 FL
MONOCYTES # BLD AUTO: 0.9 K/UL
MONOCYTES NFR BLD: 4.2 %
NEUTROPHILS # BLD AUTO: 18.8 K/UL
NEUTROPHILS NFR BLD: 86.7 %
PLATELET # BLD AUTO: 292 K/UL
PMV BLD AUTO: 10.7 FL
POCT GLUCOSE: 146 MG/DL (ref 70–110)
POCT GLUCOSE: 159 MG/DL (ref 70–110)
POCT GLUCOSE: 186 MG/DL (ref 70–110)
POCT GLUCOSE: 222 MG/DL (ref 70–110)
POCT GLUCOSE: 223 MG/DL (ref 70–110)
POCT GLUCOSE: 226 MG/DL (ref 70–110)
POCT GLUCOSE: 228 MG/DL (ref 70–110)
POCT GLUCOSE: 229 MG/DL (ref 70–110)
POCT GLUCOSE: 237 MG/DL (ref 70–110)
POCT GLUCOSE: 241 MG/DL (ref 70–110)
POCT GLUCOSE: 249 MG/DL (ref 70–110)
POCT GLUCOSE: 264 MG/DL (ref 70–110)
POCT GLUCOSE: 266 MG/DL (ref 70–110)
POCT GLUCOSE: 273 MG/DL (ref 70–110)
POCT GLUCOSE: 275 MG/DL (ref 70–110)
POCT GLUCOSE: 277 MG/DL (ref 70–110)
POCT GLUCOSE: 285 MG/DL (ref 70–110)
POCT GLUCOSE: 299 MG/DL (ref 70–110)
POCT GLUCOSE: 309 MG/DL (ref 70–110)
POCT GLUCOSE: 337 MG/DL (ref 70–110)
POCT GLUCOSE: >500 MG/DL (ref 70–110)
POTASSIUM SERPL-SCNC: 4.7 MMOL/L
POTASSIUM SERPL-SCNC: 4.9 MMOL/L
POTASSIUM SERPL-SCNC: 5.3 MMOL/L
RBC # BLD AUTO: 3.52 M/UL
SODIUM SERPL-SCNC: 139 MMOL/L
SODIUM SERPL-SCNC: 140 MMOL/L
SODIUM SERPL-SCNC: 140 MMOL/L
WBC # BLD AUTO: 21.72 K/UL

## 2017-09-18 PROCEDURE — 20600001 HC STEP DOWN PRIVATE ROOM

## 2017-09-18 PROCEDURE — G8996 SWALLOW CURRENT STATUS: HCPCS | Mod: CJ

## 2017-09-18 PROCEDURE — 36415 COLL VENOUS BLD VENIPUNCTURE: CPT

## 2017-09-18 PROCEDURE — 63600175 PHARM REV CODE 636 W HCPCS: Performed by: STUDENT IN AN ORGANIZED HEALTH CARE EDUCATION/TRAINING PROGRAM

## 2017-09-18 PROCEDURE — S5010 5% DEXTROSE AND 0.45% SALINE: HCPCS | Performed by: STUDENT IN AN ORGANIZED HEALTH CARE EDUCATION/TRAINING PROGRAM

## 2017-09-18 PROCEDURE — 85025 COMPLETE CBC W/AUTO DIFF WBC: CPT

## 2017-09-18 PROCEDURE — 63600175 PHARM REV CODE 636 W HCPCS: Performed by: INTERNAL MEDICINE

## 2017-09-18 PROCEDURE — 87040 BLOOD CULTURE FOR BACTERIA: CPT

## 2017-09-18 PROCEDURE — 87077 CULTURE AEROBIC IDENTIFY: CPT | Mod: 59

## 2017-09-18 PROCEDURE — 83036 HEMOGLOBIN GLYCOSYLATED A1C: CPT

## 2017-09-18 PROCEDURE — 25000003 PHARM REV CODE 250: Performed by: HOSPITALIST

## 2017-09-18 PROCEDURE — 92610 EVALUATE SWALLOWING FUNCTION: CPT

## 2017-09-18 PROCEDURE — 87086 URINE CULTURE/COLONY COUNT: CPT

## 2017-09-18 PROCEDURE — 87088 URINE BACTERIA CULTURE: CPT

## 2017-09-18 PROCEDURE — 87205 SMEAR GRAM STAIN: CPT

## 2017-09-18 PROCEDURE — 63600175 PHARM REV CODE 636 W HCPCS: Performed by: HOSPITALIST

## 2017-09-18 PROCEDURE — 80048 BASIC METABOLIC PNL TOTAL CA: CPT | Mod: 91

## 2017-09-18 PROCEDURE — 25000003 PHARM REV CODE 250: Performed by: STUDENT IN AN ORGANIZED HEALTH CARE EDUCATION/TRAINING PROGRAM

## 2017-09-18 PROCEDURE — G8997 SWALLOW GOAL STATUS: HCPCS | Mod: CI

## 2017-09-18 PROCEDURE — 87186 SC STD MICRODIL/AGAR DIL: CPT | Mod: 59

## 2017-09-18 RX ORDER — ONDANSETRON 2 MG/ML
8 INJECTION INTRAMUSCULAR; INTRAVENOUS EVERY 8 HOURS PRN
Status: DISCONTINUED | OUTPATIENT
Start: 2017-09-18 | End: 2017-09-21 | Stop reason: HOSPADM

## 2017-09-18 RX ORDER — LEVETIRACETAM 10 MG/ML
1000 INJECTION INTRAVASCULAR ONCE
Status: DISCONTINUED | OUTPATIENT
Start: 2017-09-18 | End: 2017-09-18

## 2017-09-18 RX ORDER — GLUCAGON 1 MG
1 KIT INJECTION
Status: DISCONTINUED | OUTPATIENT
Start: 2017-09-18 | End: 2017-09-21 | Stop reason: HOSPADM

## 2017-09-18 RX ORDER — LEVETIRACETAM 500 MG/1
1000 TABLET ORAL 2 TIMES DAILY
Status: DISCONTINUED | OUTPATIENT
Start: 2017-09-18 | End: 2017-09-21 | Stop reason: HOSPADM

## 2017-09-18 RX ORDER — INSULIN ASPART 100 [IU]/ML
1-10 INJECTION, SOLUTION INTRAVENOUS; SUBCUTANEOUS EVERY 6 HOURS PRN
Status: DISCONTINUED | OUTPATIENT
Start: 2017-09-18 | End: 2017-09-20

## 2017-09-18 RX ORDER — INSULIN ASPART 100 [IU]/ML
3 INJECTION, SOLUTION INTRAVENOUS; SUBCUTANEOUS
Status: DISCONTINUED | OUTPATIENT
Start: 2017-09-18 | End: 2017-09-19

## 2017-09-18 RX ORDER — CARVEDILOL 6.25 MG/1
12.5 TABLET ORAL 2 TIMES DAILY
Status: DISCONTINUED | OUTPATIENT
Start: 2017-09-18 | End: 2017-09-18

## 2017-09-18 RX ADMIN — DEXTROSE AND SODIUM CHLORIDE: 5; .45 INJECTION, SOLUTION INTRAVENOUS at 04:09

## 2017-09-18 RX ADMIN — SODIUM CHLORIDE 0.3 UNITS/HR: 9 INJECTION, SOLUTION INTRAVENOUS at 04:09

## 2017-09-18 RX ADMIN — HEPARIN SODIUM 5000 UNITS: 5000 INJECTION, SOLUTION INTRAVENOUS; SUBCUTANEOUS at 03:09

## 2017-09-18 RX ADMIN — INSULIN ASPART 3 UNITS: 100 INJECTION, SOLUTION INTRAVENOUS; SUBCUTANEOUS at 09:09

## 2017-09-18 RX ADMIN — DEXTROSE AND SODIUM CHLORIDE: 5; .45 INJECTION, SOLUTION INTRAVENOUS at 09:09

## 2017-09-18 RX ADMIN — CEFTRIAXONE 1 G: 1 INJECTION, SOLUTION INTRAVENOUS at 01:09

## 2017-09-18 RX ADMIN — INSULIN ASPART 3 UNITS: 100 INJECTION, SOLUTION INTRAVENOUS; SUBCUTANEOUS at 04:09

## 2017-09-18 RX ADMIN — INSULIN ASPART 4 UNITS: 100 INJECTION, SOLUTION INTRAVENOUS; SUBCUTANEOUS at 04:09

## 2017-09-18 RX ADMIN — HEPARIN SODIUM 5000 UNITS: 5000 INJECTION, SOLUTION INTRAVENOUS; SUBCUTANEOUS at 06:09

## 2017-09-18 RX ADMIN — CEFTRIAXONE 1 G: 1 INJECTION, SOLUTION INTRAVENOUS at 11:09

## 2017-09-18 RX ADMIN — LEVETIRACETAM 1000 MG: 500 TABLET ORAL at 09:09

## 2017-09-18 RX ADMIN — INSULIN ASPART 2 UNITS: 100 INJECTION, SOLUTION INTRAVENOUS; SUBCUTANEOUS at 11:09

## 2017-09-18 RX ADMIN — SODIUM CHLORIDE 1000 ML: 0.9 INJECTION, SOLUTION INTRAVENOUS at 06:09

## 2017-09-18 RX ADMIN — INSULIN DETEMIR 12 UNITS: 100 INJECTION, SOLUTION SUBCUTANEOUS at 04:09

## 2017-09-18 RX ADMIN — HEPARIN SODIUM 5000 UNITS: 5000 INJECTION, SOLUTION INTRAVENOUS; SUBCUTANEOUS at 09:09

## 2017-09-18 NOTE — PLAN OF CARE
Problem: Patient Care Overview  Goal: Plan of Care Review  Outcome: Ongoing (interventions implemented as appropriate)  Pt arrived to unit during 2200 hour. Unable to complete admit assessment due to pt appearing lethargic and slow to respond. When asked his current location, pt states he is at [facility]. MD Honeycutt arrived to bedside throughout the shift to assess pt's mental status, and insulin drip maintenance. MD placed new orders to replace lindsay cath, notified of penial bleeding. Lab notified RN and MD of difficulty with obtaining blood draw. MD notified of redness to right great toe, orders placed for wound care consult. Pt remains Q1h blood sugar checks. BM occurred during shift. VSS. Will continue to monitor.

## 2017-09-18 NOTE — ASSESSMENT & PLAN NOTE
- Baseline creatine around 2.6 - 3.0  - Given presentation of suspected HHS, suspect pre-renal dz  - UA and lytes with IVF, FeNa is 1.8%, intrinsic renal  - May be worsened chronic renal disease vs ATN   - Patient also takes lisinopril at home, held  - Ordered urine protein:crt   - On IVF 150cc/hr   - Strict I/Os

## 2017-09-18 NOTE — ASSESSMENT & PLAN NOTE
- ABG shows pH 7.27, pCO2 33, pO2 89, anion gap 17, bicarb 7 on admission   - Beta hydroxy WNL, Lactic acid pending  - Likely multifactorial   - DDX includes Diarrhea (bicarb lost), CKD with NIALL, lactic acidosis (suspect hypovolemia), intoxication  - Per nursing home takes only 1 ASA per day  - Low suspicion for intoxication will obtain ASA, consider Methanol, or ethylene glycol

## 2017-09-18 NOTE — CONSULTS
Consulted for diabetic toe ulcer.  Pt presents with chronic onychomycosis of right hallux.  There is no ulceration at this time.  Please consult podiatry or dermatology if treatment of onychomycosis is needed.  [id number]

## 2017-09-18 NOTE — PLAN OF CARE
Problem: Patient Care Overview  Goal: Plan of Care Review  Outcome: Ongoing (interventions implemented as appropriate)  At start of shift, pt very lethargic, oriented only to self and place. Pt AAOx4 at present time. VSS. Insulin gtt has been discontinued. Blood glucose monitored as ordered throughout shift. Dental soft diet with thin liquids recommended by speech. Monitoring closely for signs of aspiration. Second PIV inserted by PICC team. Pt turned q2h with wedge, HOB elevated at least 30 degrees at all times. Bed alarmed, locked in the low position, call light and table within reach. Will continue to monitor.

## 2017-09-18 NOTE — ASSESSMENT & PLAN NOTE
- On Coreg 12.5 BID, Lisinopril 5mg, nifedipine 90 daily  - Has taken lisinopril and nifedipine prior to admission  - Will hold Lisinopril given NIALL  - Will obtain nursing swallow study tonight, if unable will hold coreg tonight, swallow eval tomorrow AM

## 2017-09-18 NOTE — ASSESSMENT & PLAN NOTE
- WBC of 13 on admission; now WBC 21  - UA dirty with indwelling cath, Diarrhea with negative C diff  - Will cover with ceftriaxone given concern for UTI, per chart review no previous history of pseudomonas

## 2017-09-18 NOTE — SUBJECTIVE & OBJECTIVE
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       Past Surgical History:   Procedure Laterality Date    CATARACT EXTRACTION W/  INTRAOCULAR LENS IMPLANT  8/26/2009, 10/14/2009    CRANIOTOMY  1995    CRANIOTOMY  12/21/2010    EYE SURGERY         Review of patient's allergies indicates:  No Known Allergies    No current facility-administered medications on file prior to encounter.      Current Outpatient Prescriptions on File Prior to Encounter   Medication Sig    albuterol-ipratropium 2.5mg-0.5mg/3mL (DUO-NEB) 0.5 mg-3 mg(2.5 mg base)/3 mL nebulizer solution Take 3 mLs by nebulization every 4 (four) hours as needed for Wheezing or Shortness of Breath. Rescue    aspirin (ECOTRIN) 81 MG EC tablet Take 1 tablet (81 mg total) by mouth once daily.    carvedilol (COREG) 12.5 MG tablet Take 1 tablet (12.5 mg total) by mouth 2 (two) times daily.    insulin aspart (NOVOLOG) 100 unit/mL InPn pen  "Inject 5 Units into the skin 3 (three) times daily with meals.    insulin detemir (LEVEMIR FLEXTOUCH) 100 unit/mL (3 mL) SubQ InPn pen Inject 18 Units into the skin once daily.    levetiracetam (KEPPRA) 1000 MG tablet Take 1 tablet (1,000 mg total) by mouth 2 (two) times daily.    lisinopril (PRINIVIL,ZESTRIL) 5 MG tablet Take 2 tablets (10 mg total) by mouth once daily.    nifedipine (PROCARDIA-XL) 90 MG (OSM) 24 hr tablet Take 1 tablet (90 mg total) by mouth once daily.    nystatin (MYCOSTATIN) cream Apply topically 2 (two) times daily.    paroxetine (PAXIL) 10 MG tablet Take 1 tablet (10 mg total) by mouth every morning.    blood-glucose meter (CONTOUR METER) kit Use as instructed.  Please substitute appropriate meter to match his strips.    CONTOUR TEST STRIPS Strp USE AS DIRECTED THREE TIMES DAILY    lancets Misc Use three times daily for finger stick glucose monitoring.    pen needle, diabetic (BD INSULIN PEN NEEDLE UF SHORT) 31 gauge x 5/16" Ndle Inject 1 Device into the skin 4 (four) times daily.     Family History     Problem Relation (Age of Onset)    Cataracts Mother    Diabetes Mother, Sister, Sister, Brother, Sister, Brother    No Known Problems Father, Son, Daughter, Maternal Grandmother, Maternal Grandfather, Paternal Grandmother, Paternal Grandfather, Maternal Aunt, Maternal Uncle, Paternal Aunt, Paternal Uncle        Social History Main Topics    Smoking status: Former Smoker     Packs/day: 1.00     Types: Cigarettes     Quit date: 12/31/1994    Smokeless tobacco: Former User     Quit date: 4/3/2010    Alcohol use No    Drug use: No    Sexual activity: Not on file     Review of Systems   Unable to perform ROS: Mental status change     Objective:     Vital Signs (Most Recent):  Temp: 98.2 °F (36.8 °C) (09/17/17 1534)  Pulse: 88 (09/17/17 2029)  Resp: 16 (09/17/17 2028)  BP: (!) 166/79 (09/17/17 2029)  SpO2: 96 % (09/17/17 2029) Vital Signs (24h Range):  Temp:  [98.2 °F (36.8 °C)] 98.2 " °F (36.8 °C)  Pulse:  [74-94] 88  Resp:  [16-31] 16  SpO2:  [76 %-100 %] 96 %  BP: (151-199)/(79-97) 166/79     Weight: 68 kg (150 lb)  Body mass index is 19.79 kg/m².    Physical Exam   Constitutional: No distress.   Cachetic, elderly     HENT:   Head: Normocephalic and atraumatic.   Right Ear: External ear normal.   Left Ear: External ear normal.   Mouth/Throat: No oropharyngeal exudate.   Oropharynx dry   Eyes: Conjunctivae and EOM are normal. Pupils are equal, round, and reactive to light.   Neck: Normal range of motion. Neck supple.   Cardiovascular: Regular rhythm, normal heart sounds and intact distal pulses.  Tachycardia present.    No murmur heard.  Pulmonary/Chest: Effort normal and breath sounds normal.   Abdominal: Bowel sounds are normal. He exhibits no distension. There is no guarding.   Genitourinary:   Genitourinary Comments: Monzon in place    Musculoskeletal: Normal range of motion.   Neurological: He is alert.   Alert to person only   Left sided upper and lower extremity weakness   Dysarthric   Skin: Skin is warm and dry.   Nursing note and vitals reviewed.       Significant Labs:   ABGs:   Recent Labs  Lab 09/17/17 1958   PH 7.272*   PCO2 33.0*   HCO3 15.2*   POCSATURATED 96   BE -12     CBC:   Recent Labs  Lab 09/17/17  1638   WBC 13.95*   HGB 12.5*   HCT 36.7*   *     CMP:   Recent Labs  Lab 09/17/17  1638      K 4.9   *   CO2 7*   *   BUN 64*   CREATININE 3.7*   CALCIUM 10.1   PROT 8.7*   ALBUMIN 2.9*   BILITOT 0.3   ALKPHOS 122   AST 16   ALT 18   ANIONGAP 17*   EGFRNONAA 14.1*     All pertinent labs within the past 24 hours have been reviewed.  Plasma Osm elevated 352  Beta Hydroxy 0.0       Significant Imaging: I have reviewed all pertinent imaging results/findings within the past 24 hours.   CXR shows no acute changes or focal consolidation seen.

## 2017-09-18 NOTE — ASSESSMENT & PLAN NOTE
- Per NH at baseline (alert to person and place) prior to transfer, patient was examined s/p zyprexa  - Likely multifactorial, HSS and medication induced, cannot rule out infectious  - Hold all sedating medications, treat HHS, cover for UTI

## 2017-09-18 NOTE — ASSESSMENT & PLAN NOTE
- history of unknown high glucose at NH, given 40 Units in route  - Elevatated , and negative beta hydroxy, On presentation glucose 517  - DDx inefction vs cardiac (torponin WNL) vs stroke (no new focal weakness)   - Started on Insulin drip 3U in ED, 150 cc 0.45 NaCl per hr, added dextrose for glucose <250     - Will monitor BMP Q4 with phos, monitor for hypokalemia and replete as needed  - Given NIALL will monitor glucose closely, given concern for insulin stacking.  - Continue drip until bicarb normalizes, mental status improved, and able to tolerate PO    - Patient having issues with acces, PICC team consulted for access

## 2017-09-18 NOTE — ASSESSMENT & PLAN NOTE
- UA shows many bacteria and positive WBC  - Given hx of indwelling cath and elevated procal will treat  - Lactic acid WNL, however s/p IVF  - Ceftriaxone 1 gram q24 for now

## 2017-09-18 NOTE — PT/OT/SLP EVAL
Speech Language Pathology  Bedside Swallow Study  Evaluation    Julius Cardenas   MRN: [id number]   1061/1061 A    Admitting Diagnosis: Hyperosmolar non-ketotic state in patient with type 2 diabetes mellitus    Diet recommendations: Solid Diet Level: Dental Soft  Liquid Diet Level: Thin (NO STRAWS)   · whole po medications 1 at a time,   · Upright for all po intake and remain upright for 20 minutes s/p po intake,   · SMALL BITES AND SOPS,   · NO STRAWS,   · Only feed when awake/alert  · SLOW RATE- pt may benefit from being monitored with meals as he is impulsive with taking consecutive large sips     Continue to monitor for signs and symptoms of aspiration and discontinue oral feeding should you notice any of the following: watery eyes, reddened facial area, wet vocal quality, increased work of breathing, change in respiratory status, increased congestion, coughing, fever, etc.     SLP Treatment Date: 09/18/17  Speech Start Time: 1456     Speech Stop Time: 1512     Speech Total (min): 16 min       TREATMENT BILLABLE MINUTES:  Eval Swallow and Oral Function 16    Diagnosis: Hyperosmolar non-ketotic state in patient with type 2 diabetes mellitus    CXR: Poor inspiratory effort.  Chronic lung changes with no definite superimposed acute process seen.    HPI: Mr. 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 Patient is a very poor historian and had no documentation with him.  Per EMS patient was given 20U of insulin in the AM after a reading of 350, repeat glucose late in the day showed high so they gave him another 20U and called EMS who reported his glucose at 517.  On presentation patient reported nausea only, denied chest pain, or SOB.  He became agitated and was given one dose of Zyprexa prior to my examination.  Lab resulted showed a  negative hydroxybutyrate.  On examination patient is alert but altered.  He is alert to person only.  He denies pain, nausea, chest pain, or SOB.    Patient lives at [facility].  NH was called and reported patient baseline is alert to person, able to verbally communicate his needs, and significant assistance to attend to ADLs  He is near bed bound with a pronounced left sided weakness.  She reports that today patient was acting his normal self.  She does report diarrhea for the past couple of days, in addition to higher glucose readings.  Denies other signs of infection, fever, chills, cough or SOB.  Patient was on flagyl, but had a negative Cdiff that recently resulted.      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     Past Surgical History:   Procedure Laterality Date    CATARACT EXTRACTION W/  INTRAOCULAR LENS IMPLANT  8/26/2009, 10/14/2009    CRANIOTOMY  1995    CRANIOTOMY   12/21/2010    EYE SURGERY         Has the patient been evaluated by SLP for swallowing? : Yes  Keep patient NPO?: No   General Precautions: Standard, aspiration, fall            Prior diet: reg/thin; never uses dentures to eat (pt reports he does not have dentures).      Subjective:  Communicated with RN prior to entry who reported pt is lethargic 2/2 medications received in ER. Pt easily arousable with some confusion noted throughout assessment. Pt agreed to trials.   Patient goals: none stated.         Objective:        Oral Musculature Evaluation  Oral Musculature: general weakness  Dentition: edentulous, rarely or never uses dentures to eat  Secretion Management:  (rattley vocal quality; pt attmepting to clear with cough, however unable to adequatly clear)  Mandibular Strength and Mobility: WFL  Oral Labial Strength and Mobility: WFL  Lingual Strength and Mobility: impaired strength  Velar Elevation: WFL  Buccal Strength and Mobility: decreased tone, impaired  Volitional Cough: elicited; wet; unable to expectorate   Volitional Swallow: timely  Voice Prior to PO Intake: inconsistently rattley     Bedside Swallow Eval:  Consistencies Assessed: Thin liquids tsp sip of thin x1, cup sips x8, straw sips x6, Nectar thick liquids cup sips x4, Puree tsp bites x3 and Solids bite of bereket cracker x3  Oral Phase: slow oral transit time with bereket cracker trial  Pharyngeal Phase: coughing/choking inconsistently throughout all trials.     BSS completed. Pt with rattley vocal quality and a cough prior to po trials. Pt with inconsistent delayed coughing throughout trials (delayed cough s/p 3/8 straw sips of thin and 1/4 sips of NT water). Vocal quality clear immediately after swallows and adequate/timely hyolaryngeal lift upon palpation. Recommend: Dental soft diet and thin liquids, whole po medications 1 at a time, Upright for all po intake and remain upright for 20 minutes s/p po intake, SMALL BITES AND SOPS, NO STRAWS.  Continue to monitor for signs and symptoms of aspiration and discontinue oral feeding should you notice any of the following: watery eyes, reddened facial area, wet vocal quality, increased work of breathing, change in respiratory status, increased congestion, coughing, fever, etc. ST will f/u to assess tolerance of diet and complaicne with safe swallowing precautions.     Communicated recommendations with RN and MD who are in agreement with POC.     Assessment:  Julius Cardenas is a 84 y.o. male with a medical diagnosis of Hyperosmolar non-ketotic state in patient with type 2 diabetes mellitus and presents with inconsistent coughing throughout po trials. ST will f/u to assess tolerance of diet.           Discharge recommendations: Discharge Facility/Level Of Care Needs:  (pending progress)     Goals:    SLP Goals        Problem: SLP Goal    Goal Priority Disciplines Outcome   SLP Goal     SLP Ongoing (interventions implemented as appropriate)   Description:  Speech Therapy Short Term Goals  Goals expected to be met by 9/25  1. Pt will tolerate a dental soft diet and thin liquids with no overt s/s of aspiration.                        Plan:   Patient to be seen Therapy Frequency: 4 x/week   Plan of Care expires: 10/17/17  Plan of Care reviewed with: patient  SLP Follow-up?: Yes         SLP G-Codes  Functional Assessment Tool Used: noms  Score: 5  Functional Limitations: Swallowing  Swallow Current Status (): PADMINI  Swallow Goal Status (): NAIF Adams, CCC-SLP   Pager: [phone]  09/18/2017

## 2017-09-18 NOTE — PLAN OF CARE
CM to pt's room - pt sleeping; CM left msg w/ Miguel Angel Connors to f/u on pt's status at NH (skilled vs regular bed).

## 2017-09-18 NOTE — PLAN OF CARE
Problem: SLP Goal  Goal: SLP Goal  Speech Therapy Short Term Goals  Goals expected to be met by 9/25  1. Pt will tolerate a dental soft diet and thin liquids with no overt s/s of aspiration.     Outcome: Ongoing (interventions implemented as appropriate)  BSS completed. Recommend: Dental soft diet and thin liquids, whole po medications 1 at a time, Upright for all po intake and remain upright for 20 minutes s/p po intake, SMALL BITES AND SOPS, NO STRAWS. Continue to monitor for signs and symptoms of aspiration and discontinue oral feeding should you notice any of the following: watery eyes, reddened facial area, wet vocal quality, increased work of breathing, change in respiratory status, increased congestion, coughing, fever, etc. ST will f/u to assess tolerance of diet and compliance with safe swallowing precautions.   HORTENCIA Morocho., CCC-SLP  Pager: [phone]  09/18/2017

## 2017-09-18 NOTE — ASSESSMENT & PLAN NOTE
- UA shows many bacteria and positive WBC  - Given hx of indwelling cath and elevated procal will treat and leukocytosis  - Lactic acid WNL, however s/p IVF  - Ceftriaxone 1 gram q24 for now

## 2017-09-18 NOTE — NURSING
Reported received from Daphne MANCERA in ED at 2015. Pt arrived to room with RN transport. Insulin drip handoff occurred MD Honeycutt arrived at bedside to assess pt and clarify insulin drip orders. Stated to place drip on hold due to lack of second IV access for pt to receive needed IV fluids/Keppra. BS trending down since admission. Vital signs taken, assessment preformed. Will continue to monitor.

## 2017-09-18 NOTE — SIGNIFICANT EVENT
Critical Care Outreach (CORE)  Critical Care Medicine  Consult Note      CORE Metrics:     Admit Date: 2017  LOS: 1  Code Status: Full Code   CC: Artificial Intelligence Rapid Response Alert  Date of Consult: 2017  : 1932  Age: 84 y.o.  Weight:   Wt Readings from Last 1 Encounters:   17 68 kg (150 lb)     Race:   Sex: male  Bed: [address] [place] [address] A:   MRN: [id number]  RRT Indication(s): Artificial Intelligence Rapid Response Alert  Time CORE Call Received: 13:02  Time CORE at Bedside: 13:03  Was the patient discharged from an ICU this admission? No   Was the patient discharged from a PACU within last 24 hours? No  Did the patient receive conscious sedation/general anesthesia within last 24 hours? No  Was the patient in the ED within the past 24 hours? Yes  Was the patient started on NIPPV within the past 24 hours? No  Did this progress into an ARC or CPA: No  Attending Physician: Amna Berg  Primary Service: [facility] MED 4  Illness Category: Medical Non-Cardiac  Disposition: Stay on floor    No care rendered or requested. Pt resting comfortably. Will sign off.     GLORIA Hyatt PA-C  Critical Care Medicine  [phone]

## 2017-09-18 NOTE — ASSESSMENT & PLAN NOTE
- ABG shows pH 7.27, pCO2 33, pO2 89, anion gap 17, bicarb 7 on admission   - Beta hydroxy WNL, Lactic acid wnl  - Likely multifactorial   - DDX includes Diarrhea (bicarb lost), CKD with NIALL, ATN   - Takes asa, normal salicylate level  - Metabolic acidosis present, gap closed on repeat am labs 9/18

## 2017-09-18 NOTE — ASSESSMENT & PLAN NOTE
- On Keppra 1000 BID   - Got Keppra 1000 IV overnight  - Will repeat today  - speech eval; passed for dental soft, can have PO meds, re-started home keppra

## 2017-09-18 NOTE — ASSESSMENT & PLAN NOTE
- Hx of DM2, converted to DM1?   - previously on detemir 18. And apart 5 TID  - Will hold subq for now with insulin drip

## 2017-09-18 NOTE — ASSESSMENT & PLAN NOTE
- Monzon placed prior to discharge, schedule for follow up with urology, no note seen in chart  - Monzon replaced on admission  - Strict ins and outs

## 2017-09-18 NOTE — H&P
[facility] Hospital Medicine  History & Physical    Patient Name: Julius Cardenas  MRN: [id number]  Admission Date: 9/17/2017  Attending Physician: Amna Berg   Primary Care Provider: No primary care provider on file.    Hospital Medicine Team: [facility] 4 Pradeep Looney MD     Patient information was obtained from patient and ER records.     Subjective:     Principal Problem:Hyperosmolar non-ketotic state in patient with type 2 diabetes mellitus    Chief Complaint:   Chief Complaint   Patient presents with    Hyperglycemia     Pt presented to the ED via [facility]. Pt was sent to the ED for further evaluation of elevated blood sugar. Glucometer reading HI.         HPI: Mr. 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 Patient is a very poor historian and had no documentation with him.  Per EMS patient was given 20U of insulin in the AM after a reading of 350, repeat glucose late in the day showed high so they gave him another 20U and called EMS who reported his glucose at 517.  On presentation patient reported nausea only, denied chest pain, or SOB.  He became agitated and was given one dose of Zyprexa prior to my examination.  Lab resulted showed a negative hydroxybutyrate.  On examination patient is alert but altered.  He is alert to person only.  He denies pain, nausea, chest pain, or SOB.    Patient lives at [facility].  NH was called and reported patient baseline is alert to person, able to verbally communicate his needs, and significant assistance to attend to ADLs  He is near bed bound with a pronounced left sided weakness.  She reports that today patient was acting his normal self.  She does report diarrhea for the past couple of days, in addition to higher glucose readings.  Denies other  signs of infection, fever, chills, cough or SOB.  Patient was on flagyl, but had a negative Cdiff that recently resulted.            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       Past Surgical History:   Procedure Laterality Date    CATARACT EXTRACTION W/  INTRAOCULAR LENS IMPLANT  8/26/2009, 10/14/2009    CRANIOTOMY  1995    CRANIOTOMY  12/21/2010    EYE SURGERY         Review of patient's allergies indicates:  No Known Allergies    No current facility-administered medications on file prior to encounter.      Current Outpatient Prescriptions on File Prior to Encounter   Medication Sig    albuterol-ipratropium 2.5mg-0.5mg/3mL (DUO-NEB) 0.5 mg-3 mg(2.5 mg base)/3 mL nebulizer solution Take 3 mLs by nebulization every 4 (four) hours as needed for Wheezing or Shortness of Breath. Rescue    aspirin (ECOTRIN) 81 MG EC tablet Take 1 tablet (81 mg total) by mouth once daily.    carvedilol  "(COREG) 12.5 MG tablet Take 1 tablet (12.5 mg total) by mouth 2 (two) times daily.    insulin aspart (NOVOLOG) 100 unit/mL InPn pen Inject 5 Units into the skin 3 (three) times daily with meals.    insulin detemir (LEVEMIR FLEXTOUCH) 100 unit/mL (3 mL) SubQ InPn pen Inject 18 Units into the skin once daily.    levetiracetam (KEPPRA) 1000 MG tablet Take 1 tablet (1,000 mg total) by mouth 2 (two) times daily.    lisinopril (PRINIVIL,ZESTRIL) 5 MG tablet Take 2 tablets (10 mg total) by mouth once daily.    nifedipine (PROCARDIA-XL) 90 MG (OSM) 24 hr tablet Take 1 tablet (90 mg total) by mouth once daily.    nystatin (MYCOSTATIN) cream Apply topically 2 (two) times daily.    paroxetine (PAXIL) 10 MG tablet Take 1 tablet (10 mg total) by mouth every morning.    blood-glucose meter (CONTOUR METER) kit Use as instructed.  Please substitute appropriate meter to match his strips.    CONTOUR TEST STRIPS Strp USE AS DIRECTED THREE TIMES DAILY    lancets Misc Use three times daily for finger stick glucose monitoring.    pen needle, diabetic (BD INSULIN PEN NEEDLE UF SHORT) 31 gauge x 5/16" Ndle Inject 1 Device into the skin 4 (four) times daily.     Family History     Problem Relation (Age of Onset)    Cataracts Mother    Diabetes Mother, Sister, Sister, Brother, Sister, Brother    No Known Problems Father, Son, Daughter, Maternal Grandmother, Maternal Grandfather, Paternal Grandmother, Paternal Grandfather, Maternal Aunt, Maternal Uncle, Paternal Aunt, Paternal Uncle        Social History Main Topics    Smoking status: Former Smoker     Packs/day: 1.00     Types: Cigarettes     Quit date: 12/31/1994    Smokeless tobacco: Former User     Quit date: 4/3/2010    Alcohol use No    Drug use: No    Sexual activity: Not on file     Review of Systems   Unable to perform ROS: Mental status change     Objective:     Vital Signs (Most Recent):  Temp: 98.2 °F (36.8 °C) (09/17/17 1534)  Pulse: 88 (09/17/17 2029)  Resp: 16 " (09/17/17 2028)  BP: (!) 166/79 (09/17/17 2029)  SpO2: 96 % (09/17/17 2029) Vital Signs (24h Range):  Temp:  [98.2 °F (36.8 °C)] 98.2 °F (36.8 °C)  Pulse:  [74-94] 88  Resp:  [16-31] 16  SpO2:  [76 %-100 %] 96 %  BP: (151-199)/(79-97) 166/79     Weight: 68 kg (150 lb)  Body mass index is 19.79 kg/m².    Physical Exam   Constitutional: No distress.   Cachetic, elderly     HENT:   Head: Normocephalic and atraumatic.   Right Ear: External ear normal.   Left Ear: External ear normal.   Mouth/Throat: No oropharyngeal exudate.   Oropharynx dry   Eyes: Conjunctivae and EOM are normal. Pupils are equal, round, and reactive to light.   Neck: Normal range of motion. Neck supple.   Cardiovascular: Regular rhythm, normal heart sounds and intact distal pulses.  Tachycardia present.    No murmur heard.  Pulmonary/Chest: Effort normal and breath sounds normal.   Abdominal: Bowel sounds are normal. He exhibits no distension. There is no guarding.   Genitourinary:   Genitourinary Comments: Monzon in place    Musculoskeletal: Normal range of motion.   Neurological: He is alert.   Alert to person only   Left sided upper and lower extremity weakness   Dysarthric   Skin: Skin is warm and dry.   Nursing note and vitals reviewed.       Significant Labs:   ABGs:   Recent Labs  Lab 09/17/17 1958   PH 7.272*   PCO2 33.0*   HCO3 15.2*   POCSATURATED 96   BE -12     CBC:   Recent Labs  Lab 09/17/17  1638   WBC 13.95*   HGB 12.5*   HCT 36.7*   *     CMP:   Recent Labs  Lab 09/17/17  1638      K 4.9   *   CO2 7*   *   BUN 64*   CREATININE 3.7*   CALCIUM 10.1   PROT 8.7*   ALBUMIN 2.9*   BILITOT 0.3   ALKPHOS 122   AST 16   ALT 18   ANIONGAP 17*   EGFRNONAA 14.1*     All pertinent labs within the past 24 hours have been reviewed.  Plasma Osm elevated 352  Beta Hydroxy 0.0       Significant Imaging: I have reviewed all pertinent imaging results/findings within the past 24 hours.   CXR shows no acute changes or focal  consolidation seen.      Assessment/Plan:     * Hyperosmolar non-ketotic state in patient with type 2 diabetes mellitus    - history of unknown high glucose at NH, given 40 Units in route  - Elevatated , and negative beta hydroxy, On presentation glucose 517  - DDx inefction vs cardiac (torponin WNL) vs stroke (no new focal weakness)   - Started on Insulin drip 3U in ED, 150 cc 0.45 NaCl per hr, will add dextrose if glucose <250     - Will monitor BMP Q4 with phos, monitor for hypokalemia and replete as needed  - Given NIALL will monitor glucose closely, given concern for insulin stacking.  - Continue drip until bicarb normalizes, mental status improved, and able to tolerate PO           NIALL (acute kidney injury)    - Baseline creatine around 2.6 - 3.0  - Given presentation of suspected HHS, likely pre renal  - UA and lytes with IVF, consider US given history of obstruction currently with lindsay   - Strict ins and outs, will monitor              Metabolic acidosis, increased anion gap    - ABG shows pH 7.27, pCO2 33, pO2 89, anion gap 17, bicarb 7 on admission   - Beta hydroxy WNL, Lactic acid pending  - Likely multifactorial   - DDX includes Diarrhea (bicarb lost), CKD with NIALL, lactic acidosis (suspect hypovolemia), intoxication  - Per nursing home takes only 1 ASA per day  - Low suspicion for intoxication will obtain ASA, consider Methanol, or ethylene glycol          Urinary retention    - Lindsay placed prior to discharge, schedule for follow up with urology, no note seen in chart  - Will switch out lindsay  - Strict ins and outs            CKD (chronic kidney disease) stage 4, GFR 15-29 ml/min    - baseline creatine 2.6 - 3.0  - See plan for NIALL  - Renally dose mediation           Acute cystitis with hematuria    - UA shows many bacteria and positive WBC  - Given hx of indwelling cath and elevated procal will treat  - Lactic acid WNL, however s/p IVF  - Ceftriaxone 1 gram q24 for now          Secondary seizure  disorder    - On Keppra 1000 BID   - Will given Keppra 1000 IV tonight  - Can likely transition to PO         Acute encephalopathy    - Per NH at baseline (alert to person and place) prior to transfer, patient was examined s/p zyprexa  - Likely multifactorial, HSS and medication induced, cannot rule out infectious, feel stoke unlikely no new weakness at baseline prior to transport and Zyprexa   - Hold all sedating medications, treat HHS, cover for UTI            Weakness following cerebrovascular accident (CVA)    - CVA with residual left sided hemiparesis           Leukocytosis    - WBC of 13 on admission  - Suspect hemoconcentration given elevated H/H as well   - UA dirty with indwelling cath, Diarrhea with negative C diff  - Will cover with ceftriaxone given concern for UTI, per chart review no previous history of pseudomonas           Anemia of chronic renal failure, stage 4 (severe)    - Hemoglobin elevated from baseline, suspect hemoconcentration, HHS plus diarrhea   - Continue to monitor           Renovascular hypertension    - On Coreg 12.5 BID, Lisinopril 5mg, nifedipine 90 daily  - Has taken lisinopril and nifedipine prior to admission  - Will hold Lisinopril given NIALL  - Will obtain nursing swallow study tonight, if unable will hold coreg tonight, swallow eval tomorrow AM          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    - Hx of DM2, converted to DM1?   - previously on detemir 18. And apart 5 TID  - Will hold subq for now with insulin drip              VTE Risk Mitigation         Ordered     Low Risk of VTE  Once      09/17/17 2320     heparin (porcine) injection 5,000 Units  Every 8 hours     Route:  Subcutaneous        09/17/17 2107         Dispo: Admit to hospital medicine IM4, start inulin drip and with dextrose and fluids, IV ceftriaxone given concern for infection, NPO for tonight with nursing bedside swallow exam.  Difficulty keeping PIV access,  Consult picc team in AM,  running fluids slow to preserve line.     Pradeep Looney MD  Department of Hospital Medicine   [facility]

## 2017-09-18 NOTE — ASSESSMENT & PLAN NOTE
- Hemoglobin elevated from baseline, suspect hemoconcentration, HHS plus diarrhea   - Continue to monitor

## 2017-09-18 NOTE — HPI
Mr. 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 Patient is a very poor historian and had no documentation with him.  Per EMS patient was given 20U of insulin in the AM after a reading of 350, repeat glucose late in the day showed high so they gave him another 20U and called EMS who reported his glucose at 517.  On presentation patient reported nausea only, denied chest pain, or SOB.  He became agitated and was given one dose of Zyprexa prior to my examination.  Lab resulted showed a negative hydroxybutyrate.  On examination patient is alert but altered.  He is alert to person only.  He denies pain, nausea, chest pain, or SOB.    Patient lives at [facility].  NH was called and reported patient baseline is alert to person, able to verbally communicate his needs, and significant assistance to attend to ADLs  He is near bed bound with a pronounced left sided weakness.  She reports that today patient was acting his normal self.  She does report diarrhea for the past couple of days, in addition to higher glucose readings.  Denies other signs of infection, fever, chills, cough or SOB.  Patient was on flagyl, but had a negative Cdiff that recently resulted.

## 2017-09-18 NOTE — ASSESSMENT & PLAN NOTE
- On Coreg 12.5 BID, Lisinopril 5mg, nifedipine 90 daily  - Has taken lisinopril and nifedipine prior to admission  - Holding Lisinopril given NIALL  - Intermittent hypotension so holding coreg

## 2017-09-18 NOTE — ASSESSMENT & PLAN NOTE
- Hx of DM2, converted to DM1?   - previously on detemir 18. And apart 5 TID  - No AG currently, transitioned to SC insulin, Patient requirement daily calculated to be 33  Giving 8U of Detemir tonight and 3 U aspart q6hr as patient has elevated pro-stalin and WBC with risk of hypoglycemia  -In am can titrate up to full 33 requirement  -Cleared by speech for dental soft diet

## 2017-09-18 NOTE — ASSESSMENT & PLAN NOTE
- history of unknown high glucose at NH, given 40 Units in route  - Elevatated , and negative beta hydroxy   - On presentation glucose 517  - Started on Insulin drip 3U in ED, 150 cc 0.45 NaCl per hr, will add dextrose if glucose <250     - Will monitor BMP Q4 with phos, monitor for hypokalemia and replete as needed  - Given NIALL will monitor glucose closely, given concern for insulin stacking.  - Continue drip until bicarb normalizes, mental status improved, and able to tolerate PO

## 2017-09-18 NOTE — ASSESSMENT & PLAN NOTE
- WBC of 13 on admission  - Suspect hemoconcentration given elevated H/H as well   - UA dirty with indwelling cath, Diarrhea with negative C diff  - Will cover with ceftriaxone given concern for UTI, per chart review no previous history of pseudomonas

## 2017-09-18 NOTE — PROGRESS NOTES
[facility] Hospital Medicine  Progress Note    Patient Name: Julius Cardenas  MRN: [id number]  Patient Class: IP- Inpatient   Admission Date: 9/17/2017  Length of Stay: 1 days  Attending Physician: Amna Berg  Primary Care Provider: [facility] Medicine Team: [facility] 4 Joceline Moreno MD    Subjective:     Principal Problem:Hyperosmolar non-ketotic state in patient with type 2 diabetes mellitus    HPI:  Mr. 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 Patient is a very poor historian and had no documentation with him.  Per EMS patient was given 20U of insulin in the AM after a reading of 350, repeat glucose late in the day showed high so they gave him another 20U and called EMS who reported his glucose at 517.  On presentation patient reported nausea only, denied chest pain, or SOB.  He became agitated and was given one dose of Zyprexa prior to my examination.  Lab resulted showed a negative hydroxybutyrate.  On examination patient is alert but altered.  He is alert to person only.  He denies pain, nausea, chest pain, or SOB.    Patient lives at [facility].  NH was called and reported patient baseline is alert to person, able to verbally communicate his needs, and significant assistance to attend to ADLs  He is near bed bound with a pronounced left sided weakness.  She reports that today patient was acting his normal self.  She does report diarrhea for the past couple of days, in addition to higher glucose readings.  Denies other signs of infection, fever, chills, cough or SOB.  Patient was on flagyl, but had a negative Cdiff that recently resulted.            Hospital Course:  Patient with in HHS on insulin drip, Leukocytes on UA, leukocytosis 13.95 and elevated procal on  admission, Blood and Urine Cx drawn, started on rocephin for UTI, NIALL s/p 2L IVF and on continuous infusion of IVF, and patient issues with access, consulted picc team for access.    Interval Hx: Patient with in HHS on insulin drip, Leukocytes on UA, leukocytosis 13.95 and elevated procal on admission, Blood and Urine Cx drawn, started on rocephin for UTI, NIALL s/p 2L IVF and on continuous infusion of IVF, and patient issues with access, consulted picc team for access.    Review of Systems   Unable to perform ROS: Mental status change     Objective:     Vital Signs (Most Recent):  Temp: 97.4 °F (36.3 °C) (09/18/17 1120)  Pulse: 61 (09/18/17 1137)  Resp: 20 (09/18/17 0751)  BP: 124/66 (09/18/17 1120)  SpO2: 99 % (09/18/17 1120) Vital Signs (24h Range):  Temp:  [97.4 °F (36.3 °C)-98.7 °F (37.1 °C)] 97.4 °F (36.3 °C)  Pulse:  [61-94] 61  Resp:  [16-31] 20  SpO2:  [76 %-100 %] 99 %  BP: ()/(51-97) 124/66     Weight: 68 kg (150 lb)  Body mass index is 19.79 kg/m².    Physical Exam   Constitutional: No distress.   Cachetic, elderly     HENT:   Head: Normocephalic and atraumatic.   Mouth/Throat: No oropharyngeal exudate.   Oropharynx dry   Eyes: Conjunctivae and EOM are normal.   Cardiovascular: Normal rate, regular rhythm and normal heart sounds.    No murmur heard.  Pulmonary/Chest: Effort normal and breath sounds normal.   Abdominal: Bowel sounds are normal. He exhibits no distension. There is no guarding.   Genitourinary:   Genitourinary Comments: Monzon in place    Neurological: He is alert.   Left sided upper and lower extremity weakness   Not verbal today; follows commands    Skin: Skin is warm and dry.   Nursing note and vitals reviewed.       Significant Labs:   ABGs:     Recent Labs  Lab 09/17/17 1958   PH 7.272*   PCO2 33.0*   HCO3 15.2*   POCSATURATED 96   BE -12     CBC:     Recent Labs  Lab 09/17/17  1638 09/18/17  1232   WBC 13.95* 21.72*   HGB 12.5* 10.0*   HCT 36.7* 30.3*   * 292     CMP:      Recent Labs  Lab 09/17/17  1638 09/17/17  2137 09/18/17  1232    144 140   K 4.9 4.5 4.9   * 117* 116*   CO2 7* 13* 15*   * 139* 279*   BUN 64* 58* 59*   CREATININE 3.7* 3.3* 3.5*   CALCIUM 10.1 9.5 8.4*   PROT 8.7*  --   --    ALBUMIN 2.9*  --   --    BILITOT 0.3  --   --    ALKPHOS 122  --   --    AST 16  --   --    ALT 18  --   --    ANIONGAP 17* 14 9   EGFRNONAA 14.1* 16.2* 15.1*     All pertinent labs within the past 24 hours have been reviewed.  Plasma Osm elevated 352  Beta Hydroxy 0.0       Significant Imaging: I have reviewed all pertinent imaging results/findings within the past 24 hours.   CXR shows no acute changes or focal consolidation seen.      Assessment/Plan:      * Hyperosmolar non-ketotic state in patient with type 2 diabetes mellitus    - history of unknown high glucose at NH, given 40 Units in route  - Elevatated , and negative beta hydroxy, On presentation glucose 517  - DDx inefction vs cardiac (torponin WNL) vs stroke (no new focal weakness)   - Started on Insulin drip 3U in ED, 150 cc 0.45 NaCl per hr, added dextrose for glucose <250     - Will monitor BMP Q4 with phos, monitor for hypokalemia and replete as needed  - Given NIALL will monitor glucose closely, given concern for insulin stacking.  - Continue drip until bicarb normalizes, mental status improved, and able to tolerate PO    - Patient having issues with acces, PICC team consulted for access         Acute encephalopathy    - Per NH at baseline (alert to person and place) prior to transfer, patient was examined s/p zyprexa  - Likely multifactorial, HSS and medication induced, cannot rule out infectious, feel stoke unlikely no new weakness at baseline prior to transport and Zyprexa   - Hold all sedating medications, treat HHS, cover for UTI            Acute cystitis with hematuria    - UA shows many bacteria and positive WBC  - Given hx of indwelling cath and elevated procal will treat and leukocytosis  -  Lactic acid WNL, however s/p IVF  - Ceftriaxone 1 gram q24 for now          Leukocytosis    - WBC of 13 on admission; now WBC 21  - UA dirty with indwelling cath, Diarrhea with negative C diff  - Will cover with ceftriaxone given concern for UTI, per chart review no previous history of pseudomonas           Metabolic acidosis, increased anion gap    - ABG shows pH 7.27, pCO2 33, pO2 89, anion gap 17, bicarb 7 on admission   - Beta hydroxy WNL, Lactic acid wnl  - Likely multifactorial   - DDX includes Diarrhea (bicarb lost), CKD with NIALL, ATN   - Takes asa, normal salicylate level  - Metabolic acidosis present, gap closed on repeat am labs 9/18        Urinary retention    - Monzon placed prior to discharge, schedule for follow up with urology, no note seen in chart  - Monzon replaced on admission  - Strict ins and outs            Weakness following cerebrovascular accident (CVA)    - CVA with residual left sided hemiparesis           NIALL (acute kidney injury)    - Baseline creatine around 2.6 - 3.0  - Given presentation of suspected HHS, suspect pre-renal dz  - UA and lytes with IVF, FeNa is 1.8%, intrinsic renal  - May be worsened chronic renal disease vs ATN   - Patient also takes lisinopril at home, held  - Ordered urine protein:crt   - On IVF 150cc/hr   - Strict I/Os          Anemia of chronic renal failure, stage 4 (severe)    - Hemoglobin initially elevated from baseline, suspected hemoconcentration, HHS plus diarrhea   - Today Hgb 10 from 12.5  - Continue to monitor           Renovascular hypertension    - On Coreg 12.5 BID, Lisinopril 5mg, nifedipine 90 daily  - Has taken lisinopril and nifedipine prior to admission  - Holding Lisinopril given NIALL  - Intermittent hypotension so holding coreg        Secondary seizure disorder    - On Keppra 1000 BID   - Got Keppra 1000 IV overnight  - Will repeat today  - speech eval; passed for dental soft, can have PO meds, re-started home keppra        CKD (chronic kidney  disease) stage 4, GFR 15-29 ml/min    - baseline creatine 2.6 - 3.0  - See plan for NIALL  - Renally dose mediation           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    - Hx of DM2, converted to DM1?   - previously on detemir 18. And apart 5 TID  - No AG currently, transitioned to SC insulin, Patient requirement daily calculated to be 33  Giving 8U of Detemir tonight and 3 U aspart q6hr as patient has elevated pro-stalin and WBC with risk of hypoglycemia  -In am can titrate up to full 33 requirement  -Cleared by speech for dental soft diet            VTE Risk Mitigation         Ordered     Low Risk of VTE  Once      09/17/17 2320     heparin (porcine) injection 5,000 Units  Every 8 hours     Route:  Subcutaneous        09/17/17 2102              Joceline Moreno MD  Department of Hospital Medicine   [facility]

## 2017-09-18 NOTE — ASSESSMENT & PLAN NOTE
- Per NH at baseline (alert to person and place) prior to transfer, patient was examined s/p zyprexa  - Likely multifactorial, HSS and medication induced, cannot rule out infectious, feel stoke unlikely no new weakness at baseline prior to transport and Zyprexa   - Hold all sedating medications, treat HHS, cover for UTI

## 2017-09-18 NOTE — NURSING
Notified team that phlebotomy is unable to obtain blood for labs after numerous exhausted sticks throughout the night. Nursing staff also unable to obtain second PIV access.

## 2017-09-18 NOTE — SUBJECTIVE & OBJECTIVE
Interval Hx: Patient with in HHS on insulin drip, Leukocytes on UA, leukocytosis 13.95 and elevated procal on admission, Blood and Urine Cx drawn, started on rocephin for UTI, NIALL s/p 2L IVF and on continuous infusion of IVF, and patient issues with access, consulted picc team for access.    Review of Systems   Unable to perform ROS: Mental status change     Objective:     Vital Signs (Most Recent):  Temp: 97.4 °F (36.3 °C) (09/18/17 1120)  Pulse: 61 (09/18/17 1137)  Resp: 20 (09/18/17 0751)  BP: 124/66 (09/18/17 1120)  SpO2: 99 % (09/18/17 1120) Vital Signs (24h Range):  Temp:  [97.4 °F (36.3 °C)-98.7 °F (37.1 °C)] 97.4 °F (36.3 °C)  Pulse:  [61-94] 61  Resp:  [16-31] 20  SpO2:  [76 %-100 %] 99 %  BP: ()/(51-97) 124/66     Weight: 68 kg (150 lb)  Body mass index is 19.79 kg/m².    Physical Exam   Constitutional: No distress.   Cachetic, elderly     HENT:   Head: Normocephalic and atraumatic.   Mouth/Throat: No oropharyngeal exudate.   Oropharynx dry   Eyes: Conjunctivae and EOM are normal.   Cardiovascular: Normal rate, regular rhythm and normal heart sounds.    No murmur heard.  Pulmonary/Chest: Effort normal and breath sounds normal.   Abdominal: Bowel sounds are normal. He exhibits no distension. There is no guarding.   Genitourinary:   Genitourinary Comments: Monzon in place    Neurological: He is alert.   Left sided upper and lower extremity weakness   Not verbal today; follows commands    Skin: Skin is warm and dry.   Nursing note and vitals reviewed.       Significant Labs:   ABGs:     Recent Labs  Lab 09/17/17 1958   PH 7.272*   PCO2 33.0*   HCO3 15.2*   POCSATURATED 96   BE -12     CBC:     Recent Labs  Lab 09/17/17  1638 09/18/17  1232   WBC 13.95* 21.72*   HGB 12.5* 10.0*   HCT 36.7* 30.3*   * 292     CMP:     Recent Labs  Lab 09/17/17  1638 09/17/17  2137 09/18/17  1232    144 140   K 4.9 4.5 4.9   * 117* 116*   CO2 7* 13* 15*   * 139* 279*   BUN 64* 58* 59*   CREATININE  3.7* 3.3* 3.5*   CALCIUM 10.1 9.5 8.4*   PROT 8.7*  --   --    ALBUMIN 2.9*  --   --    BILITOT 0.3  --   --    ALKPHOS 122  --   --    AST 16  --   --    ALT 18  --   --    ANIONGAP 17* 14 9   EGFRNONAA 14.1* 16.2* 15.1*     All pertinent labs within the past 24 hours have been reviewed.  Plasma Osm elevated 352  Beta Hydroxy 0.0       Significant Imaging: I have reviewed all pertinent imaging results/findings within the past 24 hours.   CXR shows no acute changes or focal consolidation seen.

## 2017-09-18 NOTE — ASSESSMENT & PLAN NOTE
- Lindsay placed prior to discharge, schedule for follow up with urology, no note seen in chart  - Will switch out lindsay  - Strict ins and outs

## 2017-09-18 NOTE — ASSESSMENT & PLAN NOTE
- Hemoglobin initially elevated from baseline, suspected hemoconcentration, HHS plus diarrhea   - Today Hgb 10 from 12.5  - Continue to monitor

## 2017-09-18 NOTE — HOSPITAL COURSE
Patient with in HHS on insulin drip, Leukocytes on UA, leukocytosis 13.95 and elevated procal on admission, Blood and Urine Cx drawn, started on rocephin for UTI, NIALL s/p 2L IVF and on continuous infusion of IVF, and patient issues with access, consulted picc team for access. Patient sugars remained high 9/19 placed on detemir 20 with 5 aspart; glucose overnight 70s-130s. Now regimen is 15 and 5TIDWM. Proteus in urine and psuedomonas bacteremia both sens to ciprofloxacin. Patient to complete 14 day course of ciprofloxacin.

## 2017-09-19 PROBLEM — E87.29 METABOLIC ACIDOSIS, INCREASED ANION GAP: Status: RESOLVED | Noted: 2017-09-19 | Resolved: 2017-09-19

## 2017-09-19 PROBLEM — N30.01 ACUTE CYSTITIS WITH HEMATURIA: Status: ACTIVE | Noted: 2017-09-19

## 2017-09-19 PROBLEM — E87.29 METABOLIC ACIDOSIS, INCREASED ANION GAP: Status: RESOLVED | Noted: 2017-09-17 | Resolved: 2017-09-19

## 2017-09-19 LAB
ANION GAP SERPL CALC-SCNC: 10 MMOL/L
ANION GAP SERPL CALC-SCNC: 7 MMOL/L
ANION GAP SERPL CALC-SCNC: 9 MMOL/L
BASOPHILS # BLD AUTO: 0.01 K/UL
BASOPHILS NFR BLD: 0.1 %
BUN SERPL-MCNC: 52 MG/DL
BUN SERPL-MCNC: 52 MG/DL
BUN SERPL-MCNC: 53 MG/DL
BUN SERPL-MCNC: 54 MG/DL
BUN SERPL-MCNC: 55 MG/DL
BUN SERPL-MCNC: 56 MG/DL
BUN SERPL-MCNC: 57 MG/DL
CALCIUM SERPL-MCNC: 8 MG/DL
CALCIUM SERPL-MCNC: 8.1 MG/DL
CALCIUM SERPL-MCNC: 8.3 MG/DL
CALCIUM SERPL-MCNC: 8.4 MG/DL
CALCIUM SERPL-MCNC: 8.5 MG/DL
CHLORIDE SERPL-SCNC: 113 MMOL/L
CHLORIDE SERPL-SCNC: 114 MMOL/L
CHLORIDE SERPL-SCNC: 114 MMOL/L
CHLORIDE SERPL-SCNC: 115 MMOL/L
CHLORIDE SERPL-SCNC: 116 MMOL/L
CO2 SERPL-SCNC: 12 MMOL/L
CO2 SERPL-SCNC: 13 MMOL/L
CO2 SERPL-SCNC: 14 MMOL/L
CO2 SERPL-SCNC: 14 MMOL/L
CO2 SERPL-SCNC: 15 MMOL/L
CO2 SERPL-SCNC: 15 MMOL/L
CO2 SERPL-SCNC: 17 MMOL/L
CREAT SERPL-MCNC: 3.2 MG/DL
CREAT SERPL-MCNC: 3.3 MG/DL
CREAT SERPL-MCNC: 3.4 MG/DL
DIFFERENTIAL METHOD: ABNORMAL
EOSINOPHIL # BLD AUTO: 0.1 K/UL
EOSINOPHIL NFR BLD: 0.3 %
ERYTHROCYTE [DISTWIDTH] IN BLOOD BY AUTOMATED COUNT: 13.8 %
EST. GFR  (AFRICAN AMERICAN): 18.1 ML/MIN/1.73 M^2
EST. GFR  (AFRICAN AMERICAN): 18.8 ML/MIN/1.73 M^2
EST. GFR  (AFRICAN AMERICAN): 19.5 ML/MIN/1.73 M^2
EST. GFR  (NON AFRICAN AMERICAN): 15.7 ML/MIN/1.73 M^2
EST. GFR  (NON AFRICAN AMERICAN): 16.2 ML/MIN/1.73 M^2
EST. GFR  (NON AFRICAN AMERICAN): 16.9 ML/MIN/1.73 M^2
ESTIMATED AVG GLUCOSE: 237 MG/DL
GLUCOSE SERPL-MCNC: 183 MG/DL
GLUCOSE SERPL-MCNC: 191 MG/DL
GLUCOSE SERPL-MCNC: 223 MG/DL
GLUCOSE SERPL-MCNC: 268 MG/DL
GLUCOSE SERPL-MCNC: 325 MG/DL
GLUCOSE SERPL-MCNC: 326 MG/DL
GLUCOSE SERPL-MCNC: 345 MG/DL
HBA1C MFR BLD HPLC: 9.9 %
HCT VFR BLD AUTO: 25.8 %
HGB BLD-MCNC: 8.8 G/DL
LYMPHOCYTES # BLD AUTO: 0.8 K/UL
LYMPHOCYTES NFR BLD: 5.6 %
MAGNESIUM SERPL-MCNC: 1.5 MG/DL
MCH RBC QN AUTO: 28.9 PG
MCHC RBC AUTO-ENTMCNC: 34.1 G/DL
MCV RBC AUTO: 85 FL
MONOCYTES # BLD AUTO: 0.5 K/UL
MONOCYTES NFR BLD: 3.3 %
NEUTROPHILS # BLD AUTO: 13.2 K/UL
NEUTROPHILS NFR BLD: 90.7 %
PHOSPHATE SERPL-MCNC: 4.2 MG/DL
PLATELET # BLD AUTO: 273 K/UL
PMV BLD AUTO: 10.6 FL
POCT GLUCOSE: 294 MG/DL (ref 70–110)
POCT GLUCOSE: 297 MG/DL (ref 70–110)
POCT GLUCOSE: 375 MG/DL (ref 70–110)
POCT GLUCOSE: 418 MG/DL (ref 70–110)
POTASSIUM SERPL-SCNC: 4.3 MMOL/L
POTASSIUM SERPL-SCNC: 4.5 MMOL/L
POTASSIUM SERPL-SCNC: 4.5 MMOL/L
POTASSIUM SERPL-SCNC: 4.6 MMOL/L
POTASSIUM SERPL-SCNC: 4.8 MMOL/L
POTASSIUM SERPL-SCNC: 5.1 MMOL/L
POTASSIUM SERPL-SCNC: 5.4 MMOL/L
RBC # BLD AUTO: 3.05 M/UL
SODIUM SERPL-SCNC: 135 MMOL/L
SODIUM SERPL-SCNC: 136 MMOL/L
SODIUM SERPL-SCNC: 137 MMOL/L
SODIUM SERPL-SCNC: 138 MMOL/L
SODIUM SERPL-SCNC: 138 MMOL/L
SODIUM SERPL-SCNC: 139 MMOL/L
SODIUM SERPL-SCNC: 139 MMOL/L
WBC # BLD AUTO: 14.6 K/UL

## 2017-09-19 PROCEDURE — 80048 BASIC METABOLIC PNL TOTAL CA: CPT | Mod: 91

## 2017-09-19 PROCEDURE — 85025 COMPLETE CBC W/AUTO DIFF WBC: CPT

## 2017-09-19 PROCEDURE — 84100 ASSAY OF PHOSPHORUS: CPT

## 2017-09-19 PROCEDURE — 25000003 PHARM REV CODE 250

## 2017-09-19 PROCEDURE — 25000003 PHARM REV CODE 250: Performed by: STUDENT IN AN ORGANIZED HEALTH CARE EDUCATION/TRAINING PROGRAM

## 2017-09-19 PROCEDURE — G8998 SWALLOW D/C STATUS: HCPCS | Mod: CI

## 2017-09-19 PROCEDURE — 87040 BLOOD CULTURE FOR BACTERIA: CPT

## 2017-09-19 PROCEDURE — 92526 ORAL FUNCTION THERAPY: CPT

## 2017-09-19 PROCEDURE — 20600001 HC STEP DOWN PRIVATE ROOM

## 2017-09-19 PROCEDURE — 83735 ASSAY OF MAGNESIUM: CPT

## 2017-09-19 PROCEDURE — 36415 COLL VENOUS BLD VENIPUNCTURE: CPT

## 2017-09-19 PROCEDURE — 63600175 PHARM REV CODE 636 W HCPCS: Performed by: INTERNAL MEDICINE

## 2017-09-19 PROCEDURE — 99233 SBSQ HOSP IP/OBS HIGH 50: CPT | Mod: GC,,, | Performed by: INTERNAL MEDICINE

## 2017-09-19 PROCEDURE — G8997 SWALLOW GOAL STATUS: HCPCS | Mod: CI

## 2017-09-19 PROCEDURE — 63600175 PHARM REV CODE 636 W HCPCS: Performed by: STUDENT IN AN ORGANIZED HEALTH CARE EDUCATION/TRAINING PROGRAM

## 2017-09-19 PROCEDURE — S5010 5% DEXTROSE AND 0.45% SALINE: HCPCS | Performed by: STUDENT IN AN ORGANIZED HEALTH CARE EDUCATION/TRAINING PROGRAM

## 2017-09-19 RX ORDER — HYDRALAZINE HYDROCHLORIDE 25 MG/1
25 TABLET, FILM COATED ORAL ONCE
Status: COMPLETED | OUTPATIENT
Start: 2017-09-19 | End: 2017-09-19

## 2017-09-19 RX ORDER — INSULIN ASPART 100 [IU]/ML
5 INJECTION, SOLUTION INTRAVENOUS; SUBCUTANEOUS
Status: DISCONTINUED | OUTPATIENT
Start: 2017-09-19 | End: 2017-09-20

## 2017-09-19 RX ORDER — SODIUM BICARBONATE 650 MG/1
650 TABLET ORAL ONCE
Status: COMPLETED | OUTPATIENT
Start: 2017-09-19 | End: 2017-09-19

## 2017-09-19 RX ADMIN — HEPARIN SODIUM 5000 UNITS: 5000 INJECTION, SOLUTION INTRAVENOUS; SUBCUTANEOUS at 09:09

## 2017-09-19 RX ADMIN — INSULIN DETEMIR 20 UNITS: 100 INJECTION, SOLUTION SUBCUTANEOUS at 09:09

## 2017-09-19 RX ADMIN — HEPARIN SODIUM 5000 UNITS: 5000 INJECTION, SOLUTION INTRAVENOUS; SUBCUTANEOUS at 05:09

## 2017-09-19 RX ADMIN — DEXTROSE AND SODIUM CHLORIDE: 5; .45 INJECTION, SOLUTION INTRAVENOUS at 07:09

## 2017-09-19 RX ADMIN — INSULIN ASPART 5 UNITS: 100 INJECTION, SOLUTION INTRAVENOUS; SUBCUTANEOUS at 09:09

## 2017-09-19 RX ADMIN — INSULIN ASPART 4 UNITS: 100 INJECTION, SOLUTION INTRAVENOUS; SUBCUTANEOUS at 09:09

## 2017-09-19 RX ADMIN — LEVETIRACETAM 1000 MG: 500 TABLET ORAL at 09:09

## 2017-09-19 RX ADMIN — HYDRALAZINE HYDROCHLORIDE 25 MG: 25 TABLET, FILM COATED ORAL at 05:09

## 2017-09-19 RX ADMIN — INSULIN ASPART 3 UNITS: 100 INJECTION, SOLUTION INTRAVENOUS; SUBCUTANEOUS at 04:09

## 2017-09-19 RX ADMIN — HEPARIN SODIUM 5000 UNITS: 5000 INJECTION, SOLUTION INTRAVENOUS; SUBCUTANEOUS at 02:09

## 2017-09-19 RX ADMIN — SODIUM BICARBONATE 650 MG TABLET 650 MG: at 04:09

## 2017-09-19 RX ADMIN — INSULIN ASPART 3 UNITS: 100 INJECTION, SOLUTION INTRAVENOUS; SUBCUTANEOUS at 09:09

## 2017-09-19 RX ADMIN — INSULIN ASPART 10 UNITS: 100 INJECTION, SOLUTION INTRAVENOUS; SUBCUTANEOUS at 04:09

## 2017-09-19 RX ADMIN — INSULIN ASPART 5 UNITS: 100 INJECTION, SOLUTION INTRAVENOUS; SUBCUTANEOUS at 04:09

## 2017-09-19 NOTE — ASSESSMENT & PLAN NOTE
- history of unknown high glucose at NH, given 40 Units in route  - Elevatated , and negative beta hydroxy, On presentation glucose 517  - DDx inefction vs cardiac (torponin WNL) vs stroke (no new focal weakness)   - Started on Insulin drip 3U in ED, 150 cc 0.45 NaCl per hr, added dextrose for glucose <250     - Patient able to be transitioned to subQ insulin yesterday, blood sugars increasing today to 370  - switched diet to diabetic diet this AM to reduce carbohydrate load, insulin increased to levemir 15 u qHS and aspart 5 u TIDM with LDSSI  - otherwise, patient still with normal gap acidosis.  Likely baseline component of acidosis, potentially from continuous IVF overnight vs RTA given kidney dysfunction.  Will check BMP later today to re-assess and continue.

## 2017-09-19 NOTE — PLAN OF CARE
Problem: Patient Care Overview  Goal: Plan of Care Review  Outcome: Ongoing (interventions implemented as appropriate)  No acute events this shift. VSS. Pt alerted and oriented. U/O adequate. See flowsheet for full assessment

## 2017-09-19 NOTE — PROGRESS NOTES
Nutrition trigger regarding A1C >9.    Unable to speak with pt at time of visit, left paperwork at bedside. RD to follow-up.    Thanks! CALI Earl

## 2017-09-19 NOTE — ASSESSMENT & PLAN NOTE
- UA shows many bacteria and positive WBC  - likely precipitant to HHS  - Given hx of indwelling cath, will continue rocephin  - blood cultures with noted GNR, will repeat culture to note clearance  - continue rocephin at this time

## 2017-09-19 NOTE — PROGRESS NOTES
[facility] Hospital Medicine  Progress Note    Patient Name: Julius Cardenas  MRN: [id number]  Patient Class: IP- Inpatient   Admission Date: 9/17/2017  Length of Stay: 2 days  Attending Physician: Wilfrid Pollock MD  Primary Care Provider: Primary Doctor [facility] Medicine Team: [facility] [place] [facility] 4 Varun Ramirez MD    Subjective:     Principal Problem:Hyperosmolar non-ketotic state in patient with type 2 diabetes mellitus    HPI:  Mr. 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 with hyperglycemia. Patient is a very poor historian and had no documentation with him.  Per EMS patient was given 20U of insulin in the AM after a reading of 350, repeat glucose late in the day showed high so they gave him another 20U and called EMS who reported his glucose at 517.  On presentation patient reported nausea only, denied chest pain, or SOB.  He became agitated and was given one dose of Zyprexa prior to my examination.  Lab resulted showed a negative hydroxybutyrate.  On examination patient is alert but altered.  He is alert to person only.  He denies pain, nausea, chest pain, or SOB.    Patient lives at [facility].  NH was called and reported patient baseline is alert to person, able to verbally communicate his needs, and significant assistance to attend to ADLs  He is near bed bound with a pronounced left sided weakness.  She reports that today patient was acting his normal self.  She does report diarrhea for the past couple of days, in addition to higher glucose readings.  Denies other signs of infection, fever, chills, cough or SOB.  Patient was on flagyl, but had a negative Cdiff that recently resulted.          Interval Hx: Gap closed yesterday.  Patient in AM without significant changes.  Alert and oriented x 2, able to note  name and location.  Can answer simple questions.  Called sister at number provided in chart.  States that while he has baseline deficits from stroke, she notes that something about her brother is not the same.  Otherwise, patient denies being in pain.      Review of Systems   Unable to perform ROS: Mental status change     Objective:     Vital Signs (Most Recent):  Temp: 98.2 °F (36.8 °C) (09/19/17 1112)  Pulse: 88 (09/19/17 1112)  Resp: 18 (09/19/17 0811)  BP: (!) 162/67 (09/19/17 1112)  SpO2: 97 % (09/19/17 1112) Vital Signs (24h Range):  Temp:  [97.6 °F (36.4 °C)-99.6 °F (37.6 °C)] 98.2 °F (36.8 °C)  Pulse:  [] 88  Resp:  [17-20] 18  SpO2:  [97 %-100 %] 97 %  BP: (141-164)/(67-87) 162/67     Weight: 68 kg (150 lb)  Body mass index is 19.79 kg/m².    Physical Exam   Constitutional: No distress.   Cachetic, elderly     HENT:   Head: Normocephalic and atraumatic.   Mouth/Throat: No oropharyngeal exudate.   Oropharynx dry   Eyes: Conjunctivae and EOM are normal.   Cardiovascular: Normal rate, regular rhythm and normal heart sounds.    No murmur heard.  Pulmonary/Chest: Effort normal and breath sounds normal.   Abdominal: Bowel sounds are normal. He exhibits no distension. There is no guarding.   Genitourinary:   Genitourinary Comments: Monzon in place    Neurological: He is alert.   Left sided upper and lower extremity weakness  Patient able to communicate, AO x 2.    Skin: Skin is warm and dry.   Nursing note and vitals reviewed.       Significant Labs:     Recent Results (from the past 24 hour(s))   POCT glucose    Collection Time: 09/18/17  1:59 PM   Result Value Ref Range    POCT Glucose 222 (H) 70 - 110 mg/dL   POCT glucose    Collection Time: 09/18/17  3:03 PM   Result Value Ref Range    POCT Glucose 273 (H) 70 - 110 mg/dL   POCT glucose    Collection Time: 09/18/17  4:29 PM   Result Value Ref Range    POCT Glucose 229 (H) 70 - 110 mg/dL   Basic metabolic panel    Collection Time: 09/18/17  6:14 PM    Result Value Ref Range    Sodium 140 136 - 145 mmol/L    Potassium 4.7 3.5 - 5.1 mmol/L    Chloride 113 (H) 95 - 110 mmol/L    CO2 16 (L) 23 - 29 mmol/L    Glucose 258 (H) 70 - 110 mg/dL    BUN, Bld 57 (H) 8 - 23 mg/dL    Creatinine 3.4 (H) 0.5 - 1.4 mg/dL    Calcium 8.8 8.7 - 10.5 mg/dL    Anion Gap 11 8 - 16 mmol/L    eGFR if African American 18.1 (A) >60 mL/min/1.73 m^2    eGFR if non  15.7 (A) >60 mL/min/1.73 m^2   Hemoglobin A1c if not done in past 6 weeks    Collection Time: 09/18/17  6:14 PM   Result Value Ref Range    Hemoglobin A1C 9.9 (H) 4.0 - 5.6 %    Estimated Avg Glucose 237 (H) 68 - 131 mg/dL   Basic metabolic panel    Collection Time: 09/18/17  8:24 PM   Result Value Ref Range    Sodium 139 136 - 145 mmol/L    Potassium 5.3 (H) 3.5 - 5.1 mmol/L    Chloride 112 (H) 95 - 110 mmol/L    CO2 17 (L) 23 - 29 mmol/L    Glucose 262 (H) 70 - 110 mg/dL    BUN, Bld 55 (H) 8 - 23 mg/dL    Creatinine 3.3 (H) 0.5 - 1.4 mg/dL    Calcium 8.8 8.7 - 10.5 mg/dL    Anion Gap 10 8 - 16 mmol/L    eGFR if African American 18.8 (A) >60 mL/min/1.73 m^2    eGFR if non  16.2 (A) >60 mL/min/1.73 m^2   POCT glucose    Collection Time: 09/18/17 11:47 PM   Result Value Ref Range    POCT Glucose 241 (H) 70 - 110 mg/dL   Basic metabolic panel    Collection Time: 09/19/17 12:13 AM   Result Value Ref Range    Sodium 139 136 - 145 mmol/L    Potassium 5.4 (H) 3.5 - 5.1 mmol/L    Chloride 115 (H) 95 - 110 mmol/L    CO2 14 (L) 23 - 29 mmol/L    Glucose 191 (H) 70 - 110 mg/dL    BUN, Bld 55 (H) 8 - 23 mg/dL    Creatinine 3.2 (H) 0.5 - 1.4 mg/dL    Calcium 8.5 (L) 8.7 - 10.5 mg/dL    Anion Gap 10 8 - 16 mmol/L    eGFR if African American 19.5 (A) >60 mL/min/1.73 m^2    eGFR if non  16.9 (A) >60 mL/min/1.73 m^2   Basic metabolic panel    Collection Time: 09/19/17  4:07 AM   Result Value Ref Range    Sodium 137 136 - 145 mmol/L    Potassium 4.6 3.5 - 5.1 mmol/L    Chloride 114 (H) 95 - 110  mmol/L    CO2 13 (L) 23 - 29 mmol/L    Glucose 183 (H) 70 - 110 mg/dL    BUN, Bld 57 (H) 8 - 23 mg/dL    Creatinine 3.2 (H) 0.5 - 1.4 mg/dL    Calcium 8.1 (L) 8.7 - 10.5 mg/dL    Anion Gap 10 8 - 16 mmol/L    eGFR if African American 19.5 (A) >60 mL/min/1.73 m^2    eGFR if non  16.9 (A) >60 mL/min/1.73 m^2   CBC auto differential    Collection Time: 09/19/17  4:07 AM   Result Value Ref Range    WBC 14.60 (H) 3.90 - 12.70 K/uL    RBC 3.05 (L) 4.60 - 6.20 M/uL    Hemoglobin 8.8 (L) 14.0 - 18.0 g/dL    Hematocrit 25.8 (L) 40.0 - 54.0 %    MCV 85 82 - 98 fL    MCH 28.9 27.0 - 31.0 pg    MCHC 34.1 32.0 - 36.0 g/dL    RDW 13.8 11.5 - 14.5 %    Platelets 273 150 - 350 K/uL    MPV 10.6 9.2 - 12.9 fL    Gran # 13.2 (H) 1.8 - 7.7 K/uL    Lymph # 0.8 (L) 1.0 - 4.8 K/uL    Mono # 0.5 0.3 - 1.0 K/uL    Eos # 0.1 0.0 - 0.5 K/uL    Baso # 0.01 0.00 - 0.20 K/uL    Gran% 90.7 (H) 38.0 - 73.0 %    Lymph% 5.6 (L) 18.0 - 48.0 %    Mono% 3.3 (L) 4.0 - 15.0 %    Eosinophil% 0.3 0.0 - 8.0 %    Basophil% 0.1 0.0 - 1.9 %    Differential Method Automated    Magnesium    Collection Time: 09/19/17  4:07 AM   Result Value Ref Range    Magnesium 1.5 (L) 1.6 - 2.6 mg/dL   Phosphorus    Collection Time: 09/19/17  4:07 AM   Result Value Ref Range    Phosphorus 4.2 2.7 - 4.5 mg/dL   Basic metabolic panel    Collection Time: 09/19/17  7:50 AM   Result Value Ref Range    Sodium 138 136 - 145 mmol/L    Potassium 4.5 3.5 - 5.1 mmol/L    Chloride 116 (H) 95 - 110 mmol/L    CO2 15 (L) 23 - 29 mmol/L    Glucose 223 (H) 70 - 110 mg/dL    BUN, Bld 53 (H) 8 - 23 mg/dL    Creatinine 3.2 (H) 0.5 - 1.4 mg/dL    Calcium 8.1 (L) 8.7 - 10.5 mg/dL    Anion Gap 7 (L) 8 - 16 mmol/L    eGFR if African American 19.5 (A) >60 mL/min/1.73 m^2    eGFR if non  16.9 (A) >60 mL/min/1.73 m^2   POCT glucose    Collection Time: 09/19/17  9:47 AM   Result Value Ref Range    POCT Glucose 294 (H) 70 - 110 mg/dL   Basic metabolic panel    Collection  Time: 09/19/17 11:56 AM   Result Value Ref Range    Sodium 136 136 - 145 mmol/L    Potassium 4.3 3.5 - 5.1 mmol/L    Chloride 113 (H) 95 - 110 mmol/L    CO2 14 (L) 23 - 29 mmol/L    Glucose 345 (H) 70 - 110 mg/dL    BUN, Bld 56 (H) 8 - 23 mg/dL    Creatinine 3.3 (H) 0.5 - 1.4 mg/dL    Calcium 8.0 (L) 8.7 - 10.5 mg/dL    Anion Gap 9 8 - 16 mmol/L    eGFR if African American 18.8 (A) >60 mL/min/1.73 m^2    eGFR if non  16.2 (A) >60 mL/min/1.73 m^2   POCT glucose    Collection Time: 09/19/17 12:36 PM   Result Value Ref Range    POCT Glucose 375 (H) 70 - 110 mg/dL         Significant Imaging: I have reviewed all pertinent imaging results/findings within the past 24 hours.   CXR shows no acute changes or focal consolidation seen.      Assessment/Plan:      * Hyperosmolar non-ketotic state in patient with type 2 diabetes mellitus    - history of unknown high glucose at NH, given 40 Units in route  - Elevatated , and negative beta hydroxy, On presentation glucose 517  - DDx inefction vs cardiac (torponin WNL) vs stroke (no new focal weakness)   - Started on Insulin drip 3U in ED, 150 cc 0.45 NaCl per hr, added dextrose for glucose <250     - Patient able to be transitioned to subQ insulin yesterday, blood sugars increasing today to 370  - switched diet to diabetic diet this AM to reduce carbohydrate load, insulin increased to levemir 15 u qHS and aspart 5 u TIDM with LDSSI  - otherwise, patient still with normal gap acidosis.  Likely baseline component of acidosis, potentially from continuous IVF overnight vs RTA given kidney dysfunction.  Will check BMP later today to re-assess and continue.          Acute cystitis with hematuria    - UA shows many bacteria and positive WBC  - likely precipitant to HHS  - Given hx of indwelling cath, will continue rocephin  - blood cultures with noted GNR, will repeat culture to note clearance  - continue rocephin at this time           NIALL (acute kidney injury)     - Baseline creatine around 2.6 - 3.0  - Given presentation of suspected HHS, suspect pre-renal dz  - Currently 3.2 without significant improvement from day prior.  Overall, patient with continued acidosis noted in AM.  Per chart review, patient previously with low bicarb noted, range between 16-18 historically.  Will aim for there.  Also, patient on D5 half normal overnight, which may precipitate acidosis given acidity of NS.  Will discontinue fluids for now and re-assess  - repeating BMP later today to assess kidney function and bicarb            Acute encephalopathy    - Per NH at baseline (alert to person and place) prior to transfer, patient was examined s/p zyprexa  - Likely multifactorial, HSS and medication induced, cannot rule out infectious, feel stoke unlikely no new weakness at baseline prior to transport and Zyprexa   - continue to treat UTI, may consider MRI head to assess for other issues if treatment has not improved mental status         Leukocytosis    - WBC of 13 on admission; now WBC 21  - UA dirty with indwelling cath, Diarrhea with negative C diff  - Will cover with ceftriaxone given concern for UTI, per chart review no previous history of pseudomonas           Urinary retention    - Monzon placed prior to discharge, schedule for follow up with urology, no note seen in chart  - Monzon replaced on admission  - Strict ins and outs            Weakness following cerebrovascular accident (CVA)    - CVA with residual left sided hemiparesis           Anemia of chronic renal failure, stage 4 (severe)    - Hemoglobin initially elevated from baseline, suspected hemoconcentration, HHS plus diarrhea   - Today Hgb 10 from 12.5  - Continue to monitor           Renovascular hypertension    - On Coreg 12.5 BID, Lisinopril 5mg, nifedipine 90 daily  - Has taken lisinopril and nifedipine prior to admission  - Holding Lisinopril given NIALL  - Intermittent hypotension so holding coreg        Secondary seizure disorder     - On Keppra 1000 BID   - Got Keppra 1000 IV overnight  - Will repeat today  - speech eval; passed for dental soft, can have PO meds, re-started home keppra        CKD (chronic kidney disease) stage 4, GFR 15-29 ml/min    - baseline creatine 2.6 - 3.0  - See plan for NIALL  - Renally dose mediation           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    - Hx of DM2, converted to DM1?   - previously on detemir 18. And apart 5 TID  - continue levemir 15 u qHS and aspart 5 TIDWM with LDSSI            VTE Risk Mitigation         Ordered     Low Risk of VTE  Once      09/17/17 2320     heparin (porcine) injection 5,000 Units  Every 8 hours     Route:  Subcutaneous        09/17/17 2109              Varun Ramirez MD  Department of Hospital Medicine   [facility]

## 2017-09-19 NOTE — ASSESSMENT & PLAN NOTE
- Per NH at baseline (alert to person and place) prior to transfer, patient was examined s/p zyprexa  - Likely multifactorial, HSS and medication induced, cannot rule out infectious, feel stoke unlikely no new weakness at baseline prior to transport and Zyprexa   - continue to treat UTI, may consider MRI head to assess for other issues if treatment has not improved mental status

## 2017-09-19 NOTE — PT/OT/SLP PROGRESS
Speech Language Pathology  Dysphagia Treatment  Discharge    Julius Cardenas   MRN: [id number]   1061/1061 A    Admitting Diagnosis: Hyperosmolar non-ketotic state in patient with type 2 diabetes mellitus    Diet recommendations: Solid Diet Level: Dental Soft  Liquid Diet Level: Thin (NO STRAWS)   · whole po medications 1 at a time,   · Upright for all po intake and remain upright for 20 minutes s/p po intake,   · SMALL BITES AND SOPS,   · NO STRAWS,   · Only feed when awake/alert  · SLOW RATE- pt may benefit from being monitored with meals as he is impulsive with taking consecutive large sips     Continue to monitor for signs and symptoms of aspiration and discontinue oral feeding should you notice any of the following: watery eyes, reddened facial area, wet vocal quality, increased work of breathing, change in respiratory status, increased congestion, coughing, fever, etc.    SLP Treatment Date: 09/19/17  Speech Start Time: 0832     Speech Stop Time: 0856     Speech Total (min): 24 min       TREATMENT BILLABLE MINUTES:  Treatment Swallowing Dysfunction 24    Has the patient been evaluated by SLP for swallowing? : Yes  Keep patient NPO?: No   General Precautions: Standard, aspiration, fall          Subjective:  Pt awake and cooperative. Cleared with RN prior to entry.          Objective:     Pt seen to assess tolerance of dental soft diet and thin liquids. Pt continues to present with inconsistent wet vocal quality and a productive cough without po intake which does not appear to be indicative of swallowing impairments/difficulties. Pt tolerated multiple po trials this date with no overt s/s of aspiration including: sips of water via tsp x2, sips of water via cup x6, self regulated straw sips of water x3, tsp bites of pudding x4, bites of bereket cracker x2. Pt with adequate clearance of bereket cracker trial. Unknown baseline of mental status. Pt not oriented to time of day or year. Pt oriented to self. Recommend:  Continue dental soft diet and thin liquids, whole po medications 1 at a time, Upright for all po intake and remain upright for 20 minutes s/p po intake, small bites/sips, 1 bites/sip at a time, monitor with meals (pt impulsive). Continue to monitor for signs and symptoms of aspiration and discontinue oral feeding should you notice any of the following: watery eyes, reddened facial area, wet vocal quality, increased work of breathing, change in respiratory status, increased congestion, coughing, fever, etc. Please monitor respiratory status and chest x-rays and reconsult ST as needed.   Communicated recs and POC with MD.     Assessment:  Julius Cardenas is a 84 y.o. male with a medical diagnosis of Hyperosmolar non-ketotic state in patient with type 2 diabetes mellitus and presents with tolerance of dental soft diet and thin liquids. No further skilled ST services warranted at this time. Please re-consult ST as needed.     Discharge recommendations: Discharge Facility/Level Of Care Needs:  (pending progress)     Goals:    SLP Goals     Not on file          Multidisciplinary Problems (Resolved)        Problem: SLP Goal    Goal Priority Disciplines Outcome   SLP Goal   (Resolved)     SLP Outcome(s) achieved   Description:  Speech Therapy Short Term Goals  Goals expected to be met by 9/25  1. Pt will tolerate a dental soft diet and thin liquids with no overt s/s of aspiration.                        Plan:   Patient to be seen Therapy Frequency: 4 x/week   Plan of Care expires: 10/17/17  Plan of Care reviewed with: patient  SLP Follow-up?: No         SLP G-Codes  Functional Assessment Tool Used: noms  Score: 6  Functional Limitations: Swallowing  Swallow Goal Status (): CORBY  Swallow Discharge Status (): NAIF Adams, CCC-SLP   Pager: [phone]  09/19/2017

## 2017-09-19 NOTE — ASSESSMENT & PLAN NOTE
- Baseline creatine around 2.6 - 3.0  - Given presentation of suspected HHS, suspect pre-renal dz  - Currently 3.2 without significant improvement from day prior.  Overall, patient with continued acidosis noted in AM.  Per chart review, patient previously with low bicarb noted, range between 16-18 historically.  Will aim for there.  Also, patient on D5 half normal overnight, which may precipitate acidosis given acidity of NS.  Will discontinue fluids for now and re-assess  - repeating BMP later today to assess kidney function and bicarb

## 2017-09-19 NOTE — ASSESSMENT & PLAN NOTE
- Hx of DM2, converted to DM1?   - previously on detemir 18. And apart 5 TID  - continue levemir 15 u qHS and aspart 5 TIDWM with LDSSI

## 2017-09-19 NOTE — SUBJECTIVE & OBJECTIVE
Interval Hx: Gap closed yesterday.  Patient in AM without significant changes.  Called sister at number provided in chart.  States that while he has baseline deficits from stroke, she notes that something about her brother is not the same.  Otherwise, patient denies being in pain.      Review of Systems   Unable to perform ROS: Mental status change     Objective:     Vital Signs (Most Recent):  Temp: 98.2 °F (36.8 °C) (09/19/17 1112)  Pulse: 88 (09/19/17 1112)  Resp: 18 (09/19/17 0811)  BP: (!) 162/67 (09/19/17 1112)  SpO2: 97 % (09/19/17 1112) Vital Signs (24h Range):  Temp:  [97.6 °F (36.4 °C)-99.6 °F (37.6 °C)] 98.2 °F (36.8 °C)  Pulse:  [] 88  Resp:  [17-20] 18  SpO2:  [97 %-100 %] 97 %  BP: (141-164)/(67-87) 162/67     Weight: 68 kg (150 lb)  Body mass index is 19.79 kg/m².    Physical Exam   Constitutional: No distress.   Cachetic, elderly     HENT:   Head: Normocephalic and atraumatic.   Mouth/Throat: No oropharyngeal exudate.   Oropharynx dry   Eyes: Conjunctivae and EOM are normal.   Cardiovascular: Normal rate, regular rhythm and normal heart sounds.    No murmur heard.  Pulmonary/Chest: Effort normal and breath sounds normal.   Abdominal: Bowel sounds are normal. He exhibits no distension. There is no guarding.   Genitourinary:   Genitourinary Comments: Monzon in place    Neurological: He is alert.   Left sided upper and lower extremity weakness   Not verbal today; follows commands    Skin: Skin is warm and dry.   Nursing note and vitals reviewed.       Significant Labs:     Recent Results (from the past 24 hour(s))   POCT glucose    Collection Time: 09/18/17  1:59 PM   Result Value Ref Range    POCT Glucose 222 (H) 70 - 110 mg/dL   POCT glucose    Collection Time: 09/18/17  3:03 PM   Result Value Ref Range    POCT Glucose 273 (H) 70 - 110 mg/dL   POCT glucose    Collection Time: 09/18/17  4:29 PM   Result Value Ref Range    POCT Glucose 229 (H) 70 - 110 mg/dL   Basic metabolic panel    Collection  Time: 09/18/17  6:14 PM   Result Value Ref Range    Sodium 140 136 - 145 mmol/L    Potassium 4.7 3.5 - 5.1 mmol/L    Chloride 113 (H) 95 - 110 mmol/L    CO2 16 (L) 23 - 29 mmol/L    Glucose 258 (H) 70 - 110 mg/dL    BUN, Bld 57 (H) 8 - 23 mg/dL    Creatinine 3.4 (H) 0.5 - 1.4 mg/dL    Calcium 8.8 8.7 - 10.5 mg/dL    Anion Gap 11 8 - 16 mmol/L    eGFR if African American 18.1 (A) >60 mL/min/1.73 m^2    eGFR if non  15.7 (A) >60 mL/min/1.73 m^2   Hemoglobin A1c if not done in past 6 weeks    Collection Time: 09/18/17  6:14 PM   Result Value Ref Range    Hemoglobin A1C 9.9 (H) 4.0 - 5.6 %    Estimated Avg Glucose 237 (H) 68 - 131 mg/dL   Basic metabolic panel    Collection Time: 09/18/17  8:24 PM   Result Value Ref Range    Sodium 139 136 - 145 mmol/L    Potassium 5.3 (H) 3.5 - 5.1 mmol/L    Chloride 112 (H) 95 - 110 mmol/L    CO2 17 (L) 23 - 29 mmol/L    Glucose 262 (H) 70 - 110 mg/dL    BUN, Bld 55 (H) 8 - 23 mg/dL    Creatinine 3.3 (H) 0.5 - 1.4 mg/dL    Calcium 8.8 8.7 - 10.5 mg/dL    Anion Gap 10 8 - 16 mmol/L    eGFR if African American 18.8 (A) >60 mL/min/1.73 m^2    eGFR if non  16.2 (A) >60 mL/min/1.73 m^2   POCT glucose    Collection Time: 09/18/17 11:47 PM   Result Value Ref Range    POCT Glucose 241 (H) 70 - 110 mg/dL   Basic metabolic panel    Collection Time: 09/19/17 12:13 AM   Result Value Ref Range    Sodium 139 136 - 145 mmol/L    Potassium 5.4 (H) 3.5 - 5.1 mmol/L    Chloride 115 (H) 95 - 110 mmol/L    CO2 14 (L) 23 - 29 mmol/L    Glucose 191 (H) 70 - 110 mg/dL    BUN, Bld 55 (H) 8 - 23 mg/dL    Creatinine 3.2 (H) 0.5 - 1.4 mg/dL    Calcium 8.5 (L) 8.7 - 10.5 mg/dL    Anion Gap 10 8 - 16 mmol/L    eGFR if African American 19.5 (A) >60 mL/min/1.73 m^2    eGFR if non  16.9 (A) >60 mL/min/1.73 m^2   Basic metabolic panel    Collection Time: 09/19/17  4:07 AM   Result Value Ref Range    Sodium 137 136 - 145 mmol/L    Potassium 4.6 3.5 - 5.1 mmol/L     Chloride 114 (H) 95 - 110 mmol/L    CO2 13 (L) 23 - 29 mmol/L    Glucose 183 (H) 70 - 110 mg/dL    BUN, Bld 57 (H) 8 - 23 mg/dL    Creatinine 3.2 (H) 0.5 - 1.4 mg/dL    Calcium 8.1 (L) 8.7 - 10.5 mg/dL    Anion Gap 10 8 - 16 mmol/L    eGFR if African American 19.5 (A) >60 mL/min/1.73 m^2    eGFR if non  16.9 (A) >60 mL/min/1.73 m^2   CBC auto differential    Collection Time: 09/19/17  4:07 AM   Result Value Ref Range    WBC 14.60 (H) 3.90 - 12.70 K/uL    RBC 3.05 (L) 4.60 - 6.20 M/uL    Hemoglobin 8.8 (L) 14.0 - 18.0 g/dL    Hematocrit 25.8 (L) 40.0 - 54.0 %    MCV 85 82 - 98 fL    MCH 28.9 27.0 - 31.0 pg    MCHC 34.1 32.0 - 36.0 g/dL    RDW 13.8 11.5 - 14.5 %    Platelets 273 150 - 350 K/uL    MPV 10.6 9.2 - 12.9 fL    Gran # 13.2 (H) 1.8 - 7.7 K/uL    Lymph # 0.8 (L) 1.0 - 4.8 K/uL    Mono # 0.5 0.3 - 1.0 K/uL    Eos # 0.1 0.0 - 0.5 K/uL    Baso # 0.01 0.00 - 0.20 K/uL    Gran% 90.7 (H) 38.0 - 73.0 %    Lymph% 5.6 (L) 18.0 - 48.0 %    Mono% 3.3 (L) 4.0 - 15.0 %    Eosinophil% 0.3 0.0 - 8.0 %    Basophil% 0.1 0.0 - 1.9 %    Differential Method Automated    Magnesium    Collection Time: 09/19/17  4:07 AM   Result Value Ref Range    Magnesium 1.5 (L) 1.6 - 2.6 mg/dL   Phosphorus    Collection Time: 09/19/17  4:07 AM   Result Value Ref Range    Phosphorus 4.2 2.7 - 4.5 mg/dL   Basic metabolic panel    Collection Time: 09/19/17  7:50 AM   Result Value Ref Range    Sodium 138 136 - 145 mmol/L    Potassium 4.5 3.5 - 5.1 mmol/L    Chloride 116 (H) 95 - 110 mmol/L    CO2 15 (L) 23 - 29 mmol/L    Glucose 223 (H) 70 - 110 mg/dL    BUN, Bld 53 (H) 8 - 23 mg/dL    Creatinine 3.2 (H) 0.5 - 1.4 mg/dL    Calcium 8.1 (L) 8.7 - 10.5 mg/dL    Anion Gap 7 (L) 8 - 16 mmol/L    eGFR if African American 19.5 (A) >60 mL/min/1.73 m^2    eGFR if non  16.9 (A) >60 mL/min/1.73 m^2   POCT glucose    Collection Time: 09/19/17  9:47 AM   Result Value Ref Range    POCT Glucose 294 (H) 70 - 110 mg/dL   Basic  metabolic panel    Collection Time: 09/19/17 11:56 AM   Result Value Ref Range    Sodium 136 136 - 145 mmol/L    Potassium 4.3 3.5 - 5.1 mmol/L    Chloride 113 (H) 95 - 110 mmol/L    CO2 14 (L) 23 - 29 mmol/L    Glucose 345 (H) 70 - 110 mg/dL    BUN, Bld 56 (H) 8 - 23 mg/dL    Creatinine 3.3 (H) 0.5 - 1.4 mg/dL    Calcium 8.0 (L) 8.7 - 10.5 mg/dL    Anion Gap 9 8 - 16 mmol/L    eGFR if African American 18.8 (A) >60 mL/min/1.73 m^2    eGFR if non  16.2 (A) >60 mL/min/1.73 m^2   POCT glucose    Collection Time: 09/19/17 12:36 PM   Result Value Ref Range    POCT Glucose 375 (H) 70 - 110 mg/dL         Significant Imaging: I have reviewed all pertinent imaging results/findings within the past 24 hours.   CXR shows no acute changes or focal consolidation seen.

## 2017-09-19 NOTE — PLAN OF CARE
Problem: SLP Goal  Goal: SLP Goal  Speech Therapy Short Term Goals  Goals expected to be met by 9/25  1. Pt will tolerate a dental soft diet and thin liquids with no overt s/s of aspiration.      Outcome: Outcome(s) achieved Date Met: 09/19/17  Pt seen to assess tolerance of dental soft diet and thin liquids. Pt continues to present with inconsistent wet vocal quality and a productive cough without po intake which does not appear to be indicative of swallowing impairments/difficulties. Pt tolerated multiple po trials this date with no overt s/s of aspiration. Unknown baseline of mental status. Recommend: Continue dental soft diet and thin liquids, whole po medications 1 at a time, Upright for all po intake and remain upright for 20 minutes s/p po intake, small bites/sips, 1 bites/sip at a time, monitor with meals (pt impulsive). Continue to monitor for signs and symptoms of aspiration and discontinue oral feeding should you notice any of the following: watery eyes, reddened facial area, wet vocal quality, increased work of breathing, change in respiratory status, increased congestion, coughing, fever, etc. Please monitor respiratory status and chest x-rays and reconsult ST as needed.   HORTENCIA Morocho., CCC-SLP  Pager: [phone]  09/19/2017

## 2017-09-19 NOTE — PLAN OF CARE
Problem: Pressure Ulcer Risk (Pedro Scale) (Adult,Obstetrics,Pediatric)  Intervention: Prevent/Minimize Sheer/Friction Injuries  Pt resting comfortably, denies pain, states better mood this shift, q2 turns, BG monitoring per flowsheet, SSI x 2 this shift, pt with increasing HTN, M.D.s notified and new orders received. Safety measures maintained, call light within reach, no further needs assessed.

## 2017-09-20 LAB
ANION GAP SERPL CALC-SCNC: 8 MMOL/L
ANION GAP SERPL CALC-SCNC: 9 MMOL/L
ANION GAP SERPL CALC-SCNC: 9 MMOL/L
BACTERIA UR CULT: NORMAL
BASOPHILS # BLD AUTO: 0.02 K/UL
BASOPHILS NFR BLD: 0.1 %
BUN SERPL-MCNC: 50 MG/DL
BUN SERPL-MCNC: 52 MG/DL
BUN SERPL-MCNC: 53 MG/DL
CALCIUM SERPL-MCNC: 8.6 MG/DL
CALCIUM SERPL-MCNC: 8.6 MG/DL
CALCIUM SERPL-MCNC: 9 MG/DL
CHLORIDE SERPL-SCNC: 116 MMOL/L
CO2 SERPL-SCNC: 15 MMOL/L
CO2 SERPL-SCNC: 15 MMOL/L
CO2 SERPL-SCNC: 16 MMOL/L
CREAT SERPL-MCNC: 3.3 MG/DL
CREAT SERPL-MCNC: 3.4 MG/DL
CREAT SERPL-MCNC: 3.4 MG/DL
CREAT UR-MCNC: 73 MG/DL
DIFFERENTIAL METHOD: ABNORMAL
EOSINOPHIL # BLD AUTO: 0.2 K/UL
EOSINOPHIL NFR BLD: 1.3 %
ERYTHROCYTE [DISTWIDTH] IN BLOOD BY AUTOMATED COUNT: 14.2 %
EST. GFR  (AFRICAN AMERICAN): 18.1 ML/MIN/1.73 M^2
EST. GFR  (AFRICAN AMERICAN): 18.1 ML/MIN/1.73 M^2
EST. GFR  (AFRICAN AMERICAN): 18.8 ML/MIN/1.73 M^2
EST. GFR  (NON AFRICAN AMERICAN): 15.7 ML/MIN/1.73 M^2
EST. GFR  (NON AFRICAN AMERICAN): 15.7 ML/MIN/1.73 M^2
EST. GFR  (NON AFRICAN AMERICAN): 16.2 ML/MIN/1.73 M^2
GLUCOSE SERPL-MCNC: 130 MG/DL
GLUCOSE SERPL-MCNC: 73 MG/DL
GLUCOSE SERPL-MCNC: 83 MG/DL
HCT VFR BLD AUTO: 29.8 %
HGB BLD-MCNC: 10.1 G/DL
LYMPHOCYTES # BLD AUTO: 1.8 K/UL
LYMPHOCYTES NFR BLD: 13.5 %
MCH RBC QN AUTO: 29.1 PG
MCHC RBC AUTO-ENTMCNC: 33.9 G/DL
MCV RBC AUTO: 86 FL
MONOCYTES # BLD AUTO: 1.3 K/UL
MONOCYTES NFR BLD: 9.3 %
NEUTROPHILS # BLD AUTO: 10.1 K/UL
NEUTROPHILS NFR BLD: 75.4 %
PHOSPHATE SERPL-MCNC: 4 MG/DL
PLATELET # BLD AUTO: 268 K/UL
PMV BLD AUTO: 11 FL
POCT GLUCOSE: 105 MG/DL (ref 70–110)
POCT GLUCOSE: 110 MG/DL (ref 70–110)
POCT GLUCOSE: 245 MG/DL (ref 70–110)
POCT GLUCOSE: 307 MG/DL (ref 70–110)
POCT GLUCOSE: 380 MG/DL (ref 70–110)
POCT GLUCOSE: 66 MG/DL (ref 70–110)
POCT GLUCOSE: 79 MG/DL (ref 70–110)
POTASSIUM SERPL-SCNC: 4.5 MMOL/L
POTASSIUM SERPL-SCNC: 4.7 MMOL/L
POTASSIUM SERPL-SCNC: 4.7 MMOL/L
PROT UR-MCNC: 56 MG/DL
PROT/CREAT RATIO, UR: 0.77
RBC # BLD AUTO: 3.47 M/UL
SODIUM SERPL-SCNC: 140 MMOL/L
WBC # BLD AUTO: 13.4 K/UL

## 2017-09-20 PROCEDURE — 63600175 PHARM REV CODE 636 W HCPCS: Performed by: INTERNAL MEDICINE

## 2017-09-20 PROCEDURE — 63600175 PHARM REV CODE 636 W HCPCS: Performed by: STUDENT IN AN ORGANIZED HEALTH CARE EDUCATION/TRAINING PROGRAM

## 2017-09-20 PROCEDURE — 80048 BASIC METABOLIC PNL TOTAL CA: CPT | Mod: 91

## 2017-09-20 PROCEDURE — 20600001 HC STEP DOWN PRIVATE ROOM

## 2017-09-20 PROCEDURE — 99232 SBSQ HOSP IP/OBS MODERATE 35: CPT | Mod: GC,,, | Performed by: INTERNAL MEDICINE

## 2017-09-20 PROCEDURE — 25000003 PHARM REV CODE 250: Performed by: STUDENT IN AN ORGANIZED HEALTH CARE EDUCATION/TRAINING PROGRAM

## 2017-09-20 PROCEDURE — 85025 COMPLETE CBC W/AUTO DIFF WBC: CPT

## 2017-09-20 PROCEDURE — 82570 ASSAY OF URINE CREATININE: CPT

## 2017-09-20 PROCEDURE — 84100 ASSAY OF PHOSPHORUS: CPT

## 2017-09-20 PROCEDURE — 25000003 PHARM REV CODE 250: Performed by: INTERNAL MEDICINE

## 2017-09-20 PROCEDURE — 36415 COLL VENOUS BLD VENIPUNCTURE: CPT

## 2017-09-20 RX ORDER — INSULIN ASPART 100 [IU]/ML
5 INJECTION, SOLUTION INTRAVENOUS; SUBCUTANEOUS
Status: DISCONTINUED | OUTPATIENT
Start: 2017-09-20 | End: 2017-09-21

## 2017-09-20 RX ORDER — HYDRALAZINE HYDROCHLORIDE 25 MG/1
25 TABLET, FILM COATED ORAL ONCE
Status: DISCONTINUED | OUTPATIENT
Start: 2017-09-20 | End: 2017-09-20

## 2017-09-20 RX ORDER — HYDRALAZINE HYDROCHLORIDE 25 MG/1
25 TABLET, FILM COATED ORAL EVERY 8 HOURS
Status: DISCONTINUED | OUTPATIENT
Start: 2017-09-20 | End: 2017-09-20

## 2017-09-20 RX ORDER — CARVEDILOL 6.25 MG/1
12.5 TABLET ORAL 2 TIMES DAILY
Status: DISCONTINUED | OUTPATIENT
Start: 2017-09-20 | End: 2017-09-21

## 2017-09-20 RX ORDER — CARVEDILOL 6.25 MG/1
12.5 TABLET ORAL 2 TIMES DAILY
Status: DISCONTINUED | OUTPATIENT
Start: 2017-09-20 | End: 2017-09-20

## 2017-09-20 RX ORDER — INSULIN ASPART 100 [IU]/ML
1-10 INJECTION, SOLUTION INTRAVENOUS; SUBCUTANEOUS
Status: DISCONTINUED | OUTPATIENT
Start: 2017-09-20 | End: 2017-09-21 | Stop reason: HOSPADM

## 2017-09-20 RX ORDER — INSULIN ASPART 100 [IU]/ML
4 INJECTION, SOLUTION INTRAVENOUS; SUBCUTANEOUS
Status: CANCELLED | OUTPATIENT
Start: 2017-09-20

## 2017-09-20 RX ADMIN — CARVEDILOL 12.5 MG: 6.25 TABLET, FILM COATED ORAL at 04:09

## 2017-09-20 RX ADMIN — HEPARIN SODIUM 5000 UNITS: 5000 INJECTION, SOLUTION INTRAVENOUS; SUBCUTANEOUS at 02:09

## 2017-09-20 RX ADMIN — HEPARIN SODIUM 5000 UNITS: 5000 INJECTION, SOLUTION INTRAVENOUS; SUBCUTANEOUS at 05:09

## 2017-09-20 RX ADMIN — NIFEDIPINE 90 MG: 30 TABLET, FILM COATED, EXTENDED RELEASE ORAL at 11:09

## 2017-09-20 RX ADMIN — HEPARIN SODIUM 5000 UNITS: 5000 INJECTION, SOLUTION INTRAVENOUS; SUBCUTANEOUS at 09:09

## 2017-09-20 RX ADMIN — CARVEDILOL 12.5 MG: 6.25 TABLET, FILM COATED ORAL at 11:09

## 2017-09-20 RX ADMIN — INSULIN ASPART 5 UNITS: 100 INJECTION, SOLUTION INTRAVENOUS; SUBCUTANEOUS at 05:09

## 2017-09-20 RX ADMIN — INSULIN ASPART 5 UNITS: 100 INJECTION, SOLUTION INTRAVENOUS; SUBCUTANEOUS at 10:09

## 2017-09-20 RX ADMIN — LEVETIRACETAM 1000 MG: 500 TABLET ORAL at 08:09

## 2017-09-20 RX ADMIN — LEVETIRACETAM 1000 MG: 500 TABLET ORAL at 09:09

## 2017-09-20 RX ADMIN — INSULIN ASPART 5 UNITS: 100 INJECTION, SOLUTION INTRAVENOUS; SUBCUTANEOUS at 01:09

## 2017-09-20 RX ADMIN — CEFTRIAXONE 1 G: 1 INJECTION, SOLUTION INTRAVENOUS at 12:09

## 2017-09-20 RX ADMIN — DEXTROSE MONOHYDRATE 12.5 G: 25 INJECTION, SOLUTION INTRAVENOUS at 05:09

## 2017-09-20 NOTE — ASSESSMENT & PLAN NOTE
- history of unknown high glucose at NH, given 40 Units in route  - Elevatated , and negative beta hydroxy, On presentation glucose 517  - Likely precipitated from UTI/bacteremia  - Started on Insulin drip 3U in ED, 150 cc 0.45 NaCl per hr, added dextrose for glucose <250     - Patient able to be transitioned to subQ insulin 9/18, blood sugars 70s-130s today on 20/5q6 will change regimen to 15/5TIDWM   - Switched diet to diabetic diet 9/19 to reduce carbohydrate load  - otherwise, patient still with normal gap acidosis.  Likely baseline component of acidosis, potentially from chronic RF vs RTA given kidney dysfunction.

## 2017-09-20 NOTE — PROGRESS NOTES
[facility] Hospital Medicine  Progress Note    Patient Name: Julius Cardenas  MRN: [id number]  Patient Class: IP- Inpatient   Admission Date: 9/17/2017  Length of Stay: 3 days  Attending Physician: Wilfrid Pollock MD  Primary Care Provider: Primary Doctor [facility] Medicine Team: [facility] MED 4 Joceline Moreno MD    Subjective:     Principal Problem:Hyperosmolar non-ketotic state in patient with type 2 diabetes mellitus    HPI:  Mr. 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 with hyperglycemia. Patient is a very poor historian and had no documentation with him.  Per EMS patient was given 20U of insulin in the AM after a reading of 350, repeat glucose late in the day showed high so they gave him another 20U and called EMS who reported his glucose at 517.  On presentation patient reported nausea only, denied chest pain, or SOB.  He became agitated and was given one dose of Zyprexa prior to my examination.  Lab resulted showed a negative hydroxybutyrate.  On examination patient is alert but altered.  He is alert to person only.  He denies pain, nausea, chest pain, or SOB.    Patient lives at [facility].  NH was called and reported patient baseline is alert to person, able to verbally communicate his needs, and significant assistance to attend to ADLs  He is near bed bound with a pronounced left sided weakness.  She reports that today patient was acting his normal self.  She does report diarrhea for the past couple of days, in addition to higher glucose readings.  Denies other signs of infection, fever, chills, cough or SOB.  Patient was on flagyl, but had a negative Cdiff that recently resulted.            Hospital Course:  Patient with in HHS on insulin drip, Leukocytes on UA, leukocytosis 13.95 and elevated procal  on admission, Blood and Urine Cx drawn, started on rocephin for UTI, NIALL s/p 2L IVF and on continuous infusion of IVF, and patient issues with access, consulted picc team for access. Patient sugars remained high 9/19 placed on detemir 20 with 5 aspart; glucose overnight 70s-130s. Now regimen is 15 and 5TIDWM. Sens pending for proteus in urine and speciation for GNR bacteremia.     Interval History: Patient sugars remained high 9/19 placed on detemir 20 with 5 aspart; glucose overnight 70s-130s. Now regimen is 15 and 5TIDWM. Sens pending for proteus in urine and speciation for GNR bacteremia.     Review of Systems   Constitutional: Negative for chills and fever.   Eyes: Negative for visual disturbance.   Cardiovascular: Negative for chest pain and palpitations.   Gastrointestinal: Positive for abdominal pain (suprapubic).   Genitourinary: Negative for hematuria.   Neurological: Positive for weakness. Negative for headaches.     Objective:     Vital Signs (Most Recent):  Temp: 99.4 °F (37.4 °C) (09/20/17 0730)  Pulse: 81 (09/20/17 0730)  Resp: 16 (09/20/17 0730)  BP: (!) 160/78 (09/20/17 0852)  SpO2: 100 % (09/20/17 0730) Vital Signs (24h Range):  Temp:  [97.2 °F (36.2 °C)-99.4 °F (37.4 °C)] 99.4 °F (37.4 °C)  Pulse:  [76-94] 81  Resp:  [16] 16  SpO2:  [97 %-100 %] 100 %  BP: (140-197)/(64-99) 160/78     Weight: 68 kg (149 lb 14.6 oz)  Body mass index is 19.78 kg/m².    Intake/Output Summary (Last 24 hours) at 09/20/17 0936  Last data filed at 09/20/17 0600   Gross per 24 hour   Intake              250 ml   Output             1800 ml   Net            -1550 ml      Physical Exam   Constitutional: No distress.   HENT:   Head: Normocephalic and atraumatic.   Eyes: EOM are normal.   Cardiovascular: Normal rate and regular rhythm.    Pulmonary/Chest: Effort normal. No respiratory distress. He has no wheezes. He exhibits no tenderness.   Upper airway sounds radiating through upper lobes   Abdominal: Bowel sounds are normal.  He exhibits no distension. There is tenderness.   Tenderness in LLQ and suprapubic area   Genitourinary:   Genitourinary Comments: Monzon in place draining yellow urine   Musculoskeletal: He exhibits no edema.   Neurological: He is alert.   Oriented to person and place. Conversive. Left sided weakness with facial droop.    Skin: Skin is warm and dry. He is not diaphoretic.   Psychiatric: He has a normal mood and affect.       Significant Labs:   CBC:   Recent Labs  Lab 09/18/17  1232 09/19/17  0407 09/20/17  0444   WBC 21.72* 14.60* 13.40*   HGB 10.0* 8.8* 10.1*   HCT 30.3* 25.8* 29.8*    273 268     CMP:   Recent Labs  Lab 09/19/17  2330 09/20/17  0444 09/20/17  0832    140 140   K 4.7 4.5 4.7   * 116* 116*   CO2 15* 15* 16*   * 73 83   BUN 52* 53* 50*   CREATININE 3.4* 3.4* 3.3*   CALCIUM 8.6* 8.6* 9.0   ANIONGAP 9 9 8   EGFRNONAA 15.7* 15.7* 16.2*       Significant Imaging: I have reviewed all pertinent imaging results/findings within the past 24 hours.    Assessment/Plan:      * Hyperosmolar non-ketotic state in patient with type 2 diabetes mellitus    - history of unknown high glucose at NH, given 40 Units in route  - Elevatated , and negative beta hydroxy, On presentation glucose 517  - Likely precipitated from UTI/bacteremia  - Started on Insulin drip 3U in ED, 150 cc 0.45 NaCl per hr, added dextrose for glucose <250     - Patient able to be transitioned to subQ insulin 9/18, blood sugars 70s-130s today on 20/5q6 will change regimen to 15/5TIDWM   - Switched diet to diabetic diet 9/19 to reduce carbohydrate load  - otherwise, patient still with normal gap acidosis.  Likely baseline component of acidosis, potentially from chronic RF vs RTA given kidney dysfunction.          Acute encephalopathy    - Per NH at baseline (alert to person and place) prior to transfer, patient was examined s/p zyprexa  - Likely multifactorial, HSS and medication induced, cannot rule out infectious,  feel stoke unlikely no new weakness at baseline prior to transport and Zyprexa   - continue to treat UTI, may consider MRI head to assess for other issues if treatment has not improved mental status         Acute cystitis with hematuria    - UA shows many bacteria and positive WBC  - likely precipitant to HHS  - Given hx of indwelling cath, will continue rocephin for 7 days  - blood cultures with noted GNR, repeated culture to note clearance om 9/19, NG for 24hrs  - urine proteus +; awaiting sens; gnr bacteremia awaiting spec/sens          Leukocytosis    - WBC of 13 on admission; now WBC 21  - UA dirty with indwelling cath, Diarrhea with negative C diff  - Will cover with ceftriaxone given concern for UTI, per chart review no previous history of pseudomonas           Urinary retention    - Monzon placed prior to discharge, schedule for follow up with urology, no note seen in chart  - Monzon replaced on admission  - Strict ins and outs            Weakness following cerebrovascular accident (CVA)    - CVA with residual left sided hemiparesis           NIALL (acute kidney injury)    - Baseline creatine around 2.6 - 3.0  - Given presentation of suspected HHS, suspect pre-renal dz  - Currently 3.3 without significant improvement from day prior.  Overall, patient with continued acidosis noted in AM.  Per chart review, patient previously with low bicarb noted, range between 16-18 historically.  Will aim for there. Today bicarb 16.         Anemia of chronic renal failure, stage 4 (severe)    - Stable Hgb 10.1  - Continue to monitor           Renovascular hypertension    - On Coreg 12.5 BID, Lisinopril 5mg, nifedipine 90 daily  - Has taken lisinopril and nifedipine prior to admission  - Holding Lisinopril given NIALL, but baseline crt ~3, today crt 3.3, so mild NIALL, will consider re-starting if BP remain elevated  - Re-starting home coreg and nifedipine         Secondary seizure disorder    - On Keppra 1000 BID   - Got Keppra  1000 IV x 1 on admission  - speech eval; passed for dental soft, can have PO meds, re-started home keppra        CKD (chronic kidney disease) stage 4, GFR 15-29 ml/min    - baseline creatine 2.6 - 3.0  - See plan for NIALL  - Renally dose mediation           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    - Hx of DM2, converted to DM1?   - previously on detemir 18. And apart 5 TID  - continue levemir 15 u qHS and aspart 5 TIDWM with LDSSI            VTE Risk Mitigation         Ordered     Low Risk of VTE  Once      09/17/17 2320     heparin (porcine) injection 5,000 Units  Every 8 hours     Route:  Subcutaneous        09/17/17 2107              Joceline Moreno MD  Department of Hospital Medicine   [facility]

## 2017-09-20 NOTE — SUBJECTIVE & OBJECTIVE
Interval History: Patient sugars remained high 9/19 placed on detemir 20 with 5 aspart; glucose overnight 70s-130s. Now regimen is 15 and 5TIDWM. Sens pending for proteus in urine and speciation for GNR bacteremia.     Review of Systems   Constitutional: Negative for chills and fever.   Eyes: Negative for visual disturbance.   Cardiovascular: Negative for chest pain and palpitations.   Gastrointestinal: Positive for abdominal pain (suprapubic).   Genitourinary: Negative for hematuria.   Neurological: Positive for weakness. Negative for headaches.     Objective:     Vital Signs (Most Recent):  Temp: 99.4 °F (37.4 °C) (09/20/17 0730)  Pulse: 81 (09/20/17 0730)  Resp: 16 (09/20/17 0730)  BP: (!) 160/78 (09/20/17 0852)  SpO2: 100 % (09/20/17 0730) Vital Signs (24h Range):  Temp:  [97.2 °F (36.2 °C)-99.4 °F (37.4 °C)] 99.4 °F (37.4 °C)  Pulse:  [76-94] 81  Resp:  [16] 16  SpO2:  [97 %-100 %] 100 %  BP: (140-197)/(64-99) 160/78     Weight: 68 kg (149 lb 14.6 oz)  Body mass index is 19.78 kg/m².    Intake/Output Summary (Last 24 hours) at 09/20/17 0936  Last data filed at 09/20/17 0600   Gross per 24 hour   Intake              250 ml   Output             1800 ml   Net            -1550 ml      Physical Exam   Constitutional: No distress.   HENT:   Head: Normocephalic and atraumatic.   Eyes: EOM are normal.   Cardiovascular: Normal rate and regular rhythm.    Pulmonary/Chest: Effort normal. No respiratory distress. He has no wheezes. He exhibits no tenderness.   Upper airway sounds radiating through upper lobes   Abdominal: Bowel sounds are normal. He exhibits no distension. There is tenderness.   Tenderness in LLQ and suprapubic area   Genitourinary:   Genitourinary Comments: Monzon in place draining yellow urine   Musculoskeletal: He exhibits no edema.   Neurological: He is alert.   Oriented to person and place. Conversive. Left sided weakness with facial droop.    Skin: Skin is warm and dry. He is not diaphoretic.    Psychiatric: He has a normal mood and affect.       Significant Labs:   CBC:   Recent Labs  Lab 09/18/17  1232 09/19/17  0407 09/20/17  0444   WBC 21.72* 14.60* 13.40*   HGB 10.0* 8.8* 10.1*   HCT 30.3* 25.8* 29.8*    273 268     CMP:   Recent Labs  Lab 09/19/17  2330 09/20/17  0444 09/20/17  0832    140 140   K 4.7 4.5 4.7   * 116* 116*   CO2 15* 15* 16*   * 73 83   BUN 52* 53* 50*   CREATININE 3.4* 3.4* 3.3*   CALCIUM 8.6* 8.6* 9.0   ANIONGAP 9 9 8   EGFRNONAA 15.7* 15.7* 16.2*       Significant Imaging: I have reviewed all pertinent imaging results/findings within the past 24 hours.

## 2017-09-20 NOTE — PLAN OF CARE
"Problem: Patient Care Overview  Goal: Plan of Care Review  Outcome: Ongoing (interventions implemented as appropriate)  Dr notified about Pt b/p 160/78 this morning. Dr added pts home meds. PCT notified me of high b/p. Taken manually it was 150/60. Dr ordered an extra dose of carvedilol 12.5 mg and it was given at 1625. Pt is randomly inappropriate. Know where he's at but says that "people are like dogs"  Pt stated that he killed multiple people. He's fine one minute then upset hours later.      "

## 2017-09-20 NOTE — ASSESSMENT & PLAN NOTE
- On Coreg 12.5 BID, Lisinopril 5mg, nifedipine 90 daily  - Has taken lisinopril and nifedipine prior to admission  - Holding Lisinopril given NIALL, but baseline crt ~3, today crt 3.3, so mild NIALL, will consider re-starting if BP remain elevated  - Re-starting home coreg and nifedipine

## 2017-09-20 NOTE — PLAN OF CARE
Problem: Diabetes, Type 1 (Adult)  Intervention: Optimize Glycemic Control  BG checked Q6hrs - 0400 BG 79 so insulin held, will recheck.       Problem: Patient Care Overview  Goal: Plan of Care Review  Outcome: Ongoing (interventions implemented as appropriate)  No acute events this shift. AVSS on RA. AOx2 - reorientation provided. Wedge utilized to reposition Q2hrs. Catheter care completed.

## 2017-09-20 NOTE — ASSESSMENT & PLAN NOTE
- UA shows many bacteria and positive WBC  - likely precipitant to HHS  - Given hx of indwelling cath, will continue rocephin for 7 days  - blood cultures with noted GNR, repeated culture to note clearance om 9/19, NG for 24hrs  - urine proteus +; awaiting sens; gnr bacteremia awaiting spec/sens

## 2017-09-20 NOTE — PLAN OF CARE
"CM to bedside - pt sleepy, doesn't wake up to provide any info - no family at bedside; [facility] for [facility]. Pt will return to NH at d/c.    CM provided patient anticipated STEPHANIE which will be update by nursing staff. Patient was not provided a Blue "My Health Packet" for d/c planning and health tool - office out.      09/18/17 1300   Discharge Assessment   Assessment Type Discharge Planning Assessment   Confirmed/corrected address and phone number on facesheet? No  (pt is sleepy)   Assessment information obtained from? Medical Record;Patient  (pt sleepy; NH noted in MR)   Expected Length of Stay (days) 3   Communicated expected length of stay with patient/caregiver yes   Prior to hospitilization cognitive status: Unable to Assess   Prior to hospitalization functional status: Assistive Equipment;Needs Assistance   Current cognitive status: Unable to Assess  (pt sleeping)   Current Functional Status: Assistive Equipment;Needs Assistance   Facility Arrived From: from [facility]   Lives With facility resident   Able to Return to Prior Arrangements yes   Is patient able to care for self after discharge? No   Who are your caregiver(s) and their phone number(s)? sister-in-law - Kamini [phone]; [facility] [phone]   Patient's perception of discharge disposition nursing home   Readmission Within The Last 30 Days no previous admission in last 30 days   Patient currently being followed by outpatient case management? No   Patient currently receives any other outside agency services? No   Equipment Currently Used at Home other (see comments)  (facility DME)   Do you have any problems affording any of your prescribed medications? No   Is the patient taking medications as prescribed? yes   Does the patient have transportation home? Yes   Transportation Available van, wheelchair accessible;agency transportation  (facility transport)   Dialysis Name and " Scheduled days N/A   Does the patient receive services at the [facility]? No   Discharge Plan A Return to nursing home   Discharge Plan B Skilled Nursing Facility   Patient/Family In Agreement With Plan other (see comments)  (left msg w/ NH)

## 2017-09-20 NOTE — ASSESSMENT & PLAN NOTE
- On Keppra 1000 BID   - Got Keppra 1000 IV x 1 on admission  - speech eval; passed for dental soft, can have PO meds, re-started home keppra

## 2017-09-20 NOTE — ASSESSMENT & PLAN NOTE
- Baseline creatine around 2.6 - 3.0  - Given presentation of suspected HHS, suspect pre-renal dz  - Currently 3.3 without significant improvement from day prior.  Overall, patient with continued acidosis noted in AM.  Per chart review, patient previously with low bicarb noted, range between 16-18 historically.  Will aim for there. Today bicarb 16.

## 2017-09-21 VITALS
SYSTOLIC BLOOD PRESSURE: 137 MMHG | OXYGEN SATURATION: 100 % | WEIGHT: 149.94 LBS | HEIGHT: 73 IN | RESPIRATION RATE: 18 BRPM | BODY MASS INDEX: 19.87 KG/M2 | HEART RATE: 73 BPM | DIASTOLIC BLOOD PRESSURE: 71 MMHG | TEMPERATURE: 98 F

## 2017-09-21 PROBLEM — R78.81 GRAM-NEGATIVE BACTEREMIA: Status: ACTIVE | Noted: 2017-09-21

## 2017-09-21 PROBLEM — D72.829 LEUKOCYTOSIS: Status: ACTIVE | Noted: 2017-09-21

## 2017-09-21 PROBLEM — A49.9 POLYMICROBIAL BACTERIAL INFECTION: Status: ACTIVE | Noted: 2017-09-21

## 2017-09-21 PROBLEM — G93.40 ACUTE ENCEPHALOPATHY: Status: ACTIVE | Noted: 2017-09-21

## 2017-09-21 LAB
ANION GAP SERPL CALC-SCNC: 7 MMOL/L
ANION GAP SERPL CALC-SCNC: 8 MMOL/L
BACTERIA BLD CULT: NORMAL
BASOPHILS # BLD AUTO: 0.02 K/UL
BASOPHILS NFR BLD: 0.2 %
BUN SERPL-MCNC: 51 MG/DL
BUN SERPL-MCNC: 53 MG/DL
CALCIUM SERPL-MCNC: 8.6 MG/DL
CALCIUM SERPL-MCNC: 8.7 MG/DL
CHLORIDE SERPL-SCNC: 116 MMOL/L
CHLORIDE SERPL-SCNC: 116 MMOL/L
CO2 SERPL-SCNC: 17 MMOL/L
CO2 SERPL-SCNC: 18 MMOL/L
CREAT SERPL-MCNC: 3.3 MG/DL
CREAT SERPL-MCNC: 3.4 MG/DL
DIFFERENTIAL METHOD: ABNORMAL
EOSINOPHIL # BLD AUTO: 0.2 K/UL
EOSINOPHIL NFR BLD: 1.6 %
ERYTHROCYTE [DISTWIDTH] IN BLOOD BY AUTOMATED COUNT: 14.6 %
EST. GFR  (AFRICAN AMERICAN): 18.1 ML/MIN/1.73 M^2
EST. GFR  (AFRICAN AMERICAN): 18.8 ML/MIN/1.73 M^2
EST. GFR  (NON AFRICAN AMERICAN): 15.7 ML/MIN/1.73 M^2
EST. GFR  (NON AFRICAN AMERICAN): 16.2 ML/MIN/1.73 M^2
GLUCOSE SERPL-MCNC: 328 MG/DL
GLUCOSE SERPL-MCNC: 330 MG/DL
HCT VFR BLD AUTO: 26.5 %
HGB BLD-MCNC: 9 G/DL
LYMPHOCYTES # BLD AUTO: 2.2 K/UL
LYMPHOCYTES NFR BLD: 18 %
MAGNESIUM SERPL-MCNC: 1.8 MG/DL
MCH RBC QN AUTO: 28.6 PG
MCHC RBC AUTO-ENTMCNC: 34 G/DL
MCV RBC AUTO: 84 FL
MONOCYTES # BLD AUTO: 0.8 K/UL
MONOCYTES NFR BLD: 6.6 %
NEUTROPHILS # BLD AUTO: 8.9 K/UL
NEUTROPHILS NFR BLD: 73.4 %
PHOSPHATE SERPL-MCNC: 4.3 MG/DL
PLATELET # BLD AUTO: 247 K/UL
PMV BLD AUTO: 11.4 FL
POCT GLUCOSE: 101 MG/DL (ref 70–110)
POCT GLUCOSE: 171 MG/DL (ref 70–110)
POCT GLUCOSE: 261 MG/DL (ref 70–110)
POCT GLUCOSE: 354 MG/DL (ref 70–110)
POCT GLUCOSE: 397 MG/DL (ref 70–110)
POTASSIUM SERPL-SCNC: 4.7 MMOL/L
POTASSIUM SERPL-SCNC: 4.7 MMOL/L
RBC # BLD AUTO: 3.15 M/UL
SODIUM SERPL-SCNC: 141 MMOL/L
SODIUM SERPL-SCNC: 141 MMOL/L
WBC # BLD AUTO: 12.14 K/UL

## 2017-09-21 PROCEDURE — 84100 ASSAY OF PHOSPHORUS: CPT

## 2017-09-21 PROCEDURE — 85025 COMPLETE CBC W/AUTO DIFF WBC: CPT

## 2017-09-21 PROCEDURE — 87077 CULTURE AEROBIC IDENTIFY: CPT

## 2017-09-21 PROCEDURE — 87186 SC STD MICRODIL/AGAR DIL: CPT

## 2017-09-21 PROCEDURE — 63600175 PHARM REV CODE 636 W HCPCS: Performed by: INTERNAL MEDICINE

## 2017-09-21 PROCEDURE — 87040 BLOOD CULTURE FOR BACTERIA: CPT | Mod: 59

## 2017-09-21 PROCEDURE — 36415 COLL VENOUS BLD VENIPUNCTURE: CPT

## 2017-09-21 PROCEDURE — 25000003 PHARM REV CODE 250: Performed by: STUDENT IN AN ORGANIZED HEALTH CARE EDUCATION/TRAINING PROGRAM

## 2017-09-21 PROCEDURE — 63600175 PHARM REV CODE 636 W HCPCS: Performed by: STUDENT IN AN ORGANIZED HEALTH CARE EDUCATION/TRAINING PROGRAM

## 2017-09-21 PROCEDURE — 80048 BASIC METABOLIC PNL TOTAL CA: CPT | Mod: 91

## 2017-09-21 PROCEDURE — 83735 ASSAY OF MAGNESIUM: CPT

## 2017-09-21 PROCEDURE — 99238 HOSP IP/OBS DSCHRG MGMT 30/<: CPT | Mod: GC,,, | Performed by: INTERNAL MEDICINE

## 2017-09-21 RX ORDER — INSULIN ASPART 100 [IU]/ML
10 INJECTION, SOLUTION INTRAVENOUS; SUBCUTANEOUS ONCE
Status: COMPLETED | OUTPATIENT
Start: 2017-09-21 | End: 2017-09-21

## 2017-09-21 RX ORDER — INSULIN ASPART 100 [IU]/ML
8 INJECTION, SOLUTION INTRAVENOUS; SUBCUTANEOUS
Status: DISCONTINUED | OUTPATIENT
Start: 2017-09-21 | End: 2017-09-21 | Stop reason: HOSPADM

## 2017-09-21 RX ORDER — CIPROFLOXACIN 250 MG/1
500 TABLET, FILM COATED ORAL EVERY 24 HOURS
Status: DISCONTINUED | OUTPATIENT
Start: 2017-09-22 | End: 2017-09-21 | Stop reason: HOSPADM

## 2017-09-21 RX ORDER — INSULIN ASPART 100 [IU]/ML
8 INJECTION, SOLUTION INTRAVENOUS; SUBCUTANEOUS
Qty: 15 ML | Refills: 11 | Status: ON HOLD | OUTPATIENT
Start: 2017-09-21 | End: 2017-09-27

## 2017-09-21 RX ORDER — LISINOPRIL 10 MG/1
10 TABLET ORAL DAILY
Status: DISCONTINUED | OUTPATIENT
Start: 2017-09-21 | End: 2017-09-21

## 2017-09-21 RX ORDER — CIPROFLOXACIN 2 MG/ML
400 INJECTION, SOLUTION INTRAVENOUS
Status: DISCONTINUED | OUTPATIENT
Start: 2017-09-21 | End: 2017-09-21

## 2017-09-21 RX ORDER — CARVEDILOL 6.25 MG/1
25 TABLET ORAL 2 TIMES DAILY
Status: DISCONTINUED | OUTPATIENT
Start: 2017-09-21 | End: 2017-09-21 | Stop reason: HOSPADM

## 2017-09-21 RX ORDER — AMOXICILLIN 250 MG
1 CAPSULE ORAL ONCE
Status: COMPLETED | OUTPATIENT
Start: 2017-09-21 | End: 2017-09-21

## 2017-09-21 RX ORDER — CARVEDILOL 6.25 MG/1
12.5 TABLET ORAL ONCE
Status: COMPLETED | OUTPATIENT
Start: 2017-09-21 | End: 2017-09-21

## 2017-09-21 RX ORDER — NIFEDIPINE 60 MG/1
120 TABLET, EXTENDED RELEASE ORAL DAILY
Status: DISCONTINUED | OUTPATIENT
Start: 2017-09-21 | End: 2017-09-21

## 2017-09-21 RX ORDER — LISINOPRIL 10 MG/1
10 TABLET ORAL DAILY
Status: DISCONTINUED | OUTPATIENT
Start: 2017-09-21 | End: 2017-09-21 | Stop reason: HOSPADM

## 2017-09-21 RX ORDER — CARVEDILOL 25 MG/1
25 TABLET ORAL 2 TIMES DAILY
Qty: 60 TABLET | Refills: 2 | Status: ON HOLD | OUTPATIENT
Start: 2017-09-21 | End: 2017-10-21 | Stop reason: HOSPADM

## 2017-09-21 RX ORDER — CIPROFLOXACIN 500 MG/1
500 TABLET ORAL DAILY
Qty: 13 TABLET | Refills: 0 | Status: ON HOLD | OUTPATIENT
Start: 2017-09-21 | End: 2017-10-20

## 2017-09-21 RX ORDER — CARVEDILOL 6.25 MG/1
12.5 TABLET ORAL 2 TIMES DAILY
Status: DISCONTINUED | OUTPATIENT
Start: 2017-09-21 | End: 2017-09-21

## 2017-09-21 RX ADMIN — LEVETIRACETAM 1000 MG: 500 TABLET ORAL at 09:09

## 2017-09-21 RX ADMIN — CARVEDILOL 12.5 MG: 6.25 TABLET, FILM COATED ORAL at 09:09

## 2017-09-21 RX ADMIN — INSULIN ASPART 5 UNITS: 100 INJECTION, SOLUTION INTRAVENOUS; SUBCUTANEOUS at 09:09

## 2017-09-21 RX ADMIN — INSULIN ASPART 5 UNITS: 100 INJECTION, SOLUTION INTRAVENOUS; SUBCUTANEOUS at 12:09

## 2017-09-21 RX ADMIN — HEPARIN SODIUM 5000 UNITS: 5000 INJECTION, SOLUTION INTRAVENOUS; SUBCUTANEOUS at 02:09

## 2017-09-21 RX ADMIN — STANDARDIZED SENNA CONCENTRATE AND DOCUSATE SODIUM 1 TABLET: 8.6; 5 TABLET, FILM COATED ORAL at 02:09

## 2017-09-21 RX ADMIN — INSULIN ASPART 10 UNITS: 100 INJECTION, SOLUTION INTRAVENOUS; SUBCUTANEOUS at 02:09

## 2017-09-21 RX ADMIN — CARVEDILOL 12.5 MG: 6.25 TABLET, FILM COATED ORAL at 08:09

## 2017-09-21 RX ADMIN — LISINOPRIL 10 MG: 10 TABLET ORAL at 09:09

## 2017-09-21 RX ADMIN — INSULIN ASPART 8 UNITS: 100 INJECTION, SOLUTION INTRAVENOUS; SUBCUTANEOUS at 06:09

## 2017-09-21 RX ADMIN — HEPARIN SODIUM 5000 UNITS: 5000 INJECTION, SOLUTION INTRAVENOUS; SUBCUTANEOUS at 06:09

## 2017-09-21 RX ADMIN — CIPROFLOXACIN 400 MG: 2 INJECTION, SOLUTION INTRAVENOUS at 09:09

## 2017-09-21 RX ADMIN — CEFTRIAXONE 1 G: 1 INJECTION, SOLUTION INTRAVENOUS at 03:09

## 2017-09-21 NOTE — PLAN OF CARE
"Problem: Patient Care Overview  Goal: Plan of Care Review  Outcome: Ongoing (interventions implemented as appropriate)  Pt remained free from injury over night. No complaints of pain reported. VSS. Pt had one episode of emesis in early evening. No complaints of nausea reported prior or after vomiting. Pt stated " where did that come from?" Dm management occurred with long action and sliding scale insulin. IV antibiotic therapy continues.  Pt was seen sleeping during shift. Will continue to monitor.       "

## 2017-09-21 NOTE — PLAN OF CARE
[facility]     Department of Hospital Medicine     [address] [place] [address]     [phone] after hours  [phone] fax       NURSING HOME ORDERS    09/21/2017    Admit to Nursing Home:  Regular Bed                                                    Diagnoses:  Active Hospital Problems    Diagnosis  POA    *Hyperosmolar non-ketotic state in patient with type 2 diabetes mellitus [E11.01]  Yes    Gram-negative bacteremia [R78.81]  Yes    Polymicrobial bacterial infection [A49.9]  Yes    Acute cystitis with hematuria [N30.01]  Yes    Leukocytosis [D72.829]  Unknown    Acute encephalopathy [G93.40]  Unknown    Urinary retention [R33.9]  Yes    Hyperglycemia [R73.9]  Yes    Weakness following cerebrovascular accident (CVA) [I69.398, R53.1]  Not Applicable     Chronic    NIALL (acute kidney injury) [N17.9]  Yes    Anemia of chronic renal failure, stage 4 (severe) [N18.4, D63.1]  Yes     Chronic    Renovascular hypertension [I15.0]  Yes    Secondary seizure disorder [G40.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]  Yes     Chronic    MCI (mild cognitive impairment) [G31.84]  Yes     Chronic    CKD (chronic kidney disease) stage 4, GFR 15-29 ml/min [N18.4]  Yes     Chronic    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 [E10.22, N18.4]  Yes     Chronic     Poor control due to cognitive impairment, with history of hypoglycemia and DKA        Resolved Hospital Problems    Diagnosis Date Resolved POA    Metabolic acidosis, increased anion gap [E87.2] 09/19/2017 Yes                Patient is homebound due to:  Hyperosmolar non-ketotic state in patient with type 2 diabetes mellitus    Allergies:Review of patient's allergies indicates:  No Known Allergies    Vitals:     Once weekly      Diet: Renal and diabetic 2000 calorie diet- dental soft   Supplement:  1 can every three times a day with meals                         Type:     Nepro        Acitivities:     - Up in a chair  each morning as tolerated   - Ambulate with assistance to bathroom   - Scheduled walks once each shift (every 8 hours)   - May ambulate independently   - May use walker, cane, or self-propelled wheelchair      LABS:  Per facility protocol     CMP, CBC each month for 3 months   PT-INR each week for 1 month then monthly   Pre-albumin each month for 3 months   TSH every year     Nursing Precautions:    - Aspiration precautions:             - Total assistance with meals            -  Upright 90 degrees befor during and after meals             -  Suction at bedside          - Fall precautions per nursing home protocol   - Seizure precaution per group home protocol   - Decubitus precautions:        -  for positioning   - Pressure reducing foam mattress   - Turn patient every two hours. Use wedge pillows to anchor patient    CONSULTS:       Speech Therapy  to evaluate and treat     Nutrition to evaluate and recommend diet      MISCELLANEOUS CARE:          Monzon Care: Empty Monzon bag every shift.  Change Monzon every month     Routine Skin for Bedridden Patients:  Apply moisture barrier cream to all    skin folds and wet areas in perineal area daily and after baths and                           all bowel movements.                   DIABETES CARE:        Check blood sugar:       Fingerstick blood sugar AC and HS   Fingerstick blood sugar every 6 hours if unable to eat      Report CBG < 60 or > 400 to physician.                                          Insulin Sliding Scale          Glucose  Novolog Insulin Subcutaneous        0 - 60   Orange juice or glucose tablet, hold insulin      No insulin   201-250  2 units   251-300  4 units   301-350  6 units   351-400  8 units   >400   10 units then call physician      Medications: Discontinue all previous medication orders, if any. See new list below.     uJlius Cardenas   Home Medication Instructions CAM:[id number]    Printed on:09/21/17 3351   Medication  "Information                      albuterol-ipratropium 2.5mg-0.5mg/3mL (DUO-NEB) 0.5 mg-3 mg(2.5 mg base)/3 mL nebulizer solution  Take 3 mLs by nebulization every 4 (four) hours as needed for Wheezing or Shortness of Breath. Rescue             aspirin (ECOTRIN) 81 MG EC tablet  Take 1 tablet (81 mg total) by mouth once daily.             blood-glucose meter (CONTOUR METER) kit  Use as instructed.  Please substitute appropriate meter to match his strips.             carvedilol (COREG) 25 MG tablet  Take 1 tablet (25 mg total) by mouth 2 (two) times daily.             ciprofloxacin HCl (CIPRO) 500 MG tablet  Take 1 tablet (500 mg total) by mouth once daily end date 10/5/17             CONTOUR TEST STRIPS Strp  USE AS DIRECTED THREE TIMES DAILY             insulin aspart (NOVOLOG) 100 unit/mL InPn pen  Inject 8 Units into the skin 3 (three) times daily with meals.             insulin detemir (LEVEMIR FLEXTOUCH) 100 unit/mL (3 mL) SubQ InPn pen  Inject 20 Units into the skin once daily.             lancets Misc  Use three times daily for finger stick glucose monitoring.             levetiracetam (KEPPRA) 1000 MG tablet  Take 1 tablet (1,000 mg total) by mouth 2 (two) times daily.             lisinopril (PRINIVIL,ZESTRIL) 5 MG tablet  Take 2 tablets (10 mg total) by mouth once daily.             nifedipine (PROCARDIA-XL) 90 MG (OSM) 24 hr tablet  Take 1 tablet (90 mg total) by mouth once daily.             nystatin (MYCOSTATIN) cream  Apply topically 2 (two) times daily.             paroxetine (PAXIL) 10 MG tablet  Take 1 tablet (10 mg total) by mouth every morning.             pen needle, diabetic (BD INSULIN PEN NEEDLE UF SHORT) 31 gauge x 5/16" Ndle  Inject 1 Device into the skin 4 (four) times daily.                       _________________________________  Julia Barrera MD  09/21/2017    "

## 2017-09-21 NOTE — ASSESSMENT & PLAN NOTE
- On Coreg 12.5 BID, Lisinopril 5mg, nifedipine 90 daily at home  - Initially held Lisinopril given NIALL, but baseline crt ~3, today crt 3.3 unchanged from days prior, so restarted Lisinopril for BP control   - Increased home coreg to 25 BID    - held nifedipine today 2/2 being large pill

## 2017-09-21 NOTE — ASSESSMENT & PLAN NOTE
- Baseline creatine around 2.6 - 3.0  - Currently 3.3 without significant improvement from day prior.  Overall, patient with continued acidosis noted in AM.  Per chart review, patient previously with low bicarb noted, range between 16-18 historically.  Will aim for there. Today bicarb 18.   -Suspect this is very mild NIALL if any on chronic renal failure

## 2017-09-21 NOTE — ASSESSMENT & PLAN NOTE
See cystitis  Likely bacteremic from urinary source as spec is pseudomonas and patient has no obvious lung pathology  Cipro for 14 days for uti and concomitant bacteremia

## 2017-09-21 NOTE — PLAN OF CARE
DAVID received call from Akira at [facility] stating Pt could come back to them and report could be called to Suzy in the [facility] ([phone]). DAVID provided this information to Pt's nurse. Pt's nurse said she was about to call the doctor because Pt was complaining of abdominal pain and his glucose was eveline after lunch. CM made call to the doctor and DAVID provided nurse with her number and asked that she check in with her in about an hour to see how Pt was doing, etc. SW to continue to follow.     Alma Amador, JASONW

## 2017-09-21 NOTE — PLAN OF CARE
DAVID left another message for the admissions department at [facility] informing them that Pt is discharge ready and requesting a return call ASAP.     JASON MasonW

## 2017-09-21 NOTE — DISCHARGE SUMMARY
[facility] Hospital Medicine  Discharge Summary      Patient Name: Julius Cardenas  MRN: [id number]  Admission Date: 9/17/2017  Hospital Length of Stay: 4 days  Discharge Date and Time:  09/21/2017 11:16 AM  Attending Physician: Wilfrid Pollock MD   Discharging Provider: Joceline Moreno MD  Primary Care Provider: Primary Doctor [facility] Medicine Team: [facility] 4 Joceline Moreno MD    HPI:   Mr. 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 Patient is a very poor historian and had no documentation with him.  Per EMS patient was given 20U of insulin in the AM after a reading of 350, repeat glucose late in the day showed high so they gave him another 20U and called EMS who reported his glucose at 517.  On presentation patient reported nausea only, denied chest pain, or SOB.  He became agitated and was given one dose of Zyprexa prior to my examination.  Lab resulted showed a negative hydroxybutyrate.  On examination patient is alert but altered.  He is alert to person only.  He denies pain, nausea, chest pain, or SOB.    Patient lives at [facility].  NH was called and reported patient baseline is alert to person, able to verbally communicate his needs, and significant assistance to attend to ADLs  He is near bed bound with a pronounced left sided weakness.  She reports that today patient was acting his normal self.  She does report diarrhea for the past couple of days, in addition to higher glucose readings.  Denies other signs of infection, fever, chills, cough or SOB.  Patient was on flagyl, but had a negative Cdiff that recently resulted.            * No surgery found *      Indwelling Lines/Drains at time of discharge:   Lines/Drains/Airways     Drain                 Urethral Catheter  09/17/17 2331 Double-lumen 16 Fr. 3 days              Hospital Course:   Patient with in HHS on insulin drip, Leukocytes on UA, leukocytosis 13.95 and elevated procal on admission, Blood and Urine Cx drawn, started on rocephin for UTI, NIALL s/p 2L IVF and on continuous infusion of IVF, and patient issues with access, consulted picc team for access. Patient sugars remained high 9/19 placed on detemir 20 with 5 aspart; glucose overnight 70s-130s. Now regimen is 15 and 5TIDWM. Proteus in urine and psuedomonas bacteremia both sens to ciprofloxacin. Patient to complete 14 day course of ciprofloxacin.     Consults:   Consults         Status Ordering Provider     Inpatient consult to PICC team (NIAS)  Once     Provider:  (Not yet assigned)    RICO Zhang          Significant Diagnostic Studies: Labs:   CMP   Recent Labs  Lab 09/20/17  0444 09/20/17  0832 09/21/17  0605    140 141  141   K 4.5 4.7 4.7  4.7   * 116* 116*  116*   CO2 15* 16* 17*  18*   GLU 73 83 328*  330*   BUN 53* 50* 51*  53*   CREATININE 3.4* 3.3* 3.4*  3.3*   CALCIUM 8.6* 9.0 8.6*  8.7   ANIONGAP 9 8 8  7*   ESTGFRAFRICA 18.1* 18.8* 18.1*  18.8*   EGFRNONAA 15.7* 16.2* 15.7*  16.2*    and CBC   Recent Labs  Lab 09/20/17  0444 09/21/17  0605   WBC 13.40* 12.14   HGB 10.1* 9.0*   HCT 29.8* 26.5*    247       Pending Diagnostic Studies:     Procedure Component Value Units Date/Time    Basic metabolic panel [[id number]] Collected:  09/18/17 1232    Order Status:  Sent Lab Status:  In process Updated:  09/18/17 1232    Specimen:  Blood from Blood     Narrative:       Collection has been rescheduled by HLF at 9/18/2017 05:44 Reason:   tried pt again. pt still a very hardstick x3. spoke with rn umm Gaines   Collection has been rescheduled by LUIS at 9/18/2017 06:13 Reason:   Stuck Patient over 10 times including finger Stick with multiple   Nurses and Venipuncture techs attempting  Collection has been  rescheduled by [email or web address] at 9/18/2017 07:27 Reason: pt   malloryick notified nurse estiven carvajal to doctors  Specimen collection performed by Alternate Phlebotomist: KENDALL    Basic metabolic panel [[id number]] Collected:  09/18/17 1232    Order Status:  Sent Lab Status:  In process Updated:  09/18/17 1232    Specimen:  Blood from Blood     Narrative:       Specimen collection performed by Alternate Phlebotomist: CBE        Final Active Diagnoses:    Diagnosis Date Noted POA    PRINCIPAL PROBLEM:  Hyperosmolar non-ketotic state in patient with type 2 diabetes mellitus [E11.01] 02/16/2017 Yes    Leukocytosis [D72.829] 09/21/2017 Yes    Acute encephalopathy [G93.40] 09/21/2017 Yes    Gram-negative bacteremia [R78.81] 09/21/2017 Yes    Polymicrobial bacterial infection [A49.9] 09/21/2017 Yes    Acute cystitis with hematuria [N30.01] 09/19/2017 Yes    Urinary retention [R33.9] 08/13/2017 Yes    Hyperglycemia [R73.9] 06/13/2017 Yes    Weakness following cerebrovascular accident (CVA) [I69.398, R53.1] 01/30/2016 Not Applicable     Chronic    NIALL (acute kidney injury) [N17.9] 11/01/2014 Yes    Anemia of chronic renal failure, stage 4 (severe) [N18.4, D63.1] 04/08/2014 Yes     Chronic    Renovascular hypertension [I15.0] 08/31/2013 Yes    Secondary seizure disorder [G40.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] 02/01/2013 Yes     Chronic    MCI (mild cognitive impairment) [G31.84] 02/01/2013 Yes     Chronic    CKD (chronic kidney disease) stage 4, GFR 15-29 ml/min [N18.4] 12/03/2012 Yes     Chronic    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 [E10.22, N18.4]  Yes     Chronic      Problems Resolved During this Admission:    Diagnosis Date Noted Date Resolved POA    Metabolic acidosis, increased anion gap [E87.2] 09/19/2017 09/19/2017 Yes      * Hyperosmolar non-ketotic state in patient with type 2 diabetes mellitus    - history of unknown high glucose at NH, given 40 Units in route  - Elevatated , and negative beta hydroxy, On  presentation glucose 517  - Likely precipitated from UTI/bacteremia  - Started on Insulin drip 3U in ED, 150 cc 0.45 NaCl per hr, added dextrose for glucose <250 - Patient able to be transitioned to subQ insulin 9/18  - Switched diet to diabetic diet 9/19 to reduce carbohydrate load  - otherwise, patient still with normal gap acidosis.  Likely baseline component of acidosis, potentially from chronic RF vs RTA given kidney dysfunction.   -HHS resolved         Gram-negative bacteremia    See cystitis  Likely bacteremic from urinary source as spec is pseudomonas and patient has no obvious lung pathology  Cipro for 14 days for uti and concomitant bacteremia          Acute cystitis with hematuria    - UA shows many bacteria and positive WBC  - likely precipitant to HHS  - Given hx of indwelling cath, started on rocephin, also has pseudomonas bacteremia, both sens to cipro; will start on ciprofloxacin for 14 day course   - Repeated blood culture to note clearance on 9/19, NG for 48hrs          Weakness following cerebrovascular accident (CVA)    - CVA with residual left sided hemiparesis           NIALL (acute kidney injury)    - Baseline creatine around 2.6 - 3.0  - Currently 3.3 without significant improvement from day prior.  Overall, patient with continued acidosis noted in AM.  Per chart review, patient previously with low bicarb noted, range between 16-18 historically.  Will aim for there. Today bicarb 18.   -Suspect this is very mild NIALL if any on chronic renal failure        Renovascular hypertension    - On Coreg 12.5 BID, Lisinopril 5mg, nifedipine 90 daily at home  - Initially held Lisinopril given NIALL, but baseline crt ~3, today crt 3.3 unchanged from days prior, so restarted Lisinopril for BP control   - Increased home coreg to 25 BID    - held nifedipine today 2/2 being large pill         Secondary seizure disorder    - On Keppra 1000 BID   - Got Keppra 1000 IV x 1 on admission  - speech eval; passed for  dental soft, can have PO meds, re-started home keppra        CKD (chronic kidney disease) stage 4, GFR 15-29 ml/min    - baseline creatine 2.6 - 3.0  - See plan for NIALL  - Renally dose mediation           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    - Hx of DM2, converted to DM1?   - previously on detemir 18. And apart 5 TID  - Currently on levemir 10 BID and aspart 5 TIDWM with LDSSI; can continue home regimen at d/c              Discharged Condition: stable    Disposition: To NH with 14 day total course of abx for uti/bacteremia. Spoke to NH about new 1/2 vial positive blood culture from draw on 9/21 will re-draw cultures and continue abx for 14 days post 48hr no growth. Continue insulin regimen and SSI. Continue meds for chronic conditions as pre-scribed on med list.    Follow Up:  Follow-up Information     [facility]    Specialties:  Nursing Home Agency, Rehabilitation  Why:  Nursing Home  Contact information:  [address]  [facility] [address] [phone]                 Patient Instructions:   No discharge procedures on file.  Medications:  Reconciled Home Medications:   Current Discharge Medication List      START taking these medications    Details   ciprofloxacin HCl (CIPRO) 500 MG tablet Take 1 tablet (500 mg total) by mouth once daily.  Qty: 13 tablet, Refills: 0         CONTINUE these medications which have CHANGED    Details   carvedilol (COREG) 25 MG tablet Take 1 tablet (25 mg total) by mouth 2 (two) times daily.  Qty: 60 tablet, Refills: 2      insulin aspart (NOVOLOG) 100 unit/mL InPn pen Inject 8 Units into the skin 3 (three) times daily with meals.  Qty: 15 mL, Refills: 11    Associated Diagnoses: 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      insulin detemir (LEVEMIR FLEXTOUCH) 100 unit/mL (3 mL) SubQ InPn pen Inject 20 Units into the skin once daily.  Qty: 1 Box, Refills: 2         CONTINUE these medications  "which have NOT CHANGED    Details   albuterol-ipratropium 2.5mg-0.5mg/3mL (DUO-NEB) 0.5 mg-3 mg(2.5 mg base)/3 mL nebulizer solution Take 3 mLs by nebulization every 4 (four) hours as needed for Wheezing or Shortness of Breath. Rescue  Qty: 1 vial, Refills: 6      aspirin (ECOTRIN) 81 MG EC tablet Take 1 tablet (81 mg total) by mouth once daily.  Qty: 30 tablet, Refills: 11      levetiracetam (KEPPRA) 1000 MG tablet Take 1 tablet (1,000 mg total) by mouth 2 (two) times daily.  Qty: 180 tablet, Refills: 6    Associated Diagnoses: Partial symptomatic epilepsy with simple partial seizures, intractable, without status epilepticus      lisinopril (PRINIVIL,ZESTRIL) 5 MG tablet Take 2 tablets (10 mg total) by mouth once daily.  Qty: 30 tablet, Refills: 6      nifedipine (PROCARDIA-XL) 90 MG (OSM) 24 hr tablet Take 1 tablet (90 mg total) by mouth once daily.  Qty: 30 tablet, Refills: 11      paroxetine (PAXIL) 10 MG tablet Take 1 tablet (10 mg total) by mouth every morning.  Qty: 90 tablet, Refills: 11      blood-glucose meter (CONTOUR METER) kit Use as instructed.  Please substitute appropriate meter to match his strips.  Qty: 1 each, Refills: 0    Associated Diagnoses: DM2 (diabetes mellitus, type 2); Type II or unspecified type diabetes mellitus with renal manifestations, uncontrolled      CONTOUR TEST STRIPS Strp USE AS DIRECTED THREE TIMES DAILY  Qty: 300 strip, Refills: 12    Comments: **Patient requests 90 days supply**  Associated Diagnoses: Diabetes mellitus due to abnormal insulin      lancets Misc Use three times daily for finger stick glucose monitoring.  Qty: 100 each, Refills: 3      nystatin (MYCOSTATIN) cream Apply topically 2 (two) times daily.  Qty: 120 g, Refills: 3      pen needle, diabetic (BD INSULIN PEN NEEDLE UF SHORT) 31 gauge x 5/16" Ndle Inject 1 Device into the skin 4 (four) times daily.  Qty: 120 each, Refills: 11    Associated Diagnoses: Type 1 diabetes mellitus with stage 4 chronic kidney " disease           Time spent on the discharge of patient: 30 minutes      Joceline Moreno MD  Department of Hospital Medicine  [facility]

## 2017-09-21 NOTE — ASSESSMENT & PLAN NOTE
- Hx of DM2, converted to DM1?   - previously on detemir 18. And apart 5 TID  - Currently on levemir 10 BID and aspart 5 TIDWM with LDSSI; can continue home regimen at d/c

## 2017-09-21 NOTE — PHYSICIAN QUERY
PT Name: Julius Cardenas  MR #: [id number]     Physician Query Form - Documentation Clarification      CDS/: Lupe Shirley    RN CCDS          Contact information:  [phone]    This form is a permanent document in the medical record.     Query Date: September 21, 2017    By submitting this query, we are merely seeking further clarification of documentation. Please utilize your independent clinical judgment when addressing the question(s) below.    The Medical record reflects the following:    Supporting Clinical Findings Location in Medical Record     - Per NH at baseline (alert to person and place) prior to transfer, patient was examined s/p zyprexa  - Likely multifactorial, HSS and medication induced, cannot rule out infectious, feel stoke unlikely no new weakness at baseline prior to transport and Zyprexa   - continue to treat UTI, may consider MRI head to assess for other issues if treatment has not improved mental status        Problem list /PN 9/20     Encephalopathy - improving  Initially altered, likely 2/2 HHS, clinically improving per family, ANOx2       HM PN 9/19                                                                            Please further clarify the type of encephalopathy.  Thank you.      Provider Use Only      [   ] Metabolic encephalopathy    [  X ] Other encephalopathy________________                                                                                                                           [  ] Clinically undetermined

## 2017-09-21 NOTE — PLAN OF CARE
DAVID learned from CM, Yris Lam, that Pt was stable to discharge back to his Nursing Home today. DAVID sent Pt's face sheet, H&P and Nursing Home orders to [facility] via [facility]. DAVID placed call to [facility] ([phone]) and left a voicemail informing them that orders had been sent and requesting a return call.   SW to continue to follow.     JASON MasonW

## 2017-09-21 NOTE — ASSESSMENT & PLAN NOTE
- history of unknown high glucose at NH, given 40 Units in route  - Elevatated , and negative beta hydroxy, On presentation glucose 517  - Likely precipitated from UTI/bacteremia  - Started on Insulin drip 3U in ED, 150 cc 0.45 NaCl per hr, added dextrose for glucose <250 - Patient able to be transitioned to subQ insulin 9/18  - Switched diet to diabetic diet 9/19 to reduce carbohydrate load  - otherwise, patient still with normal gap acidosis.  Likely baseline component of acidosis, potentially from chronic RF vs RTA given kidney dysfunction.   -HHS resolved

## 2017-09-21 NOTE — PLAN OF CARE
DAVID placed call to [facility] Resident who confirmed Pt was discharge ready. Resident stated she was updating the orders and SW agreed to send them over to facility. DAVID arranged stretcher transport via [facility] ([id number]) for within the hour. Packet for transport left at 10th floor nurse's station. SW confirmed with Pt's nurse that she had the number for report.   Pt discharging back to NH today.     Alma Amador LCSW

## 2017-09-21 NOTE — ASSESSMENT & PLAN NOTE
- UA shows many bacteria and positive WBC  - likely precipitant to HHS  - Given hx of indwelling cath, started on rocephin, also has pseudomonas bacteremia, both sens to cipro; will start on ciprofloxacin for 14 day course   - Repeated blood culture to note clearance on 9/19, NG for 48hrs

## 2017-09-22 NOTE — CARE UPDATE
Patient with one vial of blood cultures from 9/21 with GNRs; patient on appropriate therapy for both pseudomonas and e.coli. In blood and urine. Spoke with toney malagon charge nurse from [facility] today 9/22 at 3:39pm and agreed to draw cultures and continue abx for additional 14 days after clearance in blood for 48hr.    Joceline Moreno MD

## 2017-09-24 LAB
BACTERIA BLD CULT: NORMAL

## 2017-09-25 ENCOUNTER — HOSPITAL ENCOUNTER (INPATIENT)
Facility: HOSPITAL | Age: 82
LOS: 2 days | Discharge: NURSING, REHAB OR LONG-TERM CARE | DRG: 638 | End: 2017-09-27
Attending: EMERGENCY MEDICINE | Admitting: INTERNAL MEDICINE
Payer: MEDICARE

## 2017-09-25 DIAGNOSIS — E10.22 TYPE 1 DIABETES MELLITUS WITH STAGE 4 CHRONIC KIDNEY DISEASE: Chronic | ICD-10-CM

## 2017-09-25 DIAGNOSIS — E10.10 DKA, TYPE 1: Primary | ICD-10-CM

## 2017-09-25 DIAGNOSIS — E11.10 DKA (DIABETIC KETOACIDOSES): ICD-10-CM

## 2017-09-25 DIAGNOSIS — E10.10 DIABETIC KETOACIDOSIS WITHOUT COMA ASSOCIATED WITH TYPE 1 DIABETES MELLITUS: ICD-10-CM

## 2017-09-25 DIAGNOSIS — N17.9 AKI (ACUTE KIDNEY INJURY): ICD-10-CM

## 2017-09-25 DIAGNOSIS — N18.4 TYPE 1 DIABETES MELLITUS WITH STAGE 4 CHRONIC KIDNEY DISEASE: Chronic | ICD-10-CM

## 2017-09-25 LAB
ALBUMIN SERPL BCP-MCNC: 2.3 G/DL
ALLENS TEST: ABNORMAL
ALP SERPL-CCNC: 98 U/L
ALT SERPL W/O P-5'-P-CCNC: 18 U/L
AMORPH CRY UR QL COMP ASSIST: ABNORMAL
ANION GAP SERPL CALC-SCNC: 10 MMOL/L
ANION GAP SERPL CALC-SCNC: 11 MMOL/L
ANION GAP SERPL CALC-SCNC: 12 MMOL/L
ANION GAP SERPL CALC-SCNC: 12 MMOL/L
ANION GAP SERPL CALC-SCNC: 14 MMOL/L
ANISOCYTOSIS BLD QL SMEAR: SLIGHT
AST SERPL-CCNC: 21 U/L
B-OH-BUTYR BLD STRIP-SCNC: 2 MMOL/L
BACTERIA #/AREA URNS AUTO: ABNORMAL /HPF
BASOPHILS # BLD AUTO: 0.03 K/UL
BASOPHILS NFR BLD: 0.2 %
BILIRUB SERPL-MCNC: 0.2 MG/DL
BILIRUB UR QL STRIP: NEGATIVE
BUN SERPL-MCNC: 79 MG/DL
BUN SERPL-MCNC: 80 MG/DL
BUN SERPL-MCNC: 83 MG/DL
BUN SERPL-MCNC: 86 MG/DL
BUN SERPL-MCNC: 86 MG/DL
BURR CELLS BLD QL SMEAR: ABNORMAL
CALCIUM SERPL-MCNC: 8.6 MG/DL
CALCIUM SERPL-MCNC: 8.8 MG/DL
CALCIUM SERPL-MCNC: 8.8 MG/DL
CALCIUM SERPL-MCNC: 8.9 MG/DL
CALCIUM SERPL-MCNC: 9 MG/DL
CHLORIDE SERPL-SCNC: 112 MMOL/L
CHLORIDE SERPL-SCNC: 116 MMOL/L
CHLORIDE SERPL-SCNC: 116 MMOL/L
CHLORIDE SERPL-SCNC: 117 MMOL/L
CHLORIDE SERPL-SCNC: 118 MMOL/L
CLARITY UR REFRACT.AUTO: CLEAR
CO2 SERPL-SCNC: 13 MMOL/L
CO2 SERPL-SCNC: 14 MMOL/L
CO2 SERPL-SCNC: 15 MMOL/L
CO2 SERPL-SCNC: 18 MMOL/L
CO2 SERPL-SCNC: 18 MMOL/L
COLOR UR AUTO: YELLOW
CREAT SERPL-MCNC: 3.3 MG/DL
CREAT SERPL-MCNC: 3.4 MG/DL
CREAT SERPL-MCNC: 3.6 MG/DL
CREAT SERPL-MCNC: 3.8 MG/DL
CREAT SERPL-MCNC: 4 MG/DL
DELSYS: ABNORMAL
DIFFERENTIAL METHOD: ABNORMAL
EOSINOPHIL # BLD AUTO: 0.1 K/UL
EOSINOPHIL NFR BLD: 0.8 %
ERYTHROCYTE [DISTWIDTH] IN BLOOD BY AUTOMATED COUNT: 14.6 %
EST. GFR  (AFRICAN AMERICAN): 14.9 ML/MIN/1.73 M^2
EST. GFR  (AFRICAN AMERICAN): 15.8 ML/MIN/1.73 M^2
EST. GFR  (AFRICAN AMERICAN): 16.9 ML/MIN/1.73 M^2
EST. GFR  (AFRICAN AMERICAN): 18.1 ML/MIN/1.73 M^2
EST. GFR  (AFRICAN AMERICAN): 18.8 ML/MIN/1.73 M^2
EST. GFR  (NON AFRICAN AMERICAN): 12.9 ML/MIN/1.73 M^2
EST. GFR  (NON AFRICAN AMERICAN): 13.7 ML/MIN/1.73 M^2
EST. GFR  (NON AFRICAN AMERICAN): 14.6 ML/MIN/1.73 M^2
EST. GFR  (NON AFRICAN AMERICAN): 15.7 ML/MIN/1.73 M^2
EST. GFR  (NON AFRICAN AMERICAN): 16.2 ML/MIN/1.73 M^2
GLUCOSE SERPL-MCNC: 112 MG/DL
GLUCOSE SERPL-MCNC: 113 MG/DL
GLUCOSE SERPL-MCNC: 353 MG/DL
GLUCOSE SERPL-MCNC: 551 MG/DL
GLUCOSE SERPL-MCNC: 610 MG/DL
GLUCOSE UR QL STRIP: ABNORMAL
HCO3 UR-SCNC: 17.7 MMOL/L (ref 24–28)
HCT VFR BLD AUTO: 28.7 %
HGB BLD-MCNC: 9.7 G/DL
HGB UR QL STRIP: ABNORMAL
HYPOCHROMIA BLD QL SMEAR: ABNORMAL
KETONES UR QL STRIP: NEGATIVE
LEUKOCYTE ESTERASE UR QL STRIP: ABNORMAL
LYMPHOCYTES # BLD AUTO: 2 K/UL
LYMPHOCYTES NFR BLD: 12.7 %
MCH RBC QN AUTO: 28.7 PG
MCHC RBC AUTO-ENTMCNC: 33.8 G/DL
MCV RBC AUTO: 85 FL
MICROSCOPIC COMMENT: ABNORMAL
MODE: ABNORMAL
MONOCYTES # BLD AUTO: 0.7 K/UL
MONOCYTES NFR BLD: 4.2 %
NEUTROPHILS # BLD AUTO: 12.6 K/UL
NEUTROPHILS NFR BLD: 82.1 %
NITRITE UR QL STRIP: NEGATIVE
PCO2 BLDA: 38.9 MMHG (ref 35–45)
PH SMN: 7.27 [PH] (ref 7.35–7.45)
PH UR STRIP: 5 [PH] (ref 5–8)
PLATELET # BLD AUTO: 359 K/UL
PLATELET BLD QL SMEAR: ABNORMAL
PMV BLD AUTO: 11 FL
PO2 BLDA: 38 MMHG (ref 40–60)
POC BE: -9 MMOL/L
POC SATURATED O2: 65 % (ref 95–100)
POC TCO2: 19 MMOL/L (ref 24–29)
POCT GLUCOSE: 111 MG/DL (ref 70–110)
POCT GLUCOSE: 124 MG/DL (ref 70–110)
POCT GLUCOSE: 146 MG/DL (ref 70–110)
POCT GLUCOSE: 183 MG/DL (ref 70–110)
POCT GLUCOSE: 280 MG/DL (ref 70–110)
POCT GLUCOSE: 302 MG/DL (ref 70–110)
POCT GLUCOSE: 357 MG/DL (ref 70–110)
POCT GLUCOSE: 359 MG/DL (ref 70–110)
POCT GLUCOSE: 465 MG/DL (ref 70–110)
POCT GLUCOSE: 481 MG/DL (ref 70–110)
POIKILOCYTOSIS BLD QL SMEAR: SLIGHT
POTASSIUM SERPL-SCNC: 4.4 MMOL/L
POTASSIUM SERPL-SCNC: 4.6 MMOL/L
POTASSIUM SERPL-SCNC: 4.6 MMOL/L
POTASSIUM SERPL-SCNC: 4.8 MMOL/L
POTASSIUM SERPL-SCNC: 6.2 MMOL/L
PROT SERPL-MCNC: 7.6 G/DL
PROT UR QL STRIP: NEGATIVE
RBC # BLD AUTO: 3.38 M/UL
RBC #/AREA URNS AUTO: 2 /HPF (ref 0–4)
SAMPLE: ABNORMAL
SITE: ABNORMAL
SODIUM SERPL-SCNC: 139 MMOL/L
SODIUM SERPL-SCNC: 142 MMOL/L
SODIUM SERPL-SCNC: 144 MMOL/L
SODIUM SERPL-SCNC: 145 MMOL/L
SODIUM SERPL-SCNC: 146 MMOL/L
SP GR UR STRIP: 1.01 (ref 1–1.03)
SQUAMOUS #/AREA URNS AUTO: 1 /HPF
URN SPEC COLLECT METH UR: ABNORMAL
UROBILINOGEN UR STRIP-ACNC: NEGATIVE EU/DL
WBC # BLD AUTO: 15.49 K/UL
WBC #/AREA URNS AUTO: 15 /HPF (ref 0–5)
YEAST UR QL AUTO: ABNORMAL

## 2017-09-25 PROCEDURE — 25000003 PHARM REV CODE 250: Performed by: EMERGENCY MEDICINE

## 2017-09-25 PROCEDURE — 80048 BASIC METABOLIC PNL TOTAL CA: CPT | Mod: 91

## 2017-09-25 PROCEDURE — 63600175 PHARM REV CODE 636 W HCPCS: Performed by: STUDENT IN AN ORGANIZED HEALTH CARE EDUCATION/TRAINING PROGRAM

## 2017-09-25 PROCEDURE — 25000003 PHARM REV CODE 250: Performed by: STUDENT IN AN ORGANIZED HEALTH CARE EDUCATION/TRAINING PROGRAM

## 2017-09-25 PROCEDURE — 63600175 PHARM REV CODE 636 W HCPCS: Performed by: EMERGENCY MEDICINE

## 2017-09-25 PROCEDURE — 99223 1ST HOSP IP/OBS HIGH 75: CPT | Mod: AI,GC,, | Performed by: INTERNAL MEDICINE

## 2017-09-25 PROCEDURE — 93005 ELECTROCARDIOGRAM TRACING: CPT

## 2017-09-25 PROCEDURE — 36415 COLL VENOUS BLD VENIPUNCTURE: CPT

## 2017-09-25 PROCEDURE — 51702 INSERT TEMP BLADDER CATH: CPT

## 2017-09-25 PROCEDURE — 96360 HYDRATION IV INFUSION INIT: CPT | Mod: 59

## 2017-09-25 PROCEDURE — 63600175 PHARM REV CODE 636 W HCPCS: Performed by: INTERNAL MEDICINE

## 2017-09-25 PROCEDURE — 96366 THER/PROPH/DIAG IV INF ADDON: CPT

## 2017-09-25 PROCEDURE — 82803 BLOOD GASES ANY COMBINATION: CPT

## 2017-09-25 PROCEDURE — 99291 CRITICAL CARE FIRST HOUR: CPT | Mod: ,,, | Performed by: EMERGENCY MEDICINE

## 2017-09-25 PROCEDURE — 85025 COMPLETE CBC W/AUTO DIFF WBC: CPT

## 2017-09-25 PROCEDURE — 96365 THER/PROPH/DIAG IV INF INIT: CPT

## 2017-09-25 PROCEDURE — 99900035 HC TECH TIME PER 15 MIN (STAT)

## 2017-09-25 PROCEDURE — 99291 CRITICAL CARE FIRST HOUR: CPT | Mod: 25

## 2017-09-25 PROCEDURE — 87040 BLOOD CULTURE FOR BACTERIA: CPT

## 2017-09-25 PROCEDURE — 82962 GLUCOSE BLOOD TEST: CPT

## 2017-09-25 PROCEDURE — 96376 TX/PRO/DX INJ SAME DRUG ADON: CPT

## 2017-09-25 PROCEDURE — 80053 COMPREHEN METABOLIC PANEL: CPT

## 2017-09-25 PROCEDURE — 20600001 HC STEP DOWN PRIVATE ROOM

## 2017-09-25 PROCEDURE — 81001 URINALYSIS AUTO W/SCOPE: CPT

## 2017-09-25 PROCEDURE — 82010 KETONE BODYS QUAN: CPT

## 2017-09-25 PROCEDURE — 93010 ELECTROCARDIOGRAM REPORT: CPT | Mod: ,,, | Performed by: INTERNAL MEDICINE

## 2017-09-25 PROCEDURE — 99223 1ST HOSP IP/OBS HIGH 75: CPT | Mod: GC,,, | Performed by: INTERNAL MEDICINE

## 2017-09-25 PROCEDURE — 96361 HYDRATE IV INFUSION ADD-ON: CPT | Mod: 59

## 2017-09-25 RX ORDER — CIPROFLOXACIN 250 MG/1
250 TABLET, FILM COATED ORAL EVERY 24 HOURS
Status: DISCONTINUED | OUTPATIENT
Start: 2017-09-25 | End: 2017-09-27 | Stop reason: HOSPADM

## 2017-09-25 RX ORDER — IBUPROFEN 200 MG
24 TABLET ORAL
Status: DISCONTINUED | OUTPATIENT
Start: 2017-09-25 | End: 2017-09-27 | Stop reason: HOSPADM

## 2017-09-25 RX ORDER — LISINOPRIL 10 MG/1
10 TABLET ORAL DAILY
Status: ON HOLD | COMMUNITY
End: 2017-10-21 | Stop reason: HOSPADM

## 2017-09-25 RX ORDER — HEPARIN SODIUM 5000 [USP'U]/ML
5000 INJECTION, SOLUTION INTRAVENOUS; SUBCUTANEOUS EVERY 8 HOURS
Status: DISCONTINUED | OUTPATIENT
Start: 2017-09-25 | End: 2017-09-27 | Stop reason: HOSPADM

## 2017-09-25 RX ORDER — PAROXETINE 10 MG/1
10 TABLET, FILM COATED ORAL EVERY MORNING
Status: DISCONTINUED | OUTPATIENT
Start: 2017-09-25 | End: 2017-09-27 | Stop reason: HOSPADM

## 2017-09-25 RX ORDER — CARVEDILOL 25 MG/1
25 TABLET ORAL 2 TIMES DAILY
Status: DISCONTINUED | OUTPATIENT
Start: 2017-09-25 | End: 2017-09-27 | Stop reason: HOSPADM

## 2017-09-25 RX ORDER — SODIUM CHLORIDE AND POTASSIUM CHLORIDE 150; 450 MG/100ML; MG/100ML
INJECTION, SOLUTION INTRAVENOUS CONTINUOUS
Status: DISCONTINUED | OUTPATIENT
Start: 2017-09-25 | End: 2017-09-25

## 2017-09-25 RX ORDER — GLUCAGON 1 MG
1 KIT INJECTION
Status: DISCONTINUED | OUTPATIENT
Start: 2017-09-25 | End: 2017-09-27 | Stop reason: HOSPADM

## 2017-09-25 RX ORDER — IBUPROFEN 200 MG
16 TABLET ORAL
Status: DISCONTINUED | OUTPATIENT
Start: 2017-09-25 | End: 2017-09-27 | Stop reason: HOSPADM

## 2017-09-25 RX ORDER — INSULIN ASPART 100 [IU]/ML
10 INJECTION, SOLUTION INTRAVENOUS; SUBCUTANEOUS ONCE
Status: DISCONTINUED | OUTPATIENT
Start: 2017-09-25 | End: 2017-09-25

## 2017-09-25 RX ORDER — LEVETIRACETAM 500 MG/1
1000 TABLET ORAL 2 TIMES DAILY
Status: DISCONTINUED | OUTPATIENT
Start: 2017-09-25 | End: 2017-09-27 | Stop reason: HOSPADM

## 2017-09-25 RX ORDER — ASPIRIN 81 MG/1
81 TABLET ORAL DAILY
Status: DISCONTINUED | OUTPATIENT
Start: 2017-09-25 | End: 2017-09-27 | Stop reason: HOSPADM

## 2017-09-25 RX ORDER — DEXTROSE MONOHYDRATE, SODIUM CHLORIDE, AND POTASSIUM CHLORIDE 50; 1.49; 4.5 G/1000ML; G/1000ML; G/1000ML
INJECTION, SOLUTION INTRAVENOUS CONTINUOUS
Status: DISCONTINUED | OUTPATIENT
Start: 2017-09-25 | End: 2017-09-25

## 2017-09-25 RX ORDER — CIPROFLOXACIN 500 MG/1
500 TABLET ORAL DAILY
Status: DISCONTINUED | OUTPATIENT
Start: 2017-09-25 | End: 2017-09-25

## 2017-09-25 RX ORDER — SODIUM CHLORIDE 450 MG/100ML
INJECTION, SOLUTION INTRAVENOUS CONTINUOUS
Status: DISCONTINUED | OUTPATIENT
Start: 2017-09-25 | End: 2017-09-25

## 2017-09-25 RX ORDER — IPRATROPIUM BROMIDE AND ALBUTEROL SULFATE 2.5; .5 MG/3ML; MG/3ML
3 SOLUTION RESPIRATORY (INHALATION) EVERY 4 HOURS PRN
Status: DISCONTINUED | OUTPATIENT
Start: 2017-09-25 | End: 2017-09-27 | Stop reason: HOSPADM

## 2017-09-25 RX ADMIN — ASPIRIN 81 MG: 81 TABLET, COATED ORAL at 01:09

## 2017-09-25 RX ADMIN — POTASSIUM CHLORIDE AND SODIUM CHLORIDE: 450; 150 INJECTION, SOLUTION INTRAVENOUS at 03:09

## 2017-09-25 RX ADMIN — CARVEDILOL 25 MG: 25 TABLET, FILM COATED ORAL at 10:09

## 2017-09-25 RX ADMIN — INSULIN HUMAN 5 UNITS: 100 INJECTION, SOLUTION PARENTERAL at 06:09

## 2017-09-25 RX ADMIN — SODIUM CHLORIDE 500 ML: 0.45 INJECTION, SOLUTION INTRAVENOUS at 09:09

## 2017-09-25 RX ADMIN — CARVEDILOL 25 MG: 25 TABLET, FILM COATED ORAL at 02:09

## 2017-09-25 RX ADMIN — SODIUM CHLORIDE 11.9 UNITS/HR: 9 INJECTION, SOLUTION INTRAVENOUS at 11:09

## 2017-09-25 RX ADMIN — SODIUM CHLORIDE 3.38 UNITS/HR: 9 INJECTION, SOLUTION INTRAVENOUS at 06:09

## 2017-09-25 RX ADMIN — INSULIN DETEMIR 20 UNITS: 100 INJECTION, SOLUTION SUBCUTANEOUS at 07:09

## 2017-09-25 RX ADMIN — LEVETIRACETAM 1000 MG: 500 TABLET ORAL at 10:09

## 2017-09-25 RX ADMIN — SODIUM CHLORIDE: 0.45 INJECTION, SOLUTION INTRAVENOUS at 09:09

## 2017-09-25 RX ADMIN — NIFEDIPINE 90 MG: 30 TABLET, FILM COATED, EXTENDED RELEASE ORAL at 01:09

## 2017-09-25 RX ADMIN — SODIUM CHLORIDE 8.9 UNITS/HR: 9 INJECTION, SOLUTION INTRAVENOUS at 10:09

## 2017-09-25 RX ADMIN — CIPROFLOXACIN HYDROCHLORIDE 250 MG: 250 TABLET, FILM COATED ORAL at 01:09

## 2017-09-25 RX ADMIN — SODIUM CHLORIDE 1000 ML: 0.9 INJECTION, SOLUTION INTRAVENOUS at 03:09

## 2017-09-25 RX ADMIN — HEPARIN SODIUM 5000 UNITS: 5000 INJECTION, SOLUTION INTRAVENOUS; SUBCUTANEOUS at 10:09

## 2017-09-25 RX ADMIN — LEVETIRACETAM 1000 MG: 500 TABLET ORAL at 01:09

## 2017-09-25 RX ADMIN — SODIUM CHLORIDE 7.4 UNITS/HR: 9 INJECTION, SOLUTION INTRAVENOUS at 08:09

## 2017-09-25 RX ADMIN — SODIUM CHLORIDE: 0.45 INJECTION, SOLUTION INTRAVENOUS at 11:09

## 2017-09-25 RX ADMIN — DEXTROSE MONOHYDRATE, SODIUM CHLORIDE, AND POTASSIUM CHLORIDE: 50; 4.5; 1.49 INJECTION, SOLUTION INTRAVENOUS at 05:09

## 2017-09-25 RX ADMIN — SODIUM CHLORIDE 1.5 UNITS/HR: 9 INJECTION, SOLUTION INTRAVENOUS at 04:09

## 2017-09-25 RX ADMIN — SODIUM CHLORIDE 5.4 UNITS/HR: 9 INJECTION, SOLUTION INTRAVENOUS at 07:09

## 2017-09-25 RX ADMIN — HEPARIN SODIUM 5000 UNITS: 5000 INJECTION, SOLUTION INTRAVENOUS; SUBCUTANEOUS at 02:09

## 2017-09-25 NOTE — ED TRIAGE NOTES
84 year old male pt presents to the ed from a nursing home facility for elevated blood sugar noted at the nursing facility. Pt denies any chest pain nausea or sob.pt is awake alert and oriented x 4. Ems states pt sugar was noted at 397 upon arrival.

## 2017-09-25 NOTE — ED NOTES
Assumed care from CALDERON Winslow. Pt on cardiac monitor. Insulin drip infusing at 3.38 units an hour. ABCs intact.

## 2017-09-25 NOTE — CONSULTS
[facility]  Endocrinology  Diabetes Consult Note    Consult Requested by: No att. providers found   Reason for admit: <principal problem not specified>    HISTORY OF PRESENT ILLNESS:  Reason for Consult: Management of T2DM v T1DM, Hyperglycemia     Surgical Procedure and Date: None    Diabetes diagnosis year: age 18y    Home Diabetes Medications:    Upon discharge, patient sent back to NH on  Levemir 20u qHS  Aspart 8u AC    How often checking glucose at home? 1-3 x day   BG readings on regimen: Recently since discharge patietn has ranged from 231, 181, 444 (at NH)  Hypoglycemia on the regimen?  No  Missed doses on regimen?  No    Diabetes Complications include:     Hyperglycemia, Hypoglycemia , Diabetic nephropathy  , Diabetic chronic kidney disease     , Diabetic retinopathy  and Diabetic peripheral neuropathy     Complicating diabetes co morbidities:   History of CVA, CKD and Dementia      HPI:   Patient is a 84 y.o. 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  BG found to have BG of 600s.  Endocrinology consulted for DM management.    Interval HPI:   Overnight events:  Patient admitted early AM.  AFVSS.  Elevated wbc, Hg 9.7, K 6.2, CO2 13, AG corrects to 18.25, albumin 2.3, GFR 13-15, glucose 610. Elevated beta hydroxybutyrate, negative urine ketones, +glucosuria, pH 7.2.  Started on insulin intensive therapy, and given   Regular insulin IV 5u once.      Eating:   NPO  Nausea: No  Hypoglycemia and intervention: No  Fever: No  TPN and/or TF: No    PMH, PSH, FH, SH updated and reviewed     ROS:  Review of Systems   Constitutional: Negative for unexpected weight change.   Eyes: Negative for visual disturbance.   Respiratory: Negative for shortness of breath.    Cardiovascular:  Negative for chest pain.   Gastrointestinal: Negative for abdominal pain.   Genitourinary: Negative for urgency.   Musculoskeletal: Negative for arthralgias.   Skin: Negative for wound.   Neurological: Negative for headaches.   Hematological: Does not bruise/bleed easily.   Psychiatric/Behavioral: Negative for sleep disturbance.       Current Medications and/or Treatments Impacting Glycemic Control  Immunotherapy:    Immunosuppressants     None        Steroids:   Hormones     None        Pressors:    Autonomic Drugs     None        Hyperglycemia/Diabetes Medications:   Antihyperglycemics     Start     Stop Route Frequency Ordered    09/25/17 0715  insulin regular (Humulin R) 100 Units in sodium chloride 0.9% 100 mL infusion      -- IV Continuous 09/25/17 0612               PHYSICAL EXAMINATION:Vitals:    09/25/17 1113   BP: 111/60   Pulse: 66   Resp:    Temp:      Body mass index is 22 kg/m².    Physical Exam   Constitutional: He appears well-developed.   HENT:   Right Ear: External ear normal.   Left Ear: External ear normal.   Nose: Nose normal.   Hearing normal  Dentition poor  Neck: No tracheal deviation present. No thyromegaly present.   Cardiovascular: Normal rate.    No murmur heard.  Pulmonary/Chest: Effort normal and breath sounds normal.   Abdominal: Soft. There is no tenderness. No hernia.   Musculoskeletal: He exhibits no edema.   Neurological: He has normal reflexes. No cranial nerve deficit.   Skin: No rash noted.   No nodules   Psychiatric: He has a normal mood and affect. Judgment normal.   Vitals reviewed.      Labs Reviewed and Include     Recent Labs  Lab 09/25/17  0406   *   CALCIUM 8.9   ALBUMIN 2.3*   PROT 7.6      K 6.2*   CO2 13*   *   BUN 86*   CREATININE 4.0*   ALKPHOS 98   ALT 18   AST 21   BILITOT 0.2     Lab Results   Component Value Date    WBC 15.49 (H) 09/25/2017    HGB 9.7 (L) 09/25/2017    HCT 28.7 (L) 09/25/2017    MCV 85 09/25/2017     (H) 09/25/2017      No results for input(s): TSH, FREET4 in the last 168 hours.  Lab Results   Component Value Date    HGBA1C 9.9 (H) 09/18/2017       Nutritional status:   Body mass index is 22 kg/m².  Lab Results   Component Value Date    ALBUMIN 2.3 (L) 09/25/2017    ALBUMIN 2.9 (L) 09/17/2017    ALBUMIN 3.2 (L) 08/11/2017     No results found for: PREALBUMIN    Estimated Creatinine Clearance: 13.1 mL/min (based on SCr of 4 mg/dL (H)).    Accu-Checks  Recent Labs      09/25/17   0316  09/25/17   1059  09/25/17   1208   POCTGLUCOSE  357*  481*  359*        ASSESSMENT and PLAN    DKA, type 1    Currently in DKA - AG 18 (corrected for albumin), pH 7.2, glucose 610.     Per chart review, patient appears to be T1DM although antibody levels were not checked.     Agree with fluid resuscitation and starting intensive insulin gtt therapy. Titrate according to protocol.    **TYPE1 DM --> High risk of developing DKA   Please do not turn off the drip without adequate basal insulin on board.  Continue accuchecks q1H, serial BMPs q4 until DKA resolved.     Monitor electrolytes (mg, K, Phos) and replace as necessary.  Keep patient NPO while on intensive therapy.    Once gap closes and bicarb is >15, okay to transition to transition insulin.  Transition insulin to determine dose needed for basal coverage.      Prior to discontinuing IV insulin, patient would need to bridge with long acting insulin.  Please give 1-2H prior to discontinuing drip.  I imagine that it is similar to his requirements during previous hospital stay.    Patient requires workup to determine etiology underlying DKA presentation.  Less likely insulin noncompliance as patient is a resident of a NH and they administer medications.  However, one could question the administration of the medication in outpatient setting (making sure they administer the medication to fatty areas, without lipodystrophy).  Consider ischemia/infarction (heart, bowel, etc), iatrogenic is still  possible, infectious sources (previous admission patient had urinary sepsis and bacteremia).  Etiology to be managed by primary.        Leukocytosis    Per primary  Workup may lead to underlying cause of DKA        Hyperkalemia, transcellular shifts    EKG negative for peaked twaves.  +prolonged QT interval.  ?delta waves in II, V3-V6.    Patient started on insulin drip.  Will monitor serial BMPs.    Supplement as necessary.        CKD (chronic kidney disease) stage 4, GFR 15-29 ml/min    Avoid insulin retention/hypoglycemia            Plan discussed with patient.  No family or RN at bedside.  Patient notes that he did receive his medication at the NH.     DISCHARGE:  Ongoing.  Need to determine insulin requirements and underlying etiology of repeat DKA.        Yesi Rangel MD  Endocrinology  [facility]

## 2017-09-25 NOTE — ASSESSMENT & PLAN NOTE
- WBC 15.4 on admit (last 9/21 WBC 12)  - UA clear  - CXR clear  - repeating blood cultures x2  - continue ciprofloxacin, end date 10/5/17

## 2017-09-25 NOTE — ED PROVIDER NOTES
Encounter Date: 9/25/2017       History     Chief Complaint   Patient presents with    Hyperglycemia     Patient sent from [facility] for evaluation of high blood sugar.      Patient is a 84-year-old male with past medical history of diabetes type 1 renovascular hypertension CJD stage IV peripheral neuropathy who presents to the ED from nursing home for hyperglycemia.  Patient on arrival had blood glucose of 610.  Patient currently denies any symptoms.  Patient denies abdominal pain, chest pain, shortness of breath, nausea, vomiting, diarrhea, headache, or confusion.          Review of patient's allergies indicates:  No Known Allergies  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     Past Surgical History:   Procedure Laterality Date    CATARACT EXTRACTION W/  INTRAOCULAR LENS IMPLANT  8/26/2009, 10/14/2009    CRANIOTOMY  1995    CRANIOTOMY  12/21/2010    EYE SURGERY        Family History   Problem Relation Age of Onset    Diabetes Mother     Cataracts Mother     No Known Problems Father     Diabetes Sister     No Known Problems Son     No Known Problems Daughter     No Known Problems Maternal Grandmother     No Known Problems Maternal Grandfather     No Known Problems Paternal Grandmother     No Known Problems Paternal Grandfather     Diabetes Sister     Diabetes Brother     No Known Problems Maternal Aunt     No Known Problems Maternal Uncle     No Known Problems Paternal Aunt     No Known Problems Paternal Uncle     Diabetes Sister     Diabetes Brother     Amblyopia Neg Hx     Blindness Neg Hx     Cancer Neg Hx     Glaucoma Neg Hx     Hypertension Neg Hx     Macular degeneration Neg Hx     Retinal detachment Neg Hx     Strabismus Neg Hx     Stroke Neg Hx     Thyroid disease Neg Hx      Social History   Substance Use Topics    Smoking status: Former Smoker     Packs/day: 1.00     Types: Cigarettes     Quit date: 12/31/1994    Smokeless tobacco: Former User     Quit date: 4/3/2010    Alcohol use No     Review of Systems   Constitutional: Negative for fever.   HENT: Negative for congestion.    Eyes: Negative for visual disturbance.   Respiratory: Negative for shortness of breath.    Cardiovascular: Negative for chest pain.   Gastrointestinal: Negative for abdominal distention.   Endocrine: Negative for polyuria.   Genitourinary: Negative for dysuria.   Musculoskeletal: Negative for arthralgias.   Skin: Negative for color change.   Neurological: Negative for dizziness.   Psychiatric/Behavioral: Negative for agitation.       Physical Exam     Initial Vitals [09/25/17 0214]   BP Pulse Resp Temp SpO2   116/61 66 (!) 22 97.4 °F (36.3 °C) 98 %      MAP       79.33         Physical Exam    Nursing note and vitals reviewed.  Constitutional: He appears well-developed and well-nourished. He is not diaphoretic. No distress.   HENT:   Head: Normocephalic and  atraumatic.   Eyes: EOM are normal. Pupils are equal, round, and reactive to light. No scleral icterus.   Neck: Normal range of motion. Neck supple. No JVD present.   Cardiovascular: Normal rate, regular rhythm and normal heart sounds. Exam reveals no friction rub.    No murmur heard.  Pulmonary/Chest: Breath sounds normal. No stridor. No respiratory distress. He has no wheezes.   Abdominal: Soft. Bowel sounds are normal. He exhibits no distension. There is no tenderness.   Musculoskeletal: Normal range of motion. He exhibits no edema or tenderness.   Neurological: He is alert. He has normal strength.   oriented to person and place   Skin: Skin is warm and dry.         ED Course   Procedures  Labs Reviewed   POCT GLUCOSE - Abnormal; Notable for the following:        Result Value    POCT Glucose 357 (*)     All other components within normal limits   CBC W/ AUTO DIFFERENTIAL   COMPREHENSIVE METABOLIC PANEL   BETA - HYDROXYBUTYRATE, SERUM   URINALYSIS   POCT GLUCOSE MONITORING CONTINUOUS     EKG Readings: (Independently Interpreted)   EKG seen and interpreted by me normal sinus rhythm no ST elevation          Medical Decision Making:   Initial Assessment:   Pt presents to the ED from nursing home for hyperglycemia.  Patient initial blood work showed pH of 7.2, beta hydroxybutyrate of 2, and initial blood glucose of 610. Patient was started on IV fluids, insulin drip, and given 5 units IV push.  Patient admitted to inpatient medicine for further care and medication optimization.    Daniel Khan M.D.  [employer] [facility]  PGY-1     Differential Diagnosis:   Hyperglycemia, DKA, HHS.                   ED Course      Clinical Impression:   The primary encounter diagnosis was DKA, type 1. Diagnoses of DKA (diabetic ketoacidoses), NIALL (acute kidney injury), and Diabetic ketoacidosis without coma associated with type 1 diabetes mellitus were also pertinent to this visit.          ATTENDING PHYSICIAN ATTESTATION  I have  repeated the key portions of the resident's history and physical, reviewed and agree with the resident medical documentation, and supervised and managed the medical care of the patient.  Additionally, I was present for the critical portion of any procedure(s) performed.    Irvin Sheridan MD, GARRY, [facility]  Department of Emergency Medicine    All systems were reviewed/examined and were negative except as noted in the HPI.    Medical Decision Making:    This is an emergent evaluation of a patient presenting to the ED.  Nursing notes were reviewed.  I personally reviewed, read, and interpreted the ECG and any monitoring strips.  I reviewed radiology images personally along with interpretations.  I personally reviewed and interpreted the laboratory results.  Communicated with another physician regarding patient's care: [facility]    Irvin Sheridan MD, GARRY    Critical Care Time    Critical care time was provided for 30 minutes exclusive of other billable procedures and teaching time for the support of endo organ system where the potential for death, shock, or further decline was possible.  Critical care time can include documentation, discussion with consultants, developing a care plan, as well as direct patient care.    MD Pasquale Plascencia MD  09/25/17 0884

## 2017-09-25 NOTE — HPI
Patient is a 84 year old male with DM Type I, renovascular HTN, CKD stage Iv, peripheral neuropathy, CVA with residual left sided weakness, anemia, 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 He was recently admitted for hyperglycemia from 9/17 - 9/22 for HHS/DKA and was discharged on a regimen of insulin detemir 20 daily and aspart 8 TIDWM. He was also discharged with instructions to finish a 14 day course of oral ciprofloxacin for a UTI (proteus) and bacteremia (pseudomonas). According to nursing home, he was getting his scheduled insulin, and his sugars were initially controlled but were climbing each day, most recently in the 400s. On admission, his blood glucose was 610, beta hydroxybutyrate 2, VBG pH 7.2, K 6.2, bicarb 13, gap 14, U/A clean with no ketones, CXR no abnormalities, EKG NSR with no peaked T waves evident, WBC 15.4.    In the Ed, he was initially given regular insulin push 5 units, started on a drip at 3.38u/hr (~0.05mg/kg/hr) and given IVF 1L bolus. His BG remained >500, insulin titrated up to 8.9u/hr and BG responded, now 302. Nursing to titrate per protocol. Maintenance fluids ordered at 250 ml/hr with potassium supplement as latest K 4.4. Lindsay was exchanged (patient with chronic indwelling lindsay secondary to urinary retention).

## 2017-09-25 NOTE — ASSESSMENT & PLAN NOTE
- noted during previous admission  - continue chronic indwelling lindsay  - follow up with urology outpatient

## 2017-09-25 NOTE — SUBJECTIVE & OBJECTIVE
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       Past Surgical History:   Procedure Laterality Date    CATARACT EXTRACTION W/  INTRAOCULAR LENS IMPLANT  8/26/2009, 10/14/2009    CRANIOTOMY  1995    CRANIOTOMY  12/21/2010    EYE SURGERY         Review of patient's allergies indicates:  No Known Allergies    No current facility-administered medications on file prior to encounter.      Current Outpatient Prescriptions on File Prior to Encounter   Medication Sig    albuterol-ipratropium 2.5mg-0.5mg/3mL (DUO-NEB) 0.5 mg-3 mg(2.5 mg base)/3 mL nebulizer solution Take 3 mLs by nebulization every 4 (four) hours as needed for Wheezing or Shortness of Breath. Rescue    aspirin (ECOTRIN) 81 MG EC tablet Take 1 tablet (81 mg total) by mouth once daily.    blood-glucose meter (CONTOUR METER) kit Use as instructed.  Please substitute appropriate meter to match his strips.    carvedilol (COREG) 25 MG  "tablet Take 1 tablet (25 mg total) by mouth 2 (two) times daily.    ciprofloxacin HCl (CIPRO) 500 MG tablet Take 1 tablet (500 mg total) by mouth once daily.    CONTOUR TEST STRIPS Strp USE AS DIRECTED THREE TIMES DAILY (Patient taking differently: USE AS DIRECTED FOUR TIMES DAILY)    insulin aspart (NOVOLOG) 100 unit/mL InPn pen Inject 8 Units into the skin 3 (three) times daily with meals.    insulin detemir (LEVEMIR FLEXTOUCH) 100 unit/mL (3 mL) SubQ InPn pen Inject 20 Units into the skin once daily. (Patient taking differently: Inject 20 Units into the skin 2 (two) times daily. )    lancets Misc Use three times daily for finger stick glucose monitoring. (Patient taking differently: Use four times daily for finger stick glucose monitoring.)    levetiracetam (KEPPRA) 1000 MG tablet Take 1 tablet (1,000 mg total) by mouth 2 (two) times daily.    nifedipine (PROCARDIA-XL) 90 MG (OSM) 24 hr tablet Take 1 tablet (90 mg total) by mouth once daily.    nystatin (MYCOSTATIN) cream Apply topically 2 (two) times daily.    paroxetine (PAXIL) 10 MG tablet Take 1 tablet (10 mg total) by mouth every morning.    pen needle, diabetic (BD INSULIN PEN NEEDLE UF SHORT) 31 gauge x 5/16" Ndle Inject 1 Device into the skin 4 (four) times daily. (Patient taking differently: Inject 1 Device into the skin 5 (five) times daily. )    [DISCONTINUED] lisinopril (PRINIVIL,ZESTRIL) 5 MG tablet Take 2 tablets (10 mg total) by mouth once daily.     Family History     Problem Relation (Age of Onset)    Cataracts Mother    Diabetes Mother, Sister, Sister, Brother, Sister, Brother    No Known Problems Father, Son, Daughter, Maternal Grandmother, Maternal Grandfather, Paternal Grandmother, Paternal Grandfather, Maternal Aunt, Maternal Uncle, Paternal Aunt, Paternal Uncle        Social History Main Topics    Smoking status: Former Smoker     Packs/day: 1.00     Types: Cigarettes     Quit date: 12/31/1994    Smokeless tobacco: Former User "     Quit date: 4/3/2010    Alcohol use No    Drug use: No    Sexual activity: Not on file     Review of Systems   Constitutional: Negative for chills, fever and unexpected weight change.   HENT: Negative for sore throat.    Eyes: Negative for visual disturbance.   Respiratory: Negative for cough and shortness of breath.    Cardiovascular: Negative for chest pain and leg swelling.   Gastrointestinal: Negative for abdominal pain, nausea and vomiting.   Genitourinary: Negative for dysuria.   Musculoskeletal: Negative for arthralgias.   Skin: Negative for rash.   Neurological: Negative for headaches.   Hematological: Does not bruise/bleed easily.   Psychiatric/Behavioral: Negative for hallucinations.     Objective:     Vital Signs (Most Recent):  Temp: 97.3 °F (36.3 °C) (09/25/17 1250)  Pulse: 64 (09/25/17 1250)  Resp: 20 (09/25/17 1250)  BP: (!) 118/58 (09/25/17 1250)  SpO2: 97 % (09/25/17 1250) Vital Signs (24h Range):  Temp:  [97.3 °F (36.3 °C)-97.4 °F (36.3 °C)] 97.3 °F (36.3 °C)  Pulse:  [64-74] 64  Resp:  [20-22] 20  SpO2:  [97 %-98 %] 97 %  BP: (111-144)/(58-68) 118/58     Weight: 67.6 kg (149 lb)  Body mass index is 22 kg/m².    Physical Exam   Constitutional: He appears well-developed.   HENT:   Head: Normocephalic and atraumatic.   Eyes: Pupils are equal, round, and reactive to light.   Neck: No tracheal deviation present. No thyromegaly present.   Cardiovascular: Normal rate, regular rhythm and normal heart sounds.    No murmur heard.  Pulmonary/Chest: Effort normal and breath sounds normal.   Abdominal: Soft. Bowel sounds are normal. There is no tenderness.   Musculoskeletal: He exhibits no edema.   Neurological: He has normal reflexes. No cranial nerve deficit.   sensation intact to vibration and monofilament   Skin: No rash noted.   No nodules   Vitals reviewed.       Significant Labs:   Recent Results (from the past 24 hour(s))   POCT glucose    Collection Time: 09/25/17  3:16 AM   Result Value Ref  Range    POCT Glucose 357 (H) 70 - 110 mg/dL   ISTAT PROCEDURE    Collection Time: 09/25/17  4:03 AM   Result Value Ref Range    POC PH 7.267 (L) 7.35 - 7.45    POC PCO2 38.9 35 - 45 mmHg    POC PO2 38 (LL) 40 - 60 mmHg    POC HCO3 17.7 (L) 24 - 28 mmol/L    POC BE -9 -2 to 2 mmol/L    POC SATURATED O2 65 (L) 95 - 100 %    POC TCO2 19 (L) 24 - 29 mmol/L    Sample VENOUS     Site Other     Allens Test N/A     DelSys Room Air     Mode SPONT    CBC auto differential    Collection Time: 09/25/17  4:06 AM   Result Value Ref Range    WBC 15.49 (H) 3.90 - 12.70 K/uL    RBC 3.38 (L) 4.60 - 6.20 M/uL    Hemoglobin 9.7 (L) 14.0 - 18.0 g/dL    Hematocrit 28.7 (L) 40.0 - 54.0 %    MCV 85 82 - 98 fL    MCH 28.7 27.0 - 31.0 pg    MCHC 33.8 32.0 - 36.0 g/dL    RDW 14.6 (H) 11.5 - 14.5 %    Platelets 359 (H) 150 - 350 K/uL    MPV 11.0 9.2 - 12.9 fL    Gran # 12.6 (H) 1.8 - 7.7 K/uL    Lymph # 2.0 1.0 - 4.8 K/uL    Mono # 0.7 0.3 - 1.0 K/uL    Eos # 0.1 0.0 - 0.5 K/uL    Baso # 0.03 0.00 - 0.20 K/uL    Gran% 82.1 (H) 38.0 - 73.0 %    Lymph% 12.7 (L) 18.0 - 48.0 %    Mono% 4.2 4.0 - 15.0 %    Eosinophil% 0.8 0.0 - 8.0 %    Basophil% 0.2 0.0 - 1.9 %    Platelet Estimate Increased (A)     Aniso Slight     Poik Slight     Hypo Occasional     [place] Cells Occasional     Differential Method Automated    Comprehensive metabolic panel    Collection Time: 09/25/17  4:06 AM   Result Value Ref Range    Sodium 139 136 - 145 mmol/L    Potassium 6.2 (H) 3.5 - 5.1 mmol/L    Chloride 112 (H) 95 - 110 mmol/L    CO2 13 (L) 23 - 29 mmol/L    Glucose 610 (HH) 70 - 110 mg/dL    BUN, Bld 86 (H) 8 - 23 mg/dL    Creatinine 4.0 (H) 0.5 - 1.4 mg/dL    Calcium 8.9 8.7 - 10.5 mg/dL    Total Protein 7.6 6.0 - 8.4 g/dL    Albumin 2.3 (L) 3.5 - 5.2 g/dL    Total Bilirubin 0.2 0.1 - 1.0 mg/dL    Alkaline Phosphatase 98 55 - 135 U/L    AST 21 10 - 40 U/L    ALT 18 10 - 44 U/L    Anion Gap 14 8 - 16 mmol/L    eGFR if African American 14.9 (A) >60 mL/min/1.73 m^2     eGFR if non African American 12.9 (A) >60 mL/min/1.73 m^2   Beta - Hydroxybutyrate, Serum    Collection Time: 09/25/17  4:06 AM   Result Value Ref Range    Beta-Hydroxybutyrate 2.0 (H) 0.0 - 0.5 mmol/L   Urinalysis Only    Collection Time: 09/25/17  4:16 AM   Result Value Ref Range    Specimen UA Urine, Clean Catch     Color, UA Yellow Yellow, Straw, Eduarda    Appearance, UA Clear Clear    pH, UA 5.0 5.0 - 8.0    Specific Gravity, UA 1.010 1.005 - 1.030    Protein, UA Negative Negative    Glucose, UA 3+ (A) Negative    Ketones, UA Negative Negative    Bilirubin (UA) Negative Negative    Occult Blood UA 1+ (A) Negative    Nitrite, UA Negative Negative    Urobilinogen, UA Negative <2.0 EU/dL    Leukocytes, UA 2+ (A) Negative   Urinalysis Microscopic    Collection Time: 09/25/17  4:16 AM   Result Value Ref Range    RBC, UA 2 0 - 4 /hpf    WBC, UA 15 (H) 0 - 5 /hpf    Bacteria, UA Occasional None-Occ /hpf    Yeast, UA None None    Squam Epithel, UA 1 /hpf    Amorphous, UA Rare None-Moderate    Microscopic Comment SEE COMMENT    POCT glucose    Collection Time: 09/25/17 10:59 AM   Result Value Ref Range    POCT Glucose 481 (HH) 70 - 110 mg/dL   POCT glucose    Collection Time: 09/25/17 12:08 PM   Result Value Ref Range    POCT Glucose 359 (H) 70 - 110 mg/dL   Basic metabolic panel    Collection Time: 09/25/17 12:28 PM   Result Value Ref Range    Sodium 144 136 - 145 mmol/L    Potassium 4.4 3.5 - 5.1 mmol/L    Chloride 117 (H) 95 - 110 mmol/L    CO2 15 (L) 23 - 29 mmol/L    Glucose 353 (H) 70 - 110 mg/dL    BUN, Bld 83 (H) 8 - 23 mg/dL    Creatinine 3.6 (H) 0.5 - 1.4 mg/dL    Calcium 8.6 (L) 8.7 - 10.5 mg/dL    Anion Gap 12 8 - 16 mmol/L    eGFR if African American 16.9 (A) >60 mL/min/1.73 m^2    eGFR if non  14.6 (A) >60 mL/min/1.73 m^2   POCT glucose    Collection Time: 09/25/17 12:57 PM   Result Value Ref Range    POCT Glucose 302 (H) 70 - 110 mg/dL   POCT glucose    Collection Time: 09/25/17  2:00 PM    Result Value Ref Range    POCT Glucose 280 (H) 70 - 110 mg/dL   POCT glucose    Collection Time: 09/25/17  3:00 PM   Result Value Ref Range    POCT Glucose 183 (H) 70 - 110 mg/dL   ]    Significant Imaging: I have reviewed all pertinent imaging results/findings within the past 24 hours.   CXR 9/25  No acute radiographic findings in the chest.

## 2017-09-25 NOTE — ASSESSMENT & PLAN NOTE
- On Coreg 25 BID, Lisinopril 10mg, nifedipine 90 daily at home  -Holding lisinopril for NIALL, Cr 4.0 (baseline ~3)

## 2017-09-25 NOTE — ED NOTES
Julius Cardenas, a 84 y.o. male presents to the ED w/ a c/c of hyperglycemia. Cardiac monitoring initiated. ABCs intact.       Chief Complaint   Patient presents with    Hyperglycemia     Patient sent from [facility] for evaluation of high blood sugar.      Review of patient's allergies indicates:  No Known Allergies  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

## 2017-09-25 NOTE — PHARMACY MED REC
"Admission Medication Reconciliation - Pharmacy Consult Note    The home medication history was taken by NAME, TITLE.  Based on information gathered and subsequent review by the clinical pharmacist, the items below may need attention.    You may go to "Admission" then "Reconcile Home Medications" tabs to review and/or act upon these items.        No issues noted with the medication reconciliation.        Please address this information as you see fit.  Feel free to contact us if you have any questions or require assistance.    Lorena Keith, PharmD, [facility]  Internal Medicine Clinical Pharmacy Specialist  Spectra link: [phone]        Patient's prior to admission medication regimen was as follows:  Medication Sig    albuterol-ipratropium 2.5mg-0.5mg/3mL (DUO-NEB) 0.5 mg-3 mg(2.5 mg base)/3 mL nebulizer solution Take 3 mLs by nebulization every 4 (four) hours as needed for Wheezing or Shortness of Breath. Rescue    aspirin (ECOTRIN) 81 MG EC tablet Take 1 tablet (81 mg total) by mouth once daily.    blood-glucose meter (CONTOUR METER) kit Use as instructed.  Please substitute appropriate meter to match his strips.    carvedilol (COREG) 25 MG tablet Take 1 tablet (25 mg total) by mouth 2 (two) times daily.    ciprofloxacin HCl (CIPRO) 500 MG tablet Take 1 tablet (500 mg total) by mouth once daily.    CONTOUR TEST STRIPS Strp USE AS DIRECTED THREE TIMES DAILY (Patient taking differently: USE AS DIRECTED FOUR TIMES DAILY)    insulin aspart (NOVOLOG) 100 unit/mL InPn pen Inject 8 Units into the skin 3 (three) times daily with meals.    insulin detemir (LEVEMIR FLEXTOUCH) 100 unit/mL (3 mL) SubQ InPn pen Inject 20 Units into the skin once daily. (Patient taking differently: Inject 20 Units into the skin 2 (two) times daily. )    lancets Misc Use three times daily for finger stick glucose monitoring. (Patient taking differently: Use four times daily for finger stick glucose monitoring.)    levetiracetam (KEPPRA) 1000 MG " "tablet Take 1 tablet (1,000 mg total) by mouth 2 (two) times daily.    lisinopril 10 MG tablet Take 10 mg by mouth once daily.    nifedipine (PROCARDIA-XL) 90 MG (OSM) 24 hr tablet Take 1 tablet (90 mg total) by mouth once daily.    nystatin (MYCOSTATIN) cream Apply topically 2 (two) times daily.    paroxetine (PAXIL) 10 MG tablet Take 1 tablet (10 mg total) by mouth every morning.    pen needle, diabetic (BD INSULIN PEN NEEDLE UF SHORT) 31 gauge x 5/16" Ndle Inject 1 Device into the skin 4 (four) times daily. (Patient taking differently: Inject 1 Device into the skin 5 (five) times daily. )         Please add appropriate    SmartPhrase below:        "

## 2017-09-25 NOTE — SUBJECTIVE & OBJECTIVE
Interval HPI:   Overnight events:  Patient admitted early AM.  AFVSS.  Elevated wbc, Hg 9.7, K 6.2, CO2 13, AG corrects to 18.25, albumin 2.3, GFR 13-15, glucose 610. Elevated beta hydroxybutyrate, negative urine ketones, +glucosuria, pH 7.2.  Started on insulin intensive therapy, and given   Regular insulin IV 5u once.      Eating:   NPO  Nausea: No  Hypoglycemia and intervention: No  Fever: No  TPN and/or TF: No    PMH, PSH, FH, SH updated and reviewed     ROS:  Review of Systems   Constitutional: Negative for unexpected weight change.   Eyes: Negative for visual disturbance.   Respiratory: Negative for shortness of breath.    Cardiovascular: Negative for chest pain.   Gastrointestinal: Negative for abdominal pain.   Genitourinary: Negative for urgency.   Musculoskeletal: Negative for arthralgias.   Skin: Negative for wound.   Neurological: Negative for headaches.   Hematological: Does not bruise/bleed easily.   Psychiatric/Behavioral: Negative for sleep disturbance.       Current Medications and/or Treatments Impacting Glycemic Control  Immunotherapy:    Immunosuppressants     None        Steroids:   Hormones     None        Pressors:    Autonomic Drugs     None        Hyperglycemia/Diabetes Medications:   Antihyperglycemics     Start     Stop Route Frequency Ordered    09/25/17 0715  insulin regular (Humulin R) 100 Units in sodium chloride 0.9% 100 mL infusion      -- IV Continuous 09/25/17 0612               PHYSICAL EXAMINATION:Vitals:    09/25/17 1113   BP: 111/60   Pulse: 66   Resp:    Temp:      Body mass index is 22 kg/m².    Physical Exam   Constitutional: He appears well-developed.   HENT:   Right Ear: External ear normal.   Left Ear: External ear normal.   Nose: Nose normal.   Hearing normal  Dentition good   Neck: No tracheal deviation present. No thyromegaly present.   Cardiovascular: Normal rate.    No murmur heard.  Pulmonary/Chest: Effort normal and breath sounds normal.   Abdominal: Soft. There is  no tenderness. No hernia.   Musculoskeletal: He exhibits no edema.   Feet no cuts or  scratches  Shoes appropriate   Neurological: He has normal reflexes. No cranial nerve deficit.   sensation intact to vibration and monofilament   Skin: No rash noted.   No nodules   Psychiatric: He has a normal mood and affect. Judgment normal.   Vitals reviewed.

## 2017-09-25 NOTE — ASSESSMENT & PLAN NOTE
- blood glucose at nursing home reportedly normal after discharge and increased to the 400s, 610 on admit  - Elevatated , beta hydroxybutyrate 2, VBG pH 7.2, K 6.2, gap 14, bicarb 13, U/A negative for ketones  - Unknown precipitation - possible infection, medication noncompliance  - Initially started on Insulin drip 3U in ED, 1L NS bolus, insulin regular 5 units push    - Add insulin detemir 20U when anion gap 10 or less and bicarb > 15; keep drip on, overlap for at least 2 hours, start diabetic diet and then can turn off drip after 2 hours  - Glucose checks q1h; BMP q4h  - consulted endocrine; appreciate help

## 2017-09-25 NOTE — ASSESSMENT & PLAN NOTE
EKG negative for peaked twaves.  +prolonged QT interval.  ?delta waves in II, V3-V6.    Patient started on insulin drip.  Will monitor serial BMPs.    Supplement as necessary.

## 2017-09-25 NOTE — ASSESSMENT & PLAN NOTE
Currently in DKA - AG 18 (corrected for albumin), pH 7.2, glucose 610.     Per chart review, patient appears to be T1DM although antibody levels were not checked.     Agree with fluid resuscitation and starting intensive insulin gtt therapy. Titrate according to protocol.    **TYPE1 DM --> High risk of developing DKA   Please do not turn off the drip without adequate basal insulin on board.  Continue accuchecks q1H, serial BMPs q4 until DKA resolved.     Monitor electrolytes (mg, K, Phos) and replace as necessary.  Keep patient NPO while on intensive therapy.    Once gap closes and bicarb is >15, okay to transition to transition insulin.  Transition insulin to determine dose needed for basal coverage.      Prior to discontinuing IV insulin, patient would need to bridge with long acting insulin.  Please give 1-2H prior to discontinuing drip.  I imagine that it is similar to his requirements during previous hospital stay.    Patient requires workup to determine etiology underlying DKA presentation.  Less likely insulin noncompliance as patient is a resident of a NH and they administer medications.  However, one could question the administration of the medication in outpatient setting (making sure they administer the medication to fatty areas, without lipodystrophy).  Consider ischemia/infarction (heart, bowel, etc), iatrogenic is still possible, infectious sources (previous admission patient had urinary sepsis and bacteremia).  Etiology to be managed by primary.

## 2017-09-25 NOTE — HPI
Reason for Consult: Management of T2DM v T1DM, Hyperglycemia     Surgical Procedure and Date: None    Diabetes diagnosis year: age 18y    Home Diabetes Medications:    Upon discharge, patient sent back to NH on  Levemir 20u qHS  Aspart 8u AC    How often checking glucose at home? 1-3 x day   BG readings on regimen: Recently since discharge patietn has ranged from 231, 181, 444 (at NH)  Hypoglycemia on the regimen?  No  Missed doses on regimen?  No    Diabetes Complications include:     Hyperglycemia, Hypoglycemia , Diabetic nephropathy  , Diabetic chronic kidney disease     , Diabetic retinopathy  and Diabetic peripheral neuropathy     Complicating diabetes co morbidities:   History of CVA, CKD and Dementia      HPI:   Patient is a 84 y.o. 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  BG found to have BG of 600s.  Endocrinology consulted for DM management.

## 2017-09-25 NOTE — H&P
[facility] Hospital Medicine  History & Physical    Patient Name: Julius Cardenas  MRN: [id number]  Admission Date: 9/25/2017  Attending Physician: Larry Uriostegui, *   Primary Care Provider: Primary [facility] Medicine Team: [facility] MED 4 Daniel Petersen MD     Patient information was obtained from patient and ER records.     Subjective:     Principal Problem:<principal problem not specified>    Chief Complaint:   Chief Complaint   Patient presents with    Hyperglycemia     Patient sent from [facility] for evaluation of high blood sugar.         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 He was recently admitted for hyperglycemia from 9/17 - 9/22 for HHS/DKA and was discharged on a regimen of insulin detemir 20 daily and aspart 8 TIDWM. He was also discharged with instructions to finish a 14 day course of oral ciprofloxacin for a UTI (proteus) and bacteremia (pseudomonas). According to nursing home, he was getting his scheduled insulin, and his sugars were initially controlled but were climbing each day, most recently in the 400s. On admission, his blood glucose was 610, beta hydroxybutyrate 2, VBG pH 7.2, K 6.2, bicarb 13, gap 14, U/A clean with no ketones, CXR no abnormalities, EKG NSR with no peaked T waves evident, WBC 15.4.    In the Ed, he was initially given regular insulin push 5 units, started on a drip at 3.38u/hr (~0.05mg/kg/hr) and given IVF 1L bolus. His BG remained >500, insulin titrated up to 8.9u/hr and BG responded, now 302. Nursing to titrate per protocol. Maintenance fluids ordered at 250 ml/hr with potassium supplement as latest K 4.4. Monzon was exchanged (patient with chronic  indwelling lindsay secondary to urinary retention).    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       Past Surgical History:   Procedure Laterality Date    CATARACT EXTRACTION W/  INTRAOCULAR LENS IMPLANT  8/26/2009, 10/14/2009    CRANIOTOMY  1995    CRANIOTOMY  12/21/2010    EYE SURGERY         Review of patient's allergies indicates:  No Known Allergies    No current facility-administered medications on file prior to encounter.      Current Outpatient Prescriptions on File Prior to Encounter   Medication Sig    albuterol-ipratropium 2.5mg-0.5mg/3mL (DUO-NEB) 0.5 mg-3 mg(2.5 mg base)/3 mL nebulizer solution Take 3 mLs by nebulization every 4 (four) hours as needed for Wheezing or Shortness of Breath. Rescue    aspirin (ECOTRIN) 81 MG EC tablet Take 1 tablet (81 mg total) by mouth once daily.    blood-glucose meter (CONTOUR METER) kit Use as instructed.  Please substitute appropriate  "meter to match his strips.    carvedilol (COREG) 25 MG tablet Take 1 tablet (25 mg total) by mouth 2 (two) times daily.    ciprofloxacin HCl (CIPRO) 500 MG tablet Take 1 tablet (500 mg total) by mouth once daily.    CONTOUR TEST STRIPS Strp USE AS DIRECTED THREE TIMES DAILY (Patient taking differently: USE AS DIRECTED FOUR TIMES DAILY)    insulin aspart (NOVOLOG) 100 unit/mL InPn pen Inject 8 Units into the skin 3 (three) times daily with meals.    insulin detemir (LEVEMIR FLEXTOUCH) 100 unit/mL (3 mL) SubQ InPn pen Inject 20 Units into the skin once daily. (Patient taking differently: Inject 20 Units into the skin 2 (two) times daily. )    lancets Misc Use three times daily for finger stick glucose monitoring. (Patient taking differently: Use four times daily for finger stick glucose monitoring.)    levetiracetam (KEPPRA) 1000 MG tablet Take 1 tablet (1,000 mg total) by mouth 2 (two) times daily.    nifedipine (PROCARDIA-XL) 90 MG (OSM) 24 hr tablet Take 1 tablet (90 mg total) by mouth once daily.    nystatin (MYCOSTATIN) cream Apply topically 2 (two) times daily.    paroxetine (PAXIL) 10 MG tablet Take 1 tablet (10 mg total) by mouth every morning.    pen needle, diabetic (BD INSULIN PEN NEEDLE UF SHORT) 31 gauge x 5/16" Ndle Inject 1 Device into the skin 4 (four) times daily. (Patient taking differently: Inject 1 Device into the skin 5 (five) times daily. )    [DISCONTINUED] lisinopril (PRINIVIL,ZESTRIL) 5 MG tablet Take 2 tablets (10 mg total) by mouth once daily.     Family History     Problem Relation (Age of Onset)    Cataracts Mother    Diabetes Mother, Sister, Sister, Brother, Sister, Brother    No Known Problems Father, Son, Daughter, Maternal Grandmother, Maternal Grandfather, Paternal Grandmother, Paternal Grandfather, Maternal Aunt, Maternal Uncle, Paternal Aunt, Paternal Uncle        Social History Main Topics    Smoking status: Former Smoker     Packs/day: 1.00     Types: Cigarettes     " Quit date: 12/31/1994    Smokeless tobacco: Former User     Quit date: 4/3/2010    Alcohol use No    Drug use: No    Sexual activity: Not on file     Review of Systems   Constitutional: Negative for chills, fever and unexpected weight change.   HENT: Negative for sore throat.    Eyes: Negative for visual disturbance.   Respiratory: Negative for cough and shortness of breath.    Cardiovascular: Negative for chest pain and leg swelling.   Gastrointestinal: Negative for abdominal pain, nausea and vomiting.   Genitourinary: Negative for dysuria.   Musculoskeletal: Negative for arthralgias.   Skin: Negative for rash.   Neurological: Negative for headaches.   Hematological: Does not bruise/bleed easily.   Psychiatric/Behavioral: Negative for hallucinations.     Objective:     Vital Signs (Most Recent):  Temp: 97.3 °F (36.3 °C) (09/25/17 1250)  Pulse: 64 (09/25/17 1250)  Resp: 20 (09/25/17 1250)  BP: (!) 118/58 (09/25/17 1250)  SpO2: 97 % (09/25/17 1250) Vital Signs (24h Range):  Temp:  [97.3 °F (36.3 °C)-97.4 °F (36.3 °C)] 97.3 °F (36.3 °C)  Pulse:  [64-74] 64  Resp:  [20-22] 20  SpO2:  [97 %-98 %] 97 %  BP: (111-144)/(58-68) 118/58     Weight: 67.6 kg (149 lb)  Body mass index is 22 kg/m².    Physical Exam   Constitutional: He appears well-developed.   HENT:   Head: Normocephalic and atraumatic.   Eyes: Pupils are equal, round, and reactive to light.   Neck: No tracheal deviation present. No thyromegaly present.   Cardiovascular: Normal rate, regular rhythm and normal heart sounds.    No murmur heard.  Pulmonary/Chest: Effort normal and breath sounds normal.   Abdominal: Soft. Bowel sounds are normal. There is no tenderness.   Musculoskeletal: He exhibits no edema.   Neurological: He has normal reflexes. No cranial nerve deficit.   sensation intact to vibration and monofilament   Skin: No rash noted.   No nodules   Vitals reviewed.       Significant Labs:   Recent Results (from the past 24 hour(s))   POCT glucose     Collection Time: 09/25/17  3:16 AM   Result Value Ref Range    POCT Glucose 357 (H) 70 - 110 mg/dL   ISTAT PROCEDURE    Collection Time: 09/25/17  4:03 AM   Result Value Ref Range    POC PH 7.267 (L) 7.35 - 7.45    POC PCO2 38.9 35 - 45 mmHg    POC PO2 38 (LL) 40 - 60 mmHg    POC HCO3 17.7 (L) 24 - 28 mmol/L    POC BE -9 -2 to 2 mmol/L    POC SATURATED O2 65 (L) 95 - 100 %    POC TCO2 19 (L) 24 - 29 mmol/L    Sample VENOUS     Site Other     Allens Test N/A     DelSys Room Air     Mode SPONT    CBC auto differential    Collection Time: 09/25/17  4:06 AM   Result Value Ref Range    WBC 15.49 (H) 3.90 - 12.70 K/uL    RBC 3.38 (L) 4.60 - 6.20 M/uL    Hemoglobin 9.7 (L) 14.0 - 18.0 g/dL    Hematocrit 28.7 (L) 40.0 - 54.0 %    MCV 85 82 - 98 fL    MCH 28.7 27.0 - 31.0 pg    MCHC 33.8 32.0 - 36.0 g/dL    RDW 14.6 (H) 11.5 - 14.5 %    Platelets 359 (H) 150 - 350 K/uL    MPV 11.0 9.2 - 12.9 fL    Gran # 12.6 (H) 1.8 - 7.7 K/uL    Lymph # 2.0 1.0 - 4.8 K/uL    Mono # 0.7 0.3 - 1.0 K/uL    Eos # 0.1 0.0 - 0.5 K/uL    Baso # 0.03 0.00 - 0.20 K/uL    Gran% 82.1 (H) 38.0 - 73.0 %    Lymph% 12.7 (L) 18.0 - 48.0 %    Mono% 4.2 4.0 - 15.0 %    Eosinophil% 0.8 0.0 - 8.0 %    Basophil% 0.2 0.0 - 1.9 %    Platelet Estimate Increased (A)     Aniso Slight     Poik Slight     Hypo Occasional     Meri Cells Occasional     Differential Method Automated    Comprehensive metabolic panel    Collection Time: 09/25/17  4:06 AM   Result Value Ref Range    Sodium 139 136 - 145 mmol/L    Potassium 6.2 (H) 3.5 - 5.1 mmol/L    Chloride 112 (H) 95 - 110 mmol/L    CO2 13 (L) 23 - 29 mmol/L    Glucose 610 (HH) 70 - 110 mg/dL    BUN, Bld 86 (H) 8 - 23 mg/dL    Creatinine 4.0 (H) 0.5 - 1.4 mg/dL    Calcium 8.9 8.7 - 10.5 mg/dL    Total Protein 7.6 6.0 - 8.4 g/dL    Albumin 2.3 (L) 3.5 - 5.2 g/dL    Total Bilirubin 0.2 0.1 - 1.0 mg/dL    Alkaline Phosphatase 98 55 - 135 U/L    AST 21 10 - 40 U/L    ALT 18 10 - 44 U/L    Anion Gap 14 8 - 16 mmol/L    eGFR  if  14.9 (A) >60 mL/min/1.73 m^2    eGFR if non African American 12.9 (A) >60 mL/min/1.73 m^2   Beta - Hydroxybutyrate, Serum    Collection Time: 09/25/17  4:06 AM   Result Value Ref Range    Beta-Hydroxybutyrate 2.0 (H) 0.0 - 0.5 mmol/L   Urinalysis Only    Collection Time: 09/25/17  4:16 AM   Result Value Ref Range    Specimen UA Urine, Clean Catch     Color, UA Yellow Yellow, Straw, Eduarda    Appearance, UA Clear Clear    pH, UA 5.0 5.0 - 8.0    Specific Gravity, UA 1.010 1.005 - 1.030    Protein, UA Negative Negative    Glucose, UA 3+ (A) Negative    Ketones, UA Negative Negative    Bilirubin (UA) Negative Negative    Occult Blood UA 1+ (A) Negative    Nitrite, UA Negative Negative    Urobilinogen, UA Negative <2.0 EU/dL    Leukocytes, UA 2+ (A) Negative   Urinalysis Microscopic    Collection Time: 09/25/17  4:16 AM   Result Value Ref Range    RBC, UA 2 0 - 4 /hpf    WBC, UA 15 (H) 0 - 5 /hpf    Bacteria, UA Occasional None-Occ /hpf    Yeast, UA None None    Squam Epithel, UA 1 /hpf    Amorphous, UA Rare None-Moderate    Microscopic Comment SEE COMMENT    POCT glucose    Collection Time: 09/25/17 10:59 AM   Result Value Ref Range    POCT Glucose 481 (HH) 70 - 110 mg/dL   POCT glucose    Collection Time: 09/25/17 12:08 PM   Result Value Ref Range    POCT Glucose 359 (H) 70 - 110 mg/dL   Basic metabolic panel    Collection Time: 09/25/17 12:28 PM   Result Value Ref Range    Sodium 144 136 - 145 mmol/L    Potassium 4.4 3.5 - 5.1 mmol/L    Chloride 117 (H) 95 - 110 mmol/L    CO2 15 (L) 23 - 29 mmol/L    Glucose 353 (H) 70 - 110 mg/dL    BUN, Bld 83 (H) 8 - 23 mg/dL    Creatinine 3.6 (H) 0.5 - 1.4 mg/dL    Calcium 8.6 (L) 8.7 - 10.5 mg/dL    Anion Gap 12 8 - 16 mmol/L    eGFR if African American 16.9 (A) >60 mL/min/1.73 m^2    eGFR if non  14.6 (A) >60 mL/min/1.73 m^2   POCT glucose    Collection Time: 09/25/17 12:57 PM   Result Value Ref Range    POCT Glucose 302 (H) 70 - 110 mg/dL    POCT glucose    Collection Time: 09/25/17  2:00 PM   Result Value Ref Range    POCT Glucose 280 (H) 70 - 110 mg/dL   POCT glucose    Collection Time: 09/25/17  3:00 PM   Result Value Ref Range    POCT Glucose 183 (H) 70 - 110 mg/dL   ]    Significant Imaging: I have reviewed all pertinent imaging results/findings within the past 24 hours.   CXR 9/25  No acute radiographic findings in the chest.    Assessment/Plan:     DKA, type 1    - blood glucose at nursing home reportedly normal after discharge and increased to the 400s, 610 on admit  - Elevatated , beta hydroxybutyrate 2, VBG pH 7.2, K 6.2, gap 14, bicarb 13, U/A negative for ketones  - Unknown precipitation - possible infection, medication noncompliance  - Initially started on Insulin drip 3U in ED, 1L NS bolus, insulin regular 5 units push    - Add insulin detemir 20U when anion gap 10 or less and bicarb > 15; keep drip on, overlap for at least 2 hours, start diabetic diet and then can turn off drip after 2 hours  - Glucose checks q1h; BMP q4h  - consulted endocrine; appreciate help          Leukocytosis    - WBC 15.4 on admit (last 9/21 WBC 12)  - UA clear  - CXR clear  - repeating blood cultures x2  - continue ciprofloxacin, end date 10/5/17          Renovascular hypertension    - On Coreg 25 BID, Lisinopril 10mg, nifedipine 90 daily at home  -Holding lisinopril for NIALL, Cr 4.0 (baseline ~3)            NIALL (acute kidney injury)    - Baseline creatine around 2.6 - 3.0  - 4.0 on admit, now improved to 3.6  - Avoid nephrotoxic agents; renally dose meds (cipro changed to 250 mg per pharmacy)        Secondary seizure disorder    - On Keppra 1000 BID          Weakness following cerebrovascular accident (CVA)    - hx of CVA with residual left sided hemiparesis          CKD (chronic kidney disease) stage 4, GFR 15-29 ml/min    - see above          Urinary retention    - noted during previous admission  - continue chronic indwelling lindsay  - follow up with  urology outpatient            VTE Risk Mitigation         Ordered     heparin (porcine) injection 5,000 Units  Every 8 hours     Route:  Subcutaneous        09/25/17 0933     Low Risk of VTE  Once      09/25/17 1039             Daniel Petersen MD  Department of Hospital Medicine   [facility]

## 2017-09-25 NOTE — ED NOTES
Rounding  There is no change in patient status at this time and is resting comfortably in bed with side rails up, call bell in reach, and the bed locked and in its lowest position.   Patient appears to be in no acute distress at this time and denies chest pain and shortness of breath. Respirations are even and unlabored with equal chest rise and fall.  Patient given plan of care update.

## 2017-09-25 NOTE — ED NOTES
IM team 4 notified pt's BS is 551 and insulin infusion rate is 8.9, stated will be down to speak with pt

## 2017-09-25 NOTE — ED NOTES
LOC: The patient is awake. Disoriented to person and place.  APPEARANCE: Patient resting comfortably and in no acute distress, patient is clean and well groomed, patient's clothing is properly fastened.  SKIN: The skin is warm and dry, patient has normal skin turgor and moist mucus membranes, skin intact, no breakdown or brusing noted.  MUSKULOSKELETAL: Patient moving all extremities well, no obvious swelling or deformities noted.  RESPIRATORY: Airway is open and patent, respirations are spontaneous, patient has a normal effort and rate. Breath sounds are clear and equal bilaterally.  CARDIAC: Normal heart sounds. No peripheral edema.  ABDOMEN: Soft and non tender to palpation, no distention noted. Bowel sounds present.  NEURO: No neuro deficits, hand grasp equal, no drift noted, no facial droop noted. Speech is clear.

## 2017-09-25 NOTE — ASSESSMENT & PLAN NOTE
- Baseline creatine around 2.6 - 3.0  - 4.0 on admit, now improved to 3.6  - Avoid nephrotoxic agents; renally dose meds (cipro changed to 250 mg per pharmacy)

## 2017-09-26 LAB
ANION GAP SERPL CALC-SCNC: 12 MMOL/L
ANION GAP SERPL CALC-SCNC: 6 MMOL/L
ANION GAP SERPL CALC-SCNC: 8 MMOL/L
ANION GAP SERPL CALC-SCNC: 9 MMOL/L
BACTERIA BLD CULT: NORMAL
BASOPHILS # BLD AUTO: 0.03 K/UL
BASOPHILS NFR BLD: 0.2 %
BUN SERPL-MCNC: 76 MG/DL
BUN SERPL-MCNC: 79 MG/DL
BUN SERPL-MCNC: 79 MG/DL
BUN SERPL-MCNC: 80 MG/DL
CALCIUM SERPL-MCNC: 8.5 MG/DL
CALCIUM SERPL-MCNC: 8.7 MG/DL
CALCIUM SERPL-MCNC: 8.9 MG/DL
CALCIUM SERPL-MCNC: 9.1 MG/DL
CHLORIDE SERPL-SCNC: 115 MMOL/L
CHLORIDE SERPL-SCNC: 115 MMOL/L
CHLORIDE SERPL-SCNC: 116 MMOL/L
CHLORIDE SERPL-SCNC: 120 MMOL/L
CO2 SERPL-SCNC: 13 MMOL/L
CO2 SERPL-SCNC: 18 MMOL/L
CO2 SERPL-SCNC: 19 MMOL/L
CO2 SERPL-SCNC: 20 MMOL/L
CREAT SERPL-MCNC: 3.2 MG/DL
CREAT SERPL-MCNC: 3.2 MG/DL
CREAT SERPL-MCNC: 3.3 MG/DL
CREAT SERPL-MCNC: 3.3 MG/DL
DIFFERENTIAL METHOD: ABNORMAL
EOSINOPHIL # BLD AUTO: 0.2 K/UL
EOSINOPHIL NFR BLD: 1.8 %
ERYTHROCYTE [DISTWIDTH] IN BLOOD BY AUTOMATED COUNT: 14.1 %
EST. GFR  (AFRICAN AMERICAN): 18.8 ML/MIN/1.73 M^2
EST. GFR  (AFRICAN AMERICAN): 18.8 ML/MIN/1.73 M^2
EST. GFR  (AFRICAN AMERICAN): 19.5 ML/MIN/1.73 M^2
EST. GFR  (AFRICAN AMERICAN): 19.5 ML/MIN/1.73 M^2
EST. GFR  (NON AFRICAN AMERICAN): 16.2 ML/MIN/1.73 M^2
EST. GFR  (NON AFRICAN AMERICAN): 16.2 ML/MIN/1.73 M^2
EST. GFR  (NON AFRICAN AMERICAN): 16.9 ML/MIN/1.73 M^2
EST. GFR  (NON AFRICAN AMERICAN): 16.9 ML/MIN/1.73 M^2
GLUCOSE SERPL-MCNC: 123 MG/DL
GLUCOSE SERPL-MCNC: 125 MG/DL
GLUCOSE SERPL-MCNC: 177 MG/DL
GLUCOSE SERPL-MCNC: 193 MG/DL
HCT VFR BLD AUTO: 27.8 %
HGB BLD-MCNC: 9.2 G/DL
LYMPHOCYTES # BLD AUTO: 2.2 K/UL
LYMPHOCYTES NFR BLD: 17.3 %
MCH RBC QN AUTO: 28.9 PG
MCHC RBC AUTO-ENTMCNC: 33.1 G/DL
MCV RBC AUTO: 87 FL
MONOCYTES # BLD AUTO: 0.5 K/UL
MONOCYTES NFR BLD: 3.9 %
NEUTROPHILS # BLD AUTO: 9.7 K/UL
NEUTROPHILS NFR BLD: 76.3 %
PLATELET # BLD AUTO: 396 K/UL
PMV BLD AUTO: 10.9 FL
POCT GLUCOSE: 103 MG/DL (ref 70–110)
POCT GLUCOSE: 112 MG/DL (ref 70–110)
POCT GLUCOSE: 117 MG/DL (ref 70–110)
POCT GLUCOSE: 126 MG/DL (ref 70–110)
POCT GLUCOSE: 167 MG/DL (ref 70–110)
POCT GLUCOSE: 193 MG/DL (ref 70–110)
POCT GLUCOSE: 202 MG/DL (ref 70–110)
POCT GLUCOSE: 211 MG/DL (ref 70–110)
POCT GLUCOSE: >500 MG/DL (ref 70–110)
POCT GLUCOSE: >500 MG/DL (ref 70–110)
POTASSIUM SERPL-SCNC: 4.6 MMOL/L
POTASSIUM SERPL-SCNC: 4.7 MMOL/L
POTASSIUM SERPL-SCNC: 4.9 MMOL/L
POTASSIUM SERPL-SCNC: 5.7 MMOL/L
RBC # BLD AUTO: 3.18 M/UL
SODIUM SERPL-SCNC: 141 MMOL/L
SODIUM SERPL-SCNC: 142 MMOL/L
SODIUM SERPL-SCNC: 143 MMOL/L
SODIUM SERPL-SCNC: 145 MMOL/L
WBC # BLD AUTO: 12.69 K/UL

## 2017-09-26 PROCEDURE — 99232 SBSQ HOSP IP/OBS MODERATE 35: CPT | Mod: GC,,, | Performed by: INTERNAL MEDICINE

## 2017-09-26 PROCEDURE — 36415 COLL VENOUS BLD VENIPUNCTURE: CPT

## 2017-09-26 PROCEDURE — 80048 BASIC METABOLIC PNL TOTAL CA: CPT

## 2017-09-26 PROCEDURE — 25000003 PHARM REV CODE 250: Performed by: STUDENT IN AN ORGANIZED HEALTH CARE EDUCATION/TRAINING PROGRAM

## 2017-09-26 PROCEDURE — 20600001 HC STEP DOWN PRIVATE ROOM

## 2017-09-26 PROCEDURE — 85025 COMPLETE CBC W/AUTO DIFF WBC: CPT

## 2017-09-26 PROCEDURE — 63600175 PHARM REV CODE 636 W HCPCS: Performed by: STUDENT IN AN ORGANIZED HEALTH CARE EDUCATION/TRAINING PROGRAM

## 2017-09-26 PROCEDURE — 87086 URINE CULTURE/COLONY COUNT: CPT

## 2017-09-26 RX ORDER — INSULIN ASPART 100 [IU]/ML
0-5 INJECTION, SOLUTION INTRAVENOUS; SUBCUTANEOUS
Status: DISCONTINUED | OUTPATIENT
Start: 2017-09-26 | End: 2017-09-27 | Stop reason: HOSPADM

## 2017-09-26 RX ORDER — INSULIN ASPART 100 [IU]/ML
6 INJECTION, SOLUTION INTRAVENOUS; SUBCUTANEOUS
Status: DISCONTINUED | OUTPATIENT
Start: 2017-09-26 | End: 2017-09-27 | Stop reason: HOSPADM

## 2017-09-26 RX ADMIN — HEPARIN SODIUM 5000 UNITS: 5000 INJECTION, SOLUTION INTRAVENOUS; SUBCUTANEOUS at 02:09

## 2017-09-26 RX ADMIN — INSULIN DETEMIR 20 UNITS: 100 INJECTION, SOLUTION SUBCUTANEOUS at 09:09

## 2017-09-26 RX ADMIN — CARVEDILOL 25 MG: 25 TABLET, FILM COATED ORAL at 09:09

## 2017-09-26 RX ADMIN — LEVETIRACETAM 1000 MG: 500 TABLET ORAL at 08:09

## 2017-09-26 RX ADMIN — CARVEDILOL 25 MG: 25 TABLET, FILM COATED ORAL at 08:09

## 2017-09-26 RX ADMIN — HEPARIN SODIUM 5000 UNITS: 5000 INJECTION, SOLUTION INTRAVENOUS; SUBCUTANEOUS at 06:09

## 2017-09-26 RX ADMIN — INSULIN ASPART 2 UNITS: 100 INJECTION, SOLUTION INTRAVENOUS; SUBCUTANEOUS at 02:09

## 2017-09-26 RX ADMIN — HEPARIN SODIUM 5000 UNITS: 5000 INJECTION, SOLUTION INTRAVENOUS; SUBCUTANEOUS at 09:09

## 2017-09-26 RX ADMIN — CIPROFLOXACIN HYDROCHLORIDE 250 MG: 250 TABLET, FILM COATED ORAL at 08:09

## 2017-09-26 RX ADMIN — INSULIN ASPART 2 UNITS: 100 INJECTION, SOLUTION INTRAVENOUS; SUBCUTANEOUS at 09:09

## 2017-09-26 RX ADMIN — LEVETIRACETAM 1000 MG: 500 TABLET ORAL at 09:09

## 2017-09-26 RX ADMIN — INSULIN ASPART 6 UNITS: 100 INJECTION, SOLUTION INTRAVENOUS; SUBCUTANEOUS at 02:09

## 2017-09-26 RX ADMIN — INSULIN ASPART 6 UNITS: 100 INJECTION, SOLUTION INTRAVENOUS; SUBCUTANEOUS at 08:09

## 2017-09-26 RX ADMIN — ASPIRIN 81 MG: 81 TABLET, COATED ORAL at 08:09

## 2017-09-26 RX ADMIN — PAROXETINE HYDROCHLORIDE 10 MG: 10 TABLET, FILM COATED ORAL at 06:09

## 2017-09-26 RX ADMIN — NIFEDIPINE 90 MG: 30 TABLET, FILM COATED, EXTENDED RELEASE ORAL at 08:09

## 2017-09-26 RX ADMIN — INSULIN ASPART 6 UNITS: 100 INJECTION, SOLUTION INTRAVENOUS; SUBCUTANEOUS at 05:09

## 2017-09-26 NOTE — ASSESSMENT & PLAN NOTE
- blood glucose at nursing home reportedly normal after discharge and increased to the 400s, 610 on admit  - Elevatated , beta hydroxybutyrate 2, VBG pH 7.2, K 6.2, gap 14, bicarb 13, U/A negative for ketones  - Unknown precipitation - possible infection, medication noncompliance / mismanagement at nursing home. Infectious workup thus far has been negative, and speaking to nursing home, patient does not appear to be mismanaged.  - Initially started on Insulin drip 3U in ED, 1L NS bolus, insulin regular 5 units push    - Insulin detemir 20U started at 18:00, insulin drip stopped at 20:00  - started aspart 6U with low dose sliding scale  - Glucose checks with meals and nightly; BMP daily  - consulted endocrine; appreciate help

## 2017-09-26 NOTE — PROGRESS NOTES
"[facility]  Endocrinology  Progress Note    Admit Date: 2017     Reason for Consult: Management of T2DM v T1DM, Hyperglycemia     Surgical Procedure and Date: None    Diabetes diagnosis year: age 18y    Home Diabetes Medications:    Upon discharge, patient sent back to NH on  Levemir 20u qHS  Aspart 8u AC    How often checking glucose at home? 1-3 x day   BG readings on regimen: Recently since discharge patietn has ranged from 231, 181, 444 (at NH)  Hypoglycemia on the regimen?  No  Missed doses on regimen?  No    Diabetes Complications include:     Hyperglycemia, Hypoglycemia , Diabetic nephropathy  , Diabetic chronic kidney disease     , Diabetic retinopathy  and Diabetic peripheral neuropathy     Complicating diabetes co morbidities:   History of CVA, CKD and Dementia      HPI:   Patient is a 84 y.o. 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  BG found to have BG of 600s.  Endocrinology consulted for DM management.    Interval HPI:   Overnight events:  Eatin%  Nausea: No  Hypoglycemia and intervention: No  Fever: No  TPN and/or TF: No  BG ranging 103-202 since yesterday, insulin gtt was stopped last night by primary team and pt transitioned to 20 detemir  And aspart 6    BP (!) 151/77 (BP Location: Right arm, Patient Position: Lying)   Pulse 63   Temp 97.3 °F (36.3 °C) (Axillary)   Resp 18   Ht 5' 9.02" (1.753 m)   Wt 67.6 kg (149 lb)   SpO2 100%   BMI 21.99 kg/m²       Labs Reviewed and Include      Recent Labs  Lab 17  0923   *   CALCIUM 9.1      K 4.7   CO2 19*   *   BUN 80*   CREATININE 3.3*     Lab Results   Component Value Date    WBC 12.69 2017    HGB 9.2 (L) 2017    HCT 27.8 (L) 2017    MCV 87 2017    PLT " 396 (H) 09/26/2017     No results for input(s): TSH, FREET4 in the last 168 hours.  Lab Results   Component Value Date    HGBA1C 9.9 (H) 09/18/2017       Nutritional status:   Body mass index is 21.99 kg/m².  Lab Results   Component Value Date    ALBUMIN 2.3 (L) 09/25/2017    ALBUMIN 2.9 (L) 09/17/2017    ALBUMIN 3.2 (L) 08/11/2017     No results found for: PREALBUMIN    Estimated Creatinine Clearance: 15.9 mL/min (based on SCr of 3.3 mg/dL (H)).    Accu-Checks  Recent Labs      09/25/17   1400  09/25/17   1500  09/25/17   1617  09/25/17   1704  09/25/17   1800  09/25/17   1905  09/25/17   1954  09/25/17   2033  09/26/17   0024  09/26/17   0853   POCTGLUCOSE  280*  183*  146*  124*  111*  112*  117*  103  126*  202*       Current Medications and/or Treatments Impacting Glycemic Control  Immunotherapy:  Immunosuppressants     None        Steroids:   Hormones     None        Pressors:    Autonomic Drugs     None        Hyperglycemia/Diabetes Medications: Antihyperglycemics     Start     Stop Route Frequency Ordered    09/26/17 0800  insulin aspart pen 6 Units      -- SubQ 3 times daily with meals 09/26/17 0646    09/26/17 0746  insulin aspart pen 0-5 Units      -- SubQ Before meals & nightly PRN 09/26/17 0646    09/25/17 1830  insulin detemir pen 20 Units      -- SubQ Nightly 09/25/17 1824          ASSESSMENT and PLAN    * DKA, type 1    On admission in DKA - AG 18 (corrected for albumin), pH 7.2, glucose 610.     Per chart review, patient appears to be T1DM although antibody levels were not checked.     Agree with fluid resuscitation and starting intensive insulin gtt therapy. Titrate according to protocol.    **TYPE1 DM --> High risk of developing DKA   -last night pt transitioned to subcu by primary team and thus we re not able to assess the insulin requirement by transition insulin gtt  -current regimen is detemir 20 and aspart 6 TID with Small dose SSI  -recommend decreasing the detemir to 18    Patient requires  workup to determine etiology underlying DKA presentation.  Less likely insulin noncompliance as patient is a resident of a NH and they administer medications.  However, one could question the administration of the medication in outpatient setting (making sure they administer the medication to fatty areas, without lipodystrophy).          Leukocytosis    Per primary  Workup may lead to underlying cause of DKA  - last admission 1/2 bottles of BCX positive for pan sensitive pseudomonas , FU blood cultures have been negative , pt remains on cipro per primary team        Hyperkalemia, transcellular shifts    EKG negative for peaked twaves.  +prolonged QT interval.  ?delta waves in II, V3-V6.    Patient started on insulin drip.  Will monitor serial BMPs.    Supplement as necessary.        CKD (chronic kidney disease) stage 4, GFR 15-29 ml/min    Avoid insulin retention/hypoglycemia              Beckie Alberto MD  Endocrinology  [facility]

## 2017-09-26 NOTE — PLAN OF CARE
Problem: Patient Care Overview  Goal: Plan of Care Review  Pt did well during shift. Pt occasionally became agitated and was not oriented to situation. Pt slept little during the night. Breaths equal and slightly labored at times. Will continues to monitor further

## 2017-09-26 NOTE — PLAN OF CARE
Problem: Diabetes, Type 2 (Adult)  Intervention: Optimize Glycemic Control  Pt BG checked per order. Insulin given with meals. Pt accepting.       Problem: Patient Care Overview  Goal: Plan of Care Review  Outcome: Ongoing (interventions implemented as appropriate)  Pt VSS. Pt remains free of falls, bed alarm on, BS table within reach. Pt oriented to self and place only. Pt tolerating meals well. Will continue to monitor.

## 2017-09-26 NOTE — ASSESSMENT & PLAN NOTE
- Baseline creatine around 2.6 - 3.0  - 4.0 on admit, now improved to 3.3  - Avoid nephrotoxic agents; renally dose meds (cipro changed to 250 mg per pharmacy)

## 2017-09-26 NOTE — PROGRESS NOTES
[facility] Hospital Medicine  Progress Note    Patient Name: Julius Cardenas  MRN: [id number]  Patient Class: IP- Inpatient   Admission Date: 9/25/2017  Length of Stay: 1 days  Attending Physician: Wilfrid Pollock MD  Primary Care Provider: Primary Doctor [facility] Medicine Team: [facility] MED 4 Daniel Petersen MD    Subjective:     Principal Problem:DKA, type 1    HPI:  Patient is a 84 year old male with DM Type I, renovascular HTN, CKD stage Iv, peripheral neuropathy, CVA with residual left sided weakness, anemia, 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 He was recently admitted for hyperglycemia from 9/17 - 9/22 for HHS/DKA and was discharged on a regimen of insulin detemir 20 daily and aspart 8 TIDWM. He was also discharged with instructions to finish a 14 day course of oral ciprofloxacin for a UTI (proteus) and bacteremia (pseudomonas). According to nursing home, he was getting his scheduled insulin, and his sugars were initially controlled but were climbing each day, most recently in the 400s. On admission, his blood glucose was 610, beta hydroxybutyrate 2, VBG pH 7.2, K 6.2, bicarb 13, gap 14, U/A clean with no ketones, CXR no abnormalities, EKG NSR with no peaked T waves evident, WBC 15.4.    In the Ed, he was initially given regular insulin push 5 units, started on a drip at 3.38u/hr (~0.05mg/kg/hr) and given IVF 1L bolus. His BG remained >500, insulin titrated up to 8.9u/hr and BG responded, now 302. Nursing to titrate per protocol. Maintenance fluids ordered at 250 ml/hr with potassium supplement as latest K 4.4. Lindsay was exchanged (patient with chronic indwelling lindsay secondary to urinary retention).    Hospital Course:  No notes on file    Interval History: NAEON. Patient started on insulin detemir 20U at 18:00, insulin drip stopped at 20:00. Sugars have been  100s-110s overnight, fasting this morning 125. This morning, patient drowsy, sleepy, oriented to place only. Has no complaints.    Review of Systems   Constitutional: Negative for chills, fever and unexpected weight change.   HENT: Negative for sore throat.    Eyes: Negative for visual disturbance.   Respiratory: Negative for cough and shortness of breath.    Cardiovascular: Negative for chest pain and leg swelling.   Gastrointestinal: Negative for abdominal pain, nausea and vomiting.   Genitourinary: Negative for dysuria.   Musculoskeletal: Negative for arthralgias.   Skin: Negative for rash.   Neurological: Negative for headaches.   Hematological: Does not bruise/bleed easily.   Psychiatric/Behavioral: Negative for hallucinations.     Objective:     Vital Signs (Most Recent):  Temp: 97.3 °F (36.3 °C) (09/26/17 1106)  Pulse: 63 (09/26/17 1106)  Resp: 18 (09/26/17 1106)  BP: (!) 151/77 (09/26/17 1106)  SpO2: 100 % (09/26/17 1106) Vital Signs (24h Range):  Temp:  [97.3 °F (36.3 °C)-98.9 °F (37.2 °C)] 97.3 °F (36.3 °C)  Pulse:  [60-65] 63  Resp:  [18-32] 18  SpO2:  [97 %-100 %] 100 %  BP: (112-151)/(57-77) 151/77     Weight: 67.6 kg (149 lb)  Body mass index is 21.99 kg/m².    Intake/Output Summary (Last 24 hours) at 09/26/17 1427  Last data filed at 09/26/17 1200   Gross per 24 hour   Intake           281.41 ml   Output             1375 ml   Net         -1093.59 ml      Physical Exam   Constitutional: He appears well-developed.   HENT:   Head: Normocephalic and atraumatic.   Eyes: Pupils are equal, round, and reactive to light.   Neck: No tracheal deviation present. No thyromegaly present.   Cardiovascular: Normal rate, regular rhythm and normal heart sounds.    No murmur heard.  Pulmonary/Chest: Effort normal and breath sounds normal.   Abdominal: Soft. Bowel sounds are normal. There is no tenderness.   Musculoskeletal: He exhibits no edema.   Neurological: He has normal reflexes. No cranial nerve deficit.    sensation intact to vibration and monofilament   Skin: No rash noted.   No nodules   Vitals reviewed.      Significant Labs:   Recent Results (from the past 24 hour(s))   POCT glucose    Collection Time: 09/25/17  3:00 PM   Result Value Ref Range    POCT Glucose 183 (H) 70 - 110 mg/dL   POCT glucose    Collection Time: 09/25/17  4:17 PM   Result Value Ref Range    POCT Glucose 146 (H) 70 - 110 mg/dL   POCT glucose    Collection Time: 09/25/17  5:04 PM   Result Value Ref Range    POCT Glucose 124 (H) 70 - 110 mg/dL   Basic metabolic panel    Collection Time: 09/25/17  5:40 PM   Result Value Ref Range    Sodium 146 (H) 136 - 145 mmol/L    Potassium 4.6 3.5 - 5.1 mmol/L    Chloride 118 (H) 95 - 110 mmol/L    CO2 18 (L) 23 - 29 mmol/L    Glucose 112 (H) 70 - 110 mg/dL    BUN, Bld 80 (H) 8 - 23 mg/dL    Creatinine 3.4 (H) 0.5 - 1.4 mg/dL    Calcium 9.0 8.7 - 10.5 mg/dL    Anion Gap 10 8 - 16 mmol/L    eGFR if African American 18.1 (A) >60 mL/min/1.73 m^2    eGFR if non  15.7 (A) >60 mL/min/1.73 m^2   POCT glucose    Collection Time: 09/25/17  6:00 PM   Result Value Ref Range    POCT Glucose 111 (H) 70 - 110 mg/dL   POCT glucose    Collection Time: 09/25/17  7:05 PM   Result Value Ref Range    POCT Glucose 112 (H) 70 - 110 mg/dL   POCT glucose    Collection Time: 09/25/17  7:54 PM   Result Value Ref Range    POCT Glucose 117 (H) 70 - 110 mg/dL   POCT glucose    Collection Time: 09/25/17  8:33 PM   Result Value Ref Range    POCT Glucose 103 70 - 110 mg/dL   Basic metabolic panel    Collection Time: 09/25/17  9:10 PM   Result Value Ref Range    Sodium 145 136 - 145 mmol/L    Potassium 4.6 3.5 - 5.1 mmol/L    Chloride 116 (H) 95 - 110 mmol/L    CO2 18 (L) 23 - 29 mmol/L    Glucose 113 (H) 70 - 110 mg/dL    BUN, Bld 79 (H) 8 - 23 mg/dL    Creatinine 3.3 (H) 0.5 - 1.4 mg/dL    Calcium 8.8 8.7 - 10.5 mg/dL    Anion Gap 11 8 - 16 mmol/L    eGFR if African American 18.8 (A) >60 mL/min/1.73 m^2    eGFR if non   16.2 (A) >60 mL/min/1.73 m^2   POCT glucose    Collection Time: 09/26/17 12:24 AM   Result Value Ref Range    POCT Glucose 126 (H) 70 - 110 mg/dL   Basic metabolic panel    Collection Time: 09/26/17  2:15 AM   Result Value Ref Range    Sodium 145 136 - 145 mmol/L    Potassium 5.7 (H) 3.5 - 5.1 mmol/L    Chloride 120 (H) 95 - 110 mmol/L    CO2 13 (L) 23 - 29 mmol/L    Glucose 123 (H) 70 - 110 mg/dL    BUN, Bld 79 (H) 8 - 23 mg/dL    Creatinine 3.2 (H) 0.5 - 1.4 mg/dL    Calcium 8.5 (L) 8.7 - 10.5 mg/dL    Anion Gap 12 8 - 16 mmol/L    eGFR if African American 19.5 (A) >60 mL/min/1.73 m^2    eGFR if non  16.9 (A) >60 mL/min/1.73 m^2   Basic metabolic panel    Collection Time: 09/26/17  4:56 AM   Result Value Ref Range    Sodium 143 136 - 145 mmol/L    Potassium 4.6 3.5 - 5.1 mmol/L    Chloride 116 (H) 95 - 110 mmol/L    CO2 18 (L) 23 - 29 mmol/L    Glucose 125 (H) 70 - 110 mg/dL    BUN, Bld 79 (H) 8 - 23 mg/dL    Creatinine 3.3 (H) 0.5 - 1.4 mg/dL    Calcium 8.7 8.7 - 10.5 mg/dL    Anion Gap 9 8 - 16 mmol/L    eGFR if African American 18.8 (A) >60 mL/min/1.73 m^2    eGFR if non  16.2 (A) >60 mL/min/1.73 m^2   POCT glucose    Collection Time: 09/26/17  8:53 AM   Result Value Ref Range    POCT Glucose 202 (H) 70 - 110 mg/dL   CBC auto differential    Collection Time: 09/26/17  9:23 AM   Result Value Ref Range    WBC 12.69 3.90 - 12.70 K/uL    RBC 3.18 (L) 4.60 - 6.20 M/uL    Hemoglobin 9.2 (L) 14.0 - 18.0 g/dL    Hematocrit 27.8 (L) 40.0 - 54.0 %    MCV 87 82 - 98 fL    MCH 28.9 27.0 - 31.0 pg    MCHC 33.1 32.0 - 36.0 g/dL    RDW 14.1 11.5 - 14.5 %    Platelets 396 (H) 150 - 350 K/uL    MPV 10.9 9.2 - 12.9 fL    Gran # 9.7 (H) 1.8 - 7.7 K/uL    Lymph # 2.2 1.0 - 4.8 K/uL    Mono # 0.5 0.3 - 1.0 K/uL    Eos # 0.2 0.0 - 0.5 K/uL    Baso # 0.03 0.00 - 0.20 K/uL    Gran% 76.3 (H) 38.0 - 73.0 %    Lymph% 17.3 (L) 18.0 - 48.0 %    Mono% 3.9 (L) 4.0 - 15.0 %    Eosinophil% 1.8  0.0 - 8.0 %    Basophil% 0.2 0.0 - 1.9 %    Differential Method Automated    Basic metabolic panel    Collection Time: 09/26/17  9:23 AM   Result Value Ref Range    Sodium 142 136 - 145 mmol/L    Potassium 4.7 3.5 - 5.1 mmol/L    Chloride 115 (H) 95 - 110 mmol/L    CO2 19 (L) 23 - 29 mmol/L    Glucose 193 (H) 70 - 110 mg/dL    BUN, Bld 80 (H) 8 - 23 mg/dL    Creatinine 3.3 (H) 0.5 - 1.4 mg/dL    Calcium 9.1 8.7 - 10.5 mg/dL    Anion Gap 8 8 - 16 mmol/L    eGFR if African American 18.8 (A) >60 mL/min/1.73 m^2    eGFR if non  16.2 (A) >60 mL/min/1.73 m^2   POCT glucose    Collection Time: 09/26/17 12:12 PM   Result Value Ref Range    POCT Glucose 193 (H) 70 - 110 mg/dL   ]    Significant Imaging: I have reviewed all pertinent imaging results/findings within the past 24 hours.    Assessment/Plan:      * DKA, type 1    - blood glucose at nursing home reportedly normal after discharge and increased to the 400s, 610 on admit  - Elevatated , beta hydroxybutyrate 2, VBG pH 7.2, K 6.2, gap 14, bicarb 13, U/A negative for ketones  - Unknown precipitation - possible infection, medication noncompliance / mismanagement at nursing home. Infectious workup thus far has been negative, and speaking to nursing home, patient does not appear to be mismanaged.  - Initially started on Insulin drip 3U in ED, 1L NS bolus, insulin regular 5 units push    - Insulin detemir 20U started at 18:00, insulin drip stopped at 20:00  - started aspart 6U with low dose sliding scale  - Glucose checks with meals and nightly; BMP daily  - consulted endocrine; appreciate help        Leukocytosis    - WBC 15.4 on admit (last 9/21 WBC 12)  - UA clear  - CXR clear  - blood cultures x2 prelim negative  - continue ciprofloxacin, end date 10/5/17          Renovascular hypertension    - On Coreg 25 BID, Lisinopril 10mg, nifedipine 90 daily at home  -Holding lisinopril for NIALL, Cr 4.0 (baseline ~3)            NIALL (acute kidney injury)    -  Baseline creatine around 2.6 - 3.0  - 4.0 on admit, now improved to 3.3  - Avoid nephrotoxic agents; renally dose meds (cipro changed to 250 mg per pharmacy)        Secondary seizure disorder    - On Keppra 1000 BID          Weakness following cerebrovascular accident (CVA)    - hx of CVA with residual left sided hemiparesis          CKD (chronic kidney disease) stage 4, GFR 15-29 ml/min    - see above          Urinary retention    - noted during previous admission  - continue chronic indwelling lindsay  - follow up with urology outpatient            VTE Risk Mitigation         Ordered     heparin (porcine) injection 5,000 Units  Every 8 hours     Route:  Subcutaneous        09/25/17 0933     Low Risk of VTE  Once      09/25/17 1039              Daniel Petersen MD  Department of Hospital Medicine   [facility]

## 2017-09-26 NOTE — ASSESSMENT & PLAN NOTE
On admission in DKA - AG 18 (corrected for albumin), pH 7.2, glucose 610.     Per chart review, patient appears to be T1DM although antibody levels were not checked.     Agree with fluid resuscitation and starting intensive insulin gtt therapy. Titrate according to protocol.    **TYPE1 DM --> High risk of developing DKA   -last night pt transitioned to subcu by primary team and thus we re not able to assess the insulin requirement by transition insulin gtt  -current regimen is detemir 20 and aspart 6 TID with Small dose SSI  -consier decreasing the detemir to 18    Patient requires workup to determine etiology underlying DKA presentation.  Less likely insulin noncompliance as patient is a resident of a NH and they administer medications.  However, one could question the administration of the medication in outpatient setting (making sure they administer the medication to fatty areas, without lipodystrophy).

## 2017-09-26 NOTE — PLAN OF CARE
Pt is a readmit. Per Yris Marcus, it is anticipated Pt will be medically stable to return to his [facility] bed at [facility] by tomorrow.   SW to continue to follow.     Alma Amador LCSW

## 2017-09-26 NOTE — SUBJECTIVE & OBJECTIVE
"Interval HPI:   Overnight events:  Eatin%  Nausea: No  Hypoglycemia and intervention: No  Fever: No  TPN and/or TF: No  BG ranging 103-202 since yesterday, insulin gtt was stopped last night by primary team and pt transitioned to 20 detemir  And aspart 6    BP (!) 151/77 (BP Location: Right arm, Patient Position: Lying)   Pulse 63   Temp 97.3 °F (36.3 °C) (Axillary)   Resp 18   Ht 5' 9.02" (1.753 m)   Wt 67.6 kg (149 lb)   SpO2 100%   BMI 21.99 kg/m²     Labs Reviewed and Include      Recent Labs  Lab 17  0923   *   CALCIUM 9.1      K 4.7   CO2 19*   *   BUN 80*   CREATININE 3.3*     Lab Results   Component Value Date    WBC 12.69 2017    HGB 9.2 (L) 2017    HCT 27.8 (L) 2017    MCV 87 2017     (H) 2017     No results for input(s): TSH, FREET4 in the last 168 hours.  Lab Results   Component Value Date    HGBA1C 9.9 (H) 2017       Nutritional status:   Body mass index is 21.99 kg/m².  Lab Results   Component Value Date    ALBUMIN 2.3 (L) 2017    ALBUMIN 2.9 (L) 2017    ALBUMIN 3.2 (L) 2017     No results found for: PREALBUMIN    Estimated Creatinine Clearance: 15.9 mL/min (based on SCr of 3.3 mg/dL (H)).    Accu-Checks  Recent Labs      17   1400  17   1500  17   1617  17   1704  17   1800  17   1905  17   1954  17   2033  17   0024  17   0853   POCTGLUCOSE  280*  183*  146*  124*  111*  112*  117*  103  126*  202*       Current Medications and/or Treatments Impacting Glycemic Control  Immunotherapy:  Immunosuppressants     None        Steroids:   Hormones     None        Pressors:    Autonomic Drugs     None        Hyperglycemia/Diabetes Medications: Antihyperglycemics     Start     Stop Route Frequency Ordered    17 0800  insulin aspart pen 6 Units      -- SubQ 3 times daily with meals 17 0646    17 0746  insulin aspart pen 0-5 Units      " -- SubQ Before meals & nightly PRN 09/26/17 0646    09/25/17 1830  insulin detemir pen 20 Units      -- SubQ Nightly 09/25/17 1829

## 2017-09-26 NOTE — NURSING
MD notified of pt recbossman carmichael per order. BCG checked at 2000 and resulted 117. Gtt running at 0.1 unit/hr and unable to be titrated any further. MD instructed to d/c insulin infusion as well as D51/2NS +20K. Recheck blood sugar in 30 min per insulin set orders, then Q4 thereafter. WCTM.

## 2017-09-26 NOTE — SUBJECTIVE & OBJECTIVE
Interval History: NAEON. Patient started on insulin detemir 20U at 18:00, insulin drip stopped at 20:00. Sugars have been 100s-110s overnight, fasting this morning 125. This morning, patient drowsy, sleepy, oriented to place only. Has no complaints.    Review of Systems   Constitutional: Negative for chills, fever and unexpected weight change.   HENT: Negative for sore throat.    Eyes: Negative for visual disturbance.   Respiratory: Negative for cough and shortness of breath.    Cardiovascular: Negative for chest pain and leg swelling.   Gastrointestinal: Negative for abdominal pain, nausea and vomiting.   Genitourinary: Negative for dysuria.   Musculoskeletal: Negative for arthralgias.   Skin: Negative for rash.   Neurological: Negative for headaches.   Hematological: Does not bruise/bleed easily.   Psychiatric/Behavioral: Negative for hallucinations.     Objective:     Vital Signs (Most Recent):  Temp: 97.3 °F (36.3 °C) (09/26/17 1106)  Pulse: 63 (09/26/17 1106)  Resp: 18 (09/26/17 1106)  BP: (!) 151/77 (09/26/17 1106)  SpO2: 100 % (09/26/17 1106) Vital Signs (24h Range):  Temp:  [97.3 °F (36.3 °C)-98.9 °F (37.2 °C)] 97.3 °F (36.3 °C)  Pulse:  [60-65] 63  Resp:  [18-32] 18  SpO2:  [97 %-100 %] 100 %  BP: (112-151)/(57-77) 151/77     Weight: 67.6 kg (149 lb)  Body mass index is 21.99 kg/m².    Intake/Output Summary (Last 24 hours) at 09/26/17 1427  Last data filed at 09/26/17 1200   Gross per 24 hour   Intake           281.41 ml   Output             1375 ml   Net         -1093.59 ml      Physical Exam   Constitutional: He appears well-developed.   HENT:   Head: Normocephalic and atraumatic.   Eyes: Pupils are equal, round, and reactive to light.   Neck: No tracheal deviation present. No thyromegaly present.   Cardiovascular: Normal rate, regular rhythm and normal heart sounds.    No murmur heard.  Pulmonary/Chest: Effort normal and breath sounds normal.   Abdominal: Soft. Bowel sounds are normal. There is no  tenderness.   Musculoskeletal: He exhibits no edema.   Neurological: He has normal reflexes. No cranial nerve deficit.   sensation intact to vibration and monofilament   Skin: No rash noted.   No nodules   Vitals reviewed.      Significant Labs:   Recent Results (from the past 24 hour(s))   POCT glucose    Collection Time: 09/25/17  3:00 PM   Result Value Ref Range    POCT Glucose 183 (H) 70 - 110 mg/dL   POCT glucose    Collection Time: 09/25/17  4:17 PM   Result Value Ref Range    POCT Glucose 146 (H) 70 - 110 mg/dL   POCT glucose    Collection Time: 09/25/17  5:04 PM   Result Value Ref Range    POCT Glucose 124 (H) 70 - 110 mg/dL   Basic metabolic panel    Collection Time: 09/25/17  5:40 PM   Result Value Ref Range    Sodium 146 (H) 136 - 145 mmol/L    Potassium 4.6 3.5 - 5.1 mmol/L    Chloride 118 (H) 95 - 110 mmol/L    CO2 18 (L) 23 - 29 mmol/L    Glucose 112 (H) 70 - 110 mg/dL    BUN, Bld 80 (H) 8 - 23 mg/dL    Creatinine 3.4 (H) 0.5 - 1.4 mg/dL    Calcium 9.0 8.7 - 10.5 mg/dL    Anion Gap 10 8 - 16 mmol/L    eGFR if African American 18.1 (A) >60 mL/min/1.73 m^2    eGFR if non  15.7 (A) >60 mL/min/1.73 m^2   POCT glucose    Collection Time: 09/25/17  6:00 PM   Result Value Ref Range    POCT Glucose 111 (H) 70 - 110 mg/dL   POCT glucose    Collection Time: 09/25/17  7:05 PM   Result Value Ref Range    POCT Glucose 112 (H) 70 - 110 mg/dL   POCT glucose    Collection Time: 09/25/17  7:54 PM   Result Value Ref Range    POCT Glucose 117 (H) 70 - 110 mg/dL   POCT glucose    Collection Time: 09/25/17  8:33 PM   Result Value Ref Range    POCT Glucose 103 70 - 110 mg/dL   Basic metabolic panel    Collection Time: 09/25/17  9:10 PM   Result Value Ref Range    Sodium 145 136 - 145 mmol/L    Potassium 4.6 3.5 - 5.1 mmol/L    Chloride 116 (H) 95 - 110 mmol/L    CO2 18 (L) 23 - 29 mmol/L    Glucose 113 (H) 70 - 110 mg/dL    BUN, Bld 79 (H) 8 - 23 mg/dL    Creatinine 3.3 (H) 0.5 - 1.4 mg/dL    Calcium 8.8 8.7  - 10.5 mg/dL    Anion Gap 11 8 - 16 mmol/L    eGFR if African American 18.8 (A) >60 mL/min/1.73 m^2    eGFR if non  16.2 (A) >60 mL/min/1.73 m^2   POCT glucose    Collection Time: 09/26/17 12:24 AM   Result Value Ref Range    POCT Glucose 126 (H) 70 - 110 mg/dL   Basic metabolic panel    Collection Time: 09/26/17  2:15 AM   Result Value Ref Range    Sodium 145 136 - 145 mmol/L    Potassium 5.7 (H) 3.5 - 5.1 mmol/L    Chloride 120 (H) 95 - 110 mmol/L    CO2 13 (L) 23 - 29 mmol/L    Glucose 123 (H) 70 - 110 mg/dL    BUN, Bld 79 (H) 8 - 23 mg/dL    Creatinine 3.2 (H) 0.5 - 1.4 mg/dL    Calcium 8.5 (L) 8.7 - 10.5 mg/dL    Anion Gap 12 8 - 16 mmol/L    eGFR if African American 19.5 (A) >60 mL/min/1.73 m^2    eGFR if non  16.9 (A) >60 mL/min/1.73 m^2   Basic metabolic panel    Collection Time: 09/26/17  4:56 AM   Result Value Ref Range    Sodium 143 136 - 145 mmol/L    Potassium 4.6 3.5 - 5.1 mmol/L    Chloride 116 (H) 95 - 110 mmol/L    CO2 18 (L) 23 - 29 mmol/L    Glucose 125 (H) 70 - 110 mg/dL    BUN, Bld 79 (H) 8 - 23 mg/dL    Creatinine 3.3 (H) 0.5 - 1.4 mg/dL    Calcium 8.7 8.7 - 10.5 mg/dL    Anion Gap 9 8 - 16 mmol/L    eGFR if African American 18.8 (A) >60 mL/min/1.73 m^2    eGFR if non  16.2 (A) >60 mL/min/1.73 m^2   POCT glucose    Collection Time: 09/26/17  8:53 AM   Result Value Ref Range    POCT Glucose 202 (H) 70 - 110 mg/dL   CBC auto differential    Collection Time: 09/26/17  9:23 AM   Result Value Ref Range    WBC 12.69 3.90 - 12.70 K/uL    RBC 3.18 (L) 4.60 - 6.20 M/uL    Hemoglobin 9.2 (L) 14.0 - 18.0 g/dL    Hematocrit 27.8 (L) 40.0 - 54.0 %    MCV 87 82 - 98 fL    MCH 28.9 27.0 - 31.0 pg    MCHC 33.1 32.0 - 36.0 g/dL    RDW 14.1 11.5 - 14.5 %    Platelets 396 (H) 150 - 350 K/uL    MPV 10.9 9.2 - 12.9 fL    Gran # 9.7 (H) 1.8 - 7.7 K/uL    Lymph # 2.2 1.0 - 4.8 K/uL    Mono # 0.5 0.3 - 1.0 K/uL    Eos # 0.2 0.0 - 0.5 K/uL    Baso # 0.03 0.00 - 0.20 K/uL     Gran% 76.3 (H) 38.0 - 73.0 %    Lymph% 17.3 (L) 18.0 - 48.0 %    Mono% 3.9 (L) 4.0 - 15.0 %    Eosinophil% 1.8 0.0 - 8.0 %    Basophil% 0.2 0.0 - 1.9 %    Differential Method Automated    Basic metabolic panel    Collection Time: 09/26/17  9:23 AM   Result Value Ref Range    Sodium 142 136 - 145 mmol/L    Potassium 4.7 3.5 - 5.1 mmol/L    Chloride 115 (H) 95 - 110 mmol/L    CO2 19 (L) 23 - 29 mmol/L    Glucose 193 (H) 70 - 110 mg/dL    BUN, Bld 80 (H) 8 - 23 mg/dL    Creatinine 3.3 (H) 0.5 - 1.4 mg/dL    Calcium 9.1 8.7 - 10.5 mg/dL    Anion Gap 8 8 - 16 mmol/L    eGFR if African American 18.8 (A) >60 mL/min/1.73 m^2    eGFR if non  16.2 (A) >60 mL/min/1.73 m^2   POCT glucose    Collection Time: 09/26/17 12:12 PM   Result Value Ref Range    POCT Glucose 193 (H) 70 - 110 mg/dL   ]    Significant Imaging: I have reviewed all pertinent imaging results/findings within the past 24 hours.

## 2017-09-26 NOTE — PLAN OF CARE
"CM to bedside - pt sleepy; CM familiar w/ pt & obtained assessment info from MR. Pt is a resident at [facility] and expected to return when medically ready.      CM provided patient anticipated STEPHANIE which will be update by nursing staff. Patient provided a Blue "My Health Packet" for d/c planning and health tool. Patient verbalized understanding.       09/26/17 5502   Discharge Assessment   Assessment Type Discharge Planning Assessment   Confirmed/corrected address and phone number on facesheet? Yes   Assessment information obtained from? Patient;Medical Record  (pt sleepy)   Expected Length of Stay (days) 3   Communicated expected length of stay with patient/caregiver yes   Prior to hospitilization cognitive status: Alert/Oriented   Prior to hospitalization functional status: Assistive Equipment;Needs Assistance;Partially Dependent   Current cognitive status: Not Oriented to Time  (pt sleepy)   Current Functional Status: Assistive Equipment;Needs Assistance;Partially Dependent   Facility Arrived From: [facility]   Lives With facility resident   Able to Return to Prior Arrangements yes   Is patient able to care for self after discharge? No   Who are your caregiver(s) and their phone number(s)? sister-in-law - Kamini [phone]; toño Hylton [phone]; [facility] - [phone]   Patient's perception of discharge disposition nursing home   Readmission Within The Last 30 Days previous discharge plan unsuccessful   If yes, most recent facility name: [facility]   Patient currently being followed by outpatient case management? No   Patient currently receives any other outside agency services? No   Equipment Currently Used at Home other (see comments)  (facility DME)   Do you have any problems affording any of your prescribed medications? No   Is the patient taking medications as prescribed? yes   Does the patient have transportation home? Yes   Transportation Available agency transportation;van, " wheelchair accessible  (facility transport)   Dialysis Name and Scheduled days N/A   Does the patient receive services at the [facility]? No   Discharge Plan A Return to nursing home   Discharge Plan B Skilled Nursing Facility   Patient/Family In Agreement With Plan yes   Readmission Questionnaire   At the time of your discharge, did someone talk to you about what your health problems were? Yes   At the time of discharge, did someone talk to you about what to watch out for regarding worsening of your health problem? Yes   At the time of discharge, did someone talk to you about what to do if you experienced worsening of your health problem? Yes   At the time of discharge, did someone talk to you about which medication to take when you left the hospital and which ones to stop taking? Yes   At the time of discharge, did someone talk to you about when and where to follow up with a doctor after you left the hospital? Yes   What do you believe caused you to be sick enough to be re-admitted? elevated CBG >600   How often do you need to have someone help you when you read instructions, pamphlets, or other written material from your doctor or pharmacy? Sometimes   Do you have problems taking your medications as prescribed? No   Do you have any problems affording any of  your prescribed medications? No   Do you have problems obtaining/receiving your medications? No   Does the patient have transportation to healthcare appointments? Yes   Living Arrangements residential facility   Does the patient have family/friends to help with healtcare needs after discharge? yes   Does your caregiver provide all the help you need? Yes   Are you currently feeling confused? Yes   Are you currently having problems thinking? Yes   Are you currently having memory problems? Yes

## 2017-09-26 NOTE — MEDICAL/APP STUDENT
Progress Note  Hospital Medicine    Patient Name: Julius Cardenas  YOB: 1932    Admit Date: 9/25/2017                     LOS: 1    SUBJECTIVE:     Reason for Admission:  DKA, type 1  See H&P for detailed presentating history and ROS.      Interval history: NAEON. Nurse reports that patient was occasionally agitated and disoriented to situation. This morning, he was alert and oriented.  He denied chest pain, SOB, nausea, headache, and abdominal pain.      OBJECTIVE:     Vital Signs Range (Last 24H):  Temp:  [97.3 °F (36.3 °C)-98.9 °F (37.2 °C)]   Pulse:  [60-65]   Resp:  [18-32]   BP: (112-151)/(57-77)   SpO2:  [97 %-100 %] Body mass index is 21.99 kg/m².  Wt Readings from Last 1 Encounters:   09/25/17 2000 67.6 kg (149 lb)   09/25/17 0214 67.6 kg (149 lb)       I & O (Last 24H):  Intake/Output Summary (Last 24 hours) at 09/26/17 1316  Last data filed at 09/26/17 1200   Gross per 24 hour   Intake           281.41 ml   Output             1375 ml   Net         -1093.59 ml       Physical Exam:  General: well developed, frail  HENT: Head:normocephalic, atraumatic. Ears: external ear canals normal. Nose: Nares normal. Septum midline. Mucosa normal. No drainage or sinus tenderness., no discharge. Throat:oropharynx dry, gilda appears swollen but patient reports this is normal; teeth and gums normal, no throat erythema   Eyes: conjunctivae/corneas clear. PERRL.   Lungs:  clear to auscultation bilaterally and normal respiratory effort  Cardiovascular: Heart: regular rate and rhythm, S1, S2 normal, no murmur, click, rub or gallop. Chest Wall: no tenderness. Extremities: no cyanosis or edema, or clubbing. Pulses: 2+ and symmetric  Abdomen/Rectal: Abdomen: possible minimal guarding, but pt reports no tenderness. soft, non-tender non-distented; bowel sounds normal; no masses,  no organomegaly  Rectal: normal tone, no masses or tenderness and not examined  Skin: trophic changes of the legs  bilaterally  Musculoskeletal:no clubbing, cyanosis and did not assess gait  Neurologic: Normal strength and tone. No focal numbness or weakness  Psych/Behavioral:  Normal.    Diagnostic Results:  Lab Results   Component Value Date    WBC 12.69 09/26/2017    HGB 9.2 (L) 09/26/2017    HCT 27.8 (L) 09/26/2017    MCV 87 09/26/2017     (H) 09/26/2017       Recent Labs  Lab 09/26/17  0923   *      K 4.7   *   CO2 19*   BUN 80*   CREATININE 3.3*   CALCIUM 9.1     Lab Results   Component Value Date    INR 1.0 06/27/2016    INR 1.0 06/03/2016    INR 0.9 03/30/2015     Lab Results   Component Value Date    HGBA1C 9.9 (H) 09/18/2017     Recent Labs      09/25/17   1704  09/25/17   1800  09/25/17   1905  09/25/17   1954  09/25/17   2033  09/26/17   0024  09/26/17   0853  09/26/17   1212   POCTGLUCOSE  124*  111*  112*  117*  103  126*  202*  193*       ASSESSMENT/PLAN:     Active Hospital Problems    Diagnosis  POA    *DKA, type 1 [E10.10]  Yes    DKA (diabetic ketoacidoses) [E13.10]  Yes    Leukocytosis [D72.829]  Yes    Urinary retention [R33.9]  Yes    Weakness following cerebrovascular accident (CVA) [I69.398, R53.1]  Not Applicable     Chronic    NIALL (acute kidney injury) [N17.9]  Yes    Renovascular hypertension [I15.0]  Yes    Hyperkalemia, transcellular shifts [E87.5]  Yes    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     Mr. Julius Cardenas is an 83yo M with sig PMx of DM, renovascular HTN, CKD stage IV and recent admission for HHS + bacteremia, being managed for DKA.     DKA  - anion gap closed from 14 on admission, now 9.  - Potassium normalized to 4.6  - BG down to 125 this am  - Insulin was discontinued ~7PM last night and is currently receiving insulin detemir 20 units and 6 units aspart with  low-dose sliding scale.  - continue accuchecks Qhr, BMP daily    Bacteremia  - continue Cipro 250mg daily; end date: 10/5  - blood cultures x2: no growth to date    NIALL  - Cr on admit was 4, now 3.3  - good urine output, cont to monitor I's and O's    Renovascular HTN  - coreg 25 BID  - nifedipine 90mg daily  - hold lisinopril due to NIALL    Secondary Seizure Disorder  - continue Keppra 1000mg    CKD  - see NIALL above.  - renally dose meds.    DM  - see DKA plan above.    Urinary retention  - found on last admission  - continue indwelling catheter  - f/u with urology output    VTE ppx  - heparin SubQ 5,000 units Q8H       Ansley Rosas MS3

## 2017-09-26 NOTE — ASSESSMENT & PLAN NOTE
- WBC 15.4 on admit (last 9/21 WBC 12)  - UA clear  - CXR clear  - blood cultures x2 prelim negative  - continue ciprofloxacin, end date 10/5/17

## 2017-09-26 NOTE — ASSESSMENT & PLAN NOTE
Per primary  Workup may lead to underlying cause of DKA  - last admission 1/2 bottles of BCX positive for pan sensitive pseudomonas , FU blood cultures have been negative , pt remains on cipro per primary team

## 2017-09-27 VITALS
RESPIRATION RATE: 18 BRPM | WEIGHT: 149 LBS | SYSTOLIC BLOOD PRESSURE: 129 MMHG | HEART RATE: 70 BPM | DIASTOLIC BLOOD PRESSURE: 72 MMHG | BODY MASS INDEX: 22.07 KG/M2 | TEMPERATURE: 98 F | OXYGEN SATURATION: 98 % | HEIGHT: 69 IN

## 2017-09-27 LAB
ANION GAP SERPL CALC-SCNC: 8 MMOL/L
BACTERIA UR CULT: NORMAL
BACTERIA UR CULT: NORMAL
BASOPHILS # BLD AUTO: 0.02 K/UL
BASOPHILS NFR BLD: 0.2 %
BUN SERPL-MCNC: 79 MG/DL
CALCIUM SERPL-MCNC: 8.5 MG/DL
CHLORIDE SERPL-SCNC: 114 MMOL/L
CO2 SERPL-SCNC: 17 MMOL/L
CREAT SERPL-MCNC: 3.2 MG/DL
DIFFERENTIAL METHOD: ABNORMAL
EOSINOPHIL # BLD AUTO: 0.2 K/UL
EOSINOPHIL NFR BLD: 2 %
ERYTHROCYTE [DISTWIDTH] IN BLOOD BY AUTOMATED COUNT: 14.4 %
EST. GFR  (AFRICAN AMERICAN): 19.5 ML/MIN/1.73 M^2
EST. GFR  (NON AFRICAN AMERICAN): 16.9 ML/MIN/1.73 M^2
GLUCOSE SERPL-MCNC: 125 MG/DL
HCT VFR BLD AUTO: 26.7 %
HGB BLD-MCNC: 8.8 G/DL
LYMPHOCYTES # BLD AUTO: 2.1 K/UL
LYMPHOCYTES NFR BLD: 20.1 %
MCH RBC QN AUTO: 28.5 PG
MCHC RBC AUTO-ENTMCNC: 33 G/DL
MCV RBC AUTO: 86 FL
MONOCYTES # BLD AUTO: 0.6 K/UL
MONOCYTES NFR BLD: 6.1 %
NEUTROPHILS # BLD AUTO: 7.4 K/UL
NEUTROPHILS NFR BLD: 71.2 %
PLATELET # BLD AUTO: 341 K/UL
PMV BLD AUTO: 11 FL
POCT GLUCOSE: 110 MG/DL (ref 70–110)
POCT GLUCOSE: 135 MG/DL (ref 70–110)
POCT GLUCOSE: 98 MG/DL (ref 70–110)
POTASSIUM SERPL-SCNC: 4.5 MMOL/L
RBC # BLD AUTO: 3.09 M/UL
SODIUM SERPL-SCNC: 139 MMOL/L
WBC # BLD AUTO: 10.44 K/UL

## 2017-09-27 PROCEDURE — 99238 HOSP IP/OBS DSCHRG MGMT 30/<: CPT | Mod: GC,,, | Performed by: INTERNAL MEDICINE

## 2017-09-27 PROCEDURE — 36415 COLL VENOUS BLD VENIPUNCTURE: CPT

## 2017-09-27 PROCEDURE — 85025 COMPLETE CBC W/AUTO DIFF WBC: CPT

## 2017-09-27 PROCEDURE — 63600175 PHARM REV CODE 636 W HCPCS: Performed by: STUDENT IN AN ORGANIZED HEALTH CARE EDUCATION/TRAINING PROGRAM

## 2017-09-27 PROCEDURE — 25000003 PHARM REV CODE 250: Performed by: STUDENT IN AN ORGANIZED HEALTH CARE EDUCATION/TRAINING PROGRAM

## 2017-09-27 PROCEDURE — 80048 BASIC METABOLIC PNL TOTAL CA: CPT

## 2017-09-27 RX ORDER — INSULIN ASPART 100 [IU]/ML
6 INJECTION, SOLUTION INTRAVENOUS; SUBCUTANEOUS
Qty: 15 ML | Refills: 11 | Status: ON HOLD | OUTPATIENT
Start: 2017-09-27 | End: 2017-10-21 | Stop reason: HOSPADM

## 2017-09-27 RX ADMIN — PAROXETINE HYDROCHLORIDE 10 MG: 10 TABLET, FILM COATED ORAL at 06:09

## 2017-09-27 RX ADMIN — ASPIRIN 81 MG: 81 TABLET, COATED ORAL at 09:09

## 2017-09-27 RX ADMIN — HEPARIN SODIUM 5000 UNITS: 5000 INJECTION, SOLUTION INTRAVENOUS; SUBCUTANEOUS at 06:09

## 2017-09-27 RX ADMIN — INSULIN ASPART 6 UNITS: 100 INJECTION, SOLUTION INTRAVENOUS; SUBCUTANEOUS at 09:09

## 2017-09-27 RX ADMIN — LEVETIRACETAM 1000 MG: 500 TABLET ORAL at 09:09

## 2017-09-27 RX ADMIN — CIPROFLOXACIN HYDROCHLORIDE 250 MG: 250 TABLET, FILM COATED ORAL at 09:09

## 2017-09-27 RX ADMIN — CARVEDILOL 25 MG: 25 TABLET, FILM COATED ORAL at 09:09

## 2017-09-27 RX ADMIN — NIFEDIPINE 90 MG: 30 TABLET, FILM COATED, EXTENDED RELEASE ORAL at 09:09

## 2017-09-27 NOTE — HOSPITAL COURSE
Patient's BG on admit was 610. In the Ed, he was initially given regular insulin push 5 units, started on a drip at 3.38u/hr (~0.05mg/kg/hr) and given IVF 1L bolus. His BG remained >500, insulin titrated up to 8.9u/hr and BG responded, titrated appropriately. Maintenance fluids ordered at 250 ml/hr with potassium supplement. Lindsay was exchanged (patient with chronic indwelling lindsay secondary to urinary retention). Insulin detemir 20 units started at 6pm, insulin drip turned off at 8pm, patient resumed diet. Insulin aspart started at 6u the next morning. Blood sugars have been well controlled, last fasting , insulin detemir decreased to 18u on endocrinology recommendation. Trigger of 2nd DKA still unknown at this unpoint. Infectious workup has been negative - CXR clear, UA clear, blood cultures x2 negative, afebrile, WBC trending down.

## 2017-09-27 NOTE — PLAN OF CARE
Problem: Patient Care Overview  Goal: Plan of Care Review  Outcome: Ongoing (interventions implemented as appropriate)  Pt is AOx2; disoriented to time & situation. AVSS; sats 100% on RA. No c/o pain/discomfort overnight. Chronic indwelling lindsay remains clean, dry, & intact with patent output. Monitored BG AC/HS; no coverage needed. No acute events/changes occurred throughout shift. Possible D/C sometime today. WCTM.

## 2017-09-27 NOTE — NURSING
Report given to Laura Pinon  At [facility]. Vital signs stable. Ivs removed. Chronic indwelling  Monzon clean and intact. Pt alert and oriented x2(person and place) Pt able to follow commands.

## 2017-09-27 NOTE — PLAN OF CARE
SW learned from CM, Yris Lam, that [facility] is [facility]. SW sent all necessary referral information, including orders, to [facility] via [facility] and asked them to notify her when Pt could be transferred back. SW to continue to follow.     JASON MasonW

## 2017-09-27 NOTE — DISCHARGE SUMMARY
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 He was recently admitted for hyperglycemia from 9/17 - 9/22 for HHS/DKA and was discharged on a regimen of insulin detemir 20 daily and aspart 8 TIDWM. He was also discharged with instructions to finish a 14 day course of oral ciprofloxacin for a UTI (proteus) and bacteremia (pseudomonas). According to nursing home, he was getting his scheduled insulin, and his sugars were initially controlled but were climbing each day, most recently in the 400s. On admission, his blood glucose was 610, beta hydroxybutyrate 2, VBG pH 7.2, K 6.2, bicarb 13, gap 14, U/A clean with no ketones, CXR no abnormalities, EKG NSR with no peaked T waves evident, WBC 15.4.    In the Ed, he was initially given regular insulin push 5 units, started on a drip at 3.38u/hr (~0.05mg/kg/hr) and given IVF 1L bolus. His BG remained >500, insulin titrated up to 8.9u/hr and BG responded, now 302. Nursing to titrate per protocol. Maintenance fluids ordered at 250 ml/hr with potassium supplement as latest K 4.4. Lindsay was exchanged (patient with chronic indwelling lindsay secondary to urinary retention).    * No surgery found *      Indwelling Lines/Drains at time of discharge:   Lines/Drains/Airways     Drain                 Urethral Catheter  09/17/17 2331 Double-lumen 16 Fr. 9 days              Hospital Course:   Patient's BG on admit was 610. In the Ed, he was initially given regular insulin push 5 units, started on a drip at 3.38u/hr (~0.05mg/kg/hr) and given IVF 1L bolus. His BG remained >500, insulin titrated up to 8.9u/hr and BG responded, titrated appropriately. Maintenance fluids ordered at 250 ml/hr with potassium supplement. Lindsay was exchanged (patient with chronic indwelling lindsay secondary to urinary retention). Insulin detemir 20 units started at 6pm, insulin drip turned off at 8pm, patient resumed diet. Insulin aspart started at 6u the next morning. Blood sugars have been well controlled, last fasting , insulin detemir decreased to 18u on endocrinology recommendation. Trigger of 2nd DKA still unknown at this unpoint. Infectious workup has been negative - CXR clear, UA clear, blood cultures x2 negative, afebrile, WBC trending down.     Consults:   Consults         Status Ordering Provider     Inpatient consult to Endocrinology  Once     Provider:  (Not yet assigned)    Completed SAYDA ALDANA          Significant Diagnostic Studies: Labs:   Recent Results (from the past 24 hour(s))   POCT glucose    Collection Time: 09/26/17 12:12 PM   Result Value Ref Range    POCT Glucose 193 (H) 70 - 110 mg/dL   Basic metabolic panel    Collection Time: 09/26/17  1:25 PM   Result Value Ref Range    Sodium 141 136 - 145 mmol/L    Potassium 4.9 3.5 - 5.1 mmol/L    Chloride 115 (H) 95 - 110 mmol/L    CO2 20 (L) 23 - 29 mmol/L    Glucose 177 (H) 70 - 110 mg/dL    BUN, Bld 76 (H) 8 - 23 mg/dL    Creatinine 3.2 (H) 0.5 - 1.4 mg/dL    Calcium 8.9 8.7 - 10.5 mg/dL    Anion Gap 6 (L) 8 - 16 mmol/L    eGFR if African American 19.5 (A) >60 mL/min/1.73 m^2    eGFR if non  16.9 (A) >60 mL/min/1.73 m^2   POCT glucose    Collection Time: 09/26/17  2:07 PM   Result Value Ref Range    POCT Glucose 211 (H) 70 - 110 mg/dL   POCT glucose    Collection  Time: 09/26/17  5:01 PM   Result Value Ref Range    POCT Glucose 167 (H) 70 - 110 mg/dL   POCT glucose    Collection Time: 09/26/17  9:30 PM   Result Value Ref Range    POCT Glucose 135 (H) 70 - 110 mg/dL   CBC auto differential    Collection Time: 09/27/17  4:19 AM   Result Value Ref Range    WBC 10.44 3.90 - 12.70 K/uL    RBC 3.09 (L) 4.60 - 6.20 M/uL    Hemoglobin 8.8 (L) 14.0 - 18.0 g/dL    Hematocrit 26.7 (L) 40.0 - 54.0 %    MCV 86 82 - 98 fL    MCH 28.5 27.0 - 31.0 pg    MCHC 33.0 32.0 - 36.0 g/dL    RDW 14.4 11.5 - 14.5 %    Platelets 341 150 - 350 K/uL    MPV 11.0 9.2 - 12.9 fL    Gran # 7.4 1.8 - 7.7 K/uL    Lymph # 2.1 1.0 - 4.8 K/uL    Mono # 0.6 0.3 - 1.0 K/uL    Eos # 0.2 0.0 - 0.5 K/uL    Baso # 0.02 0.00 - 0.20 K/uL    Gran% 71.2 38.0 - 73.0 %    Lymph% 20.1 18.0 - 48.0 %    Mono% 6.1 4.0 - 15.0 %    Eosinophil% 2.0 0.0 - 8.0 %    Basophil% 0.2 0.0 - 1.9 %    Differential Method Automated    Basic metabolic panel    Collection Time: 09/27/17  4:19 AM   Result Value Ref Range    Sodium 139 136 - 145 mmol/L    Potassium 4.5 3.5 - 5.1 mmol/L    Chloride 114 (H) 95 - 110 mmol/L    CO2 17 (L) 23 - 29 mmol/L    Glucose 125 (H) 70 - 110 mg/dL    BUN, Bld 79 (H) 8 - 23 mg/dL    Creatinine 3.2 (H) 0.5 - 1.4 mg/dL    Calcium 8.5 (L) 8.7 - 10.5 mg/dL    Anion Gap 8 8 - 16 mmol/L    eGFR if African American 19.5 (A) >60 mL/min/1.73 m^2    eGFR if non  16.9 (A) >60 mL/min/1.73 m^2   POCT glucose    Collection Time: 09/27/17  9:20 AM   Result Value Ref Range    POCT Glucose 98 70 - 110 mg/dL       Pending Diagnostic Studies:     None        Final Active Diagnoses:    Diagnosis Date Noted POA    PRINCIPAL PROBLEM:  DKA, type 1 [E10.10] 09/25/2017 Yes    Leukocytosis [D72.829] 09/21/2017 Yes    Renovascular hypertension [I15.0] 08/31/2013 Yes    NIALL (acute kidney injury) [N17.9] 11/01/2014 Yes    Secondary seizure disorder [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] 02/01/2013 Yes     Chronic    Weakness following  cerebrovascular accident (CVA) [I69.398, R53.1] 01/30/2016 Not Applicable     Chronic    CKD (chronic kidney disease) stage 4, GFR 15-29 ml/min [N18.4] 12/03/2012 Yes     Chronic    Urinary retention [R33.9] 08/13/2017 Yes    DKA (diabetic ketoacidoses) [E13.10] 09/25/2017 Yes    Hyperkalemia, transcellular shifts [E87.5] 04/06/2013 Yes      Problems Resolved During this Admission:    Diagnosis Date Noted Date Resolved POA      * DKA, type 1    - blood glucose at nursing home reportedly normal after discharge and increased to the 400s, 610 on admit  - Elevatated , beta hydroxybutyrate 2, VBG pH 7.2, K 6.2, gap 14, bicarb 13, U/A negative for ketones  - Unknown precipitation - possible infection, medication noncompliance / mismanagement at nursing home. Infectious workup thus far has been negative, and speaking to nursing home, patient does not appear to be mismanaged.  - Initially started on Insulin drip 3U in ED, 1L NS bolus, insulin regular 5 units push    - Insulin detemir 20U started at 18:00, insulin drip stopped at 20:00  - started aspart 6U with low dose sliding scale  - Glucose checks with meals and nightly; BMP daily  - consulted endocrine; appreciate help - decreasing insulin detemir to 18U        Leukocytosis    - WBC 15.4 on admit (last 9/21 WBC 12), now trending down  - UA clear  - CXR clear  - blood cultures x2 prelim negative  - continue ciprofloxacin, end date 10/5/17          Renovascular hypertension    - On Coreg 25 BID, Lisinopril 10mg, nifedipine 90 daily at home  -Holding lisinopril for NIALL, Cr 4.0 (baseline ~3), now stabilized at 3.3, can restart on discharge            NIALL (acute kidney injury)    - Baseline creatine around 2.6 - 3.0  - 4.0 on admit, now improved to 3.3  - Avoid nephrotoxic agents; renally dose meds (cipro changed to 250 mg per pharmacy)        Secondary seizure disorder    - On Keppra 1000 BID          Weakness following cerebrovascular accident (CVA)    - hx of  CVA with residual left sided hemiparesis          CKD (chronic kidney disease) stage 4, GFR 15-29 ml/min    - see above          Urinary retention    - noted during previous admission  - continue chronic indwelling lindsay  - follow up with urology outpatient              Discharged Condition: good    Disposition: Long Term Care    Follow Up:    Patient Instructions:     Diet general     Activity as tolerated       Medications:  Reconciled Home Medications:   Current Discharge Medication List      CONTINUE these medications which have CHANGED    Details   insulin aspart (NOVOLOG) 100 unit/mL InPn pen Inject 6 Units into the skin 3 (three) times daily with meals.  Qty: 15 mL, Refills: 11    Associated Diagnoses: 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      insulin detemir (LEVEMIR FLEXTOUCH) 100 unit/mL (3 mL) SubQ InPn pen Inject 18 Units into the skin once daily.  Qty: 1 Box, Refills: 2         CONTINUE these medications which have NOT CHANGED    Details   albuterol-ipratropium 2.5mg-0.5mg/3mL (DUO-NEB) 0.5 mg-3 mg(2.5 mg base)/3 mL nebulizer solution Take 3 mLs by nebulization every 4 (four) hours as needed for Wheezing or Shortness of Breath. Rescue  Qty: 1 vial, Refills: 6      aspirin (ECOTRIN) 81 MG EC tablet Take 1 tablet (81 mg total) by mouth once daily.  Qty: 30 tablet, Refills: 11      blood-glucose meter (CONTOUR METER) kit Use as instructed.  Please substitute appropriate meter to match his strips.  Qty: 1 each, Refills: 0    Associated Diagnoses: DM2 (diabetes mellitus, type 2); Type II or unspecified type diabetes mellitus with renal manifestations, uncontrolled      carvedilol (COREG) 25 MG tablet Take 1 tablet (25 mg total) by mouth 2 (two) times daily.  Qty: 60 tablet, Refills: 2      ciprofloxacin HCl (CIPRO) 500 MG tablet Take 1 tablet (500 mg total) by mouth once daily.  Qty: 13 tablet, Refills: 0      CONTOUR TEST STRIPS Strp USE AS DIRECTED THREE TIMES DAILY  Qty: 300 strip,  "Refills: 12    Comments: **Patient requests 90 days supply**  Associated Diagnoses: Diabetes mellitus due to abnormal insulin      lancets Misc Use three times daily for finger stick glucose monitoring.  Qty: 100 each, Refills: 3      levetiracetam (KEPPRA) 1000 MG tablet Take 1 tablet (1,000 mg total) by mouth 2 (two) times daily.  Qty: 180 tablet, Refills: 6    Associated Diagnoses: Partial symptomatic epilepsy with simple partial seizures, intractable, without status epilepticus      lisinopril 10 MG tablet Take 10 mg by mouth once daily.      nifedipine (PROCARDIA-XL) 90 MG (OSM) 24 hr tablet Take 1 tablet (90 mg total) by mouth once daily.  Qty: 30 tablet, Refills: 11      nystatin (MYCOSTATIN) cream Apply topically 2 (two) times daily.  Qty: 120 g, Refills: 3      paroxetine (PAXIL) 10 MG tablet Take 1 tablet (10 mg total) by mouth every morning.  Qty: 90 tablet, Refills: 11      pen needle, diabetic (BD INSULIN PEN NEEDLE UF SHORT) 31 gauge x 5/16" Ndle Inject 1 Device into the skin 4 (four) times daily.  Qty: 120 each, Refills: 11    Associated Diagnoses: 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           Time spent on the discharge of patient: 30 minutes      Daniel Petersen MD  Department of Hospital Medicine  [facility]  "

## 2017-09-27 NOTE — NURSING
Pt discharging back to NH, awaiting for SW to update notes on who to call for report, room assignment and transportation.

## 2017-09-27 NOTE — ASSESSMENT & PLAN NOTE
- blood glucose at nursing home reportedly normal after discharge and increased to the 400s, 610 on admit  - Elevatated , beta hydroxybutyrate 2, VBG pH 7.2, K 6.2, gap 14, bicarb 13, U/A negative for ketones  - Unknown precipitation - possible infection, medication noncompliance / mismanagement at nursing home. Infectious workup thus far has been negative, and speaking to nursing home, patient does not appear to be mismanaged.  - Initially started on Insulin drip 3U in ED, 1L NS bolus, insulin regular 5 units push    - Insulin detemir 20U started at 18:00, insulin drip stopped at 20:00  - started aspart 6U with low dose sliding scale  - Glucose checks with meals and nightly; BMP daily  - consulted endocrine; appreciate help - decreasing insulin detemir to 18U

## 2017-09-27 NOTE — PLAN OF CARE
[facility]     Department of Hospital Medicine     [address] [place] [address]     [phone] after hours  [phone] fax       NURSING HOME ORDERS    09/27/2017    Admit to Nursing Home:  Regular Bed                                               Diagnoses:  Active Hospital Problems    Diagnosis  POA    *DKA, type 1 [E10.10]  Yes    DKA (diabetic ketoacidoses) [E13.10]  Yes    Leukocytosis [D72.829]  Yes    Urinary retention [R33.9]  Yes    Weakness following cerebrovascular accident (CVA) [I69.398, R53.1]  Not Applicable     Chronic    NIALL (acute kidney injury) [N17.9]  Yes    Renovascular hypertension [I15.0]  Yes    Hyperkalemia, transcellular shifts [E87.5]  Yes    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       Patient is homebound due to:  DKA, type 1    Allergies:Review of patient's allergies indicates:  No Known Allergies    Vitals:       Once weekly      Diet: Diabetic Renal 2000 calorie diet    Acitivities:     - Up in a chair each morning as tolerated   - Ambulate with assistance to bathroom   - Scheduled walks once each shift (every 8 hours)   - May ambulate independently   - May use walker, cane, or self-propelled wheelchair    LABS:  Per facility protocol   CMP, CBC each month for 3 months   Pre-albumin each month for 3 months     Nursing Precautions:     - Aspiration precautions:             - Total assistance with meals            -  Upright 90 degrees befor during and after meals             -  Suction at bedside          - Fall precautions per nursing home protocol   - Seizure precaution per prison protocol   - Decubitus precautions:        -  for positioning   - Pressure reducing foam mattress   - Turn patient every two hours.  Use wedge pillows to anchor patient    CONSULTS:      Physical Therapy to evaluate and treat     Occupational Therapy to evaluate and treat      MISCELLANEOUS CARE:      Monzon Care: Empty Monzon bag every shift.  Change Monzon every month     Routine Skin for Bedridden Patients:  Apply moisture barrier cream to all    skin folds and wet areas in perineal area daily and after baths and                           all bowel movements.                  DIABETES CARE:        Check blood sugar:    Fingerstick blood sugar AC and HS   Fingerstick blood sugar every 6 hours if unable to eat      Report CBG < 60 or > 400 to physician.                                          Insulin Sliding Scale          Glucose  Novolog Insulin Subcutaneous        0 - 60   Orange juice or glucose tablet, hold insulin      No insulin   201-250  2 units   251-300  4 units   301-350  6 units   351-400  8 units   >400   10 units then call physician      Medications: Discontinue all previous medication orders, if any. See new list below.     Julius Cardenas   Home Medication Instructions CAM:[id number]    Printed on:09/27/17 8194   Medication Information                      albuterol-ipratropium 2.5mg-0.5mg/3mL (DUO-NEB) 0.5 mg-3 mg(2.5 mg base)/3 mL nebulizer solution  Take 3 mLs by nebulization every 4 (four) hours as needed for Wheezing or Shortness of Breath. Rescue             aspirin (ECOTRIN) 81 MG EC tablet  Take 1 tablet (81 mg total) by mouth once daily.             blood-glucose meter (CONTOUR METER) kit  Use as instructed.  Please substitute appropriate meter to match his strips.             carvedilol (COREG) 25 MG tablet  Take 1 tablet (25 mg total) by mouth 2 (two) times daily.             ciprofloxacin HCl (CIPRO) 500 MG tablet  Take 1 tablet (500 mg total) by mouth once daily until 10/6/17             CONTOUR TEST STRIPS Strp  USE AS DIRECTED THREE TIMES DAILY             insulin aspart (NOVOLOG) 100 unit/mL InPn  "pen  Inject 6 Units into the skin 3 (three) times daily with meals.             insulin detemir (LEVEMIR FLEXTOUCH) 100 unit/mL (3 mL) SubQ InPn pen  Inject 18 Units into the skin once daily.             lancets Misc  Use three times daily for finger stick glucose monitoring.             levetiracetam (KEPPRA) 1000 MG tablet  Take 1 tablet (1,000 mg total) by mouth 2 (two) times daily.             lisinopril 10 MG tablet  Take 10 mg by mouth once daily.             nifedipine (PROCARDIA-XL) 90 MG (OSM) 24 hr tablet  Take 1 tablet (90 mg total) by mouth once daily.             nystatin (MYCOSTATIN) cream  Apply topically 2 (two) times daily.             paroxetine (PAXIL) 10 MG tablet  Take 1 tablet (10 mg total) by mouth every morning.             pen needle, diabetic (BD INSULIN PEN NEEDLE UF SHORT) 31 gauge x 5/16" Ndle  Inject 1 Device into the skin 4 (four) times daily.                       _________________________________  Julia Barrera MD  09/27/2017    "

## 2017-09-27 NOTE — SUBJECTIVE & OBJECTIVE
Interval History: NAEON. Patient's fasting glucose 125 this morning, blood glucose well controlled on regimen. Patient awake and alert this morning, has no complaints at this time.    Review of Systems   Constitutional: Negative for chills, fever and unexpected weight change.   HENT: Negative for sore throat.    Eyes: Negative for visual disturbance.   Respiratory: Negative for cough and shortness of breath.    Cardiovascular: Negative for chest pain and leg swelling.   Gastrointestinal: Negative for abdominal pain, nausea and vomiting.   Genitourinary: Negative for dysuria.   Musculoskeletal: Negative for arthralgias.   Skin: Negative for rash.   Neurological: Negative for headaches.   Hematological: Does not bruise/bleed easily.   Psychiatric/Behavioral: Negative for hallucinations.     Objective:     Vital Signs (Most Recent):  Temp: 96.5 °F (35.8 °C) (09/27/17 0732)  Pulse: 65 (09/27/17 1100)  Resp: 16 (09/27/17 0732)  BP: 139/65 (09/27/17 0929)  SpO2: 98 % (09/27/17 0428) Vital Signs (24h Range):  Temp:  [96.5 °F (35.8 °C)-97.8 °F (36.6 °C)] 96.5 °F (35.8 °C)  Pulse:  [64-71] 65  Resp:  [16-20] 16  SpO2:  [94 %-100 %] 98 %  BP: (121-143)/(63-76) 139/65     Weight: 67.6 kg (149 lb)  Body mass index is 21.99 kg/m².    Intake/Output Summary (Last 24 hours) at 09/27/17 1125  Last data filed at 09/27/17 1000   Gross per 24 hour   Intake              360 ml   Output             1925 ml   Net            -1565 ml      Physical Exam   Constitutional: He appears well-developed.   HENT:   Head: Normocephalic and atraumatic.   Eyes: Pupils are equal, round, and reactive to light.   Neck: No tracheal deviation present. No thyromegaly present.   Cardiovascular: Normal rate, regular rhythm and normal heart sounds.    No murmur heard.  Pulmonary/Chest: Effort normal and breath sounds normal.   Abdominal: Soft. Bowel sounds are normal. There is no tenderness.   Musculoskeletal: He exhibits no edema.   Neurological: He has  normal reflexes. No cranial nerve deficit.   sensation intact to vibration and monofilament   Skin: No rash noted.   No nodules   Vitals reviewed.      Significant Labs:   Recent Results (from the past 24 hour(s))   POCT glucose    Collection Time: 09/26/17 12:12 PM   Result Value Ref Range    POCT Glucose 193 (H) 70 - 110 mg/dL   Basic metabolic panel    Collection Time: 09/26/17  1:25 PM   Result Value Ref Range    Sodium 141 136 - 145 mmol/L    Potassium 4.9 3.5 - 5.1 mmol/L    Chloride 115 (H) 95 - 110 mmol/L    CO2 20 (L) 23 - 29 mmol/L    Glucose 177 (H) 70 - 110 mg/dL    BUN, Bld 76 (H) 8 - 23 mg/dL    Creatinine 3.2 (H) 0.5 - 1.4 mg/dL    Calcium 8.9 8.7 - 10.5 mg/dL    Anion Gap 6 (L) 8 - 16 mmol/L    eGFR if African American 19.5 (A) >60 mL/min/1.73 m^2    eGFR if non  16.9 (A) >60 mL/min/1.73 m^2   POCT glucose    Collection Time: 09/26/17  2:07 PM   Result Value Ref Range    POCT Glucose 211 (H) 70 - 110 mg/dL   POCT glucose    Collection Time: 09/26/17  5:01 PM   Result Value Ref Range    POCT Glucose 167 (H) 70 - 110 mg/dL   POCT glucose    Collection Time: 09/26/17  9:30 PM   Result Value Ref Range    POCT Glucose 135 (H) 70 - 110 mg/dL   CBC auto differential    Collection Time: 09/27/17  4:19 AM   Result Value Ref Range    WBC 10.44 3.90 - 12.70 K/uL    RBC 3.09 (L) 4.60 - 6.20 M/uL    Hemoglobin 8.8 (L) 14.0 - 18.0 g/dL    Hematocrit 26.7 (L) 40.0 - 54.0 %    MCV 86 82 - 98 fL    MCH 28.5 27.0 - 31.0 pg    MCHC 33.0 32.0 - 36.0 g/dL    RDW 14.4 11.5 - 14.5 %    Platelets 341 150 - 350 K/uL    MPV 11.0 9.2 - 12.9 fL    Gran # 7.4 1.8 - 7.7 K/uL    Lymph # 2.1 1.0 - 4.8 K/uL    Mono # 0.6 0.3 - 1.0 K/uL    Eos # 0.2 0.0 - 0.5 K/uL    Baso # 0.02 0.00 - 0.20 K/uL    Gran% 71.2 38.0 - 73.0 %    Lymph% 20.1 18.0 - 48.0 %    Mono% 6.1 4.0 - 15.0 %    Eosinophil% 2.0 0.0 - 8.0 %    Basophil% 0.2 0.0 - 1.9 %    Differential Method Automated    Basic metabolic panel    Collection Time:  09/27/17  4:19 AM   Result Value Ref Range    Sodium 139 136 - 145 mmol/L    Potassium 4.5 3.5 - 5.1 mmol/L    Chloride 114 (H) 95 - 110 mmol/L    CO2 17 (L) 23 - 29 mmol/L    Glucose 125 (H) 70 - 110 mg/dL    BUN, Bld 79 (H) 8 - 23 mg/dL    Creatinine 3.2 (H) 0.5 - 1.4 mg/dL    Calcium 8.5 (L) 8.7 - 10.5 mg/dL    Anion Gap 8 8 - 16 mmol/L    eGFR if African American 19.5 (A) >60 mL/min/1.73 m^2    eGFR if non  16.9 (A) >60 mL/min/1.73 m^2   POCT glucose    Collection Time: 09/27/17  9:20 AM   Result Value Ref Range    POCT Glucose 98 70 - 110 mg/dL   ]    Significant Imaging: I have reviewed all pertinent imaging results/findings within the past 24 hours.

## 2017-09-27 NOTE — ASSESSMENT & PLAN NOTE
- On Coreg 25 BID, Lisinopril 10mg, nifedipine 90 daily at home  -Holding lisinopril for NIALL, Cr 4.0 (baseline ~3), now stabilized at 3.3, can restart on discharge

## 2017-09-27 NOTE — PLAN OF CARE
DAVID received call from Akira at [facility] stating Pt could return there today. Pt will be going to room number 209 in the [facility]. Report can be called to Mike at [phone]. DAVID informed Pt's nurse of above. DAVID arranged ambulance transport via [facility] ([id number]) for within the hour.     Alma Amador, JASONW

## 2017-09-27 NOTE — PROGRESS NOTES
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 He was recently admitted for hyperglycemia from 9/17 - 9/22 for HHS/DKA and was discharged on a regimen of insulin detemir 20 daily and aspart 8 TIDWM. He was also discharged with instructions to finish a 14 day course of oral ciprofloxacin for a UTI (proteus) and bacteremia (pseudomonas). According to nursing home, he was getting his scheduled insulin, and his sugars were initially controlled but were climbing each day, most recently in the 400s. On admission, his blood glucose was 610, beta hydroxybutyrate 2, VBG pH 7.2, K 6.2, bicarb 13, gap 14, U/A clean with no ketones, CXR no abnormalities, EKG NSR with no peaked T waves evident, WBC 15.4.    In the Ed, he was initially given regular insulin push 5 units, started on a drip at 3.38u/hr (~0.05mg/kg/hr) and given IVF 1L bolus. His BG remained >500, insulin titrated up to 8.9u/hr and BG responded, now 302. Nursing to titrate per protocol. Maintenance fluids ordered at 250 ml/hr with potassium supplement as latest K 4.4. Lindsay was exchanged (patient with chronic indwelling lindsay secondary to urinary retention).    Hospital Course:  No notes on file    Interval History: NAEON. Patient's fasting glucose 125 this morning, blood glucose well controlled on regimen. Patient awake  and alert this morning, has no complaints at this time.    Review of Systems   Constitutional: Negative for chills, fever and unexpected weight change.   HENT: Negative for sore throat.    Eyes: Negative for visual disturbance.   Respiratory: Negative for cough and shortness of breath.    Cardiovascular: Negative for chest pain and leg swelling.   Gastrointestinal: Negative for abdominal pain, nausea and vomiting.   Genitourinary: Negative for dysuria.   Musculoskeletal: Negative for arthralgias.   Skin: Negative for rash.   Neurological: Negative for headaches.   Hematological: Does not bruise/bleed easily.   Psychiatric/Behavioral: Negative for hallucinations.     Objective:     Vital Signs (Most Recent):  Temp: 96.5 °F (35.8 °C) (09/27/17 0732)  Pulse: 65 (09/27/17 1100)  Resp: 16 (09/27/17 0732)  BP: 139/65 (09/27/17 0929)  SpO2: 98 % (09/27/17 0428) Vital Signs (24h Range):  Temp:  [96.5 °F (35.8 °C)-97.8 °F (36.6 °C)] 96.5 °F (35.8 °C)  Pulse:  [64-71] 65  Resp:  [16-20] 16  SpO2:  [94 %-100 %] 98 %  BP: (121-143)/(63-76) 139/65     Weight: 67.6 kg (149 lb)  Body mass index is 21.99 kg/m².    Intake/Output Summary (Last 24 hours) at 09/27/17 1125  Last data filed at 09/27/17 1000   Gross per 24 hour   Intake              360 ml   Output             1925 ml   Net            -1565 ml      Physical Exam   Constitutional: He appears well-developed.   HENT:   Head: Normocephalic and atraumatic.   Eyes: Pupils are equal, round, and reactive to light.   Neck: No tracheal deviation present. No thyromegaly present.   Cardiovascular: Normal rate, regular rhythm and normal heart sounds.    No murmur heard.  Pulmonary/Chest: Effort normal and breath sounds normal.   Abdominal: Soft. Bowel sounds are normal. There is no tenderness.   Musculoskeletal: He exhibits no edema.   Neurological: He has normal reflexes. No cranial nerve deficit.   sensation intact to vibration and monofilament   Skin: No rash noted.   No nodules    Vitals reviewed.      Significant Labs:   Recent Results (from the past 24 hour(s))   POCT glucose    Collection Time: 09/26/17 12:12 PM   Result Value Ref Range    POCT Glucose 193 (H) 70 - 110 mg/dL   Basic metabolic panel    Collection Time: 09/26/17  1:25 PM   Result Value Ref Range    Sodium 141 136 - 145 mmol/L    Potassium 4.9 3.5 - 5.1 mmol/L    Chloride 115 (H) 95 - 110 mmol/L    CO2 20 (L) 23 - 29 mmol/L    Glucose 177 (H) 70 - 110 mg/dL    BUN, Bld 76 (H) 8 - 23 mg/dL    Creatinine 3.2 (H) 0.5 - 1.4 mg/dL    Calcium 8.9 8.7 - 10.5 mg/dL    Anion Gap 6 (L) 8 - 16 mmol/L    eGFR if African American 19.5 (A) >60 mL/min/1.73 m^2    eGFR if non  16.9 (A) >60 mL/min/1.73 m^2   POCT glucose    Collection Time: 09/26/17  2:07 PM   Result Value Ref Range    POCT Glucose 211 (H) 70 - 110 mg/dL   POCT glucose    Collection Time: 09/26/17  5:01 PM   Result Value Ref Range    POCT Glucose 167 (H) 70 - 110 mg/dL   POCT glucose    Collection Time: 09/26/17  9:30 PM   Result Value Ref Range    POCT Glucose 135 (H) 70 - 110 mg/dL   CBC auto differential    Collection Time: 09/27/17  4:19 AM   Result Value Ref Range    WBC 10.44 3.90 - 12.70 K/uL    RBC 3.09 (L) 4.60 - 6.20 M/uL    Hemoglobin 8.8 (L) 14.0 - 18.0 g/dL    Hematocrit 26.7 (L) 40.0 - 54.0 %    MCV 86 82 - 98 fL    MCH 28.5 27.0 - 31.0 pg    MCHC 33.0 32.0 - 36.0 g/dL    RDW 14.4 11.5 - 14.5 %    Platelets 341 150 - 350 K/uL    MPV 11.0 9.2 - 12.9 fL    Gran # 7.4 1.8 - 7.7 K/uL    Lymph # 2.1 1.0 - 4.8 K/uL    Mono # 0.6 0.3 - 1.0 K/uL    Eos # 0.2 0.0 - 0.5 K/uL    Baso # 0.02 0.00 - 0.20 K/uL    Gran% 71.2 38.0 - 73.0 %    Lymph% 20.1 18.0 - 48.0 %    Mono% 6.1 4.0 - 15.0 %    Eosinophil% 2.0 0.0 - 8.0 %    Basophil% 0.2 0.0 - 1.9 %    Differential Method Automated    Basic metabolic panel    Collection Time: 09/27/17  4:19 AM   Result Value Ref Range    Sodium 139 136 - 145 mmol/L    Potassium 4.5 3.5 - 5.1 mmol/L    Chloride 114 (H) 95 -  110 mmol/L    CO2 17 (L) 23 - 29 mmol/L    Glucose 125 (H) 70 - 110 mg/dL    BUN, Bld 79 (H) 8 - 23 mg/dL    Creatinine 3.2 (H) 0.5 - 1.4 mg/dL    Calcium 8.5 (L) 8.7 - 10.5 mg/dL    Anion Gap 8 8 - 16 mmol/L    eGFR if African American 19.5 (A) >60 mL/min/1.73 m^2    eGFR if non  16.9 (A) >60 mL/min/1.73 m^2   POCT glucose    Collection Time: 09/27/17  9:20 AM   Result Value Ref Range    POCT Glucose 98 70 - 110 mg/dL   ]    Significant Imaging: I have reviewed all pertinent imaging results/findings within the past 24 hours.    Assessment/Plan:      * DKA, type 1    - blood glucose at nursing home reportedly normal after discharge and increased to the 400s, 610 on admit  - Elevatated , beta hydroxybutyrate 2, VBG pH 7.2, K 6.2, gap 14, bicarb 13, U/A negative for ketones  - Unknown precipitation - possible infection, medication noncompliance / mismanagement at nursing home. Infectious workup thus far has been negative, and speaking to nursing home, patient does not appear to be mismanaged.  - Initially started on Insulin drip 3U in ED, 1L NS bolus, insulin regular 5 units push    - Insulin detemir 20U started at 18:00, insulin drip stopped at 20:00  - started aspart 6U with low dose sliding scale  - Glucose checks with meals and nightly; BMP daily  - consulted endocrine; appreciate help - decreasing insulin detemir to 18U        Leukocytosis    - WBC 15.4 on admit (last 9/21 WBC 12)  - UA clear  - CXR clear  - blood cultures x2 prelim negative  - continue ciprofloxacin, end date 10/5/17          Renovascular hypertension    - On Coreg 25 BID, Lisinopril 10mg, nifedipine 90 daily at home  -Holding lisinopril for NIALL, Cr 4.0 (baseline ~3), now stabilized at 3.3, can restart on discharge            NIALL (acute kidney injury)    - Baseline creatine around 2.6 - 3.0  - 4.0 on admit, now improved to 3.3  - Avoid nephrotoxic agents; renally dose meds (cipro changed to 250 mg per pharmacy)         Secondary seizure disorder    - On Keppra 1000 BID          Weakness following cerebrovascular accident (CVA)    - hx of CVA with residual left sided hemiparesis          CKD (chronic kidney disease) stage 4, GFR 15-29 ml/min    - see above          Urinary retention    - noted during previous admission  - continue chronic indwelling lindsay  - follow up with urology outpatient            VTE Risk Mitigation         Ordered     heparin (porcine) injection 5,000 Units  Every 8 hours     Route:  Subcutaneous        09/25/17 0974     Low Risk of VTE  Once      09/25/17 1039              Daniel Petersen MD  Department of Hospital Medicine   [facility]

## 2017-09-30 LAB
BACTERIA BLD CULT: NORMAL
BACTERIA BLD CULT: NORMAL

## 2017-10-04 ENCOUNTER — TELEPHONE (OUTPATIENT)
Dept: INTERNAL MEDICINE | Facility: CLINIC | Age: 82
End: 2017-10-04

## 2017-10-04 NOTE — TELEPHONE ENCOUNTER
----- Message from Padmini Agosto sent at 10/4/2017 11:20 AM CDT -----  Contact: Corrine/VICENTE Direct [phone]  The wheelchair Rx has . Please sent a new one -[phone].    Please call and advise.    Thank You

## 2017-10-06 ENCOUNTER — HOSPITAL ENCOUNTER (EMERGENCY)
Facility: HOSPITAL | Age: 82
Discharge: HOME | End: 2017-10-06
Attending: EMERGENCY MEDICINE
Payer: MEDICARE

## 2017-10-06 VITALS
HEIGHT: 66 IN | SYSTOLIC BLOOD PRESSURE: 123 MMHG | RESPIRATION RATE: 14 BRPM | OXYGEN SATURATION: 96 % | DIASTOLIC BLOOD PRESSURE: 57 MMHG | HEART RATE: 60 BPM | WEIGHT: 149 LBS | BODY MASS INDEX: 23.95 KG/M2

## 2017-10-06 DIAGNOSIS — E16.2 HYPOGLYCEMIA: Primary | ICD-10-CM

## 2017-10-06 LAB
ALBUMIN SERPL BCP-MCNC: 2.3 G/DL
ALP SERPL-CCNC: 106 U/L
ALT SERPL W/O P-5'-P-CCNC: 39 U/L
ANION GAP SERPL CALC-SCNC: 7 MMOL/L
AST SERPL-CCNC: 58 U/L
BACTERIA #/AREA URNS AUTO: ABNORMAL /HPF
BASOPHILS # BLD AUTO: 0.01 K/UL
BASOPHILS NFR BLD: 0.1 %
BILIRUB SERPL-MCNC: 0.3 MG/DL
BILIRUB UR QL STRIP: NEGATIVE
BUN SERPL-MCNC: 72 MG/DL
CALCIUM SERPL-MCNC: 9.1 MG/DL
CHLORIDE SERPL-SCNC: 122 MMOL/L
CLARITY UR REFRACT.AUTO: ABNORMAL
CO2 SERPL-SCNC: 17 MMOL/L
COLOR UR AUTO: YELLOW
CREAT SERPL-MCNC: 3.8 MG/DL
DIFFERENTIAL METHOD: ABNORMAL
EOSINOPHIL # BLD AUTO: 0.1 K/UL
EOSINOPHIL NFR BLD: 0.9 %
ERYTHROCYTE [DISTWIDTH] IN BLOOD BY AUTOMATED COUNT: 15.1 %
EST. GFR  (AFRICAN AMERICAN): 15.8 ML/MIN/1.73 M^2
EST. GFR  (NON AFRICAN AMERICAN): 13.7 ML/MIN/1.73 M^2
GLUCOSE SERPL-MCNC: 128 MG/DL
GLUCOSE UR QL STRIP: ABNORMAL
HCT VFR BLD AUTO: 35.2 %
HGB BLD-MCNC: 11.5 G/DL
HGB UR QL STRIP: ABNORMAL
HYALINE CASTS UR QL AUTO: 22 /LPF
KETONES UR QL STRIP: NEGATIVE
LEUKOCYTE ESTERASE UR QL STRIP: ABNORMAL
LIPASE SERPL-CCNC: <3 U/L
LYMPHOCYTES # BLD AUTO: 1.5 K/UL
LYMPHOCYTES NFR BLD: 14.2 %
MAGNESIUM SERPL-MCNC: 3.1 MG/DL
MCH RBC QN AUTO: 28.9 PG
MCHC RBC AUTO-ENTMCNC: 32.7 G/DL
MCV RBC AUTO: 88 FL
MICROSCOPIC COMMENT: ABNORMAL
MONOCYTES # BLD AUTO: 0.3 K/UL
MONOCYTES NFR BLD: 2.8 %
NEUTROPHILS # BLD AUTO: 8.8 K/UL
NEUTROPHILS NFR BLD: 81.7 %
NITRITE UR QL STRIP: NEGATIVE
PH UR STRIP: 5 [PH] (ref 5–8)
PHOSPHATE SERPL-MCNC: 5 MG/DL
PLATELET # BLD AUTO: 303 K/UL
PMV BLD AUTO: 10.7 FL
POCT GLUCOSE: 128 MG/DL (ref 70–110)
POCT GLUCOSE: 170 MG/DL (ref 70–110)
POCT GLUCOSE: 181 MG/DL (ref 70–110)
POTASSIUM SERPL-SCNC: 4.7 MMOL/L
PROT SERPL-MCNC: 7.8 G/DL
PROT UR QL STRIP: NEGATIVE
RBC # BLD AUTO: 3.98 M/UL
RBC #/AREA URNS AUTO: >100 /HPF (ref 0–4)
SODIUM SERPL-SCNC: 146 MMOL/L
SP GR UR STRIP: 1 (ref 1–1.03)
URN SPEC COLLECT METH UR: ABNORMAL
UROBILINOGEN UR STRIP-ACNC: NEGATIVE EU/DL
WBC # BLD AUTO: 10.79 K/UL
WBC #/AREA URNS AUTO: 48 /HPF (ref 0–5)
WBC CLUMPS UR QL AUTO: ABNORMAL

## 2017-10-06 PROCEDURE — 87086 URINE CULTURE/COLONY COUNT: CPT

## 2017-10-06 PROCEDURE — 96366 THER/PROPH/DIAG IV INF ADDON: CPT

## 2017-10-06 PROCEDURE — 81001 URINALYSIS AUTO W/SCOPE: CPT

## 2017-10-06 PROCEDURE — 96365 THER/PROPH/DIAG IV INF INIT: CPT

## 2017-10-06 PROCEDURE — 99284 EMERGENCY DEPT VISIT MOD MDM: CPT | Mod: ,,, | Performed by: EMERGENCY MEDICINE

## 2017-10-06 PROCEDURE — 83690 ASSAY OF LIPASE: CPT

## 2017-10-06 PROCEDURE — 84100 ASSAY OF PHOSPHORUS: CPT

## 2017-10-06 PROCEDURE — 82962 GLUCOSE BLOOD TEST: CPT

## 2017-10-06 PROCEDURE — 25000003 PHARM REV CODE 250: Performed by: EMERGENCY MEDICINE

## 2017-10-06 PROCEDURE — 83735 ASSAY OF MAGNESIUM: CPT

## 2017-10-06 PROCEDURE — 85025 COMPLETE CBC W/AUTO DIFF WBC: CPT

## 2017-10-06 PROCEDURE — 99284 EMERGENCY DEPT VISIT MOD MDM: CPT | Mod: 25

## 2017-10-06 PROCEDURE — 80053 COMPREHEN METABOLIC PANEL: CPT

## 2017-10-06 RX ORDER — DEXTROSE MONOHYDRATE, SODIUM CHLORIDE, AND POTASSIUM CHLORIDE 50; .745; 4.5 G/1000ML; G/1000ML; G/1000ML
1000 INJECTION, SOLUTION INTRAVENOUS
Status: COMPLETED | OUTPATIENT
Start: 2017-10-06 | End: 2017-10-06

## 2017-10-06 RX ADMIN — DEXTROSE MONOHYDRATE, SODIUM CHLORIDE, AND POTASSIUM CHLORIDE 1000 ML: 50; 4.5; .745 INJECTION, SOLUTION INTRAVENOUS at 01:10

## 2017-10-06 NOTE — ED NOTES
Pt presents via EMS from [facility]. EMS states at morning rounding he was found with a CBG of 20. Pt was given oral glucose at the home as well as in route. Current CBG is 100. Pt alert and oriented to self only. Pt responding verbally.

## 2017-10-06 NOTE — ED NOTES
LOC: The patient is awake, alert and aware of environment with an appropriate affect, the patient is oriented to self and speaking appropriately.  APPEARANCE: Patient resting comfortably and in no acute distress, patient is clean and well groomed  SKIN: The skin is warm and dry, color consistent with ethnicity, patient has normal skin turgor and moist mucus membranes, skin intact, no breakdown or brusing noted.  MUSCULOSKELETAL: Patient moving right side well, no obvious swelling or deformities noted. Pt has some effort against gravity to LUE, no effort to LLE s/p stroke.  RESPIRATORY: Airway is open and patent, breath sounds clear throughout all lung fields; respirations are spontaneous, patient has a normal effort and rate, no accessory muscle use noted.   CARDIAC: Patient has no peripheral edema noted, capillary refill < 3 seconds. No complaints of chest pain   ABDOMEN: Soft and non tender to palpation, no distention noted. Bowel sounds present x 4  NEUROLOGIC: PERRL, 4mm bilaterally, eyes open spontaneously, behavior appropriate to situation, follows commands, facial expression symmetrical,  hand grasp weaker on the left, purposeful motor response noted except to LLE, normal sensation in all extremities when touched with a finger.

## 2017-10-07 LAB — BACTERIA UR CULT: NO GROWTH

## 2017-10-09 LAB — POCT GLUCOSE: 100 MG/DL (ref 70–110)

## 2017-10-10 NOTE — PHYSICIAN QUERY
PT Name: Julius Cardenas  MR #: [id number]     Physician Query Form - Documentation Clarification      CDS/: Lianet Fontana RN  CCDS               Contact information: [email or web address]    This form is a permanent document in the medical record.     Query Date: October 10, 2017    By submitting this query, we are merely seeking further clarification of documentation. Please utilize your independent clinical judgment when addressing the question(s) below.    The Medical record reflects the following:    Supporting Clinical Findings Location in Medical Record    Hyperosmolar non-ketotic state in patient with type 2 diabetes mellitus - Started on Insulin drip 3U in ED, 150 cc 0.45 NaCl per hr, will add dextrose if glucose <250    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 - Hx of DM2, converted to DM1?   HHS with history of type 2 DM   -varying notes describing type 1 vs type 2 DM, however endocrine notes say he has type 2 diagnosed as a teen.        H&P    84-year-old with renovascular HTN, type 1 DM, CKD-4, anemia, seizure disorder    Per EMS patient was given 20U of insulin in the AM after a reading of 350, repeat glucose late in the day showed high so they gave him another 20U and called EMS who reported his glucose at 517   PRINCIPAL PROBLEM: Hyperosmolar non-ketotic state in patient with type 2 diabetes mellitus      Discharge Summary                                                                          Doctor, please specify diagnosis or diagnoses associated with above clinical findings.    Due to conflicting documentation, please clarify the known or suspected type of diabetes mellitus:    Provider Use Only      [  ]  Diabetes mellitus, type 1      [ X ]  Diabetes mellitus, type 2      [  ] Clinically undetermined

## 2017-10-10 NOTE — PHYSICIAN QUERY
PT Name: Julius Cardenas  MR #: [id number]    Physician Query Form - Cause and Effect Relationship Clarification      CDS/: Lianet Fontana RN  CCDS               Contact information: [email or web address]    This form is a permanent document in the medical record.     Query Date: October 10, 2017    By submitting this query, we are merely seeking further clarification of documentation. Please utilize your independent clinical judgment when addressing the question(s) below.    The Medical record contains the following:  Supporting Clinical Findings   Location in record      UTI  -patient with WBC of 14 without bands on admission, U/A with wbc, leukocytes, bactereia consistent with UA. Procalcitonin elevated at 4.4. Blood Cx pending. Chronic lindsay catheter at NH is a contributor      Urinary retention - Will switch out lindsay    Acute cystitis with hematuria - Given hx of indwelling cath and elevated procal will treat                                                                                                                                      H&P                                                                                              Provider, please clarify if there is any correlation between UTI and chronic lindsay catheter:           Are the conditions:     [ X] Due to or associated with each other     [  ] Unrelated to each other     [  ] Other (Please Specify): _________________________     [  ] Clinically Undetermined

## 2017-10-16 ENCOUNTER — TELEPHONE (OUTPATIENT)
Dept: VASCULAR SURGERY | Facility: CLINIC | Age: 82
End: 2017-10-16

## 2017-10-16 NOTE — ED PROVIDER NOTES
Encounter Date: 10/6/2017       History     Chief Complaint   Patient presents with    Hypoglycemia     Unresponsive to voice during morning rounds at [facility]. CBG 20. He receeved oral glucose and glucagon per nursing staff. He recived an additional dose of oral glucose per EMS. Pt is now responsive to voice.      84-year-old male transferred from nursing home because of altered mental status patient was noted to have a very low blood sugar at the nursing home paramedics gave D50 with improvement and presents to the ED closer to his baseline patient does have a significant past medical history          Review of patient's allergies indicates:  No Known Allergies  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     Past Surgical History:   Procedure Laterality Date    CATARACT EXTRACTION W/  INTRAOCULAR LENS IMPLANT  8/26/2009, 10/14/2009    CRANIOTOMY  1995    CRANIOTOMY   12/21/2010    EYE SURGERY       Family History   Problem Relation Age of Onset    Diabetes Mother     Cataracts Mother     No Known Problems Father     Diabetes Sister     No Known Problems Son     No Known Problems Daughter     No Known Problems Maternal Grandmother     No Known Problems Maternal Grandfather     No Known Problems Paternal Grandmother     No Known Problems Paternal Grandfather     Diabetes Sister     Diabetes Brother     No Known Problems Maternal Aunt     No Known Problems Maternal Uncle     No Known Problems Paternal Aunt     No Known Problems Paternal Uncle     Diabetes Sister     Diabetes Brother     Amblyopia Neg Hx     Blindness Neg Hx     Cancer Neg Hx     Glaucoma Neg Hx     Hypertension Neg Hx     Macular degeneration Neg Hx     Retinal detachment Neg Hx     Strabismus Neg Hx     Stroke Neg Hx     Thyroid disease Neg Hx      Social History   Substance Use Topics    Smoking status: Former Smoker     Packs/day: 1.00     Types: Cigarettes     Quit date: 12/31/1994    Smokeless tobacco: Former User     Quit date: 4/3/2010    Alcohol use No     Review of Systems   Unable to perform ROS: Other       Physical Exam     Initial Vitals [10/06/17 1044]   BP Pulse Resp Temp SpO2   139/65 (!) 51 16 -- 100 %      MAP       89.67         Physical Exam    Constitutional: He appears lethargic. He is easily aroused.   HENT:   Mouth/Throat: Mucous membranes are dry.   Eyes: No scleral icterus.   Neck: No stridor present.   Cardiovascular: Normal rate and regular rhythm.   Pulmonary/Chest: No accessory muscle usage. No tachypnea. No respiratory distress.   Abdominal: He exhibits no distension.   Neurological: He is easily aroused. He appears lethargic.   Left hemiparesis         ED Course   Procedures  Labs Reviewed   CBC W/ AUTO DIFFERENTIAL - Abnormal; Notable for the following:        Result Value    RBC 3.98 (*)     Hemoglobin 11.5 (*)     Hematocrit 35.2 (*)     RDW 15.1  (*)     Gran # 8.8 (*)     Gran% 81.7 (*)     Lymph% 14.2 (*)     Mono% 2.8 (*)     All other components within normal limits   COMPREHENSIVE METABOLIC PANEL - Abnormal; Notable for the following:     Sodium 146 (*)     Chloride 122 (*)     CO2 17 (*)     Glucose 128 (*)     BUN, Bld 72 (*)     Creatinine 3.8 (*)     Albumin 2.3 (*)     AST 58 (*)     Anion Gap 7 (*)     eGFR if  15.8 (*)     eGFR if non  13.7 (*)     All other components within normal limits   LIPASE - Abnormal; Notable for the following:     Lipase <3 (*)     All other components within normal limits   MAGNESIUM - Abnormal; Notable for the following:     Magnesium 3.1 (*)     All other components within normal limits   PHOSPHORUS - Abnormal; Notable for the following:     Phosphorus 5.0 (*)     All other components within normal limits   URINALYSIS, REFLEX TO URINE CULTURE - Abnormal; Notable for the following:     Appearance, UA Hazy (*)     Glucose, UA 1+ (*)     Occult Blood UA 2+ (*)     Leukocytes, UA 2+ (*)     All other components within normal limits    Narrative:     Preferred Collection Type->Urine, Catheterized   URINALYSIS MICROSCOPIC - Abnormal; Notable for the following:     RBC, UA >100 (*)     WBC, UA 48 (*)     Bacteria, UA Many (*)     Hyaline Casts, UA 22 (*)     All other components within normal limits    Narrative:     Preferred Collection Type->Urine, Catheterized   POCT GLUCOSE - Abnormal; Notable for the following:     POCT Glucose 128 (*)     All other components within normal limits   POCT GLUCOSE - Abnormal; Notable for the following:     POCT Glucose 181 (*)     All other components within normal limits   POCT GLUCOSE - Abnormal; Notable for the following:     POCT Glucose 170 (*)     All other components within normal limits   CULTURE, URINE    Narrative:     Preferred Collection Type->Urine, Catheterized   POCT GLUCOSE                          Attending Attestation:              Attending ED Notes:   Patient presented to the ED because of an episode of hypoglycemia patient was given D50 with improvement of patient was evaluated here in the ED and continued to improve and the patient was subsequently discharged back to the nursing home for further monitoring of his blood sugar          ED Course      Clinical Impression:   The encounter diagnosis was Hypoglycemia.    Disposition:   Disposition: Discharged  Condition: Stable                        Cristofer Sauceda MD  12/20/17 [id number]

## 2017-10-16 NOTE — TELEPHONE ENCOUNTER
----- Message from Erik Perez sent at 10/16/2017  1:59 PM CDT -----  Contact: [facility]  Caller said pt needs appt for ankle brachial index. Please call to schedule at [phone] ext [phone]

## 2017-10-19 ENCOUNTER — HOSPITAL ENCOUNTER (INPATIENT)
Facility: HOSPITAL | Age: 82
LOS: 1 days | Discharge: HOSPICE | DRG: 682 | End: 2017-10-21
Attending: EMERGENCY MEDICINE | Admitting: INTERNAL MEDICINE
Payer: MEDICARE

## 2017-10-19 DIAGNOSIS — D32.0 MENINGIOMA, RECURRENT OF BRAIN: ICD-10-CM

## 2017-10-19 DIAGNOSIS — N18.9 ACUTE RENAL FAILURE SUPERIMPOSED ON CHRONIC KIDNEY DISEASE, UNSPECIFIED CKD STAGE, UNSPECIFIED ACUTE RENAL FAILURE TYPE: Primary | ICD-10-CM

## 2017-10-19 DIAGNOSIS — N17.9 ACUTE RENAL FAILURE SUPERIMPOSED ON CHRONIC KIDNEY DISEASE, UNSPECIFIED CKD STAGE, UNSPECIFIED ACUTE RENAL FAILURE TYPE: Primary | ICD-10-CM

## 2017-10-19 DIAGNOSIS — R56.9 SEIZURES: ICD-10-CM

## 2017-10-19 DIAGNOSIS — G93.41 ENCEPHALOPATHY, METABOLIC: ICD-10-CM

## 2017-10-19 LAB
ALBUMIN SERPL BCP-MCNC: 2 G/DL
ALLENS TEST: ABNORMAL
ALP SERPL-CCNC: 113 U/L
ALT SERPL W/O P-5'-P-CCNC: 44 U/L
ANION GAP SERPL CALC-SCNC: ABNORMAL MMOL/L
AST SERPL-CCNC: 46 U/L
BACTERIA #/AREA URNS AUTO: ABNORMAL /HPF
BASOPHILS # BLD AUTO: 0.02 K/UL
BASOPHILS NFR BLD: 0.2 %
BILIRUB SERPL-MCNC: 0.3 MG/DL
BILIRUB UR QL STRIP: NEGATIVE
BNP SERPL-MCNC: 38 PG/ML
BUN SERPL-MCNC: 126 MG/DL
CALCIUM SERPL-MCNC: 8.5 MG/DL
CHLORIDE SERPL-SCNC: >130 MMOL/L
CLARITY UR REFRACT.AUTO: ABNORMAL
CO2 SERPL-SCNC: 7 MMOL/L
COLOR UR AUTO: ABNORMAL
CREAT SERPL-MCNC: 6.7 MG/DL
DELSYS: ABNORMAL
DIFFERENTIAL METHOD: ABNORMAL
EOSINOPHIL # BLD AUTO: 0.1 K/UL
EOSINOPHIL NFR BLD: 0.5 %
ERYTHROCYTE [DISTWIDTH] IN BLOOD BY AUTOMATED COUNT: 15 %
EST. GFR  (AFRICAN AMERICAN): 8 ML/MIN/1.73 M^2
EST. GFR  (NON AFRICAN AMERICAN): 6.9 ML/MIN/1.73 M^2
GLUCOSE SERPL-MCNC: 219 MG/DL
GLUCOSE UR QL STRIP: NEGATIVE
HCO3 UR-SCNC: 11.1 MMOL/L (ref 24–28)
HCT VFR BLD AUTO: 31.3 %
HCT VFR BLD CALC: 24 %PCV (ref 36–54)
HGB BLD-MCNC: 9.3 G/DL
HGB UR QL STRIP: ABNORMAL
HYALINE CASTS UR QL AUTO: 9 /LPF
IMM GRANULOCYTES # BLD AUTO: 0.04 K/UL
IMM GRANULOCYTES NFR BLD AUTO: 0.4 %
KETONES UR QL STRIP: NEGATIVE
LACTATE SERPL-SCNC: 1.9 MMOL/L
LEUKOCYTE ESTERASE UR QL STRIP: ABNORMAL
LYMPHOCYTES # BLD AUTO: 1.9 K/UL
LYMPHOCYTES NFR BLD: 16.5 %
MCH RBC QN AUTO: 28.7 PG
MCHC RBC AUTO-ENTMCNC: 29.7 G/DL
MCV RBC AUTO: 97 FL
MICROSCOPIC COMMENT: ABNORMAL
MONOCYTES # BLD AUTO: 0.9 K/UL
MONOCYTES NFR BLD: 7.6 %
NEUTROPHILS # BLD AUTO: 8.4 K/UL
NEUTROPHILS NFR BLD: 74.8 %
NITRITE UR QL STRIP: NEGATIVE
NRBC BLD-RTO: 0 /100 WBC
PCO2 BLDA: 27.2 MMHG (ref 35–45)
PH SMN: 7.22 [PH] (ref 7.35–7.45)
PH UR STRIP: 5 [PH] (ref 5–8)
PLATELET # BLD AUTO: 271 K/UL
PMV BLD AUTO: 12 FL
PO2 BLDA: 58 MMHG (ref 40–60)
POC BE: -17 MMOL/L
POC IONIZED CALCIUM: 0.94 MMOL/L (ref 1.06–1.42)
POC SATURATED O2: 85 % (ref 95–100)
POC TCO2: 12 MMOL/L (ref 24–29)
POCT GLUCOSE: 276 MG/DL (ref 70–110)
POTASSIUM BLD-SCNC: 8.9 MMOL/L (ref 3.5–5.1)
POTASSIUM SERPL-SCNC: 6 MMOL/L
PROT SERPL-MCNC: 7.1 G/DL
PROT UR QL STRIP: ABNORMAL
RBC # BLD AUTO: 3.24 M/UL
RBC #/AREA URNS AUTO: 37 /HPF (ref 0–4)
SAMPLE: ABNORMAL
SITE: ABNORMAL
SODIUM BLD-SCNC: 153 MMOL/L (ref 136–145)
SODIUM SERPL-SCNC: 154 MMOL/L
SP GR UR STRIP: 1.01 (ref 1–1.03)
SQUAMOUS #/AREA URNS AUTO: 1 /HPF
TROPONIN I SERPL DL<=0.01 NG/ML-MCNC: 0.03 NG/ML
URN SPEC COLLECT METH UR: ABNORMAL
UROBILINOGEN UR STRIP-ACNC: NEGATIVE EU/DL
WBC # BLD AUTO: 11.19 K/UL
WBC #/AREA URNS AUTO: >100 /HPF (ref 0–5)

## 2017-10-19 PROCEDURE — 87040 BLOOD CULTURE FOR BACTERIA: CPT

## 2017-10-19 PROCEDURE — 25000003 PHARM REV CODE 250: Performed by: EMERGENCY MEDICINE

## 2017-10-19 PROCEDURE — 87086 URINE CULTURE/COLONY COUNT: CPT

## 2017-10-19 PROCEDURE — 87077 CULTURE AEROBIC IDENTIFY: CPT | Mod: 59

## 2017-10-19 PROCEDURE — 84132 ASSAY OF SERUM POTASSIUM: CPT

## 2017-10-19 PROCEDURE — 85025 COMPLETE CBC W/AUTO DIFF WBC: CPT

## 2017-10-19 PROCEDURE — 99291 CRITICAL CARE FIRST HOUR: CPT | Mod: 25

## 2017-10-19 PROCEDURE — 80048 BASIC METABOLIC PNL TOTAL CA: CPT

## 2017-10-19 PROCEDURE — 99291 CRITICAL CARE FIRST HOUR: CPT | Mod: ,,, | Performed by: EMERGENCY MEDICINE

## 2017-10-19 PROCEDURE — 81001 URINALYSIS AUTO W/SCOPE: CPT

## 2017-10-19 PROCEDURE — 99900035 HC TECH TIME PER 15 MIN (STAT)

## 2017-10-19 PROCEDURE — 84484 ASSAY OF TROPONIN QUANT: CPT

## 2017-10-19 PROCEDURE — 82330 ASSAY OF CALCIUM: CPT

## 2017-10-19 PROCEDURE — 83880 ASSAY OF NATRIURETIC PEPTIDE: CPT

## 2017-10-19 PROCEDURE — 85014 HEMATOCRIT: CPT

## 2017-10-19 PROCEDURE — 93005 ELECTROCARDIOGRAM TRACING: CPT

## 2017-10-19 PROCEDURE — 93010 ELECTROCARDIOGRAM REPORT: CPT | Mod: ,,, | Performed by: INTERNAL MEDICINE

## 2017-10-19 PROCEDURE — 87186 SC STD MICRODIL/AGAR DIL: CPT | Mod: 59

## 2017-10-19 PROCEDURE — 84295 ASSAY OF SERUM SODIUM: CPT

## 2017-10-19 PROCEDURE — 87088 URINE BACTERIA CULTURE: CPT

## 2017-10-19 PROCEDURE — 83605 ASSAY OF LACTIC ACID: CPT

## 2017-10-19 PROCEDURE — 25000003 PHARM REV CODE 250: Performed by: STUDENT IN AN ORGANIZED HEALTH CARE EDUCATION/TRAINING PROGRAM

## 2017-10-19 PROCEDURE — 82962 GLUCOSE BLOOD TEST: CPT

## 2017-10-19 PROCEDURE — 80053 COMPREHEN METABOLIC PANEL: CPT

## 2017-10-19 PROCEDURE — 82803 BLOOD GASES ANY COMBINATION: CPT

## 2017-10-19 PROCEDURE — 96361 HYDRATE IV INFUSION ADD-ON: CPT

## 2017-10-19 RX ORDER — SODIUM CHLORIDE 9 MG/ML
500 INJECTION, SOLUTION INTRAVENOUS
Status: COMPLETED | OUTPATIENT
Start: 2017-10-19 | End: 2017-10-19

## 2017-10-19 RX ADMIN — SODIUM CHLORIDE 1000 ML: 0.9 INJECTION, SOLUTION INTRAVENOUS at 10:10

## 2017-10-19 RX ADMIN — SODIUM CHLORIDE 500 ML: 0.9 INJECTION, SOLUTION INTRAVENOUS at 09:10

## 2017-10-20 PROBLEM — I96 NECROTIC TOES: Status: ACTIVE | Noted: 2017-10-20

## 2017-10-20 PROBLEM — D32.0: Status: ACTIVE | Noted: 2017-10-20

## 2017-10-20 PROBLEM — N17.9 ACUTE RENAL FAILURE SUPERIMPOSED ON CHRONIC KIDNEY DISEASE: Status: ACTIVE | Noted: 2017-10-20

## 2017-10-20 PROBLEM — E87.0 HYPERNATREMIA: Status: ACTIVE | Noted: 2017-10-20

## 2017-10-20 PROBLEM — Z78.9 MECHANICAL DEEP VEIN THROMBOSIS (DVT) PROPHYLAXIS IN PLACE: Status: ACTIVE | Noted: 2017-10-20

## 2017-10-20 PROBLEM — N39.0 URINARY TRACT INFECTION WITH HEMATURIA: Status: ACTIVE | Noted: 2017-10-20

## 2017-10-20 PROBLEM — Z86.73 HISTORY OF CARDIOEMBOLIC CEREBROVASCULAR ACCIDENT (CVA): Status: ACTIVE | Noted: 2017-10-20

## 2017-10-20 PROBLEM — G93.41 ENCEPHALOPATHY, METABOLIC: Status: ACTIVE | Noted: 2017-10-20

## 2017-10-20 PROBLEM — N18.9 ACUTE RENAL FAILURE SUPERIMPOSED ON CHRONIC KIDNEY DISEASE: Status: ACTIVE | Noted: 2017-10-20

## 2017-10-20 PROBLEM — I69.30 HISTORY OF CVA WITH RESIDUAL DEFICIT: Status: ACTIVE | Noted: 2017-10-20

## 2017-10-20 PROBLEM — R31.9 URINARY TRACT INFECTION WITH HEMATURIA: Status: ACTIVE | Noted: 2017-10-20

## 2017-10-20 LAB
ALLENS TEST: ABNORMAL
ANION GAP SERPL CALC-SCNC: 12 MMOL/L
ANION GAP SERPL CALC-SCNC: ABNORMAL MMOL/L
ANION GAP SERPL CALC-SCNC: ABNORMAL MMOL/L
BUN SERPL-MCNC: 119 MG/DL
BUN SERPL-MCNC: 125 MG/DL
BUN SERPL-MCNC: 132 MG/DL
CALCIUM SERPL-MCNC: 7.5 MG/DL
CALCIUM SERPL-MCNC: 7.9 MG/DL
CALCIUM SERPL-MCNC: 8.2 MG/DL
CHLORIDE SERPL-SCNC: 130 MMOL/L
CHLORIDE SERPL-SCNC: >130 MMOL/L
CHLORIDE SERPL-SCNC: >130 MMOL/L
CO2 SERPL-SCNC: 12 MMOL/L
CO2 SERPL-SCNC: 7 MMOL/L
CO2 SERPL-SCNC: 7 MMOL/L
CREAT SERPL-MCNC: 6.1 MG/DL
CREAT SERPL-MCNC: 6.3 MG/DL
CREAT SERPL-MCNC: 6.5 MG/DL
DELSYS: ABNORMAL
EST. GFR  (AFRICAN AMERICAN): 8.3 ML/MIN/1.73 M^2
EST. GFR  (AFRICAN AMERICAN): 8.6 ML/MIN/1.73 M^2
EST. GFR  (AFRICAN AMERICAN): 8.9 ML/MIN/1.73 M^2
EST. GFR  (NON AFRICAN AMERICAN): 7.2 ML/MIN/1.73 M^2
EST. GFR  (NON AFRICAN AMERICAN): 7.4 ML/MIN/1.73 M^2
EST. GFR  (NON AFRICAN AMERICAN): 7.7 ML/MIN/1.73 M^2
FLOW: 2
GLUCOSE SERPL-MCNC: 153 MG/DL
GLUCOSE SERPL-MCNC: 164 MG/DL
GLUCOSE SERPL-MCNC: 83 MG/DL
HCO3 UR-SCNC: 10.2 MMOL/L (ref 24–28)
MODE: ABNORMAL
PCO2 BLDA: 26.9 MMHG (ref 35–45)
PH SMN: 7.19 [PH] (ref 7.35–7.45)
PO2 BLDA: 60 MMHG (ref 80–100)
POC BE: -18 MMOL/L
POC SATURATED O2: 85 % (ref 95–100)
POC TCO2: 11 MMOL/L (ref 23–27)
POCT GLUCOSE: 116 MG/DL (ref 70–110)
POCT GLUCOSE: 164 MG/DL (ref 70–110)
POCT GLUCOSE: 97 MG/DL (ref 70–110)
POTASSIUM SERPL-SCNC: 5.6 MMOL/L
POTASSIUM SERPL-SCNC: 5.7 MMOL/L
POTASSIUM SERPL-SCNC: 5.8 MMOL/L
POTASSIUM SERPL-SCNC: 6.6 MMOL/L
SAMPLE: ABNORMAL
SITE: ABNORMAL
SODIUM SERPL-SCNC: 153 MMOL/L
SODIUM SERPL-SCNC: 153 MMOL/L
SODIUM SERPL-SCNC: 154 MMOL/L
SP02: 96

## 2017-10-20 PROCEDURE — 63600175 PHARM REV CODE 636 W HCPCS: Performed by: INTERNAL MEDICINE

## 2017-10-20 PROCEDURE — G8996 SWALLOW CURRENT STATUS: HCPCS | Mod: CN

## 2017-10-20 PROCEDURE — 80048 BASIC METABOLIC PNL TOTAL CA: CPT

## 2017-10-20 PROCEDURE — 82962 GLUCOSE BLOOD TEST: CPT

## 2017-10-20 PROCEDURE — 82803 BLOOD GASES ANY COMBINATION: CPT

## 2017-10-20 PROCEDURE — 20600001 HC STEP DOWN PRIVATE ROOM

## 2017-10-20 PROCEDURE — 27000221 HC OXYGEN, UP TO 24 HOURS

## 2017-10-20 PROCEDURE — 36600 WITHDRAWAL OF ARTERIAL BLOOD: CPT

## 2017-10-20 PROCEDURE — G8997 SWALLOW GOAL STATUS: HCPCS | Mod: CM

## 2017-10-20 PROCEDURE — 80048 BASIC METABOLIC PNL TOTAL CA: CPT | Mod: 91

## 2017-10-20 PROCEDURE — 25000003 PHARM REV CODE 250: Performed by: INTERNAL MEDICINE

## 2017-10-20 PROCEDURE — 94640 AIRWAY INHALATION TREATMENT: CPT

## 2017-10-20 PROCEDURE — 99223 1ST HOSP IP/OBS HIGH 75: CPT | Mod: ,,, | Performed by: PHYSICIAN ASSISTANT

## 2017-10-20 PROCEDURE — 84132 ASSAY OF SERUM POTASSIUM: CPT

## 2017-10-20 PROCEDURE — 36415 COLL VENOUS BLD VENIPUNCTURE: CPT

## 2017-10-20 PROCEDURE — 99223 1ST HOSP IP/OBS HIGH 75: CPT | Mod: ,,, | Performed by: GENERAL ACUTE CARE HOSPITAL

## 2017-10-20 PROCEDURE — 96365 THER/PROPH/DIAG IV INF INIT: CPT

## 2017-10-20 PROCEDURE — 96375 TX/PRO/DX INJ NEW DRUG ADDON: CPT

## 2017-10-20 PROCEDURE — 92610 EVALUATE SWALLOWING FUNCTION: CPT

## 2017-10-20 PROCEDURE — 95819 EEG AWAKE AND ASLEEP: CPT

## 2017-10-20 PROCEDURE — 99900035 HC TECH TIME PER 15 MIN (STAT)

## 2017-10-20 PROCEDURE — 25000242 PHARM REV CODE 250 ALT 637 W/ HCPCS: Performed by: STUDENT IN AN ORGANIZED HEALTH CARE EDUCATION/TRAINING PROGRAM

## 2017-10-20 PROCEDURE — 25000003 PHARM REV CODE 250: Performed by: STUDENT IN AN ORGANIZED HEALTH CARE EDUCATION/TRAINING PROGRAM

## 2017-10-20 PROCEDURE — 99223 1ST HOSP IP/OBS HIGH 75: CPT | Mod: AI,GC,, | Performed by: INTERNAL MEDICINE

## 2017-10-20 PROCEDURE — 63600175 PHARM REV CODE 636 W HCPCS: Performed by: STUDENT IN AN ORGANIZED HEALTH CARE EDUCATION/TRAINING PROGRAM

## 2017-10-20 PROCEDURE — 95819 EEG AWAKE AND ASLEEP: CPT | Mod: 26,,, | Performed by: PSYCHIATRY & NEUROLOGY

## 2017-10-20 RX ORDER — LEVETIRACETAM 500 MG/1
1000 TABLET ORAL 2 TIMES DAILY
Status: DISCONTINUED | OUTPATIENT
Start: 2017-10-20 | End: 2017-10-20

## 2017-10-20 RX ORDER — SODIUM CHLORIDE, SODIUM LACTATE, POTASSIUM CHLORIDE, CALCIUM CHLORIDE 600; 310; 30; 20 MG/100ML; MG/100ML; MG/100ML; MG/100ML
INJECTION, SOLUTION INTRAVENOUS CONTINUOUS
Status: DISCONTINUED | OUTPATIENT
Start: 2017-10-20 | End: 2017-10-20

## 2017-10-20 RX ORDER — INSULIN ASPART 100 [IU]/ML
0-5 INJECTION, SOLUTION INTRAVENOUS; SUBCUTANEOUS EVERY 6 HOURS
Status: DISCONTINUED | OUTPATIENT
Start: 2017-10-20 | End: 2017-10-20

## 2017-10-20 RX ORDER — CIPROFLOXACIN 500 MG/1
500 TABLET ORAL EVERY 24 HOURS
Status: DISCONTINUED | OUTPATIENT
Start: 2017-10-21 | End: 2017-10-21

## 2017-10-20 RX ORDER — IPRATROPIUM BROMIDE AND ALBUTEROL SULFATE 2.5; .5 MG/3ML; MG/3ML
3 SOLUTION RESPIRATORY (INHALATION) EVERY 4 HOURS PRN
Status: DISCONTINUED | OUTPATIENT
Start: 2017-10-20 | End: 2017-10-21 | Stop reason: HOSPADM

## 2017-10-20 RX ORDER — IBUPROFEN 200 MG
24 TABLET ORAL
Status: DISCONTINUED | OUTPATIENT
Start: 2017-10-20 | End: 2017-10-21 | Stop reason: HOSPADM

## 2017-10-20 RX ORDER — CEFEPIME HYDROCHLORIDE 1 G/50ML
1 INJECTION, SOLUTION INTRAVENOUS
Status: COMPLETED | OUTPATIENT
Start: 2017-10-20 | End: 2017-10-20

## 2017-10-20 RX ORDER — ALBUTEROL SULFATE 0.83 MG/ML
10 SOLUTION RESPIRATORY (INHALATION) ONCE
Status: COMPLETED | OUTPATIENT
Start: 2017-10-20 | End: 2017-10-20

## 2017-10-20 RX ORDER — IBUPROFEN 200 MG
16 TABLET ORAL
Status: DISCONTINUED | OUTPATIENT
Start: 2017-10-20 | End: 2017-10-21 | Stop reason: HOSPADM

## 2017-10-20 RX ORDER — GLUCAGON 1 MG
1 KIT INJECTION
Status: DISCONTINUED | OUTPATIENT
Start: 2017-10-20 | End: 2017-10-21 | Stop reason: HOSPADM

## 2017-10-20 RX ORDER — ASPIRIN 81 MG/1
81 TABLET ORAL DAILY
Status: DISCONTINUED | OUTPATIENT
Start: 2017-10-20 | End: 2017-10-20

## 2017-10-20 RX ORDER — INSULIN ASPART 100 [IU]/ML
0-5 INJECTION, SOLUTION INTRAVENOUS; SUBCUTANEOUS
Status: DISCONTINUED | OUTPATIENT
Start: 2017-10-20 | End: 2017-10-20

## 2017-10-20 RX ORDER — HEPARIN SODIUM 5000 [USP'U]/ML
5000 INJECTION, SOLUTION INTRAVENOUS; SUBCUTANEOUS EVERY 8 HOURS
Status: DISCONTINUED | OUTPATIENT
Start: 2017-10-20 | End: 2017-10-20

## 2017-10-20 RX ADMIN — ALBUTEROL SULFATE 10 MG: 2.5 SOLUTION RESPIRATORY (INHALATION) at 12:10

## 2017-10-20 RX ADMIN — CEFEPIME HYDROCHLORIDE 1 G: 1 INJECTION, SOLUTION INTRAVENOUS at 01:10

## 2017-10-20 RX ADMIN — SODIUM CHLORIDE, SODIUM LACTATE, POTASSIUM CHLORIDE, AND CALCIUM CHLORIDE: .6; .31; .03; .02 INJECTION, SOLUTION INTRAVENOUS at 06:10

## 2017-10-20 RX ADMIN — INSULIN DETEMIR 18 UNITS: 100 INJECTION, SOLUTION SUBCUTANEOUS at 02:10

## 2017-10-20 RX ADMIN — DEXTROSE MONOHYDRATE 25 G: 25 INJECTION, SOLUTION INTRAVENOUS at 01:10

## 2017-10-20 RX ADMIN — SODIUM CHLORIDE 1000 ML: 0.9 INJECTION, SOLUTION INTRAVENOUS at 02:10

## 2017-10-20 RX ADMIN — VANCOMYCIN HYDROCHLORIDE 1000 MG: 1 INJECTION, POWDER, LYOPHILIZED, FOR SOLUTION INTRAVENOUS at 04:10

## 2017-10-20 RX ADMIN — INSULIN HUMAN 10 UNITS: 100 INJECTION, SOLUTION PARENTERAL at 01:10

## 2017-10-20 RX ADMIN — Medication 1 G: at 01:10

## 2017-10-20 NOTE — PT/OT/SLP EVAL
Speech Language Pathology  Evaluation    Julius Cardenas   MRN: [id number]   Admitting Diagnosis: The encounter diagnosis was Acute renal failure superimposed on chronic kidney disease, unspecified CKD stage, unspecified acute renal failure type.    Diet recommendations: Solid Diet Level: NPO  Liquid Diet Level: NPO   -Strict oral care      SLP Treatment Date: 10/20/17  Speech Start Time: 1148     Speech Stop Time: 1200     Speech Total (min): 12 min       TREATMENT BILLABLE MINUTES:  Eval Swallow and Oral Function 12    Diagnosis:The encounter diagnosis was Acute renal failure superimposed on chronic kidney disease, unspecified CKD stage, unspecified acute renal failure type.      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 : Yes  Keep patient NPO?: Yes   General Precautions: Standard, aspiration          Prior diet: unknown, ADITYA 2/2 cognitive status     Subjective:  SLP reviewed Pt with nurse and MD team, MD requests SLP to continue with BSS   Patient presents fatigued, pleasantly confused  He explains, "I'm not a fish"  Patient goals: "I want water"    Pain/Comfort  Pain Rating 1: 2/10  Location - Orientation 1: lower  Location 1: back  Pain Addressed 1: Reposition, Distraction, Nurse notified  Pain Rating Post-Intervention 1: 2/10    Objective:   Patient found with: peripheral IV. Pt found awake in bed. HOB elevated.     Oral Musculature Evaluation  Oral Musculature: general weakness  Dentition: edentulous  Secretion Management: coughing on secretions  Mucosal Quality: adequate  Mandibular Strength and Mobility: impaired  Oral Labial Strength and Mobility: impaired retraction, impaired pursing  Lingual Strength and Mobility: impaired strength  Volitional Cough: present, reduced   Volitional Swallow: delayed upon digital palpation      Bedside Swallow Eval:  Consistencies Assessed: Thin liquids ice chips x2, teaspoon sips x2 and Puree 1/2 teaspoon bite x1  Oral Phase: decreased closure around utensil and slow oral transit time  Pharyngeal Phase: throat clearing and wet vocal quality after swallow  Patient with wet vocal quality prior to initiation of PO trials. Pt with weak throat clear and requires cues for multiple swallows and throat clear to attempt to clear vocal quality, Pt with moderately delayed timing of initiation of swallow upon digital palpation. Immediate throat clear following teaspoon trials of thin liquids and 1/2 teaspoon trial of puree. Additional trials held 2/2 overt S/S aspiration with trials presented.REC: continue NPO    Additional Treatment:  Patient educated on SLP role and SLP POC. Pt not able to demonstrate " understanding. Pt nurse nad MD team in room at end of assessment. SLP reviewed findings with MD and Nurse, MD explains Patient might be transitioning to comfort care measures and will continue to monitor.     FIM:  Social Interaction: 2 Maximal Direction--The patient interacts appropriately 25 to 49% of the time, but may beed restraint due to socially inappropriate behaviors.     Assessment:  Julius Cardenas is a 84 y.o. male with a medical diagnosis of acute renal failure and presents with oropharyngeal dysphagia. ST to continue to follow.     Discharge recommendations: Discharge Facility/Level Of Care Needs: nursing facility, basic (pending status)     Goals:    SLP Goals        Problem: SLP Goal    Goal Priority Disciplines Outcome   SLP Goal     SLP Ongoing (interventions implemented as appropriate)   Description:  Speech Language Pathology Goals  Goals expected to be met by 10/27/2017  1. Pt will participate in ongoing swallow assessment                          Plan:   Patient to be seen Therapy Frequency: 5 x/week   Plan of Care expires: 11/19/17  Plan of Care reviewed with: patient  SLP Follow-up?: Yes         SLP G-Codes  Functional Assessment Tool Used: NOMS  Score: 1  Functional Limitations: Swallowing  Swallow Current Status (): CN  Swallow Goal Status (): MIKE Quiles, CCC-SLP  Speech-Language Pathology  Pager: [phone]  10/20/2017

## 2017-10-20 NOTE — ASSESSMENT & PLAN NOTE
-- Etiology likely NIALL on CKD  -- K improved from 6.6 -> 5.6 with shifting and fluids in the ED  -- Ordered kayexalate for more definitive treatment  -- Will follow K Q4H until resolved  -- Tele ordered

## 2017-10-20 NOTE — ASSESSMENT & PLAN NOTE
-- Appears dehydrated on examination  -- Uremia likely contributing to altered mental status  -- Electrolytes consistent with hypovolemia given hypernatremia and hyperchloremia, increased BUN:Cr  -- Given 1L NS in the ED. Will avoid hyperchloremic fluid resuscitation going forward unless he requires it for pressure indications  -- 1.9L free water deficit. Ordered NGT for free water enteral boluses, 400mL QID x5, along with LR infusion  -- Will follow with BMP Q12H

## 2017-10-20 NOTE — PLAN OF CARE
Problem: SLP Goal  Goal: SLP Goal  Speech Language Pathology Goals  Goals expected to be met by 10/27/2017  1. Pt will participate in ongoing swallow assessment       Outcome: Ongoing (interventions implemented as appropriate)  Bedside Swallow Study completed. Patient with overt S/S aspiration with trials of puree and thin liquids at the bedside. See report for full details. REC: NPO and ST to continue to follow. Findings reviewed with nurse and MD team. Thank you.    HORTENCIA Márquez., [facility]-SLP  Speech-Language Pathology  Pager: [phone]  10/20/2017

## 2017-10-20 NOTE — ASSESSMENT & PLAN NOTE
-- Blood glucose well controlled on arrival  -- NPO for now; will continue basal insulin and hold prandial insulin  -- POCT glucose Q6H and aspart SSI ordered

## 2017-10-20 NOTE — ASSESSMENT & PLAN NOTE
-- Appears to be progressing to dementia per discussion with his daughter in law  -- Delirium precautions ordered

## 2017-10-20 NOTE — HPI
Mr. Cardenas is an 85 y/o male with DM type I complicated by peripheral neuropathy, renovascular HTN, CKD stage IV, CVA with residual left sided weakness, anemia, parasagittal meningioma s/p resection complicated by seizures on keppra who presented to the ED from [facility] with altered mental status on 10/19/17.     He was recently discharged from [facility] in later September following an admission for hyperglycemia requiring insulin drip. He was discharged to return to his SNF. Per his sister-in-law (listed in Epic as his point of contact), he has gradually been declining over the course of the last year, with gradually worsening mental status that she attributes to dementia.      His potassium was shifted in the ED and he was given IVF and admitted to the floor with Hospital Medicine.

## 2017-10-20 NOTE — H&P
[facility] Hospital Medicine  History & Physical    Patient Name: Julius Cardenas  MRN: [id number]  Admission Date: 10/19/2017  Attending Physician: Dr. Ashby  Primary Care Provider: Primary Doctor [facility] Medicine Team: [facility] 5 Rajat Wagner MD     Patient information was obtained from relative(s) and past medical records.     Subjective:     Principal Problem:<principal problem not specified>    Chief Complaint:   Chief Complaint   Patient presents with    Fatigue     Pt from [facility] [address],  staff reported pt unresponsive with 02 sat 82, and BP 70/50.  Pt now sat 95% room air, BP 92/56, awake and alert.        HPI: Mr. Cardenas is an 84-year-old man with DM type I complicated by peripheral neuropathy, renovascular HTN, CKD stage IV, CVA with residual left sided weakness, anemia, parasagittal meningioma s/p resection complicated by seizures on keppra who presents with altered mental status. He was recently discharged from [facility] in later September following an admission for hyperglycemia requiring insulin drip. He was discharged to return to his SNF. Unfortunately he is a poor historian, and the nursing home is not answering, so history since his discharge is limited. Per his sister-in-law (listed in Epic as his point of contact), he has gradually been declining over the course of the last year, with gradually worsening mental status that she attributes to dementia. Apparently he was found unresponsive and hypotensive with systolic BP and SpO2 in the 70s. By the time he arrived to the ED, he had received some IVF, and his mentation improved dramatically. He cannot respond to any of my questions.    Upon arrival to the ED, his vital signs were significant for tachycardia with RR 26 and hypotension with BP 96/53. Lab workup significant for anemia with Hb 9.3 (down from most recent measurement two weeks ago but appears baseline prior to that) and numerous electrolyte abnormalities  including hypernatremia with Na 153, hyperkalemia with K 6.6, hyperchloremia with Cl 130, metabolic acidosis with bicarbonate 12, and BUN/Cr of 125 and 6.3. Lactate normal at 1.9. Urinalysis positive for > 100 WBC and 37 RBC. CXR stable/mildly improved from prior examination with coarse interstitial attenuation. He was given 1L NS, cefepime 1g IV, and insulin + d50. On my evaluation he is no longer oriented. Hospital medicine admission requested for altered mental status and NIALL on CKD.    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       Past Surgical History:   Procedure Laterality Date    CATARACT EXTRACTION W/  INTRAOCULAR LENS IMPLANT  8/26/2009, 10/14/2009    CRANIOTOMY  1995    CRANIOTOMY  12/21/2010    EYE SURGERY         Review of patient's allergies indicates:  No Known Allergies    No current facility-administered medications on file prior to encounter.      Current Outpatient  "Prescriptions on File Prior to Encounter   Medication Sig    albuterol-ipratropium 2.5mg-0.5mg/3mL (DUO-NEB) 0.5 mg-3 mg(2.5 mg base)/3 mL nebulizer solution Take 3 mLs by nebulization every 4 (four) hours as needed for Wheezing or Shortness of Breath. Rescue    aspirin (ECOTRIN) 81 MG EC tablet Take 1 tablet (81 mg total) by mouth once daily.    blood-glucose meter (CONTOUR METER) kit Use as instructed.  Please substitute appropriate meter to match his strips.    carvedilol (COREG) 25 MG tablet Take 1 tablet (25 mg total) by mouth 2 (two) times daily.    ciprofloxacin HCl (CIPRO) 500 MG tablet Take 1 tablet (500 mg total) by mouth once daily.    CONTOUR TEST STRIPS Strp USE AS DIRECTED THREE TIMES DAILY (Patient taking differently: USE AS DIRECTED FOUR TIMES DAILY)    insulin aspart (NOVOLOG) 100 unit/mL InPn pen Inject 6 Units into the skin 3 (three) times daily with meals.    insulin detemir (LEVEMIR FLEXTOUCH) 100 unit/mL (3 mL) SubQ InPn pen Inject 18 Units into the skin once daily.    lancets Misc Use three times daily for finger stick glucose monitoring. (Patient taking differently: Use four times daily for finger stick glucose monitoring.)    levetiracetam (KEPPRA) 1000 MG tablet Take 1 tablet (1,000 mg total) by mouth 2 (two) times daily.    lisinopril 10 MG tablet Take 10 mg by mouth once daily.    nifedipine (PROCARDIA-XL) 90 MG (OSM) 24 hr tablet Take 1 tablet (90 mg total) by mouth once daily.    nystatin (MYCOSTATIN) cream Apply topically 2 (two) times daily.    paroxetine (PAXIL) 10 MG tablet Take 1 tablet (10 mg total) by mouth every morning.    pen needle, diabetic (BD INSULIN PEN NEEDLE UF SHORT) 31 gauge x 5/16" Ndle Inject 1 Device into the skin 4 (four) times daily. (Patient taking differently: Inject 1 Device into the skin 5 (five) times daily. )     Family History     Problem Relation (Age of Onset)    Cataracts Mother    Diabetes Mother, Sister, Sister, Brother, Sister, Brother "    No Known Problems Father, Son, Daughter, Maternal Grandmother, Maternal Grandfather, Paternal Grandmother, Paternal Grandfather, Maternal Aunt, Maternal Uncle, Paternal Aunt, Paternal Uncle        Social History Main Topics    Smoking status: Former Smoker     Packs/day: 1.00     Types: Cigarettes     Quit date: 12/31/1994    Smokeless tobacco: Former User     Quit date: 4/3/2010    Alcohol use No    Drug use: No    Sexual activity: Not on file     Review of Systems   Unable to perform ROS: Mental status change     Objective:     Vital Signs (Most Recent):  Temp: 96.8 °F (36 °C) (10/19/17 1844)  Pulse: (!) 55 (10/20/17 0247)  Resp: (!) 28 (10/20/17 0056)  BP: (!) 98/54 (10/20/17 0247)  SpO2: 100 % (10/20/17 0247) Vital Signs (24h Range):  Temp:  [96.8 °F (36 °C)] 96.8 °F (36 °C)  Pulse:  [52-67] 55  Resp:  [21-28] 28  SpO2:  [95 %-100 %] 100 %  BP: ()/(51-61) 98/54        There is no height or weight on file to calculate BMI.    Physical Exam   Constitutional: No distress.   Chronically ill-appearing elderly man   HENT:   Head: Normocephalic and atraumatic.   Dry mucous membranes   Eyes: Pupils are equal, round, and reactive to light.   Neck: Neck supple. No JVD present.   Cardiovascular: Normal rate and regular rhythm.  Exam reveals no gallop and no friction rub.    No murmur heard.  Pulmonary/Chest: Effort normal. No respiratory distress. He has no wheezes.   Fine bilateral crackles   Abdominal: Soft. Bowel sounds are normal. He exhibits no distension and no mass. There is no tenderness.   thin   Musculoskeletal: Normal range of motion. He exhibits no edema or tenderness.   Neurological: He displays normal reflexes.   Alert, but minimally oriented and not responsive to verbal communication. Will not follow any commands   Skin: Skin is warm and dry. No rash noted. No erythema.        Significant Labs:     CBC:    Recent Labs  Lab 10/19/17  2136 10/19/17  2250   WBC 11.19  --    GRAN 74.8*  8.4*  --   "  HGB 9.3*  --    HCT 31.3* 24*     --        Chem 10:    Recent Labs  Lab 10/19/17  2136 10/19/17  2336 10/20/17  0051   * 153* 154*   K 6.0* 6.6* 5.6*   CL >130* >130* 130*   CO2 7* 7* 12*   * 119* 125*   CREATININE 6.7* 6.1* 6.3*   * 164* 153*   CALCIUM 8.5* 7.5* 7.9*       LFTs:    Recent Labs  Lab 10/19/17  2136   ALKPHOS 113   BILITOT 0.3   AST 46*   ALT 44   ALBUMIN 2.0*       Significant Imaging:   CXR 10/19/2017:   "Chronic interstitial findings, no convincing acute cardiopulmonary process." - per interpreting radiologist    CT head without contrast 10/20/2017:  In process    Assessment/Plan:     Hypernatremia    -- Na 154 on admission with free water deficit 1.9L based on his weight in his prior admission  -- Etiology likely hypovolemia  -- Ordered NGT insertion with abdomen x-ray for confirmation along with free water enteral boluses. Please see acute renal failure for more details.        Mechanical deep vein thrombosis (DVT) prophylaxis in place    -- He is at high risk for CVA and would benefit from pharmacologic prophylaxis  -- SCD only for now until his head CT is complete to rule-out bleed          History of CVA with residual deficit    -- Functional status not clear, but seems better than at present per his sister in law  -- Will need to communicate with nursing home in the morning.  -- CT head as described above - holding home aspirin and pharmacologic DVT prophylaxis until it results        Encephalopathy, metabolic    -- Etiology unclear  -- Minimally cooperative with neurologic examination. Delirium is high on the differential given waxing and waning timeline (altered at NH, oriented in triage, altered again on my examination). Possible contributors include UTI, hypernatremia, and azotemia  -- Treating above underlying conditions  -- Strict delirium and fall precautions ordered  -- Stat CT head and EEG ordered        Urinary tract infection with hematuria    -- " Occurring in the setting of chronic lindsay, unsure if colonization or true infection, but given his altered mental status will treat as infection. Ordered PCT to help with this decision  -- Ordered for the catheter to be replaced. Urine culture already obtained, along with blood culture  -- Given cefepime by the ED, presumably due to his history of pseudomonas infection. Given his renal function, he is not due for another dose for 24h. Will avoid this for now given his seizure history in favor of ciprofloxacin.        Acute renal failure superimposed on chronic kidney disease    -- Appears dehydrated on examination  -- Uremia likely contributing to altered mental status  -- Electrolytes consistent with hypovolemia given hypernatremia and hyperchloremia, increased BUN:Cr  -- Given 1L NS in the ED. Will avoid hyperchloremic fluid resuscitation going forward unless he requires it for pressure indications  -- 1.9L free water deficit. Ordered NGT for free water enteral boluses, 400mL QID x5, along with LR infusion  -- Will follow with BMP Q12H        DKA, type 1    -- Blood glucose well controlled on arrival  -- NPO for now; will continue basal insulin and hold prandial insulin  -- POCT glucose Q6H and aspart SSI ordered        Anemia of chronic renal failure, stage 4 (severe)    -- Stable  -- Will follow with CBC daily          Hyperkalemia    -- Etiology likely NIALL on CKD  -- K improved from 6.6 -> 5.6 with shifting and fluids in the ED  -- Ordered kayexalate for more definitive treatment  -- Will follow K Q4H until resolved  -- Tele ordered        Seizures    -- Stable without any seizure activity in years per sister  -- On keppra at home. Ordering level given NIALL - this will need to be resumed if the level comes back at a normal therapeutic range  -- Given that he was found with an acute mental status change in the setting of his history of brain surgery and seizures, seizure with post-ictal or continued NCSE is on  the differential  -- EEG ordered for assessment        MCI (mild cognitive impairment)    -- Appears to be progressing to dementia per discussion with his daughter in law  -- Delirium precautions ordered            VTE Risk Mitigation         Ordered     Place sequential compression device  Until discontinued      10/20/17 0258     High Risk of VTE  Once      10/20/17 0257             Rajat Wagner MD  Department of Hospital Medicine   [facility]

## 2017-10-20 NOTE — ASSESSMENT & PLAN NOTE
-- Occurring in the setting of chronic lindsay, unsure if colonization or true infection, but given his altered mental status will treat as infection. Ordered PCT to help with this decision  -- Ordered for the catheter to be replaced. Urine culture already obtained, along with blood culture  -- Given cefepime by the ED, presumably due to his history of pseudomonas infection. Given his renal function, he is not due for another dose for 24h. Will avoid this for now given his seizure history in favor of ciprofloxacin.

## 2017-10-20 NOTE — ED TRIAGE NOTES
Julius Cardenas, a 84 y.o. male presents to the ED room 9. Pt was sent here from [facility] after being found unresponsive with hypotension and low sats. Pt currently arousable to speech and oriented x4.       Chief Complaint   Patient presents with    Fatigue     Pt from [facility],  staff reported pt unresponsive with 02 sat 82, and BP 70/50.  Pt now sat 95% room air, BP 92/56, awake and alert.     Review of patient's allergies indicates:  No Known Allergies  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

## 2017-10-20 NOTE — SUBJECTIVE & OBJECTIVE
"Prescriptions Prior to Admission   Medication Sig Dispense Refill Last Dose    albuterol-ipratropium 2.5mg-0.5mg/3mL (DUO-NEB) 0.5 mg-3 mg(2.5 mg base)/3 mL nebulizer solution Take 3 mLs by nebulization every 4 (four) hours as needed for Wheezing or Shortness of Breath. Rescue 1 vial 6 9/17/2017    aspirin (ECOTRIN) 81 MG EC tablet Take 1 tablet (81 mg total) by mouth once daily. 30 tablet 11 9/17/2017    blood-glucose meter (CONTOUR METER) kit Use as instructed.  Please substitute appropriate meter to match his strips. 1 each 0 Taking    carvedilol (COREG) 25 MG tablet Take 1 tablet (25 mg total) by mouth 2 (two) times daily. 60 tablet 2     CONTOUR TEST STRIPS Strp USE AS DIRECTED THREE TIMES DAILY (Patient taking differently: USE AS DIRECTED FOUR TIMES DAILY) 300 strip 12 Taking    insulin aspart (NOVOLOG) 100 unit/mL InPn pen Inject 6 Units into the skin 3 (three) times daily with meals. (Patient taking differently: Inject 6 Units into the skin 3 (three) times daily with meals. Plus sliding scale) 15 mL 11     insulin detemir (LEVEMIR FLEXTOUCH) 100 unit/mL (3 mL) SubQ InPn pen Inject 18 Units into the skin once daily. (Patient taking differently: Inject 25 units into the skin every morning and 20 units at bedtime) 1 Box 2     lancets Misc Use three times daily for finger stick glucose monitoring. (Patient taking differently: Use four times daily for finger stick glucose monitoring.) 100 each 3 Taking    levetiracetam (KEPPRA) 1000 MG tablet Take 1 tablet (1,000 mg total) by mouth 2 (two) times daily. 180 tablet 6 9/17/2017    lisinopril 10 MG tablet Take 10 mg by mouth once daily.       nifedipine (PROCARDIA-XL) 90 MG (OSM) 24 hr tablet Take 1 tablet (90 mg total) by mouth once daily. 30 tablet 11 9/17/2017    paroxetine (PAXIL) 10 MG tablet Take 1 tablet (10 mg total) by mouth every morning. 90 tablet 11 9/17/2017    pen needle, diabetic (BD INSULIN PEN NEEDLE UF SHORT) 31 gauge x 5/16" Ndle " Inject 1 Device into the skin 4 (four) times daily. (Patient taking differently: Inject 1 Device into the skin 5 (five) times daily. ) 120 each 11        Review of patient's allergies indicates:  No Known Allergies    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     Past Surgical History:   Procedure Laterality Date    CATARACT EXTRACTION W/  INTRAOCULAR LENS IMPLANT  8/26/2009, 10/14/2009    CRANIOTOMY  1995    CRANIOTOMY  12/21/2010    EYE SURGERY       Family History     Problem Relation (Age of Onset)    Cataracts Mother    Diabetes Mother, Sister, Sister, Brother, Sister, Brother    No Known Problems Father, Son, Daughter, Maternal Grandmother, Maternal Grandfather, Paternal Grandmother, Paternal Grandfather, Maternal Aunt, Maternal Uncle, Paternal Aunt, Paternal Uncle        Social History Main Topics    Smoking status: Former Smoker     Packs/day: 1.00     Types: Cigarettes     Quit  date: 12/31/1994    Smokeless tobacco: Former User     Quit date: 4/3/2010    Alcohol use No    Drug use: No    Sexual activity: Not on file     Review of Systems   Unable to obtain secondary to AMS  Objective:     Weight: 60.4 kg (133 lb 2.5 oz)  Body mass index is 21.49 kg/m².  Vital Signs (Most Recent):  Temp: (!) 90.1 °F (32.3 °C) (10/20/17 0900)  Pulse: (!) 53 (10/20/17 1627)  Resp: 19 (10/20/17 1509)  BP: (!) 83/46 (10/20/17 1509)  SpO2: 95 % (10/20/17 1627) Vital Signs (24h Range):  Temp:  [90.1 °F (32.3 °C)-96.8 °F (36 °C)] 90.1 °F (32.3 °C)  Pulse:  [51-73] 53  Resp:  [16-28] 19  SpO2:  [89 %-100 %] 95 %  BP: ()/(46-61) 83/46          Neurosurgery Physical Exam   General: well developed, well nourished, no distress  Head: normocephalic, atraumatic  Neurologic: Alert, answers a few questions  GCS: Motor: 5/Verbal: 4/Eyes: 4 GCS Total: 13  Mental Status: Awake  Language: No aphasia  Speech: dysarthria  Cranial nerves: face symmetric, tongue midline, CN II-XII grossly intact.   Eyes: pupils equal, round, reactive to light with accommodation, EOMI  Pulmonary: normal respirations, not labored, no accessory muscles used  Abdomen: soft, non-distended, not tender to palpation  Sensory: intact to light touch throughout  Motor Strength: LUE/LLE paresis.  WD to pain RUE/RLE.  No abnormal movements seen.   Pronator Drift: unable to assess  Finger-to-nose: Unable to assess  Fall: absent  Clonus: absent  Babinski: absent  Pulses: 2+ and symmetric radial and dorsalis pedis  Skin: warm, dry and intact, no rashes      Significant Labs:    Recent Labs  Lab 10/19/17  2336 10/20/17  0051 10/20/17  0952   * 153* 83   * 154* 153*   K 6.6* 5.6* 5.8*  5.7*   CL >130* 130* >130*   CO2 7* 12* 7*   * 125* 132*   CREATININE 6.1* 6.3* 6.5*   CALCIUM 7.5* 7.9* 8.2*       Recent Labs  Lab 10/19/17  2136 10/19/17  2250   WBC 11.19  --    HGB 9.3*  --    HCT 31.3* 24*     --      No results for  input(s): LABPT, INR, APTT in the last 48 hours.  Microbiology Results (last 7 days)     Procedure Component Value Units Date/Time    Blood culture #2 [[id number]] Collected:  10/19/17 2137    Order Status:  Completed Specimen:  Blood from Peripheral, Antecubital, Left Updated:  10/20/17 0715     Blood Culture, Routine No Growth to date    Narrative:       Blood Culture #2    Blood culture #1 [[id number]] Collected:  10/19/17 2137    Order Status:  Completed Specimen:  Blood from Peripheral, Hand, Right Updated:  10/20/17 0515     Blood Culture, Routine No Growth to date    Narrative:       Blood Culture #1    Urine culture [[id number]] Collected:  10/19/17 2141    Order Status:  No result Specimen:  Urine Updated:  10/19/17 2235          Significant Diagnostics:  I personally reviewed CT Head & agree with findings: 1. Significant interval increase in size of a right parasagittal meningioma with increased component of associated vasogenic edema and localized mass effect.  Further evaluation with contrast-enhanced MRI is recommended.    2. Stable postoperative change of bifrontoparietal craniotomies.  Unchanged encephalomalacia of the bilateral parasagittal frontal lobes which may be secondary to post procedural change or other remote insult.

## 2017-10-20 NOTE — PLAN OF CARE
DAVID spoke with [facility] and was told that they have a contract with [facility].  DAVID then called Dr. Mullins to discuss plan of care further, to which he stated that he had spoken to the family about non-aggressive Tx, but not hospice.  Dr. Mullins stated that he would discuss further with family.  DAVID has uploaded the FS and H&P for [facility] in right care.      Coty Rich LMSW

## 2017-10-20 NOTE — PLAN OF CARE
Problem: Patient Care Overview  Goal: Plan of Care Review  Outcome: Ongoing (interventions implemented as appropriate)  Patient transferred from ER, soiled BM brief with foul order and soft/tan/natalie colored. Elevated K+ 5.6.  POC reviewed with patient, No sign of learning, cognitive impaired.  Answers to voice. Remains free of falls and injury. Bradycardic, Hypotensive, rectal temp 96.7F. Heat turned up to 80 in room. MD notified for IM 5, MD reported to bedside ([facility]?).  NG tube unable to be placed by ER, 3 nurses on floor attempted with 18fr, 16fr, and 14fr 30 mins apart, unable to pass nasal cavity, MD notified this morning at 8am, after final attempt. Wounds Left buttock skin tear, sacral ulcer, penile injury from lindsay, right great toe wound black in color and reddened. Skin ashy and tenting.  x2 BM, Voiding in lindsay concentrated yellow urine. LR running at 75ml/hr. Blood sugar 97.  Left side difficulty moving slightly, per history of stoke. Scars to head from past surgery. q2 turns with wedge. Unable to administer medications due to no NG insertion.

## 2017-10-20 NOTE — PLAN OF CARE
10/20/17 1439   Discharge Assessment   Assessment Type Discharge Planning Assessment   Confirmed/corrected address and phone number on facesheet? Yes   Assessment information obtained from? Medical Record   Expected Length of Stay (days) 1   Communicated expected length of stay with patient/caregiver no   Prior to hospitilization cognitive status: Unable to Assess   Current cognitive status: Unable to Assess   Current Functional Status: Needs Assistance   [facility]ility [facility] From: ([facility]   Lives With facility resident  ([facility])   Able to Return to Prior Arrangements unable to determine at this time (comments)   Is patient able to care for self after discharge? No   Who are your caregiver(s) and their phone number(s)? (Kamini Ronald, [phone]; Concetta Cardenas, [phone])   Patient's perception of discharge disposition hospice/medical facility   Readmission Within The Last 30 Days current reason for admission unrelated to previous admission   Patient currently being followed by outpatient case management? No   Patient currently receives any other outside agency services? No   Do you have any problems affording any of your prescribed medications? No   Is the patient taking medications as prescribed? yes   Does the patient have transportation home? Yes   Transportation Available agency transportation;van, wheelchair accessible   Does the patient receive services at the [facility]? No   Discharge Plan A Inpatient Hospice   Discharge Plan B Return to Nursing Home   Patient/Family In Agreement With Plan yes     Primary Doctor No  None      [employer] [address] [facility] [address] AT [facility]  [address]  [facility] [address]  Phone: [phone] Fax: [phone]    [employer] [address] [facility] [address] [facility] AT [facility] [address]  [address] [facility]  [address]  Phone: [phone] Fax: [phone]      Payor: MEDICARE / Plan: MEDICARE PART A & B / Product Type: Government /

## 2017-10-20 NOTE — SIGNIFICANT EVENT
Critical Care Outreach (CORE)  Critical Care Medicine  Consult Note      CORE Metrics:     Admit Date: 10/19/2017  LOS: 0  Code Status: Full Code   CC: Artificial Intelligence Alert   Date of Consult: 10/20/2017  : 1932  Age: 84 y.o.  Weight:   Wt Readings from Last 1 Encounters:   10/20/17 60.4 kg (133 lb 2.5 oz)     Race:   Sex: male  Bed: [facility]8A:   MRN: [id number]  RRT Indication(s): Artificial Intelligence Alert   Time CORE Call Received: 09:03  Time CORE at Bedside: 09:04  Was the patient discharged from an ICU this admission? No   Was the patient discharged from a PACU within last 24 hours? No  Did the patient receive conscious sedation/general anesthesia within last 24 hours? No  Was the patient in the ED within the past 24 hours? Yes  Was the patient started on NIPPV within the past 24 hours? No  Did this progress into an ARC or CPA: No  Attending Physician: Maye Ashby MD  Primary Service: [facility]  Illness Category: Medical Non-Cardiac  Consult Requested By: Maye Ashby MD   Disposition: Stay on floor. Transition to comfort care.    SUBJECTIVE:     HPI:  Mr. 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     He was recently discharged from [facility] in later September following an admission for hyperglycemia requiring insulin drip. He was discharged to return to his SNF. Per his sister-in-law (listed in Epic as his point of contact), he has gradually been declining over the course of the last year, with gradually worsening mental status that she attributes to dementia.      His potassium was shifted in the ED and he was given IVF and admitted to the floor with Hospital Medicine.     Hospital/ICU  Course:  Mr. Cardenas was admitted to the Hospital Medicine service. He was given IVF with mild improvement in his hyperchloremia but overall worsening of his acidosis. He was started empirically on cefepime + ciprofloxacin + vancomycin for a suspected urinary tract infection.     An artificial intelligence rapid response triggered for Mr. Cardenas the morning of 10/20/17 prompting ICU consultation.     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      Past Surgical History:   Procedure Laterality Date    CATARACT EXTRACTION W/  INTRAOCULAR LENS IMPLANT  8/26/2009, 10/14/2009    CRANIOTOMY  1995    CRANIOTOMY  12/21/2010    EYE SURGERY        Review of patient's allergies indicates:  No Known Allergies    Family History   Problem Relation Age of Onset    Diabetes Mother     Cataracts Mother     No Known Problems Father     Diabetes Sister     No Known Problems Son     No Known Problems  Daughter     No Known Problems Maternal Grandmother     No Known Problems Maternal Grandfather     No Known Problems Paternal Grandmother     No Known Problems Paternal Grandfather     Diabetes Sister     Diabetes Brother     No Known Problems Maternal Aunt     No Known Problems Maternal Uncle     No Known Problems Paternal Aunt     No Known Problems Paternal Uncle     Diabetes Sister     Diabetes Brother     Amblyopia Neg Hx     Blindness Neg Hx     Cancer Neg Hx     Glaucoma Neg Hx     Hypertension Neg Hx     Macular degeneration Neg Hx     Retinal detachment Neg Hx     Strabismus Neg Hx     Stroke Neg Hx     Thyroid disease Neg Hx       Social History   Substance Use Topics    Smoking status: Former Smoker     Packs/day: 1.00     Types: Cigarettes     Quit date: 12/31/1994    Smokeless tobacco: Former User     Quit date: 4/3/2010    Alcohol use No         Inpatient Medications:  Continuous Infusions:    Scheduled Meds:   aspirin  81 mg Oral Daily    [START ON 10/21/2017] ciprofloxacin HCl  500 mg Oral Daily    insulin aspart  0-5 Units Subcutaneous Q6H    insulin detemir  18 Units Subcutaneous Daily    sodium chloride 0.9%  1,000 mL Intravenous Once    sodium polystyrene  30 g Oral Once    vancomycin (VANCOCIN) IVPB  1,000 mg Intravenous Once      PRN Meds:.albuterol-ipratropium 2.5mg-0.5mg/3mL, dextrose 50%, dextrose 50%, glucagon (human recombinant), glucose, glucose     Review of Systems:  Unable to obtain review of symptoms secondary to altered mental status without any available surrogates.       OBJECTIVE:     Vital Signs (Most Recent):  Temp: (!) 90.1 °F (32.3 °C) (10/20/17 0900)  Pulse: 73 (10/20/17 0700)  Resp: 20 (10/20/17 0527)  BP: (!) 102/51 (10/20/17 0527)  SpO2: 97 % (10/20/17 0527) Vital Signs (24h Range):  Temp:  [90.1 °F (32.3 °C)-96.8 °F (36 °C)] 90.1 °F (32.3 °C)  Pulse:  [52-73] 73  Resp:  [20-28] 20  SpO2:  [95 %-100 %] 97 %  BP: ()/(51-61) 102/51     I  & O (24h):    Intake/Output Summary (Last 24 hours) at 10/20/17 1057  Last data filed at 10/20/17 0158   Gross per 24 hour   Intake             2075 ml   Output                0 ml   Net             2075 ml        Physical Exam:  GA: Not awake or alert, cachectic, contracted, no acute distress.   HEENT: Adequate dentition. No visible oropharyngeal abnormalities. No scleral icterus or JVD. No visible thyromegally.   Pulmonary: Chest wall symmetric. No accessory muscle use. No abnormalities on percussion. No tenderness to palpation. Tachypneic. Clear to auscultation A/P/L bilaterally. No wheezing, crackles, or rhonchi.  Cardiac: Bradycardic. S1 & S2 w/o rubs/murmurs/gallops. No lower extremity edema.   Abdominal: Bowel sounds present x 4. No appreciable hepatosplenomegaly.  Skin: No jaundice, rashes, or visible lesions.  Lymphatic: No cervical or inguinal lymphadenopathy.  Musculoskeletal: Contracted.  Extremities: No clubbing or cyanosis.  Neuro:  --GCS: E1 V1 M4  --Mental Status: Not awake or alert.   --RASS: -3    Lines/Drains/Airway:           Nutrition:  Current Diet Order   Procedures    Diet NPO         Labs:  CBC/Anemia Profile:     Recent Labs  Lab 10/19/17  2136 10/19/17  2250   WBC 11.19  --    HGB 9.3*  --    HCT 31.3* 24*     --    MCV 97  --    RDW 15.0*  --         Coags:   No results for input(s): INR, APTT in the last 168 hours.    Invalid input(s): PT     Chemistries:     Recent Labs  Lab 10/19/17  2136 10/19/17  2336 10/20/17  0051 10/20/17  0952   * 153* 154* 153*   K 6.0* 6.6* 5.6* 5.8*  5.7*   CL >130* >130* 130* >130*   CO2 7* 7* 12* 7*   * 119* 125*  --    CREATININE 6.7* 6.1* 6.3* 6.5*   CALCIUM 8.5* 7.5* 7.9* 8.2*   PROT 7.1  --   --   --    BILITOT 0.3  --   --   --    ALKPHOS 113  --   --   --    ALT 44  --   --   --    AST 46*  --   --   --         Urine Studies:   Lab Results   Component Value Date    COLORU Eduarda 10/19/2017    APPEARANCEUA Cloudy (A) 10/19/2017     PHUR 5.0 10/19/2017    SPECGRAV 1.015 10/19/2017    PROTEINUA 2+ (A) 10/19/2017    GLUCUA Negative 10/19/2017    KETONESU Negative 10/19/2017    BILIRUBINUA Negative 10/19/2017    OCCULTUA 2+ (A) 10/19/2017    NITRITE Negative 10/19/2017    UROBILINOGEN Negative 10/19/2017    LEUKOCYTESUR 3+ (A) 10/19/2017    RBCUA 37 (H) 10/19/2017    WBCUA >100 (H) 10/19/2017    BACTERIA Many (A) 10/19/2017    SQUAMEPITHEL 1 10/19/2017    HYALINECASTS 9 (A) 10/19/2017        Lactic Acid:   Lab Results   Component Value Date    LACTATE 1.9 10/19/2017    LACTATE 2.2 09/17/2017    LACTATE 1.4 08/12/2017        Cardiac Enzymes:   Recent Labs      10/19/17   2136   TROPONINI  0.026        Arterial Blood Gas:     Recent Labs  Lab 10/20/17  1022   PH 7.189*   PCO2 26.9*   HCO3 10.2*   POCSATURATED 85*   BE -18        Microbiology Results (last 7 days)     Procedure Component Value Units Date/Time    Blood culture #2 [[id number]] Collected:  10/19/17 2137    Order Status:  Completed Specimen:  Blood from Peripheral, Antecubital, Left Updated:  10/20/17 0715     Blood Culture, Routine No Growth to date    Narrative:       Blood Culture #2    Blood culture #1 [[id number]] Collected:  10/19/17 2137    Order Status:  Completed Specimen:  Blood from Peripheral, Hand, Right Updated:  10/20/17 0515     Blood Culture, Routine No Growth to date    Narrative:       Blood Culture #1    Urine culture [[id number]] Collected:  10/19/17 2141    Order Status:  No result Specimen:  Urine Updated:  10/19/17 2235          Imaging:  CTH 10/20/17:  1. Significant interval increase in size of a right parasagittal meningioma with increased component of associated vasogenic edema and localized mass effect.  Further evaluation with contrast-enhanced MRI is recommended.    2. Stable postoperative change of bifrontoparietal craniotomies.  Unchanged encephalomalacia of the bilateral parasagittal frontal lobes which may be secondary to post procedural change or other  remote insult.       ASSESSMENT/PLAN:     Active Hospital Problems    Diagnosis    Acute renal failure superimposed on chronic kidney disease    Urinary tract infection with hematuria    Encephalopathy, metabolic    History of CVA with residual deficit    Mechanical deep vein thrombosis (DVT) prophylaxis in place    Hypernatremia    DKA, type 1    Anemia of chronic renal failure, stage 4 (severe)    Hyperkalemia    Seizures    MCI (mild cognitive impairment)          NIALL 2/2 ATN/Metabolic Acidosis:  --Case discussed with IM 5, who has contacted family to discuss seriousness of worsening illness. IM 5 relates that family has elected to pursue comfort measures and to change code status to DNR/DNI.   --RRT/ICU team will sign off.     The above discussed with Dr. Cohen.     I spent >70 minutes reviewing patient records, examining, and counseling the patient with greater than 50% of the time spent with direct patient care and coordination.     GLORIA Hyatt PA-C  Critical Care Medicine  [phone]

## 2017-10-20 NOTE — ASSESSMENT & PLAN NOTE
-- Stable without any seizure activity in years per sister  -- On keppra at home. Ordering level given NIALL - this will need to be resumed if the level comes back at a normal therapeutic range  -- Given that he was found with an acute mental status change in the setting of his history of brain surgery and seizures, seizure with post-ictal or continued NCSE is on the differential  -- EEG ordered for assessment

## 2017-10-20 NOTE — SIGNIFICANT EVENT
Called Concetta Cardenas at [phone] who shares joint POA w/ her mother (Kamini Cardenas) for Julius Cardenas. I updated her on his care and him being critically ill. We discussed his continued admissions over the past year to the hospital and that his illnesses are progressing to the point that he can't be care for at the nursing home. Niece, who has a background in social work, believes further escalation of care would not benefit Mr. Cardenas and would like to proceed w/ comfort care along w/ making him a DNI/DNR. At this point, we called her mother and further discussed his care. After a long discussion with everyone, they all came into agreement to avoid agressive care and proceed w/ comfort care. Suggested family to come to the hospital and visit w/ Mr. Cardenas.     At this point, we decided against dialysis, ICU care, NG tube placement and will proceed w/ comfort care.     10/20/2017    Larry Mullins DO PGY-III  [facility]  Medicine Resident    Pager [phone]

## 2017-10-20 NOTE — HOSPITAL COURSE
Mr. Cardenas was admitted to the Hospital Medicine service. He was given IVF with mild improvement in his hyperchloremia but overall worsening of his acidosis. He was started empirically on cefepime + ciprofloxacin + vancomycin for a suspected urinary tract infection.     An artificial intelligence rapid response triggered for Mr. Cardenas the morning of 10/20/17 prompting ICU consultation.

## 2017-10-20 NOTE — ASSESSMENT & PLAN NOTE
-- Functional status not clear, but seems better than at present per his sister in law  -- Will need to communicate with nursing home in the morning.  -- CT head as described above - holding home aspirin and pharmacologic DVT prophylaxis until it results

## 2017-10-20 NOTE — SUBJECTIVE & OBJECTIVE
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       Past Surgical History:   Procedure Laterality Date    CATARACT EXTRACTION W/  INTRAOCULAR LENS IMPLANT  8/26/2009, 10/14/2009    CRANIOTOMY  1995    CRANIOTOMY  12/21/2010    EYE SURGERY         Review of patient's allergies indicates:  No Known Allergies    No current facility-administered medications on file prior to encounter.      Current Outpatient Prescriptions on File Prior to Encounter   Medication Sig    albuterol-ipratropium 2.5mg-0.5mg/3mL (DUO-NEB) 0.5 mg-3 mg(2.5 mg base)/3 mL nebulizer solution Take 3 mLs by nebulization every 4 (four) hours as needed for Wheezing or Shortness of Breath. Rescue    aspirin (ECOTRIN) 81 MG EC tablet Take 1 tablet (81 mg total) by mouth once daily.    blood-glucose meter (CONTOUR METER) kit Use as instructed.  Please substitute appropriate meter to match his strips.    carvedilol (COREG) 25 MG  "tablet Take 1 tablet (25 mg total) by mouth 2 (two) times daily.    ciprofloxacin HCl (CIPRO) 500 MG tablet Take 1 tablet (500 mg total) by mouth once daily.    CONTOUR TEST STRIPS Strp USE AS DIRECTED THREE TIMES DAILY (Patient taking differently: USE AS DIRECTED FOUR TIMES DAILY)    insulin aspart (NOVOLOG) 100 unit/mL InPn pen Inject 6 Units into the skin 3 (three) times daily with meals.    insulin detemir (LEVEMIR FLEXTOUCH) 100 unit/mL (3 mL) SubQ InPn pen Inject 18 Units into the skin once daily.    lancets Misc Use three times daily for finger stick glucose monitoring. (Patient taking differently: Use four times daily for finger stick glucose monitoring.)    levetiracetam (KEPPRA) 1000 MG tablet Take 1 tablet (1,000 mg total) by mouth 2 (two) times daily.    lisinopril 10 MG tablet Take 10 mg by mouth once daily.    nifedipine (PROCARDIA-XL) 90 MG (OSM) 24 hr tablet Take 1 tablet (90 mg total) by mouth once daily.    nystatin (MYCOSTATIN) cream Apply topically 2 (two) times daily.    paroxetine (PAXIL) 10 MG tablet Take 1 tablet (10 mg total) by mouth every morning.    pen needle, diabetic (BD INSULIN PEN NEEDLE UF SHORT) 31 gauge x 5/16" Ndle Inject 1 Device into the skin 4 (four) times daily. (Patient taking differently: Inject 1 Device into the skin 5 (five) times daily. )     Family History     Problem Relation (Age of Onset)    Cataracts Mother    Diabetes Mother, Sister, Sister, Brother, Sister, Brother    No Known Problems Father, Son, Daughter, Maternal Grandmother, Maternal Grandfather, Paternal Grandmother, Paternal Grandfather, Maternal Aunt, Maternal Uncle, Paternal Aunt, Paternal Uncle        Social History Main Topics    Smoking status: Former Smoker     Packs/day: 1.00     Types: Cigarettes     Quit date: 12/31/1994    Smokeless tobacco: Former User     Quit date: 4/3/2010    Alcohol use No    Drug use: No    Sexual activity: Not on file     Review of Systems   Unable to " "perform ROS: Mental status change     Objective:     Vital Signs (Most Recent):  Temp: 96.8 °F (36 °C) (10/19/17 1844)  Pulse: (!) 55 (10/20/17 0247)  Resp: (!) 28 (10/20/17 0056)  BP: (!) 98/54 (10/20/17 0247)  SpO2: 100 % (10/20/17 0247) Vital Signs (24h Range):  Temp:  [96.8 °F (36 °C)] 96.8 °F (36 °C)  Pulse:  [52-67] 55  Resp:  [21-28] 28  SpO2:  [95 %-100 %] 100 %  BP: ()/(51-61) 98/54        There is no height or weight on file to calculate BMI.    Physical Exam   Constitutional: No distress.   Chronically ill-appearing elderly man   HENT:   Head: Normocephalic and atraumatic.   Dry mucous membranes   Eyes: Pupils are equal, round, and reactive to light.   Neck: Neck supple. No JVD present.   Cardiovascular: Normal rate and regular rhythm.  Exam reveals no gallop and no friction rub.    No murmur heard.  Pulmonary/Chest: Effort normal. No respiratory distress. He has no wheezes.   Fine bilateral crackles   Abdominal: Soft. Bowel sounds are normal. He exhibits no distension and no mass. There is no tenderness.   thin   Musculoskeletal: Normal range of motion. He exhibits no edema or tenderness.   Neurological: He displays normal reflexes.   Alert, but minimally oriented and not responsive to verbal communication. Will not follow any commands   Skin: Skin is warm and dry. No rash noted. No erythema.        Significant Labs:     CBC:    Recent Labs  Lab 10/19/17  2136 10/19/17  2250   WBC 11.19  --    GRAN 74.8*  8.4*  --    HGB 9.3*  --    HCT 31.3* 24*     --        Chem 10:    Recent Labs  Lab 10/19/17  2136 10/19/17  2336 10/20/17  0051   * 153* 154*   K 6.0* 6.6* 5.6*   CL >130* >130* 130*   CO2 7* 7* 12*   * 119* 125*   CREATININE 6.7* 6.1* 6.3*   * 164* 153*   CALCIUM 8.5* 7.5* 7.9*       LFTs:    Recent Labs  Lab 10/19/17  2136   ALKPHOS 113   BILITOT 0.3   AST 46*   ALT 44   ALBUMIN 2.0*       Significant Imaging:   CXR 10/19/2017:   "Chronic interstitial findings, no " "convincing acute cardiopulmonary process." - per interpreting radiologist    CT head without contrast 10/20/2017:  In process  "

## 2017-10-20 NOTE — PROCEDURES
DATE OF PROCEDURE:  10/20/2017    EEG NUMBER:  FH     REQUESTING PHYSICIAN:  Dr. Wagner.    LOCATION OF SERVICE:  648.    ELECTROENCEPHALOGRAM REPORT    METHODOLOGY:  Electroencephalographic (EEG) recording is recorded with   electrodes placed according to the International 10-20 placement system.  Thirty   two (32) channels of digital signal (sampling rate of 512/sec), including T1   and T2, were simultaneously recorded from the scalp and may include EKG, EMG,   and/or eye monitors.  Recording band pass was 0.1 to 512 Hz.  Digital video   recording of the patient is simultaneously recorded with the EEG.  The patient   is instructed to report clinical symptoms which may occur during the recording   session.  EEG and video recording are stored and archived in digital format.    Activation procedures, which include photic stimulation, hyperventilation and   instructing patients to perform simple tasks, are done in selected patients.    The EEG is displayed on a monitor screen and can be reviewed using different   montages.  Computer assisted-analysis is employed to detect spike and   electrographic seizure activity.   The entire record is submitted for computer   analysis.  The entire recording is visually reviewed, and the times identified   by computer analysis as being spikes or seizures are reviewed again.    Compressed spectral analysis (CSA) is also performed on the activity recorded   from each individual channel.  This is displayed as a power display of   frequencies from 0 to 30 Hz over time.   The CSA is reviewed looking for   asymmetries in power between homologous areas of the scalp, then compared with   the original EEG recording.    [employer] software was also utilized in the review of this study.  This software   suite analyzes the EEG recording in multiple domains.  Coherence and rhythmicity   are computed to identify EEG sections which may contain organized seizures.    Each channel undergoes  analysis to detect the presence of spike and sharp waves   which have special and morphological characteristics of epileptic activity.  The   routine EEG recording is converted from special into frequency domain.  This is   then displayed comparing homologous areas to identify areas of significant   asymmetry.  Algorithm to identify non-cortically generated artifact is used to   separate artifact from the EEG.      EEG FINDINGS:  Recording was obtained at the patient's bedside in the hospital   room.  The patient was not moving around much, but was breathing on his own.    Background was very slow consisting of a 3-4 Hz theta-delta mixture, which was   seen diffusely.  There was no anterior-posterior gradient.  There were no   lateralized changes.  No spike or sharp wave activity was seen.  The patient was   stimulated and he responded verbally and the background improved to a 5 Hz   rhythmic posterior dominant rhythm.  No additional activation procedures were   done.    IMPRESSION:  Markedly abnormal EEG with generalized slowing indicative of a   diffuse cortical dysfunction without focal changes nor an epileptic process   being noted.      RR/HN  dd: 10/20/2017 15:22:23 (CDT)  td: 10/20/2017 15:53:27 (CDT)  Doc ID   #[id number]  Job ID #[id number]    CC:

## 2017-10-20 NOTE — ASSESSMENT & PLAN NOTE
-- He is at high risk for CVA and would benefit from pharmacologic prophylaxis  -- SCD only for now until his head CT is complete to rule-out bleed

## 2017-10-20 NOTE — CARE UPDATE
Update on assessment/plan:  83 yo m w/ T1DM, CKD IV, CVA, parasagittal meningioma and progressive neurocognitive decline who presented from NH w/ hypoxia, hypotension and AMS. Found to have NIALL w/ significant electrolyte abnormalities, hypovolemia w/ significant hypernatremia/hyperchloridemia and possible progression of meningioma w/ vasogenic edema. Given constellation of acute problems requiring significant escalation of care (e.g. Dialysis, vasopressors, etc) a goals of care discussion between POAs (niece/daughter-in-law) occurred. A decision to proceed w/ comfort care was agreed upon.   Plan:   -Palliative consulted.   -If patient stabilizes over the next 24 hours, can consider transfer to inpatient hospice.   -Continue antibiotic treatment for suspected sepsis  -Avoid unnecessary lab draws and sticks   -Allow for pleasure eating and family visitors  -Consider comfort care order set if patient develops symptoms and treat accordingly.     Larry Mullins DO PGY-III  [facility] Medicine Resident   Pager [phone]

## 2017-10-20 NOTE — ASSESSMENT & PLAN NOTE
-- Na 154 on admission with free water deficit 1.9L based on his weight in his prior admission  -- Etiology likely hypovolemia  -- Ordered NGT insertion with abdomen x-ray for confirmation along with free water enteral boluses. Please see acute renal failure for more details.

## 2017-10-20 NOTE — ED PROVIDER NOTES
Encounter Date: 10/19/2017       History     Chief Complaint   Patient presents with    Fatigue     Pt from [address],  staff reported pt unresponsive with 02 sat 82, and BP 70/50.  Pt now sat 95% room air, BP 92/56, awake and alert.     HPI   83 yo M with PMH of DM type 1, Anemia, Meningoma s/p excision, Seizures on Keppra, and stroke with residual left side hemiparesis who presents from [facility] with altered mental status. History difficult to obtain due to patient baseline mental status. Per EMS, patient found to be unresponsive by nursing staff today. He was noted to have a pulse ox of 82% and BP of 70/50. Patient noted to be alert and oriented x 4 and had pulse ox of 95% on RA with BP of 92/56 in ED.  Review of patient's allergies indicates:  No Known Allergies  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     Past Surgical History:   Procedure Laterality Date    CATARACT  EXTRACTION W/  INTRAOCULAR LENS IMPLANT  8/26/2009, 10/14/2009    CRANIOTOMY  1995    CRANIOTOMY  12/21/2010    EYE SURGERY       Family History   Problem Relation Age of Onset    Diabetes Mother     Cataracts Mother     No Known Problems Father     Diabetes Sister     No Known Problems Son     No Known Problems Daughter     No Known Problems Maternal Grandmother     No Known Problems Maternal Grandfather     No Known Problems Paternal Grandmother     No Known Problems Paternal Grandfather     Diabetes Sister     Diabetes Brother     No Known Problems Maternal Aunt     No Known Problems Maternal Uncle     No Known Problems Paternal Aunt     No Known Problems Paternal Uncle     Diabetes Sister     Diabetes Brother     Amblyopia Neg Hx     Blindness Neg Hx     Cancer Neg Hx     Glaucoma Neg Hx     Hypertension Neg Hx     Macular degeneration Neg Hx     Retinal detachment Neg Hx     Strabismus Neg Hx     Stroke Neg Hx     Thyroid disease Neg Hx      Social History   Substance Use Topics    Smoking status: Former Smoker     Packs/day: 1.00     Types: Cigarettes     Quit date: 12/31/1994    Smokeless tobacco: Former User     Quit date: 4/3/2010    Alcohol use No     Review of Systems   Constitutional: Negative for chills and fever.   HENT: Negative for congestion, rhinorrhea and sore throat.    Respiratory: Positive for shortness of breath. Negative for cough, choking and wheezing.    Cardiovascular: Negative for chest pain, palpitations and leg swelling.   Gastrointestinal: Negative for abdominal pain, blood in stool, constipation, nausea and vomiting.   Endocrine: Negative for polydipsia, polyphagia and polyuria.   Genitourinary: Negative for decreased urine volume, difficulty urinating, dysuria, frequency, hematuria and urgency.   Musculoskeletal: Negative for back pain.   Neurological: Negative for dizziness, seizures, syncope, speech difficulty, weakness, light-headedness, numbness  and headaches.   Hematological: Negative for adenopathy.       Physical Exam     Initial Vitals [10/19/17 1844]   BP Pulse Resp Temp SpO2   (!) 96/53 60 (!) 26 96.8 °F (36 °C) 95 %      MAP       67.33         Physical Exam    Nursing note and vitals reviewed.  Constitutional: He appears well-developed and well-nourished. He is not diaphoretic. No distress.   HENT:   Head: Normocephalic and atraumatic.   Mouth/Throat: Mucous membranes are dry.   Eyes: Conjunctivae and EOM are normal. Pupils are equal, round, and reactive to light. No scleral icterus.   Neck: Normal range of motion. Neck supple. No JVD present.   Cardiovascular: Normal rate, regular rhythm, normal heart sounds and intact distal pulses. Exam reveals no gallop and no friction rub.    No murmur heard.  Pulmonary/Chest: No respiratory distress. He has no wheezes. He has no rhonchi. He has rales. He exhibits no tenderness.   Abdominal: Soft. Bowel sounds are normal. He exhibits no distension and no mass. There is no tenderness. There is no rebound and no guarding.   Genitourinary:   Genitourinary Comments: Lindsay in place, with cloudy urine with sediment in the lindsay bad and tubing.    Lymphadenopathy:     He has no cervical adenopathy.   Neurological: He is alert.   Sleep but arousable to voice. Alert & oriented x 2 (person and place). Following commands. Baseline residual left side weakness   Skin: Capillary refill takes 2 to 3 seconds.         ED Course   Procedures  Labs Reviewed   CBC W/ AUTO DIFFERENTIAL - Abnormal; Notable for the following:        Result Value    RBC 3.24 (*)     Hemoglobin 9.3 (*)     Hematocrit 31.3 (*)     MCHC 29.7 (*)     RDW 15.0 (*)     Gran # 8.4 (*)     Gran% 74.8 (*)     Lymph% 16.5 (*)     All other components within normal limits   COMPREHENSIVE METABOLIC PANEL - Abnormal; Notable for the following:     Sodium 154 (*)     Potassium 6.0 (*)     Chloride >130 (*)     CO2 7 (*)     Glucose 219 (*)     BUN, Bld 126 (*)      Creatinine 6.7 (*)     Calcium 8.5 (*)     Albumin 2.0 (*)     AST 46 (*)     eGFR if  8.0 (*)     eGFR if non  6.9 (*)     All other components within normal limits   URINALYSIS, REFLEX TO URINE CULTURE - Abnormal; Notable for the following:     Appearance, UA Cloudy (*)     Protein, UA 2+ (*)     Occult Blood UA 2+ (*)     Leukocytes, UA 3+ (*)     All other components within normal limits    Narrative:     Preferred Collection Type->Urine, Clean Catch   URINALYSIS MICROSCOPIC - Abnormal; Notable for the following:     RBC, UA 37 (*)     WBC, UA >100 (*)     Bacteria, UA Many (*)     Hyaline Casts, UA 9 (*)     All other components within normal limits    Narrative:     Preferred Collection Type->Urine, Clean Catch   BASIC METABOLIC PANEL - Abnormal; Notable for the following:     Sodium 153 (*)     Potassium 6.6 (*)     Chloride >130 (*)     CO2 7 (*)     Glucose 164 (*)     BUN, Bld 119 (*)     Creatinine 6.1 (*)     Calcium 7.5 (*)     eGFR if  8.9 (*)     eGFR if non  7.7 (*)     All other components within normal limits   BASIC METABOLIC PANEL - Abnormal; Notable for the following:     Sodium 154 (*)     Potassium 5.6 (*)     Chloride 130 (*)     CO2 12 (*)     Glucose 153 (*)     BUN, Bld 125 (*)     Creatinine 6.3 (*)     Calcium 7.9 (*)     eGFR if  8.6 (*)     eGFR if non  7.4 (*)     All other components within normal limits   POCT GLUCOSE - Abnormal; Notable for the following:     POCT Glucose 276 (*)     All other components within normal limits   ISTAT PROCEDURE - Abnormal; Notable for the following:     POC PH 7.220 (*)     POC PCO2 27.2 (*)     POC HCO3 11.1 (*)     POC SATURATED O2 85 (*)     POC Sodium 153 (*)     POC Potassium 8.9 (*)     POC TCO2 12 (*)     POC Ionized Calcium 0.94 (*)     POC Hematocrit 24 (*)     All other components within normal limits   POCT GLUCOSE - Abnormal; Notable for the  following:     POCT Glucose 164 (*)     All other components within normal limits   CULTURE, URINE   LACTIC ACID, PLASMA   B-TYPE NATRIURETIC PEPTIDE   TROPONIN I     EKG Readings: (Independently Interpreted)   Initial Reading: No STEMI. Rhythm: Sinus Bradycardia. Heart Rate: 56. Other Findings: Prolonged QT Interval (534).                APC / Resident Notes:   HO-II MDM  Patient presents from nursing facility with altered mental status. Physical exam as mentioned above. DDx includes but is not limited to sepsis, UTI, hypoglycemia, ACS, dehydration, electrolyte abnormality and PNA. Will obtain CBC, CMP, lactic acid, BNP, troponin, blood cultures, UA with culture, CXR, and EKG. Will anticipate hospital admission pending work-up. Plan discussed with Dr. Reynaga.     Kriss Ruth D.O  [facility] EMERG[facility] PGY-2  10:31 PM 10/19/2017     -II UPDATE:  Lab result: Na 154, K 6 (but hemolyzed), CL >130 Co2 7, glucose 219,  and Cr 6.7 (baseline appear to be around 3.2). Will repeat BMP given hemolysis and critical values. HgB 9.3 HCT 31.3, BNP, lactic acid, troponin were unremarkable. POC glucose 276. UA shows 3+ Leukocytes, many bacteria, >100 WBC and many bacteria. EKG showed sinus bradycardia with a rate 56 with a prolonged QT interval at 534, normal axis, no peaked T wave or sine waves, acute ST segment or T wave changes. CXR shows chronic interstitial changes, no acute pulmonary abnormalities when compared to an xray from 9/25/2017.     An ISTAT showed a K of 8.9, Na 153, ph 7.2, PCO2 27.2, POC HCO3 11.1, and sat O2 85. These values are likely an error with the sample. Will repeat a BMP.     Patient UTI treated with IV Cefepime. Patient also given IV NS 1.5 L for hydration. Will also give calcium gluconate, albuterol nebulizer, and dextrose/insulin for hyperkalemia.       Internal medicine consulted for admission for acute on chronic kidney injury.         Kriss Ruth D.O  [facility] EMERGENCY  MEDICINE PGY-2  11:43 PM 10/19/2017      HO-II UPDATE:   Repeat BMP shows K of 5.6, , , Co2 12, glucose 153, , Cr 6.3. Patient remained hemodynamically stable while in the Ed.       Kriss Ruth D.O  [facility] EMERGENCY MEDICINE PGY-2  1:05  AM 10/20/2017            Attending Attestation:   Physician Attestation Statement for Resident:  As the supervising MD   Physician Attestation Statement: I have personally seen and examined this patient.   I agree with the above history. -:   As the supervising MD I agree with the above PE.    As the supervising MD I agree with the above treatment, course, plan, and disposition.  I have reviewed and agree with the residents interpretation of the following: lab data.  I have reviewed the following: old records at this facility.        Attending Critical Care:   Critical Care Times:   Direct Patient Care (initial evaluation, reassessments, and time considering the case)................................................................25 minutes.   Additional History from reviewing old medical records or taking additional history from the family, EMS, PCP, etc.......................5 minutes.   Ordering, Reviewing, and Interpreting Diagnostic Studies...............................................................................................................5 minutes.   Documentation..................................................................................................................................................................................5 minutes.   Consultation with other Physicians. .................................................................................................................................................5 minutes.   ==============================================================  · Total Critical Care Time - exclusive of procedural time: 45  minutes.  ==============================================================  Critical care was necessary to treat or prevent imminent or life-threatening deterioration of the following conditions: metabolic crisis.       Attending ED Notes:   Limited history, transferred from nursing facility for change in mental status. Lab work concerning for severe electrolyte abnormalities and new ARF. IV fluid resuscitation initiated. Hyperkalemia therapy given. Antibiotics started as well. Patient hemodynamically stable for floor admission.           ED Course      Clinical Impression:   The primary encounter diagnosis was Acute renal failure superimposed on chronic kidney disease, unspecified CKD stage, unspecified acute renal failure type. Diagnoses of Encephalopathy, metabolic and Meningioma, recurrent of brain were also pertinent to this visit.    Disposition:   Disposition: Admitted  Condition: Serious                        Robertoari Gay Reynaga MD  10/20/17 6034

## 2017-10-20 NOTE — ASSESSMENT & PLAN NOTE
-- Etiology unclear  -- Minimally cooperative with neurologic examination. Delirium is high on the differential given waxing and waning timeline (altered at NH, oriented in triage, altered again on my examination). Possible contributors include UTI, hypernatremia, and azotemia  -- Treating above underlying conditions  -- Strict delirium and fall precautions ordered  -- Stat CT head and EEG ordered

## 2017-10-20 NOTE — CONSULTS
[facility]  Neurosurgery  Consult Note    Consults  Subjective:     Chief Complaint/Reason for Admission: Meningioma    History of Present Illness: Mr. Cardenas is an 84yoM with PMHx right parasagittal meningioma s/p resection (unsure of where/when), acute on chronic kidney failure, DMI, peripheral neuropathy, CVA with residual left sided weakness who presents to [facility] for AMS.  He was found unresponsive in his SNF and transferred to [facility].  He was hydrated and mentation improved.  He was found to have UTI, renal failure, and CTH showed right parasaggital meningioma with vasogenic edema  The patient is unable to give HPI, so records are obtained from EMR.     Prescriptions Prior to Admission   Medication Sig Dispense Refill Last Dose    albuterol-ipratropium 2.5mg-0.5mg/3mL (DUO-NEB) 0.5 mg-3 mg(2.5 mg base)/3 mL nebulizer solution Take 3 mLs by nebulization every 4 (four) hours as needed for Wheezing or Shortness of Breath. Rescue 1 vial 6 9/17/2017    aspirin (ECOTRIN) 81 MG EC tablet Take 1 tablet (81 mg total) by mouth once daily. 30 tablet 11 9/17/2017    blood-glucose meter (CONTOUR METER) kit Use as instructed.  Please substitute appropriate meter to match his strips. 1 each 0 Taking    carvedilol (COREG) 25 MG tablet Take 1 tablet (25 mg total) by mouth 2 (two) times daily. 60 tablet 2     CONTOUR TEST STRIPS Strp USE AS DIRECTED THREE TIMES DAILY (Patient taking differently: USE AS DIRECTED FOUR TIMES DAILY) 300 strip 12 Taking    insulin aspart (NOVOLOG) 100 unit/mL InPn pen Inject 6 Units into the skin 3 (three) times daily with meals. (Patient taking differently: Inject 6 Units into the skin 3 (three) times daily with meals. Plus sliding scale) 15 mL 11     insulin detemir (LEVEMIR FLEXTOUCH) 100 unit/mL (3 mL) SubQ InPn pen Inject 18 Units into the skin once daily. (Patient taking differently: Inject 25 units into the skin every morning and 20 units at bedtime) 1 Box 2     lancets  "Misc Use three times daily for finger stick glucose monitoring. (Patient taking differently: Use four times daily for finger stick glucose monitoring.) 100 each 3 Taking    levetiracetam (KEPPRA) 1000 MG tablet Take 1 tablet (1,000 mg total) by mouth 2 (two) times daily. 180 tablet 6 9/17/2017    lisinopril 10 MG tablet Take 10 mg by mouth once daily.       nifedipine (PROCARDIA-XL) 90 MG (OSM) 24 hr tablet Take 1 tablet (90 mg total) by mouth once daily. 30 tablet 11 9/17/2017    paroxetine (PAXIL) 10 MG tablet Take 1 tablet (10 mg total) by mouth every morning. 90 tablet 11 9/17/2017    pen needle, diabetic (BD INSULIN PEN NEEDLE UF SHORT) 31 gauge x 5/16" Ndle Inject 1 Device into the skin 4 (four) times daily. (Patient taking differently: Inject 1 Device into the skin 5 (five) times daily. ) 120 each 11        Review of patient's allergies indicates:  No Known Allergies    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     TIA (transient ischemic attack) 2016    Type 1 diabetes     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 3/25/2013    Type 1 diabetes mellitus with hyperglycemia 4/8/2014    Type 1 diabetes mellitus with hypoglycemia and without coma 4/8/2014    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     Poor control due to cognitive impairment, with history of hypoglycemia and DKA     Vitamin D " deficiency disease 7/31/2013     Past Surgical History:   Procedure Laterality Date    CATARACT EXTRACTION W/  INTRAOCULAR LENS IMPLANT  8/26/2009, 10/14/2009    CRANIOTOMY  1995    CRANIOTOMY  12/21/2010    EYE SURGERY       Family History     Problem Relation (Age of Onset)    Cataracts Mother    Diabetes Mother, Sister, Sister, Brother, Sister, Brother    No Known Problems Father, Son, Daughter, Maternal Grandmother, Maternal Grandfather, Paternal Grandmother, Paternal Grandfather, Maternal Aunt, Maternal Uncle, Paternal Aunt, Paternal Uncle        Social History Main Topics    Smoking status: Former Smoker     Packs/day: 1.00     Types: Cigarettes     Quit date: 12/31/1994    Smokeless tobacco: Former User     Quit date: 4/3/2010    Alcohol use No    Drug use: No    Sexual activity: Not on file     Review of Systems   Unable to obtain secondary to AMS  Objective:     Weight: 60.4 kg (133 lb 2.5 oz)  Body mass index is 21.49 kg/m².  Vital Signs (Most Recent):  Temp: (!) 90.1 °F (32.3 °C) (10/20/17 0900)  Pulse: (!) 53 (10/20/17 1627)  Resp: 19 (10/20/17 1509)  BP: (!) 83/46 (10/20/17 1509)  SpO2: 95 % (10/20/17 1627) Vital Signs (24h Range):  Temp:  [90.1 °F (32.3 °C)-96.8 °F (36 °C)] 90.1 °F (32.3 °C)  Pulse:  [51-73] 53  Resp:  [16-28] 19  SpO2:  [89 %-100 %] 95 %  BP: ()/(46-61) 83/46          Neurosurgery Physical Exam   General: well developed, well nourished, no distress  Head: normocephalic, atraumatic  Neurologic: Alert, answers a few questions  GCS: Motor: 5/Verbal: 4/Eyes: 4 GCS Total: 13  Mental Status: Awake  Language: No aphasia  Speech: dysarthria  Cranial nerves: face symmetric, tongue midline, CN II-XII grossly intact.   Eyes: pupils equal, round, reactive to light with accommodation, EOMI  Pulmonary: normal respirations, not labored, no accessory muscles used  Abdomen: soft, non-distended, not tender to palpation  Sensory: intact to light touch throughout  Motor Strength: LUE/LLE  paresis.  WD to pain RUE/RLE.  No abnormal movements seen.   Pronator Drift: unable to assess  Finger-to-nose: Unable to assess  Fall: absent  Clonus: absent  Babinski: absent  Pulses: 2+ and symmetric radial and dorsalis pedis  Skin: warm, dry and intact, no rashes      Significant Labs:    Recent Labs  Lab 10/19/17  2336 10/20/17  0051 10/20/17  0952   * 153* 83   * 154* 153*   K 6.6* 5.6* 5.8*  5.7*   CL >130* 130* >130*   CO2 7* 12* 7*   * 125* 132*   CREATININE 6.1* 6.3* 6.5*   CALCIUM 7.5* 7.9* 8.2*       Recent Labs  Lab 10/19/17  2136 10/19/17  2250   WBC 11.19  --    HGB 9.3*  --    HCT 31.3* 24*     --      No results for input(s): LABPT, INR, APTT in the last 48 hours.  Microbiology Results (last 7 days)     Procedure Component Value Units Date/Time    Blood culture #2 [[id number]] Collected:  10/19/17 2137    Order Status:  Completed Specimen:  Blood from Peripheral, Antecubital, Left Updated:  10/20/17 0715     Blood Culture, Routine No Growth to date    Narrative:       Blood Culture #2    Blood culture #1 [[id number]] Collected:  10/19/17 2137    Order Status:  Completed Specimen:  Blood from Peripheral, Hand, Right Updated:  10/20/17 0515     Blood Culture, Routine No Growth to date    Narrative:       Blood Culture #1    Urine culture [[id number]] Collected:  10/19/17 2141    Order Status:  No result Specimen:  Urine Updated:  10/19/17 2235          Significant Diagnostics:  I personally reviewed CT Head & agree with findings: 1. Significant interval increase in size of a right parasagittal meningioma with increased component of associated vasogenic edema and localized mass effect.  Further evaluation with contrast-enhanced MRI is recommended.    2. Stable postoperative change of bifrontoparietal craniotomies.  Unchanged encephalomalacia of the bilateral parasagittal frontal lobes which may be secondary to post procedural change or other remote insult.    Assessment/Plan:      Meningioma, recurrent of brain    Mr. Cardenas is an 84yoM with multiple comorbidities who presents with AMS, found to have UTI, acute on chronic renal failure, and large meningioma with surrounding cerebral edema  -Per medicine team, family does not desire any aggressive treatment at this time, likely transitioning to Hospice care  -Please reconsult Neurosurgery if we can be of further assistance  -Discussed with Dr. Wang            Thank you for your consult. I will sign off. Please contact us if you have any additional questions.    Alexia Gallego PA-C  Neurosurgery  [facility]

## 2017-10-20 NOTE — ED NOTES
Hospital medicine notified that NG tube placement unsuccessful due to pt not following commands at this time. Hospital medicine stated okay for pt to go to room without NG tube.

## 2017-10-20 NOTE — SUBJECTIVE & OBJECTIVE
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       Past Surgical History:   Procedure Laterality Date    CATARACT EXTRACTION W/  INTRAOCULAR LENS IMPLANT  8/26/2009, 10/14/2009    CRANIOTOMY  1995    CRANIOTOMY  12/21/2010    EYE SURGERY         Review of patient's allergies indicates:  No Known Allergies    Family History     Problem Relation (Age of Onset)    Cataracts Mother    Diabetes Mother, Sister, Sister, Brother, Sister, Brother    No Known Problems Father, Son, Daughter, Maternal Grandmother, Maternal Grandfather, Paternal Grandmother, Paternal Grandfather, Maternal Aunt, Maternal Uncle, Paternal Aunt, Paternal Uncle        Social History Main Topics    Smoking status: Former Smoker     Packs/day: 1.00     Types: Cigarettes     Quit date: 12/31/1994    Smokeless tobacco: Former User     Quit date: 4/3/2010    Alcohol use No    Drug use: No    Sexual activity: Not on file       Review of Systems   Unable to perform ROS: Dementia     Objective:     Vital Signs (Most Recent):  Temp: (!) 90.1 °F (32.3 °C) (10/20/17 0900)  Pulse: 73 (10/20/17 0700)  Resp: 20 (10/20/17 0527)  BP: (!) 102/51 (10/20/17 0527)  SpO2: 97 % (10/20/17 0527) Vital Signs (24h Range):  Temp:  [90.1 °F (32.3 °C)-96.8 °F (36 °C)] 90.1 °F (32.3 °C)  Pulse:  [52-73] 73  Resp:  [20-28] 20  SpO2:  [95 %-100 %] 97 %  BP: ()/(51-61) 102/51   Weight: 60.4 kg (133 lb 2.5 oz)  Body mass index is 21.49 kg/m².      Intake/Output Summary (Last 24 hours) at 10/20/17 1044  Last data filed at 10/20/17 0158   Gross per 24 hour   Intake             2075 ml   Output                0 ml   Net             2075 ml       Physical Exam   HENT:   Head: Normocephalic and atraumatic.   Mouth/Throat: Oropharynx is clear and moist.   Eyes: Conjunctivae are normal. Pupils are equal, round, and reactive to light. Right eye exhibits no discharge. Left eye exhibits no discharge. No scleral icterus.   Neck: Trachea normal and full passive range of motion without pain. Neck supple. No JVD present. No tracheal deviation present. No thyromegaly present.   Cardiovascular: Regular rhythm, S1 normal, S2 normal, normal heart sounds and intact distal pulses.  Exam reveals no gallop and no friction rub.    No murmur heard.  Pulmonary/Chest: Effort normal and breath sounds normal. No respiratory distress. He has no wheezes. He has no rales. He exhibits no tenderness.   Abdominal: Soft. Bowel sounds are normal. He exhibits no distension and no mass. There is no tenderness. There is no rebound and no guarding.   Musculoskeletal: He exhibits deformity. He exhibits no tenderness.   Contracted   Lymphadenopathy:     He has no cervical adenopathy.   Neurological: No cranial nerve deficit.   Skin: Skin is warm and dry. No abrasion and no bruising noted.   Psychiatric: He has a normal mood and affect.   Vitals reviewed.      Vents:     Lines/Drains/Airways      Drain                 Urethral Catheter 09/17/17 2331 Double-lumen 16 Fr. 32 days          Peripheral Intravenous Line                 Peripheral IV - Single Lumen 10/19/17 1848 Left Forearm less than 1 day         Peripheral IV - Single Lumen 10/19/17 2153 Right Hand less than 1 day              Significant Labs:    CBC/Anemia Profile:    Recent Labs  Lab 10/19/17  2136 10/19/17  2250   WBC 11.19  --    HGB 9.3*  --    HCT 31.3* 24*     --    MCV 97  --    RDW 15.0*  --         Chemistries:    Recent Labs  Lab 10/19/17  2136 10/19/17  2336 10/20/17  0051 10/20/17  0952   * 153* 154*  --    K 6.0* 6.6* 5.6* 5.7*   CL >130* >130* 130*  --    CO2 7* 7* 12*  --    * 119* 125*  --    CREATININE 6.7* 6.1* 6.3*  --    CALCIUM 8.5* 7.5* 7.9*  --    ALBUMIN 2.0*  --   --   --    PROT 7.1  --   --   --    BILITOT 0.3  --   --   --    ALKPHOS 113  --   --   --    ALT 44  --   --   --    AST 46*  --   --   --        Significant Imaging: I have reviewed and interpreted all pertinent imaging results/findings within the past 24 hours.

## 2017-10-20 NOTE — ED NOTES
Report given to charge nurse, Rocío Yuen RN who is aware of level 2 and need for bed.  Pt awake and alert with 02 sat of 96 on 2L NC.

## 2017-10-20 NOTE — PHARMACY MED REC
"Admission Medication Reconciliation - Pharmacy Consult Note    The home medication history was taken by Khushboo Lora, Pharmacy Technician.  Based on information gathered and subsequent review by the clinical pharmacist, the items below may need attention.     You may go to "Admission" then "Reconcile Home Medications" tabs to review and/or act upon these items. Based on information gathered and subsequent review by the clinical pharmacist, the items below may need attention.    Potentially problematic discrepancies with current MAR  o Patient IS taking the following which was not ordered upon admit  o Aspirin 81 mg PO   o Carvedilol 25 mg PO BID - held in setting of hypotension  o Levetriacetam 1000 mg PO BID  o Lisinopril 10 mg PO daily -- held in setting of hypotension  o Nifedipine XL 90 mg PO daily -- held in the setting of hypotension  o Paroxetine 10 mg PO daily     o Patient IS NOT taking the following which was ordered upon admit  o Ciprofloxacin 500 mg PO daily     o Patient is taking a drug DIFFERENTLY than how ordered upon admit  o Insulin detemir taking 25 units in the morning and 20 units at bedtime at home -- 18 units nightly ordered inpatient  o Insulin aspart 6 units TID with meals + SS at home -- low dose sliding scale ordered inpatient     Please address this information as you see fit.  Feel free to contact us if you have any questions or require assistance.    Gloria Talley, PharmD  PGY-1 Pharmacy Resident   EXT [id number]    Patient's prior to admission medication regimen was as follows:  Medication Sig    albuterol-ipratropium 2.5mg-0.5mg/3mL (DUO-NEB) 0.5 mg-3 mg(2.5 mg base)/3 mL nebulizer solution Take 3 mLs by nebulization every 4 (four) hours as needed for Wheezing or Shortness of Breath. Rescue    aspirin (ECOTRIN) 81 MG EC tablet Take 1 tablet (81 mg total) by mouth once daily.    blood-glucose meter (CONTOUR METER) kit Use as instructed.  Please substitute appropriate meter to match his " "strips.    carvedilol (COREG) 25 MG tablet Take 1 tablet (25 mg total) by mouth 2 (two) times daily.    CONTOUR TEST STRIPS Strp USE AS DIRECTED THREE TIMES DAILY (Patient taking differently: USE AS DIRECTED FOUR TIMES DAILY)    insulin aspart (NOVOLOG) 100 unit/mL InPn pen Inject 6 Units into the skin 3 (three) times daily with meals. (Patient taking differently: Inject 6 Units into the skin 3 (three) times daily with meals. Plus sliding scale)    insulin detemir (LEVEMIR FLEXTOUCH) 100 unit/mL (3 mL) SubQ InPn pen Inject 18 Units into the skin once daily. (Patient taking differently: Inject 25 units into the skin every morning and 20 units at bedtime)    lancets Misc Use three times daily for finger stick glucose monitoring. (Patient taking differently: Use four times daily for finger stick glucose monitoring.)    levetiracetam (KEPPRA) 1000 MG tablet Take 1 tablet (1,000 mg total) by mouth 2 (two) times daily.    lisinopril 10 MG tablet Take 10 mg by mouth once daily.    nifedipine (PROCARDIA-XL) 90 MG (OSM) 24 hr tablet Take 1 tablet (90 mg total) by mouth once daily.    paroxetine (PAXIL) 10 MG tablet Take 1 tablet (10 mg total) by mouth every morning.    pen needle, diabetic (BD INSULIN PEN NEEDLE UF SHORT) 31 gauge x 5/16" Ndle Inject 1 Device into the skin 4 (four) times daily. (Patient taking differently: Inject 1 Device into the skin 5 (five) times daily. )         Please insert appropriate  SmartPhrase below:        "

## 2017-10-20 NOTE — HPI
Mr. Cardenas is an 84yoM with PMHx right parasagittal meningioma s/p resection (unsure of where/when), acute on chronic kidney failure, DMI, peripheral neuropathy, CVA with residual left sided weakness who presents to [facility] for [place].  He was found unresponsive in his SNF and transferred to [facility].  He was hydrated and mentation improved.  He was found to have UTI, renal failure, and CTH showed right parasaggital meningioma with vasogenic edema  The patient is unable to give HPI, so records are obtained from EMR.

## 2017-10-20 NOTE — HPI
Mr. Cardenas is an 84-year-old man with DM type I complicated by peripheral neuropathy, renovascular HTN, CKD stage IV, CVA with residual left sided weakness, anemia, parasagittal meningioma s/p resection complicated by seizures on keppra who presents with altered mental status. He was recently discharged from [facility] in later September following an admission for hyperglycemia requiring insulin drip. He was discharged to return to his SNF. Unfortunately he is a poor historian, and the nursing home is not answering, so history since his discharge is limited. Per his sister-in-law (listed in Epic as his point of contact), he has gradually been declining over the course of the last year, with gradually worsening mental status that she attributes to dementia. Apparently he was found unresponsive and hypotensive with systolic BP and SpO2 in the 70s. By the time he arrived to the ED, he had received some IVF, and his mentation improved dramatically. He cannot respond to any of my questions.    Upon arrival to the ED, his vital signs were significant for tachycardia with RR 26 and hypotension with BP 96/53. Lab workup significant for anemia with Hb 9.3 (down from most recent measurement two weeks ago but appears baseline prior to that) and numerous electrolyte abnormalities including hypernatremia with Na 153, hyperkalemia with K 6.6, hyperchloremia with Cl 130, metabolic acidosis with bicarbonate 12, and BUN/Cr of 125 and 6.3. Lactate normal at 1.9. Urinalysis positive for > 100 WBC and 37 RBC. CXR stable/mildly improved from prior examination with coarse interstitial attenuation. He was given 1L NS, cefepime 1g IV, and insulin + d50. On my evaluation he is no longer oriented. Hospital medicine admission requested for altered mental status and NIALL on CKD.

## 2017-10-20 NOTE — PROGRESS NOTES
Patient seen for wound care consult. Patient with multiple skin issues. He has a split to the dorsal side of the meatus of his penis from his indwelling lindsay cath. Lindsay is secured to his leg high on the inner thigh. Patient's legs are very dry and flaking. Moisturized and intact clear fluid filled blister noted to right heel. Heel suspension boots ordered to the bedside. Asked nurse to reapply TEDs and SCDs in about an hour. Betadine noted to be in use prior to the right great toe wound. Recommend to continue and discussed with MD.        10/20/17 1150       Wound 10/20/17 1145 Arterial ulcer distal first toe   Date First Assessed/Time First Assessed: 10/20/17 1145   Pre-existing: Yes  Wound Type: Arterial ulcer  Side: Right  Orientation: distal  Location: first toe   Wound Image    Wound WDL ex   Dressing Appearance no dressing;no drainage   Wound Base black eschar   Periwound Area dry;intact   Wound Edges intact   Wound Length (cm) 1   Wound Width (cm) 1   Cleansed W/ sterile normal saline       Pressure Ulcer 10/20/17 1144 sacral spine suspected deep tissue injury   Date First Assessed/Time First Assessed: 10/20/17 1144   Pressure Ulcer Present on Admission: yes  Location: sacral spine  Staging: suspected deep tissue injury   Staging suspected deep tissue injury   Healing Pressure Ulcer yes   Pressure Ulcer Risk Factors activity;mobility;nutrition;shear/friction;moisture   Dressing Appearance intact   Drainage Amount none   Appearance pink;moist;purple   Periwound Area intact;other (see comments)  (dark in color)   Wound Edges open   Wound Length (cm) 4   Wound Width (cm) 4   Depth (cm) 0   Cleansed W/ soap and water   Dressing foam       Pressure Ulcer 10/20/17 1144 Right heel Stage II   Date First Assessed/Time First Assessed: 10/20/17 1144   Pressure Ulcer Present on Admission: yes  Side: Right  Location: heel  Staging: Stage II   Wound Image    Staging Stage III   Healing Pressure Ulcer no   Pressure Ulcer  Risk Factors activity;mobility;nutrition;shear/friction   Dressing Appearance no dressing   Appearance blistered;other (see comments)  (clear fluid filled)   Periwound Area intact;dry   Wound Edges intact   Wound Length (cm) 3   Wound Width (cm) 3   Skin Interventions   Pressure Reduction Devices pressure-redistributing mattress utilized;positioning supports utilized;heel offloading device utilized   Pressure Reduction Techniques positioned off wounds;pressure points protected   Skin Protection sacral silicone foam dressing in place;tubing/devices free from skin contact     Mepilex in place to sacral ulcer. Recommend to continue. Will follow as needed. Nursing to continue.

## 2017-10-20 NOTE — ASSESSMENT & PLAN NOTE
Mr. Cardenas is an 84yoM with multiple comorbidities who presents with AMS, found to have UTI, acute on chronic renal failure, and large meningioma with surrounding cerebral edema  -Per medicine team, family does not desire any aggressive treatment at this time, likely transitioning to Hospice care  -Please reconsult Neurosurgery if we can be of further assistance  -Discussed with Dr. Wang

## 2017-10-20 NOTE — CONSULTS
[facility]  Palliative Medicine  Consult Note    Patient Name: Julius Cardenas  MRN: [id number]  Admission Date: 10/19/2017  Hospital Length of Stay: 0 days  Code Status: DNR   Attending Provider: Maye Ashby MD  Consulting Provider: REYNALDO Ryder  Primary Care Physician: Primary Doctor No  Principal Problem:<principal problem not specified>    Patient information was obtained from primary team  and ER records.      Inpatient consult to Palliative Care  Consult performed by: BRAYAN MCMAHON  Consult ordered by: HEIDY APDILLA  Reason for consult: comfort care, goals of care transition to hospice   Assessment/Recommendations: Palliative Care Acknowledgement of Consult - .date 10/20/17 12:15 PM     Consult received.   Chart reviewed and patient discussed with Dr. Padilla.   -Goal of consult is to determine patient's goals of care and comfort recommendations.   - As reported to primary team patient's family is interested in a transition to comfort and possibly hospice.    -Family expected to arrive in the afternoon, palliative care at bedside and family unavailable.    -Family contacted by telephone with no answer.    -Bedside nurse received call from family message received  That family not available until the early evening - after work.    - Patient currently resides at [facility] - if family would like to transition to comfort with hospice [facility] has contracts with [facility].    Dr. Padilla and Dr. Ashby notified.   - Symptom management:  Patient may benefit from use of palliative care comfort care order set   Full consult to follow.    Thank you for  Opportunity to participate in Mr. Cardenas's care.

## 2017-10-21 VITALS
SYSTOLIC BLOOD PRESSURE: 127 MMHG | WEIGHT: 133.19 LBS | OXYGEN SATURATION: 95 % | TEMPERATURE: 97 F | HEART RATE: 61 BPM | BODY MASS INDEX: 21.49 KG/M2 | RESPIRATION RATE: 17 BRPM | DIASTOLIC BLOOD PRESSURE: 59 MMHG

## 2017-10-21 LAB — BACTERIA UR CULT: NORMAL

## 2017-10-21 PROCEDURE — 25000003 PHARM REV CODE 250: Performed by: INTERNAL MEDICINE

## 2017-10-21 PROCEDURE — 63600175 PHARM REV CODE 636 W HCPCS: Performed by: INTERNAL MEDICINE

## 2017-10-21 PROCEDURE — 99239 HOSP IP/OBS DSCHRG MGMT >30: CPT | Mod: GC,,, | Performed by: INTERNAL MEDICINE

## 2017-10-21 RX ORDER — CIPROFLOXACIN 2 MG/ML
400 INJECTION, SOLUTION INTRAVENOUS
Status: DISCONTINUED | OUTPATIENT
Start: 2017-10-21 | End: 2017-10-21 | Stop reason: HOSPADM

## 2017-10-21 RX ORDER — CIPROFLOXACIN 2 MG/ML
400 INJECTION, SOLUTION INTRAVENOUS
Status: CANCELLED | OUTPATIENT
Start: 2017-10-22

## 2017-10-21 RX ORDER — CIPROFLOXACIN 2 MG/ML
400 INJECTION, SOLUTION INTRAVENOUS DAILY
Qty: 2000 ML | Refills: 0
Start: 2017-10-21 | End: 2017-10-31

## 2017-10-21 RX ADMIN — VANCOMYCIN HYDROCHLORIDE 1000 MG: 1 INJECTION, POWDER, LYOPHILIZED, FOR SOLUTION INTRAVENOUS at 04:10

## 2017-10-21 RX ADMIN — CIPROFLOXACIN 400 MG: 2 INJECTION, SOLUTION INTRAVENOUS at 10:10

## 2017-10-21 NOTE — PLAN OF CARE
Problem: Patient Care Overview  Goal: Individualization & Mutuality  Plan of care discussed with patient. Patient is free of fall/trauma/injury. Denies CP, SOB, or pain/discomfort. VSS. IV antibiotics administered per order. Turned pt q2 hr, heels elevated off bed. All questions addressed. Will continue to monitor

## 2017-10-21 NOTE — PLAN OF CARE
Problem: Patient Care Overview  Goal: Plan of Care Review  Outcome: Ongoing (interventions implemented as appropriate)  Patient verbalizes no complaints overnight; denies chest pain, SOB, or other discomfort. IV ABX administered per MD order. Comfort care maintained. Pt remains free of falls or injuries. Pt unable to verbalizes understanding of plan of care. Will continue to monitor.

## 2017-10-21 NOTE — NURSING TRANSFER
Nursing Transfer Note      10/21/2017     Transfer From: [facility]    Transfer via bed     Transported by RN    Medicines sent: none    Chart send with patient: Yes    Patient assessed at: (10/21/2017, 12:30 AM)    Upon arrival to floor: patient oriented to room, call bell in reach and bed in lowest position, SCDs on, 2 L O2 via NC applied.

## 2017-10-21 NOTE — PROGRESS NOTES
Niece, Duanelogan pt's sister-Christine called for update on pt  Is requesting to speak with MD. Notified MD on call that family would like an update. MD to call family. Phone numbers on sticky note of electronic chart.

## 2017-10-21 NOTE — PROGRESS NOTES
Notified MD Debra that DNR form was never signed with a second signature today. MD stated that she will remind attending in AM.

## 2017-10-21 NOTE — PLAN OF CARE
Nurse given information to call report.  Transportation scheduled with [facility] ([phone]).    Astrid Sheridan LMSW

## 2017-10-21 NOTE — PLAN OF CARE
Ms. Urbina at [facility] advised that they cannot accept pt back today but they would be able to accept on Monday, 10/23.    Astrid Sheridan LMSW

## 2017-10-21 NOTE — PLAN OF CARE
Discharge plan is inpt hospice.  MSW spoke to pt's sister Kamini who would like [facility]  MSW spoke to Aydin at hospices [phone] who states that he will meet family at the home to sign paper work.  Clinicals faxed to [phone]. Weekend MSW will continue to follow.    Astrid Sheridan LMSW

## 2017-10-21 NOTE — PROGRESS NOTES
Pt has lindsay to gravity in place, with no lindsay orders. MD Ramos notified. MD stated that lindsay was in place prior to admission and that it will remain in place because pt is on comfort/palliative care. Will continue to monitor.

## 2017-10-21 NOTE — NURSING
Contacted Dr Glaser in regards to low BP.  said she will look at placing new orders. Nothing placed at this time.

## 2017-10-21 NOTE — PROGRESS NOTES
Notified MD Debra that pt's DNR form still has only 1 signature and needs the attending signature. MD to remind attending.

## 2017-10-21 NOTE — PLAN OF CARE
[facility]     Department of Hospital Medicine     [address] [place] [address]     [phone] after hours  [phone] fax                                   HOSPICE  ORDERS     10/21/2017    Admit to Hospice: Inpatient Service   Diagnoses:  Active Hospital Problems    Diagnosis  POA    Acute renal failure superimposed on chronic kidney disease [N17.9, N18.9]  Yes    Urinary tract infection with hematuria [N39.0, R31.9]  Unknown    Encephalopathy, metabolic [G93.41]  Unknown    History of CVA with residual deficit [I69.30]  Not Applicable    Mechanical deep vein thrombosis (DVT) prophylaxis in place [Z78.9]  Unknown    Hypernatremia [E87.0]  Unknown    Necrotic toes [I96]  Yes    Meningioma, recurrent of brain [D32.0]  Unknown    DKA, type 1 [E10.10]  Yes    Anemia of chronic renal failure, stage 4 (severe) [N18.4, D63.1]  Yes     Chronic    Hyperkalemia [E87.5]  Yes    Seizures [R56.9]  Yes    MCI (mild cognitive impairment) [G31.84]  Yes     Chronic      Resolved Hospital Problems    Diagnosis Date Resolved POA   No resolved problems to display.       Hospice Qualifying Diagnoses:   Recurrent meningioma + vasogenic edema  Renal failure requiring dialysis   Bacteremia       Patient has a life expectancy < 6 months due to these conditions.    Vital Signs: Routine per Hospice Protocol.    Allergies:Review of patient's allergies indicates:  No Known Allergies    Diet: comfort feeding    Activities: As tolerated    Nursing: Per Hospice Routine    Future Orders:  Hospice Medical Director may dictate new orders for comfortable care measures & sign death certificate.    Medications:         Comfort Care Medications Only     Julius Cardenas   Home Medication Instructions CAM:[id number]    Printed on:10/21/17 7868   Medication Information                      albuterol-ipratropium 2.5mg-0.5mg/3mL (DUO-NEB) 0.5 mg-3 mg(2.5 mg base)/3 mL nebulizer  solution  Take 3 mLs by nebulization every 4 (four) hours as needed for Wheezing or Shortness of Breath. Rescue             ciprofloxacin, CIPRO,400mg/200ml D5W IVPB (CIPRO) 400 mg/200 mL IVPB  Inject 200 mLs (400 mg total) into the vein once daily.               _________________________________  Simin Richardson MD  10/21/2017

## 2017-10-21 NOTE — DISCHARGE SUMMARY
DISCHARGE SUMMARY  Hospital Medicine    Team: [facility]    Patient Name: Julius Cardenas  YOB: 1932    Admit Date: 10/19/2017    Discharge Date: 10/21/2017    Discharge Attending Physician: Maye Ashby MD     Admitting Resident: Rajat Wagner MD     Diagnoses:  Active Hospital Problems    Diagnosis  POA    Acute renal failure superimposed on chronic kidney disease [N17.9, N18.9]  Yes    Urinary tract infection with hematuria [N39.0, R31.9]  Unknown    Encephalopathy, metabolic [G93.41]  Unknown    History of CVA with residual deficit [I69.30]  Not Applicable    Mechanical deep vein thrombosis (DVT) prophylaxis in place [Z78.9]  Unknown    Hypernatremia [E87.0]  Unknown    Necrotic toes [I96]  Yes    Meningioma, recurrent of brain [D32.0]  Unknown    DKA, type 1 [E10.10]  Yes    Anemia of chronic renal failure, stage 4 (severe) [N18.4, D63.1]  Yes     Chronic    Hyperkalemia [E87.5]  Yes    Seizures [R56.9]  Yes    MCI (mild cognitive impairment) [G31.84]  Yes     Chronic      Resolved Hospital Problems    Diagnosis Date Resolved POA   No resolved problems to display.       Discharged Condition: admit to hospice, proceeding with the comfort care    HOSPITAL COURSE:      Initial Presentation:  Mr. 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 He was recently discharged from [facility] later September following an admission for hyperglycemia requiring insulin drip. Apparently he was found unresponsive and hypotensive with systolic BP and SpO2 in the 70s. By the time he arrived to the ED, he had received some IVF, and his mentation improved dramatically. He cannot respond to  any of the  questions.     Course of Principle Problem for Admission:  Upon arrival to the ED, his vital signs were significant for tachycardia with RR 26 and hypotension with BP 96/53. Lab workup significant for anemia with Hb 9.3 (down from most recent measurement two weeks ago but appears baseline prior to that) and numerous electrolyte abnormalities including hypernatremia with Na 153, hyperkalemia with K 6.6, hyperchloremia with Cl 130, metabolic acidosis with bicarbonate 12, and BUN/Cr of 125 and 6.3. Lactate normal at 1.9. Urinalysis positive for > 100 WBC and 37 RBC. CXR stable/mildly improved from prior examination with coarse interstitial attenuation. He was given 1L NS, cefepime 1g IV, and insulin + d50. he was admitted to the hospital for altered mental status and NIALL on CKD. Family was contacted (sister in-law and the niece that were mentioned in our medical records), for decision making on the need for HD and other invasive life-saving measures. Per them the patient has been living with them for 20 years and they are unsure if he has any children, per them, he might have had a son the probably has passed away. Sister in-law and niece decided to move forward with comfort care since he has had several admissions in the last year and he is not improving. They have consulted the brother and other siblings as well.    Other Medical Problems Addressed in the Hospital:    Hypernatremia     -- Na 154 on admission with free water deficit 1.9L based on his weight in his prior admission  -- Etiology likely hypovolemia  -- Ordered NGT insertion with abdomen x-ray for confirmation along with free water enteral boluses. After the family decided to move forward with comfort care this order was discontinued.       Mechanical deep vein thrombosis (DVT) prophylaxis in place     -- He is at high risk for CVA and would benefit from pharmacologic prophylaxis  -- SCD only for now until his head CT is complete to rule-out  bleed          History of CVA with residual deficit     -- Functional status not clear  -- CT head after admission revealed significant interval change in the size of the recurred meningioma and significant vasogenic edema       Encephalopathy, metabolic     - Minimally cooperative with neurologic examination. Delirium is high on the differential given waxing and waning timeline (altered at NH, oriented in triage, altered again on my examination). Possible contributors include UTI, hypernatremia, and azotemia  -- Treating above underlying conditions  -- Strict delirium and fall precautions ordered  -- CT head findings as above  - did not proceed with EEG after family decided to move forward with the comfort care       Urinary tract infection with hematuria     -- Occurring in the setting of chronic lindsay, unsure if colonization or true infection, but given his altered mental status will treat as infection. Ordered PCT to help with this decision  -- Ordered for the catheter to be replaced. Urine culture already obtained, along with blood culture  -- Given cefepime by the ED, presumably due to his history of pseudomonas infection. Given his renal function, he is not due for another dose for 24h. Will avoid this for now given his seizure history in favor of ciprofloxacin.  - started on ciprofloxacin after admission. Later on urine cultures grew pseudomonas, however at that point the patient was transitioning to the hospice, we continued the ciprofloxacin at pseudomonal dosage       Acute renal failure superimposed on chronic kidney disease     -- Appears dehydrated on examination  -- Uremia likely contributing to altered mental status  -- Electrolytes consistent with hypovolemia given hypernatremia and hyperchloremia, increased BUN:Cr  -- Given 1L NS in the ED. Will avoid hyperchloremic fluid resuscitation going forward unless he requires it for pressure indications  --the family members were contacted for decision  making for moving forward with dialysis, however they decided to transition to hospice         DKA, type 1     - Blood glucose well controlled on arrival  - NPO for now; will continue basal insulin and hold prandial insulin  - POCT glucose Q6H and aspart SSI ordered  - measurements were stopped after the family decided to move forward with comfort care       Anemia of chronic renal failure, stage 4 (severe)     - Stable  - followed with CBC daily  - measurements were stopped after the family decided to move forward with comfort care       Hyperkalemia     - Etiology likely NIALL on CKD  - K improved from 6.6 -> 5.7 with shifting and fluids and kayexalate as more definitive treatment       Seizures     - Stable without any seizure activity in years per sister  - keppra at home.   - Markedly abnormal EEG with generalized slowing indicative of a   diffuse cortical dysfunction without focal changes nor an epileptic process   being noted.       MCI (mild cognitive impairment)     - Appeared to be progressing to dementia per discussion with family  - enlarged meningioma and vasogenic edema as contributing factor for acute onset AMS     10/21:  Review of Systems   Unable to perform ROS: Mental status change      Objective:      Vital Signs (Most Recent):  Temp: 97 °F (36.1 °C) (10/21/17 1127)  Pulse: 61 (10/21/17 1127)  Resp: 17 (10/21/17 1127)  BP: (!) 127/59 (10/21/17 1127)  SpO2: 95 % (10/21/17 0753) Vital Signs (24h Range):  Temp:  [92.1 °F (33.4 °C)-97 °F (36.1 °C)] 97 °F (36.1 °C)  Pulse:  [57-62] 61  Resp:  [16-22] 17  SpO2:  [95 %-99 %] 95 %  BP: (101-127)/(52-61) 127/59      Weight: 60.4 kg (133 lb 2.5 oz)  Body mass index is 21.49 kg/m².     Physical Exam   Constitutional: No distress.   Chronically ill-appearing elderly man   HENT:   Head: Normocephalic and atraumatic.   Dry mucous membranes   Eyes: Pupils are equal, round, and reactive to light.   Neck: Neck supple. No JVD present.   Cardiovascular: Normal rate  and regular rhythm.  Exam reveals no gallop and no friction rub.    No murmur heard.  Pulmonary/Chest: Effort normal. No respiratory distress. He has no wheezes.   Fine bilateral crackles   Abdominal: Soft. Bowel sounds are normal. He exhibits no distension and no mass. There is no tenderness.   thin   Musculoskeletal: Normal range of motion. He exhibits no edema or tenderness.   Neurological: He displays normal reflexes.   Alert, but minimally oriented and not responsive to verbal communication. Will not follow any commands   Skin: Skin is warm and dry. No rash noted. No erythema.       CONSULTS:   Neurosurgery  Palliative medicine    Last CBC/BMP/HgbA1c (if applicable):  Recent Results (from the past 336 hour(s))   CBC auto differential    Collection Time: 10/19/17  9:36 PM   Result Value Ref Range    WBC 11.19 3.90 - 12.70 K/uL    Hemoglobin 9.3 (L) 14.0 - 18.0 g/dL    Hematocrit 31.3 (L) 40.0 - 54.0 %    Platelets 271 150 - 350 K/uL     Recent Results (from the past 336 hour(s))   Basic metabolic panel    Collection Time: 10/20/17  9:52 AM   Result Value Ref Range    Sodium 153 (H) 136 - 145 mmol/L    Potassium 5.8 (H) 3.5 - 5.1 mmol/L    Chloride >130 (HH) 95 - 110 mmol/L    CO2 7 (LL) 23 - 29 mmol/L    BUN, Bld 132 (H) 8 - 23 mg/dL    Creatinine 6.5 (H) 0.5 - 1.4 mg/dL    Calcium 8.2 (L) 8.7 - 10.5 mg/dL    Anion Gap Unable to calculate 8 - 16 mmol/L   Basic metabolic panel    Collection Time: 10/20/17 12:51 AM   Result Value Ref Range    Sodium 154 (H) 136 - 145 mmol/L    Potassium 5.6 (H) 3.5 - 5.1 mmol/L    Chloride 130 (HH) 95 - 110 mmol/L    CO2 12 (L) 23 - 29 mmol/L    BUN, Bld 125 (H) 8 - 23 mg/dL    Creatinine 6.3 (H) 0.5 - 1.4 mg/dL    Calcium 7.9 (L) 8.7 - 10.5 mg/dL    Anion Gap 12 8 - 16 mmol/L   Basic metabolic panel    Collection Time: 10/19/17 11:36 PM   Result Value Ref Range    Sodium 153 (H) 136 - 145 mmol/L    Potassium 6.6 (HH) 3.5 - 5.1 mmol/L    Chloride >130 (HH) 95 - 110 mmol/L    CO2 7  (LL) 23 - 29 mmol/L    BUN, Bld 119 (H) 8 - 23 mg/dL    Creatinine 6.1 (H) 0.5 - 1.4 mg/dL    Calcium 7.5 (L) 8.7 - 10.5 mg/dL    Anion Gap Unable to calculate 8 - 16 mmol/L     Lab Results   Component Value Date    HGBA1C 9.9 (H) 09/18/2017       Other Pertinent Lab Findings:    PH 7.189  Pco2 26.9  Hco3 10.2  Pertinent/Significant Diagnostic Studies:   CT Head without contrast    Special Treatments/Procedures: n/a    Disposition:  IP Hospice        Future Scheduled Appointments:  No future appointments.    Discharge Medication List:   only comfort medication     Ronald Julius CAIN   Home Medication Instructions CAM:[id number]    Printed on:10/21/17 0377   Medication Information                      albuterol-ipratropium 2.5mg-0.5mg/3mL (DUO-NEB) 0.5 mg-3 mg(2.5 mg base)/3 mL nebulizer solution  Take 3 mLs by nebulization every 4 (four) hours as needed for Wheezing or Shortness of Breath. Rescue             ciprofloxacin, CIPRO,400mg/200ml D5W IVPB (CIPRO) 400 mg/200 mL IVPB  Inject 200 mLs (400 mg total) into the vein once daily.                 Patient Instructions:  No discharge procedures on file.    Signing Physician:  Simin Richardson MD

## 2017-10-21 NOTE — PLAN OF CARE
Problem: Patient Care Overview  Goal: Plan of Care Review  Outcome: Ongoing (interventions implemented as appropriate)  Plan of care discussed with pt. Pt is AOx2.  Pt remains free of falls. Pt temperature is low contacted Dr López, no new orders placed. Called Dr Connelly in regards to low BP, Dr said she will put in orders. Will continue to monitor.

## 2017-10-21 NOTE — SUBJECTIVE & OBJECTIVE
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     TIA (transient ischemic attack) 2016    Type 1 diabetes     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 3/25/2013    Type 1 diabetes mellitus with hyperglycemia 4/8/2014    Type 1 diabetes mellitus with  hypoglycemia and without coma 4/8/2014    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     Poor control due to cognitive impairment, with history of hypoglycemia and DKA     Vitamin D deficiency disease 7/31/2013       Past Surgical History:   Procedure Laterality Date    CATARACT EXTRACTION W/  INTRAOCULAR LENS IMPLANT  8/26/2009, 10/14/2009    CRANIOTOMY  1995    CRANIOTOMY  12/21/2010    EYE SURGERY         Review of patient's allergies indicates:  No Known Allergies    No current facility-administered medications on file prior to encounter.      Current Outpatient Prescriptions on File Prior to Encounter   Medication Sig    albuterol-ipratropium 2.5mg-0.5mg/3mL (DUO-NEB) 0.5 mg-3 mg(2.5 mg base)/3 mL nebulizer solution Take 3 mLs by nebulization every 4 (four) hours as needed for Wheezing or Shortness of Breath. Rescue    [DISCONTINUED] aspirin (ECOTRIN) 81 MG EC tablet Take 1 tablet (81 mg total) by mouth once daily.    [DISCONTINUED] blood-glucose meter (CONTOUR METER) kit Use as instructed.  Please substitute appropriate meter to match his strips.    [DISCONTINUED] carvedilol (COREG) 25 MG tablet Take 1 tablet (25 mg total) by mouth 2 (two) times daily.    [DISCONTINUED] CONTOUR TEST STRIPS Strp USE AS DIRECTED THREE TIMES DAILY (Patient taking differently: USE AS DIRECTED FOUR TIMES DAILY)    [DISCONTINUED] insulin aspart (NOVOLOG) 100 unit/mL InPn pen Inject 6 Units into the skin 3 (three) times daily with meals. (Patient taking differently: Inject 6 Units into the skin 3 (three) times daily with meals. Plus sliding scale)    [DISCONTINUED] insulin detemir (LEVEMIR FLEXTOUCH) 100 unit/mL (3 mL) SubQ InPn pen Inject 18 Units into the skin once daily. (Patient taking differently: Inject 25 units into the skin every morning and 20 units at bedtime)    [DISCONTINUED] lancets Misc Use three times daily for finger stick glucose monitoring. (Patient taking differently: Use four times  "daily for finger stick glucose monitoring.)    [DISCONTINUED] levetiracetam (KEPPRA) 1000 MG tablet Take 1 tablet (1,000 mg total) by mouth 2 (two) times daily.    [DISCONTINUED] lisinopril 10 MG tablet Take 10 mg by mouth once daily.    [DISCONTINUED] nifedipine (PROCARDIA-XL) 90 MG (OSM) 24 hr tablet Take 1 tablet (90 mg total) by mouth once daily.    [DISCONTINUED] paroxetine (PAXIL) 10 MG tablet Take 1 tablet (10 mg total) by mouth every morning.    [DISCONTINUED] pen needle, diabetic (BD INSULIN PEN NEEDLE UF SHORT) 31 gauge x 5/16" Ndle Inject 1 Device into the skin 4 (four) times daily. (Patient taking differently: Inject 1 Device into the skin 5 (five) times daily. )     Family History     Problem Relation (Age of Onset)    Cataracts Mother    Diabetes Mother, Sister, Sister, Brother, Sister, Brother    No Known Problems Father, Son, Daughter, Maternal Grandmother, Maternal Grandfather, Paternal Grandmother, Paternal Grandfather, Maternal Aunt, Maternal Uncle, Paternal Aunt, Paternal Uncle        Social History Main Topics    Smoking status: Former Smoker     Packs/day: 1.00     Types: Cigarettes     Quit date: 12/31/1994    Smokeless tobacco: Former User     Quit date: 4/3/2010    Alcohol use No    Drug use: No    Sexual activity: Not on file     Review of Systems   Unable to perform ROS: Mental status change     Objective:     Vital Signs (Most Recent):  Temp: 97 °F (36.1 °C) (10/21/17 1127)  Pulse: 61 (10/21/17 1127)  Resp: 17 (10/21/17 1127)  BP: (!) 127/59 (10/21/17 1127)  SpO2: 95 % (10/21/17 0753) Vital Signs (24h Range):  Temp:  [92.1 °F (33.4 °C)-97 °F (36.1 °C)] 97 °F (36.1 °C)  Pulse:  [57-62] 61  Resp:  [16-22] 17  SpO2:  [95 %-99 %] 95 %  BP: (101-127)/(52-61) 127/59     Weight: 60.4 kg (133 lb 2.5 oz)  Body mass index is 21.49 kg/m².    Physical Exam   Constitutional: No distress.   Chronically ill-appearing elderly man   HENT:   Head: Normocephalic and atraumatic.   Dry mucous " "membranes   Eyes: Pupils are equal, round, and reactive to light.   Neck: Neck supple. No JVD present.   Cardiovascular: Normal rate and regular rhythm.  Exam reveals no gallop and no friction rub.    No murmur heard.  Pulmonary/Chest: Effort normal. No respiratory distress. He has no wheezes.   Fine bilateral crackles   Abdominal: Soft. Bowel sounds are normal. He exhibits no distension and no mass. There is no tenderness.   thin   Musculoskeletal: Normal range of motion. He exhibits no edema or tenderness.   Neurological: He displays normal reflexes.   Alert, but minimally oriented and not responsive to verbal communication. Will not follow any commands   Skin: Skin is warm and dry. No rash noted. No erythema.        Significant Labs:     CBC:    Recent Labs  Lab 10/19/17  2136 10/19/17  2250   WBC 11.19  --    GRAN 74.8*  8.4*  --    HGB 9.3*  --    HCT 31.3* 24*     --        Chem 10:    Recent Labs  Lab 10/19/17  2336 10/20/17  0051 10/20/17  0952   * 154* 153*   K 6.6* 5.6* 5.8*  5.7*   CL >130* 130* >130*   CO2 7* 12* 7*   * 125* 132*   CREATININE 6.1* 6.3* 6.5*   * 153* 83   CALCIUM 7.5* 7.9* 8.2*       LFTs:    Recent Labs  Lab 10/19/17  2136   ALKPHOS 113   BILITOT 0.3   AST 46*   ALT 44   ALBUMIN 2.0*       Significant Imaging:   CXR 10/19/2017:   "Chronic interstitial findings, no convincing acute cardiopulmonary process." - per interpreting radiologist    CT head without contrast 10/20/2017:  In process    Past Surgical History:   Procedure Laterality Date    CATARACT EXTRACTION W/  INTRAOCULAR LENS IMPLANT  8/26/2009, 10/14/2009    CRANIOTOMY  1995    CRANIOTOMY  12/21/2010    EYE SURGERY         Review of patient's allergies indicates:  No Known Allergies    No current facility-administered medications on file prior to encounter.      Current Outpatient Prescriptions on File Prior to Encounter   Medication Sig    albuterol-ipratropium 2.5mg-0.5mg/3mL (DUO-NEB) 0.5 " "mg-3 mg(2.5 mg base)/3 mL nebulizer solution Take 3 mLs by nebulization every 4 (four) hours as needed for Wheezing or Shortness of Breath. Rescue    aspirin (ECOTRIN) 81 MG EC tablet Take 1 tablet (81 mg total) by mouth once daily.    blood-glucose meter (CONTOUR METER) kit Use as instructed.  Please substitute appropriate meter to match his strips.    carvedilol (COREG) 25 MG tablet Take 1 tablet (25 mg total) by mouth 2 (two) times daily.    CONTOUR TEST STRIPS Strp USE AS DIRECTED THREE TIMES DAILY (Patient taking differently: USE AS DIRECTED FOUR TIMES DAILY)    insulin aspart (NOVOLOG) 100 unit/mL InPn pen Inject 6 Units into the skin 3 (three) times daily with meals. (Patient taking differently: Inject 6 Units into the skin 3 (three) times daily with meals. Plus sliding scale)    insulin detemir (LEVEMIR FLEXTOUCH) 100 unit/mL (3 mL) SubQ InPn pen Inject 18 Units into the skin once daily. (Patient taking differently: Inject 25 units into the skin every morning and 20 units at bedtime)    lancets Misc Use three times daily for finger stick glucose monitoring. (Patient taking differently: Use four times daily for finger stick glucose monitoring.)    levetiracetam (KEPPRA) 1000 MG tablet Take 1 tablet (1,000 mg total) by mouth 2 (two) times daily.    lisinopril 10 MG tablet Take 10 mg by mouth once daily.    nifedipine (PROCARDIA-XL) 90 MG (OSM) 24 hr tablet Take 1 tablet (90 mg total) by mouth once daily.    paroxetine (PAXIL) 10 MG tablet Take 1 tablet (10 mg total) by mouth every morning.    pen needle, diabetic (BD INSULIN PEN NEEDLE UF SHORT) 31 gauge x 5/16" Ndle Inject 1 Device into the skin 4 (four) times daily. (Patient taking differently: Inject 1 Device into the skin 5 (five) times daily. )     Family History     Problem Relation (Age of Onset)    Cataracts Mother    Diabetes Mother, Sister, Sister, Brother, Sister, Brother    No Known Problems Father, Son, Daughter, Maternal Grandmother, " Maternal Grandfather, Paternal Grandmother, Paternal Grandfather, Maternal Aunt, Maternal Uncle, Paternal Aunt, Paternal Uncle        Social History Main Topics    Smoking status: Former Smoker     Packs/day: 1.00     Types: Cigarettes     Quit date: 12/31/1994    Smokeless tobacco: Former User     Quit date: 4/3/2010    Alcohol use No    Drug use: No    Sexual activity: Not on file     Review of Systems   Unable to perform ROS: Mental status change     Objective:     Vital Signs (Most Recent):  Temp: (!) 93.8 °F (34.3 °C) (10/21/17 0403)  Pulse: 62 (10/21/17 0753)  Resp: 16 (10/21/17 0753)  BP: (!) 111/58 (10/21/17 0753)  SpO2: 95 % (10/21/17 0753) Vital Signs (24h Range):  Temp:  [92.1 °F (33.4 °C)-94.4 °F (34.7 °C)] 93.8 °F (34.3 °C)  Pulse:  [51-62] 62  Resp:  [16-22] 16  SpO2:  [89 %-99 %] 95 %  BP: ()/(46-61) 111/58     Weight: 60.4 kg (133 lb 2.5 oz)  Body mass index is 21.49 kg/m².    Physical Exam   Constitutional: He appears lethargic. He is uncooperative. He appears ill. No distress. Nasal cannula in place.   Chronically ill-appearing elderly man   HENT:   Head: Normocephalic and atraumatic.   Mouth/Throat: Mucous membranes are dry.   Dry mucous membranes   Eyes: Pupils are equal, round, and reactive to light.   Neck: Neck supple. No JVD present.   Cardiovascular: Normal rate and regular rhythm.    Difficult to assess the heart sounds 2/2 to patient's positioning and being uncooperative   Pulmonary/Chest: Effort normal. No tachypnea. No respiratory distress. He has no wheezes. He has rhonchi.   Fine bilateral crackles   Abdominal: Soft. Bowel sounds are normal. He exhibits no distension and no mass. There is no tenderness.   thin   Musculoskeletal: Normal range of motion. He exhibits no edema or tenderness.   Stiff upper extremities   Neurological: He appears lethargic. He displays normal reflexes.   Awake, not responsive to verbal communication. Incomprehensible verbal responses. Does not  "follow any commands   Skin: Skin is warm and dry. No rash noted. No erythema.   Vitals reviewed.       Significant Labs:     CBC:    Recent Labs  Lab 10/19/17  2136 10/19/17  2250   WBC 11.19  --    GRAN 74.8*  8.4*  --    HGB 9.3*  --    HCT 31.3* 24*     --        Chem 10:    Recent Labs  Lab 10/19/17  2336 10/20/17  0051 10/20/17  0952   * 154* 153*   K 6.6* 5.6* 5.8*  5.7*   CL >130* 130* >130*   CO2 7* 12* 7*   * 125* 132*   CREATININE 6.1* 6.3* 6.5*   * 153* 83   CALCIUM 7.5* 7.9* 8.2*       LFTs:    Recent Labs  Lab 10/19/17  2136   ALKPHOS 113   BILITOT 0.3   AST 46*   ALT 44   ALBUMIN 2.0*       Significant Imaging:   CXR 10/19/2017:   "Chronic interstitial findings, no convincing acute cardiopulmonary process." - per interpreting radiologist    CT head without contrast 10/20/2017:  In process  "

## 2017-10-21 NOTE — NURSING
Contacted Dr Ramos regarding low temp 92.1.  reported that patient is on comfort measure, and no new orders are being added in regards to the low temperature.

## 2017-10-21 NOTE — PROGRESS NOTES
Notified MD on call that DNR form is still missing Attending signature. MD stated he would make sure it is done.

## 2017-10-22 NOTE — PROGRESS NOTES
Patient is ready for discharge. Patient stable alert and oriented. IVs removed. No complaints of pain. VSS.  Discussed discharge plan.

## 2017-10-23 LAB
BACTERIA BLD CULT: NORMAL

## 2017-10-24 LAB
BACTERIA BLD CULT: NORMAL

## 2017-10-25 NOTE — PHYSICIAN QUERY
PT Name: Julius Cardenas  MR #: [id number]     Physician Query Form - Documentation Clarification      CDS/: Brandy E Capley               Contact information: [email or web address]    This form is a permanent document in the medical record.     Query Date: October 25, 2017    By submitting this query, we are merely seeking further clarification of documentation. Please utilize your independent clinical judgment when addressing the question(s) below.    The Medical record reflects the following:    Supporting Clinical Findings Location in Medical Record     Bacteremia      [facility] medicine care plan 10/21     Blood Culture, Routine    STAPHYLOCOCCUS EPIDERMIDIS   Specimen Type: Blood  Specimen Source: Peripheral, Hand, Right  Collected: 10/19/2017  9:37 PM      Blood culture 10/19                                                                            Doctor, Please specify diagnosis or diagnoses associated with above clinical findings.    Provider Use Only       (*)  Bacteremia due to staphylococcus epidermidis     (*)  Other (please specify): UTI due to pseudomonas aeruginosa                                                                                                            [  ] Clinically undetermined

## 2017-10-25 NOTE — PHYSICIAN QUERY
PT Name: Julius Cardenas  MR #: [id number]    Physician Query Form - Cause and Effect Relationship Clarification      CDS/: Brandy E Capley               Contact information: [email or web address]    This form is a permanent document in the medical record.     Query Date: October 25, 2017    By submitting this query, we are merely seeking further clarification of documentation. Please utilize your independent clinical judgment when addressing the question(s) below.    The Medical record contains the following:  Supporting Clinical Findings   Location in record                                                                          Urinary tract infection with hematuria   -- Occurring in the setting of chronic lindsay, unsure if colonization or true infection, but given his altered mental status will treat as infection                                                                                                                       DC summary         Provider, please clarify if there is any correlation between ______UTI___________________ and ___Foley Catheter_______________.           Are the conditions:     [*] Due to or associated with each other     [  ] Unrelated to each other     [  ] Other (Please Specify): _________________________     [*] Clinically Undetermined

## 2017-10-25 NOTE — PHYSICIAN QUERY
PT Name: Julius Cardenas  MR #: [id number]     Physician Query Form - Documentation Clarification      CDS/: Brandy E Capley               Contact information: [email or web address]    This form is a permanent document in the medical record.     Query Date: October 25, 2017    By submitting this query, we are merely seeking further clarification of documentation. Please utilize your independent clinical judgment when addressing the question(s) below.    The Medical record reflects the following:    Supporting Clinical Findings Location in Medical Record     Urinary tract infection with hematuria   -- Occurring in the setting of chronic lindsay, unsure if colonization or true infection, but given his altered mental status will treat as infection.      started on ciprofloxacin after admission. Later on urine cultures grew pseudomonas, however at that point the patient was transitioning to the hospice, we continued the ciprofloxacin at pseudomonal dosage    DC summary 10/21     Urine Culture, Routine PSEUDOMONAS AERUGINOSA> 100,000 cfu/ml      Urine culture 10/19                                                                            Doctor, Please specify diagnosis or diagnoses associated with above clinical findings.    Provider Use Only       (*)  UTI due to pseudomonas Aeruginosa     (  )  Other (please specify):  _________________________________                                                                                                             [  ] Clinically undetermined

## 2022-04-07 NOTE — PLAN OF CARE
Problem: Physical Therapy Goal  Goal: Physical Therapy Goal  Goals to be met by: 7/10/17     Patient will increase functional independence with mobility by performin. Supine to sit with modified independence.   2. Sit to supine with modified independence.   3. Sit<>stand transfer with supervision using rolling walker.   4. Gait > 30 feet with CGA using rolling walker.   5. Ascend/descend 5 stairs with bilateral handrails with min A.     Outcome: Ongoing (interventions implemented as appropriate)  Pt progressing with mobility, although demonstrated agitation when encouraged to perform OOB mobility including gait 2 trials x 12 ft with rolling walker and standing to wash face at sink. Will continue to follow and progress as tolerated. Please see progress note for detailed plan of care and recommendations.         Pf[employer] dose 1 and 2

## 2022-04-28 NOTE — PT/OT/SLP EVAL
Occupational Therapy  Evaluation/Treatment    Julius Cardenas   MRN: [id number]   Admitting Diagnosis: Hypoglycemia    OT Date of Treatment: 03/26/17   OT Start Time: 1434  OT Stop Time: 1528  OT Total Time (min): 54 min    Billable Minutes:  Evaluation 30  Therapeutic Activity 24    Diagnosis: Hypoglycemia     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      Past Surgical History:   Procedure Laterality Date    CATARACT EXTRACTION W/  INTRAOCULAR LENS IMPLANT  8/26/2009, 10/14/2009    CRANIOTOMY  1995    CRANIOTOMY  12/21/2010    EYE SURGERY         Referring physician: Blair Daniels  Date referred to OT: 3/24/17    General Precautions: Standard, fall, diabetic  Orthopedic Precautions: N/A  Braces: N/A    Do you have any cultural, spiritual, Gnosticism conflicts, given your current situation?: none     Patient History:  Living Environment  Lives With: other (see comments) (neice her  and  their two teenaged children)  Living Arrangements: house (single story house)  Home Accessibility: stairs to enter home (walko-in shower with seat)  Home Layout: Able to live on 1st floor  Number of Stairs to Enter Home: 5  Stair Railings at Home: outside, present on right side  Transportation Available: family or friend will provide  Equipment Currently Used at Home: cane, quad, wheelchair, bedside commode (DME available for use)    Prior level of function:   Bed Mobility/Transfers: needs device (QC)  Grooming: needs device (QC)  Bathing: unable to perform (total A by Aide)  Upper Body Dressing: independent  Lower Body Dressing: needs assist  Toileting: independent (QC)  Home Management Skills: unable to perform  Driving License: Yes  Mode of Transportation: Car  Leisure and Hobbies: TV, radio  IADL Comments: ambulates with QC, MI self-care (Total A bathing and assist LBD)     Dominant hand: right    Subjective:  The patient was found chair position in bed completing his lunch.  He was agreeable to the OT evaluation but had trouble understanding why he needed to do more than the ambulation done earlier today with Physical Therapy.  He participated in the evaluation tasks needing encouragement, close CGA for safety and environmental modification to maximize patient performance..    Chief Complaint: frustrated with being in the hospital  Patient/Family stated goals: none    Pain Ratin/10  Pain Rating Post-Intervention: 0/10    Objective:  Patient found with: peripheral IV, telemetry    Cognitive Exam:  Oriented to: Person, Place and Time  Follows Commands/attention: Follows one-step commands  Communication: clear/fluent  Memory:  Impaired STM and Impaired LTM  Safety awareness/insight to disability: impaired  Coping skills/emotional control: Appropriate to situation    Visual/perceptual:  Impaired  acuity and glasses lost    Physical Exam:  Postural examination/scapula alignment: Rounded shoulder, Head forward  "and Abnormal trunk flexion  Skin integrity: Bruising of legs  Edema: None noted     Sensation:   Intact for light touch both hands    Upper Extremity Range of Motion:  Right Upper Extremity: shoulder flexion/ABD 60*  Left Upper Extremity: shoulder flexion/AB 90*    Upper Extremity Strength:  Right Upper Extremity: grossly 4/5  Left Upper Extremity: grossly 3+/5   Strength: WFL for gross grasp both hands    Fine motor coordination:   Poor+  both hands (finger  Contractures with loss of intrinsic muscle function both hands)    Gross motor coordination: sitting balance Fair + sitting EOB, difficulty obtaining LE balance with standing with imapired gait and poor RW safety with ambulation and transfers    Functional Mobility:  Bed Mobility:  Supine to Sit: Stand by Assistance  Sit to Supine: Stand by Assistance    Transfers:   Mod/Min A sit>stand/CGA with poor safety stand>sit with RW  Mod A BSC transfer with RW (poor safety)    Functional Ambulation: ambulates with RW bed> wall (12")>chair>BSC at EOB close CGA    Activities of Daily Living:  Feeding Level of Assistance: Modified independent (bed level)  UE Dressing Level of Assistance: Moderate assistance (don hospital gown as robe sitting EOB)  LE Dressing Level of Assistance: Moderate assistance (doff/don socks sitting EoB)  Grooming Position: Standing at sink (RW)  Grooming Level of Assistance: Contact guard assistance, Stand by assistance  Toileting Where Assessed: Bedside commode  Toileting Level of Assistance: Total assistance    Balance:   Static Sit: FAIR: Maintains without assist, but unable to take any challenges   Dynamic Sit: FAIR+: Maintains balance through MINIMAL excursions of active trunk motion  Static Stand: POOR+: Needs MINIMAL assist to maintain  Dynamic stand: FAIR: Needs CONTACT GUARD during gait    Therapeutic Activities and Exercises:  Transfer training, UBD, safety with RW use    AM-PAC 6 CLICK ADL  How much help from another person does " "this patient currently need?  1 = Unable, Total/Dependent Assistance  2 = A lot, Maximum/Moderate Assistance  3 = A little, Minimum/Contact Guard/Supervision  4 = None, Modified [place]/Independent    Putting on and taking off regular lower body clothing? : 2  Bathing (including washing, rinsing, drying)?: 2  Toileting, which includes using toilet, bedpan, or urinal? : 1  Putting on and taking off regular upper body clothing?: 2  Taking care of personal grooming such as brushing teeth?: 3  Eating meals?: 4  Total Score: 14    AM-PAC Raw Score CMS "G-Code Modifier Level of Impairment Assistance   6 % Total / Unable   7 - 9 CM 80 - 100% Maximal Assist   10 - 14 CL 60 - 80% Moderate Assist   15 - 19 CK 40 - 60% Moderate Assist   20 - 22 CJ 20 - 40% Minimal Assist   23 CI 1-20% SBA / CGA   24 CH 0% Independent/ Mod I       Patient left supine with all lines intact, call button in reach, bed alarm on and PCT notified    Assessment:  Julius Cardenas is a 84 y.o. male with a medical diagnosis of Hypoglycemia and presents with pain, safety, LE balance-gait, poor hand function, limitation in ROM UEs and LEs and anxiety during the session.  The patient needs OT treatment to maximize function while in the hospital.    Functional Performance Deficits Effecting Function: pain, safety, LE balance-gait, poor hand function, limitation in ROM UEs and LEs, anxiety    Rehab potential is good.    Activity tolerance: Fair    Discharge recommendations: Discharge Facility/Level Of Care Needs: home health OT, home health PT (Home Health Aide)     Barriers to discharge: Barriers to Discharge: Inaccessible home environment    Equipment recommendations: walker, rolling     GOALS:   Occupational Therapy Goals        Problem: Occupational Therapy Goal    Goal Priority Disciplines Outcome Interventions   Occupational Therapy Goal     OT, PT/OT Ongoing (interventions implemented as appropriate)    Description:  Goals to be met by " 4/25/17  1. Min A UBD  2. Min A LBD  3. SBA G/H standing at sink  4. Mod A toilet hygiene              PLAN:  Patient to be seen 5 x/week to address the above listed problems via self-care/home management, therapeutic activities, therapeutic exercises  Plan of Care expires: 04/25/17  Plan of Care reviewed with: patient         Vilma ROSA MARIA Thompson  03/26/2017   Good

## 2022-07-06 NOTE — ASSESSMENT & PLAN NOTE
- Does not appear to be on any meds  - Previously appears to have been on azothiaprine at one time   - GFR mildly improved from admission  - appears stable

## 2022-10-19 NOTE — PT/OT/SLP PROGRESS
Physical Therapy  Treatment    Julius Cardenas   MRN: [id number]   Admitting Diagnosis: Hyperglycemia    PT Received On: 08/16/17  PT Start Time: 0926     PT Stop Time: 0951    PT Total Time (min): 25 min       Billable Minutes:  Therapeutic Activity 13 and Neuromuscular Re-education 12=25    Treatment Type: Treatment  PT/PTA: PT     PTA Visit Number: 0       General Precautions: Standard, fall, seizure  Orthopedic Precautions: N/A   Braces:           Subjective:  Communicated with nurse prior to session.  Nurse juan david therapy.  Patient agreeable to therapy    Pain/Comfort  Pain Rating 1: 0/10  Pain Addressed 1: Reposition, Distraction  Pain Rating Post-Intervention 1: other (see comments) (patient c/o pain all over; resolves w/ supine and repositioning)    Objective:   Patient found with: telemetry, lindsay catheter    Functional Mobility:  Bed Mobility:   Rolling/Turning to Left: Moderate assistance  Rolling/Turning Right: Moderate assistance (Mod of 2 first trial; mod of 1 second trial)  Scooting/Bridging: Minimum Assistance (min of 2 to scoot in sit to EOB, posterior lean)  Supine to Sit: Moderate Assistance (assist of 2 one trial; assist of 1 second trial)  Sit to Supine: Maximum Assistance (of 2 for BLEs into bed and trunk positioning)    Transfers:  Sit <> Stand Assistance: Moderate Assistance (stands from EOB w/ RW and min/mod assist of 2 for balance, hip elevation)  Sit <> Stand Assistive Device: Rolling Walker    Gait:   Gait Distance: side steps to the left to HOB w/ RW and min/mod assist of 2 for balance, assist w/ RW, 8 small side steps  Assistance 1: Total assistance (min/mod of 2 w/ RW)  Gait Assistive Device: Rolling walker  Gait Pattern:  (side steps)  Gait Deviation(s): decreased connie, increased time in double stance, decreased velocity of limb motion, decreased step length, decreased stride length, decreased toe-to-floor clearance, decreased weight-shifting ability, backward lean, lateral  lean      Balance:   Static Sit: sits EOB w/ min assist of 2, one behind for posterior lean and one in front to facilitate balance. Pt w/ posterior and left lateral lean, corrects briefly w/ cues/assist. Sits EOB ~ 6 minutes. (Pt has BM and returned to supine for cleaning.)  Dynamic Sit: Pt unable to move out of KAIT and demonstrates posterior lean  Static Stand: stands w/ RW and min to occ mod assist of 2 ~ two minute    Therapeutic Activities and Exercises:  Sitting EOB w/ facilitation for trunk positioning, forward leaning to counter act posterior lean  Rolling side to side for hygiene (PCT/tech assist) and to change drawsheets.  Patient educated on PT POC, balance, safety, transfer technique.     AM-PAC 6 CLICK MOBILITY  How much help from another person does this patient currently need?   1 = Unable, Total/Dependent Assistance  2 = A lot, Maximum/Moderate Assistance  3 = A little, Minimum/Contact Guard/Supervision  4 = None, Modified [place]/Independent    Turning over in bed (including adjusting bedclothes, sheets and blankets)?: 2  Sitting down on and standing up from a chair with arms (e.g., wheelchair, bedside commode, etc.): 2  Moving from lying on back to sitting on the side of the bed?: 2  Moving to and from a bed to a chair (including a wheelchair)?: 2  Need to walk in hospital room?: 1  Climbing 3-5 steps with a railing?: 1  Total Score: 10    AM-PAC Raw Score CMS G-Code Modifier Level of Impairment Assistance   6 % Total / Unable   7 - 9 CM 80 - 100% Maximal Assist   10 - 14 CL 60 - 80% Moderate Assist   15 - 19 CK 40 - 60% Moderate Assist   20 - 22 CJ 20 - 40% Minimal Assist   23 CI 1-20% SBA / CGA   24 CH 0% Independent/ Mod I     Patient left supine with all lines intact, call button in reach and bed alarm on.    Assessment:  Julius Cardenas is a 84 y.o. male with a medical diagnosis of Hyperglycemia and presents with deficits noted including left hemiparesis from prior stroke. Patient  participated fairly well w/ max encourgement. Requiring less assist as trial progressed. Patient will benefit from continued physical therapy to address deficits and improve safety and functional mobility. Continue with physical therapy plan of care. .    Rehab identified problem list/impairments: Rehab identified problem list/impairments: weakness, impaired endurance, gait instability, impaired functional mobilty, impaired self care skills, impaired balance, decreased upper extremity function, decreased lower extremity function, decreased ROM, impaired joint extensibility, pain, decreased safety awareness, abnormal tone    Rehab potential is fair.    Activity tolerance: Fair    Discharge recommendations: Discharge Facility/Level Of Care Needs: nursing facility, skilled     Barriers to discharge: Barriers to Discharge: Inaccessible home environment, Decreased caregiver support    Equipment recommendations: Equipment Needed After Discharge: bedside commode     GOALS:    Physical Therapy Goals        Problem: Physical Therapy Goal    Goal Priority Disciplines Outcome Goal Variances Interventions   Physical Therapy Goal     PT/OT, PT Ongoing (interventions implemented as appropriate)     Description:  Goals to be met by: 2017    Patient will increase functional independence with mobility by performin. Supine to sit with MInimal Assistance   2. Rolling to Left and Right with Minimal Assistance.  3. Sit to stand transfer with Minimal Assistance  4. Bed to chair transfer with Moderate Assistance using squat pivot  5. Wheelchair propulsion x50 feet with Minimal Assistance using bilateral upper and lower extremities  6. Gait 10' with mod A using appropriate AD if able                        PLAN:    Patient to be seen 3 x/week  to address the above listed problems via gait training, therapeutic activities, therapeutic exercises, neuromuscular re-education  Plan of Care expires:    Plan of Care reviewed with:  patient         Devorah Lazaro, PT  08/16/2017     19-Oct-2022 04:57
